# Patient Record
Sex: MALE | Race: WHITE | NOT HISPANIC OR LATINO | Employment: OTHER | URBAN - METROPOLITAN AREA
[De-identification: names, ages, dates, MRNs, and addresses within clinical notes are randomized per-mention and may not be internally consistent; named-entity substitution may affect disease eponyms.]

---

## 2017-01-20 ENCOUNTER — OFFICE VISIT (OUTPATIENT)
Dept: INTERNAL MEDICINE | Facility: CLINIC | Age: 52
End: 2017-01-20
Payer: COMMERCIAL

## 2017-01-20 VITALS
SYSTOLIC BLOOD PRESSURE: 134 MMHG | OXYGEN SATURATION: 95 % | HEIGHT: 64 IN | BODY MASS INDEX: 34.52 KG/M2 | HEART RATE: 93 BPM | WEIGHT: 202.19 LBS | DIASTOLIC BLOOD PRESSURE: 84 MMHG

## 2017-01-20 DIAGNOSIS — G25.81 RLS (RESTLESS LEGS SYNDROME): Primary | ICD-10-CM

## 2017-01-20 PROCEDURE — 99213 OFFICE O/P EST LOW 20 MIN: CPT | Mod: S$GLB,,, | Performed by: NURSE PRACTITIONER

## 2017-01-20 PROCEDURE — 99999 PR PBB SHADOW E&M-EST. PATIENT-LVL III: CPT | Mod: PBBFAC,,, | Performed by: NURSE PRACTITIONER

## 2017-01-20 PROCEDURE — 99213 OFFICE O/P EST LOW 20 MIN: CPT | Mod: PBBFAC,PN | Performed by: NURSE PRACTITIONER

## 2017-01-20 RX ORDER — ROPINIROLE 0.5 MG/1
0.5 TABLET, FILM COATED ORAL 3 TIMES DAILY
Qty: 90 TABLET | Refills: 2 | Status: SHIPPED | OUTPATIENT
Start: 2017-01-20 | End: 2017-06-20

## 2017-01-20 RX ORDER — ACETAMINOPHEN 325 MG/1
325 TABLET ORAL EVERY 6 HOURS PRN
COMMUNITY
End: 2018-06-13

## 2017-01-20 NOTE — MR AVS SNAPSHOT
[facility] [address] [address]  Phone: [phone]  Fax: [phone]                  Michael Guillaume   2017 9:00 AM   Office Visit    Description:  Male : 1965   Provider:  Latrice Zafar NP   Department:  [facility] Medicine           Reason for Visit     Insomnia     Leg Pain           Diagnoses this Visit        Comments    RLS (restless legs syndrome)    -  Primary            To Do List           Future Appointments        Provider Department Dept Phone    3/22/2017 8:00 AM [place], [facility] [phone]    3/28/2017 7:30 AM Latrice Zafar NP [facility] [phone]      Goals (5 Years of Data)     None      Follow-Up and Disposition     Return if symptoms worsen or fail to improve.       These Medications        Disp Refills Start End    ropinirole (REQUIP) 0.5 MG tablet 90 tablet 2 2017    Take 1 tablet (0.5 mg total) by mouth 3 (three) times daily. - Oral    Pharmacy: [facility] #: [phone]         [facility] On Call     [facility] On Call Nurse Care Line -  Assistance  Registered nurses in the [facility] provide clinical advisement, health education, appointment booking, and other advisory services.  Call for this free service at [phone].             Medications           Message regarding Medications     Verify the changes and/or additions to your medication regime listed below are the same as discussed with your clinician today.  If any of these changes or additions are incorrect, please notify your healthcare provider.        START taking these NEW medications        Refills    ropinirole (REQUIP) 0.5 MG tablet 2    Sig: Take 1 tablet (0.5 mg total) by mouth 3 (three) times daily.    Class: Normal    Route: Oral           Verify that the below list of medications is an accurate representation  of the medications you are currently taking.  If none reported, the list may be blank. If incorrect, please contact your healthcare provider. Carry this list with you in case of emergency.           Current Medications     acetaminophen (TYLENOL) 325 MG tablet Take 325 mg by mouth every 6 (six) hours as needed for Pain.    alprazolam (XANAX) 1 MG tablet TAKE ONE TABLET BY MOUTH THREE TIMES A DAY AS     NEEDED    aspirin 81 mg Tab Take 1 tablet by mouth once daily.     carvedilol (COREG) 25 MG tablet TAKE 2 TABLETS BY MOUTH TWICE A DAY    clopidogrel (PLAVIX) 75 mg tablet Take 1 tablet (75 mg total) by mouth once daily.    esomeprazole (NEXIUM) 40 MG capsule TAKE ONE CAPSULE BY MOUTH EVERY DAY    hydrochlorothiazide (HYDRODIURIL) 12.5 MG Tab Take 1 tablet (12.5 mg total) by mouth once daily.    MULTI-VITAMIN HI-PO ORAL Take by mouth once daily.     ramipril (ALTACE) 10 MG capsule Take 1 capsule (10 mg total) by mouth 2 (two) times daily.    rosuvastatin (CRESTOR) 20 MG tablet Take 1 tablet (20 mg total) by mouth once daily.    SYMBICORT 80-4.5 mcg/actuation HFAA INHALE 2 PUFFS TWICE A   DAY AS DIRECTED    albuterol (PROVENTIL) 2.5 mg /3 mL (0.083 %) nebulizer solution Take 3 mLs (2.5 mg total) by nebulization every 6 (six) hours as needed for Wheezing.    albuterol-ipratropium 2.5mg-0.5mg/3mL (DUO-NEB) 0.5 mg-3 mg(2.5 mg base)/3 mL nebulizer solution Take 3 mLs by nebulization every 6 (six) hours as needed for Wheezing.    carvedilol (COREG) 25 MG tablet Take 2 tablets (50 mg total) by mouth 2 (two) times daily.    ropinirole (REQUIP) 0.5 MG tablet Take 1 tablet (0.5 mg total) by mouth 3 (three) times daily.    vitamin E 400 UNIT capsule Take 400 Units by mouth once daily.             Clinical Reference Information           Vital Signs - Last Recorded  Most recent update: 1/20/2017  9:11 AM by Sanchez Méndez MA    BP Pulse Ht Wt SpO2 BMI    134/84 (BP Location: Right arm, Patient Position: Sitting, BP Method: Manual)  "93 5' 4" (1.626 m) 91.7 kg (202 lb 2.6 oz) 95% 34.7 kg/m2      Blood Pressure          Most Recent Value    BP  134/84      Allergies as of 1/20/2017     Lipitor  [Atorvastatin]      Immunizations Administered on Date of Encounter - 1/20/2017     None      Instructions      Understanding Restless Legs Syndrome  Are you ever annoyed by a creeping or itching feeling in your legs? Do you often feel an urge to move your legs while sitting or lying in bed? This can keep you from falling asleep at night. You may then feel tired during the day. If you have these problems, talk to your health care provider. He or she can suggest a treatment plan and help you find ways to sleep better.    Restless legs syndrome (RLS)  RLS is a creeping, crawly, or jumpy feeling in the legs with an urge to move them. Symptoms of RLS often occur during periods of inactivity, such as when you sit or lie down at night. This discomfort can keep you from falling asleep. RLS is more common in older people and tends to run in families. Overuse of caffeine or alcohol may make symptoms worse. Iron deficiency, diabetes, or kidney problems can contribute to RLS.  Periodic limb movement syndrome (PLMS)  PLMS is sudden, repetitive leg jerking during sleep. The person you sleep with is often the one who notices it. Your legs may jerk many times during the night. You and your partner may both have trouble sleeping and feel tired in the morning. PLMS shouldnt be confused with the normal leg or body twitching many people have when first falling asleep.  Treating these problems  If these problems are causing disrupted sleep and daytime symptoms, treatment may be needed. Possible treatments may include:  · Avoiding medications like antidepressants, antinausea medications, and antipsychotic medications.   · Prescribed medications.  · Lifestyle changes, such as controlling caffeine intake, alcohol, and smoking.  © 7444-1071 [employer] [address] [place] [address]. All rights reserved. This information is not intended as a substitute for professional medical care. Always follow your healthcare professional's instructions.        Restless Legs Syndrome: What You Can Do  Symptoms of restless leg syndrome (RLS) can be treated. Together, you and your health care provider can work on your treatment plan. If needed, medications may be prescribed. Also learn what you can do to ease your discomfort. Good sleep habits and a healthy lifestyle will help you rest better at night and have more energy during the day.    Working with your health care provider  RLS may occur on its own and may be passed on in families. It is sometimes linked to other medical problems. Low iron may cause some RLS symptoms. Your health care provider may order a lab test to check your iron level. Other medical problems associated with RLS are kidney disease, diabetes, and multiple sclerosis. Your doctor may prescribe medications to reduce your symptoms and help you sleep better.  Tips for temporary relief  To reduce your discomfort, try the following:  · Walking or stretching  · Rubbing your legs  · Having a massage  · Taking a hot or cold bath  · Doing activities that make muscles in your hands or legs work  · Relaxing with yoga or meditation  Good sleep habits  Even though you have RLS, you can still have restful sleep. Try these good sleeping habits:  · Keep a regular sleep schedule. Go to bed and get up at the same time each day.  · Avoid or limit naps.  · Make sure the bedroom is quiet, dark, and not too hot or too cold.  · Use your bed only for sleep and sex.  Healthy lifestyle  Your lifestyle affects your health and your sleep. Here are some healthy habits:  · Eat a balanced diet. To get enough vitamins and minerals, you may also need to take supplements.  · Manage stress and learn ways to relax. Deep breathing techniques and visualization can help to relax your  muscles and calm your mind.  · Exercise regularly. It can help reduce stress. Also, you will have more energy during the day and be more tired at bedtime. Afternoon exercise is best. Nighttime exercise may affect how well you sleep.  · Avoid alcohol, nicotine, and caffeine.  © 8357-0618 [employer]. [address] [place] [address], [place] [address]. All rights reserved. This information is not intended as a substitute for professional medical care. Always follow your healthcare professional's instructions.

## 2017-01-20 NOTE — PROGRESS NOTES
Subjective:       Patient ID: Michael Guillaume is a 51 y.o. male.    Chief Complaint: Insomnia and Leg Pain    Patient is known, to me and presents with   Chief Complaint   Patient presents with    Insomnia    Leg Pain   .  Denies chest pain and shortness of breath.  Patient presents with insomnia due to leg discomfort and has to move legs especially at night  HPI  Review of Systems   Constitutional: Negative.    Respiratory: Negative.    Cardiovascular: Negative.    Musculoskeletal: Positive for arthralgias.   Skin: Negative.    Neurological: Negative.    Psychiatric/Behavioral: Positive for sleep disturbance. Negative for suicidal ideas. The patient is not nervous/anxious.        Objective:      Physical Exam   Constitutional: He is oriented to person, place, and time. He appears well-developed and well-nourished. No distress.   Neck: Normal range of motion. Neck supple. No JVD present. No thyromegaly present.   Cardiovascular: Normal rate, regular rhythm and normal heart sounds.    No murmur heard.  Pulmonary/Chest: Effort normal and breath sounds normal. No stridor. He has no wheezes.   Musculoskeletal: Normal range of motion. He exhibits no edema, tenderness or deformity.   Lymphadenopathy:     He has no cervical adenopathy.   Neurological: He is alert and oriented to person, place, and time. He has normal reflexes. He displays normal reflexes. No cranial nerve deficit. He exhibits normal muscle tone. Coordination normal.   Skin: Skin is warm and dry. No rash noted. He is not diaphoretic. No erythema. No pallor.   Psychiatric: He has a normal mood and affect. His behavior is normal. Judgment and thought content normal.       Assessment:       1. RLS (restless legs syndrome)        Plan:   Michael BELL was seen today for insomnia and leg pain.    Diagnoses and all orders for this visit:    RLS (restless legs syndrome)  -     ropinirole (REQUIP) 0.5 MG tablet; Take 1 tablet (0.5 mg total) by mouth 3 (three) times  "daily.  "This note will not be shared with the patient."  Will try requip  Will let me know if helps  RTC as scheduled  "

## 2017-01-20 NOTE — PATIENT INSTRUCTIONS
Understanding Restless Legs Syndrome  Are you ever annoyed by a creeping or itching feeling in your legs? Do you often feel an urge to move your legs while sitting or lying in bed? This can keep you from falling asleep at night. You may then feel tired during the day. If you have these problems, talk to your health care provider. He or she can suggest a treatment plan and help you find ways to sleep better.    Restless legs syndrome (RLS)  RLS is a creeping, crawly, or jumpy feeling in the legs with an urge to move them. Symptoms of RLS often occur during periods of inactivity, such as when you sit or lie down at night. This discomfort can keep you from falling asleep. RLS is more common in older people and tends to run in families. Overuse of caffeine or alcohol may make symptoms worse. Iron deficiency, diabetes, or kidney problems can contribute to RLS.  Periodic limb movement syndrome (PLMS)  PLMS is sudden, repetitive leg jerking during sleep. The person you sleep with is often the one who notices it. Your legs may jerk many times during the night. You and your partner may both have trouble sleeping and feel tired in the morning. PLMS shouldnt be confused with the normal leg or body twitching many people have when first falling asleep.  Treating these problems  If these problems are causing disrupted sleep and daytime symptoms, treatment may be needed. Possible treatments may include:  · Avoiding medications like antidepressants, antinausea medications, and antipsychotic medications.   · Prescribed medications.  · Lifestyle changes, such as controlling caffeine intake, alcohol, and smoking.  © 0019-1323 [employer]. [address] [place] [address], [place] [address]. All rights reserved. This information is not intended as a substitute for professional medical care. Always follow your healthcare professional's instructions.        Restless Legs Syndrome: What You Can Do  Symptoms of restless leg syndrome  (RLS) can be treated. Together, you and your health care provider can work on your treatment plan. If needed, medications may be prescribed. Also learn what you can do to ease your discomfort. Good sleep habits and a healthy lifestyle will help you rest better at night and have more energy during the day.    Working with your health care provider  RLS may occur on its own and may be passed on in families. It is sometimes linked to other medical problems. Low iron may cause some RLS symptoms. Your health care provider may order a lab test to check your iron level. Other medical problems associated with RLS are kidney disease, diabetes, and multiple sclerosis. Your doctor may prescribe medications to reduce your symptoms and help you sleep better.  Tips for temporary relief  To reduce your discomfort, try the following:  · Walking or stretching  · Rubbing your legs  · Having a massage  · Taking a hot or cold bath  · Doing activities that make muscles in your hands or legs work  · Relaxing with yoga or meditation  Good sleep habits  Even though you have RLS, you can still have restful sleep. Try these good sleeping habits:  · Keep a regular sleep schedule. Go to bed and get up at the same time each day.  · Avoid or limit naps.  · Make sure the bedroom is quiet, dark, and not too hot or too cold.  · Use your bed only for sleep and sex.  Healthy lifestyle  Your lifestyle affects your health and your sleep. Here are some healthy habits:  · Eat a balanced diet. To get enough vitamins and minerals, you may also need to take supplements.  · Manage stress and learn ways to relax. Deep breathing techniques and visualization can help to relax your muscles and calm your mind.  · Exercise regularly. It can help reduce stress. Also, you will have more energy during the day and be more tired at bedtime. Afternoon exercise is best. Nighttime exercise may affect how well you sleep.  · Avoid alcohol, nicotine, and caffeine.  ©  5916-9704 [employer]. [address] [place] [address], [place] [address]. All rights reserved. This information is not intended as a substitute for professional medical care. Always follow your healthcare professional's instructions.

## 2017-02-09 ENCOUNTER — OFFICE VISIT (OUTPATIENT)
Dept: INTERNAL MEDICINE | Facility: CLINIC | Age: 52
End: 2017-02-09
Payer: COMMERCIAL

## 2017-02-09 VITALS
RESPIRATION RATE: 18 BRPM | BODY MASS INDEX: 34.15 KG/M2 | SYSTOLIC BLOOD PRESSURE: 144 MMHG | WEIGHT: 200 LBS | HEIGHT: 64 IN | HEART RATE: 80 BPM | DIASTOLIC BLOOD PRESSURE: 92 MMHG | OXYGEN SATURATION: 95 % | TEMPERATURE: 97 F

## 2017-02-09 DIAGNOSIS — S39.012A LUMBAR STRAIN, INITIAL ENCOUNTER: Primary | ICD-10-CM

## 2017-02-09 LAB
BILIRUB SERPL-MCNC: NORMAL MG/DL
BLOOD URINE, POC: NORMAL
COLOR, POC UA: CLEAR
GLUCOSE UR QL STRIP: NORMAL
KETONES UR QL STRIP: NORMAL
LEUKOCYTE ESTERASE URINE, POC: NORMAL
NITRITE, POC UA: NORMAL
PH, POC UA: 6
PROTEIN, POC: NORMAL
SPECIFIC GRAVITY, POC UA: 1.01
UROBILINOGEN, POC UA: NORMAL

## 2017-02-09 PROCEDURE — 96372 THER/PROPH/DIAG INJ SC/IM: CPT | Mod: PBBFAC,PN

## 2017-02-09 PROCEDURE — 81001 URINALYSIS AUTO W/SCOPE: CPT | Mod: PBBFAC,PN | Performed by: NURSE PRACTITIONER

## 2017-02-09 PROCEDURE — 99214 OFFICE O/P EST MOD 30 MIN: CPT | Mod: PBBFAC,PN | Performed by: NURSE PRACTITIONER

## 2017-02-09 PROCEDURE — 99213 OFFICE O/P EST LOW 20 MIN: CPT | Mod: S$GLB,,, | Performed by: NURSE PRACTITIONER

## 2017-02-09 PROCEDURE — 99999 PR PBB SHADOW E&M-EST. PATIENT-LVL IV: CPT | Mod: PBBFAC,,, | Performed by: NURSE PRACTITIONER

## 2017-02-09 RX ORDER — CYCLOBENZAPRINE HCL 10 MG
10 TABLET ORAL NIGHTLY PRN
Qty: 30 TABLET | Refills: 0 | Status: SHIPPED | OUTPATIENT
Start: 2017-02-09 | End: 2017-02-19

## 2017-02-09 RX ORDER — METHYLPREDNISOLONE ACETATE 80 MG/ML
80 INJECTION, SUSPENSION INTRA-ARTICULAR; INTRALESIONAL; INTRAMUSCULAR; SOFT TISSUE
Status: COMPLETED | OUTPATIENT
Start: 2017-02-09 | End: 2017-02-09

## 2017-02-09 RX ADMIN — METHYLPREDNISOLONE ACETATE 80 MG: 80 INJECTION, SUSPENSION INTRA-ARTICULAR; INTRALESIONAL; INTRAMUSCULAR; SOFT TISSUE at 09:02

## 2017-02-09 NOTE — MR AVS SNAPSHOT
[facility] [address] [address]  Phone: [phone]  Fax: [phone]                  Michael Guillaume   2017 9:30 AM   Office Visit    Description:  Male : 1965   Provider:  Latrice Zafar NP   Department:  [facility] Internal Medicine           Reason for Visit     Back Pain           Diagnoses this Visit        Comments    Lumbar strain, initial encounter    -  Primary            To Do List           Future Appointments        Provider Department Dept Phone    3/22/2017 8:00 AM [place], [facility] [phone]    3/28/2017 7:30 AM Latrice Zafar NP [facility] [phone]      Goals (5 Years of Data)     None      Follow-Up and Disposition     Return if symptoms worsen or fail to improve.       These Medications        Disp Refills Start End    cyclobenzaprine (FLEXERIL) 10 MG tablet 30 tablet 0 2017    Take 1 tablet (10 mg total) by mouth nightly as needed. - Oral    [facility] [address] Ph #: [phone]         [facility] On Call     [facility] On Call Nurse Care Line -  Assistance  Registered nurses in the [facility] provide clinical advisement, health education, appointment booking, and other advisory services.  Call for this free service at [phone].             Medications           Message regarding Medications     Verify the changes and/or additions to your medication regime listed below are the same as discussed with your clinician today.  If any of these changes or additions are incorrect, please notify your healthcare provider.        START taking these NEW medications        Refills    cyclobenzaprine (FLEXERIL) 10 MG tablet 0    Sig: Take 1 tablet (10 mg total) by mouth nightly as needed.    Class: Normal    Route: Oral      These medications were administered today        Dose Freq    methylPREDNISolone  acetate injection 80 mg 80 mg Clinic/HOD 1 time    Sig: Inject 1 mL (80 mg total) into the muscle one time.    Class: Normal    Route: Intramuscular           Verify that the below list of medications is an accurate representation of the medications you are currently taking.  If none reported, the list may be blank. If incorrect, please contact your healthcare provider. Carry this list with you in case of emergency.           Current Medications     acetaminophen (TYLENOL) 325 MG tablet Take 325 mg by mouth every 6 (six) hours as needed for Pain.    albuterol-ipratropium 2.5mg-0.5mg/3mL (DUO-NEB) 0.5 mg-3 mg(2.5 mg base)/3 mL nebulizer solution Take 3 mLs by nebulization every 6 (six) hours as needed for Wheezing.    alprazolam (XANAX) 1 MG tablet TAKE ONE TABLET BY MOUTH THREE TIMES A DAY AS     NEEDED    aspirin 81 mg Tab Take 1 tablet by mouth once daily.     carvedilol (COREG) 25 MG tablet Take 2 tablets (50 mg total) by mouth 2 (two) times daily.    clopidogrel (PLAVIX) 75 mg tablet Take 1 tablet (75 mg total) by mouth once daily.    esomeprazole (NEXIUM) 40 MG capsule TAKE ONE CAPSULE BY MOUTH EVERY DAY    hydrochlorothiazide (HYDRODIURIL) 12.5 MG Tab Take 1 tablet (12.5 mg total) by mouth once daily.    MULTI-VITAMIN HI-PO ORAL Take by mouth once daily.     ramipril (ALTACE) 10 MG capsule Take 1 capsule (10 mg total) by mouth 2 (two) times daily.    ropinirole (REQUIP) 0.5 MG tablet Take 1 tablet (0.5 mg total) by mouth 3 (three) times daily.    rosuvastatin (CRESTOR) 20 MG tablet Take 1 tablet (20 mg total) by mouth once daily.    SYMBICORT 80-4.5 mcg/actuation HFAA INHALE 2 PUFFS TWICE A   DAY AS DIRECTED    vitamin E 400 UNIT capsule Take 400 Units by mouth once daily.      albuterol (PROVENTIL) 2.5 mg /3 mL (0.083 %) nebulizer solution Take 3 mLs (2.5 mg total) by nebulization every 6 (six) hours as needed for Wheezing.    cyclobenzaprine (FLEXERIL) 10 MG tablet Take 1 tablet (10 mg total) by mouth  "nightly as needed.           Clinical Reference Information           Your Vitals Were     BP Pulse Temp Resp Height Weight    144/92 80 97.4 °F (36.3 °C) (Oral) 18 5' 4" (1.626 m) 90.7 kg (200 lb)    SpO2 BMI             95% 34.33 kg/m2         Blood Pressure          Most Recent Value    BP  (!)  144/92      Allergies as of 2/9/2017     Lipitor  [Atorvastatin]      Immunizations Administered on Date of Encounter - 2/9/2017     None      Orders Placed During Today's Visit      Normal Orders This Visit    POCT urinalysis, dipstick or tablet reag       Instructions      Understanding Lumbosacral Strain    Lumbosacral strain is a medical term for an injury that causes low back pain. The lumbosacral area (low back) is between the bottom of the ribcage and the top of the buttocks. A strain is tearing of muscles and tendons. These tears can be very small but still cause pain.  How a lumbosacral strain happens  Muscles and tendons connected to the spine can be strained in a number of ways:  · Sitting or standing in the same position for long periods of time. This can harm the low back over time. Poor posture can make low back pain more likely.  · Moving the muscles and tendons past their usual range of motion. This can cause a sudden injury. This can happen when you twist, bend over, or lift something heavy. Not using correct technique for sports or tasks like lifting can make back injury more likely.  · Accidents or falls  Lumbosacral strain can be caused by other problems, but these are less common.  Symptoms of lumbosacral strain  Symptoms may include:  · Pain in the back, often on one side  · Pain that gets worse with movement and gets better with rest  · Inability to move as freely as usual  · Swelling, slight redness, and skin warmth in the painful area  Treatment for lumbosacral strain  Low back pain often goes away by itself within several weeks. But it often comes back. Treatment focuses on reducing pain and " avoiding further injury. Bed rest is usually not recommended for low back pain. Treatments may include:  · Avoiding or changing the action that caused the problem. This helps prevent injuring the tissues again.  · Prescription or over-the-counter pain medicines. These help reduce inflammation, swelling, and pain.  · Cold or heat packs. These help reduce pain and swelling.  · Stretching and other exercises. These improve flexibility and strength.  · Physical therapy. This usually includes exercises and other treatments.  · Injections of medicine. This may relieve symptoms.  If these treatments do not relieve symptoms, your healthcare provider may order imaging tests to learn more about the problem. Sometimes you may need surgery.  Possible complications of lumbosacral strain  If the cause of the pain is not addressed, symptoms may return or get worse. Follow your healthcare providers instructions on lifestyle changes and treating your back.     When to call your healthcare provider  Call your healthcare provider right away if you have any of these:  · Fever of 100.4°F (38°C) or higher, or as directed  · Numbness, tingling, or weakness  · Problems with bowel or bladder control, or problems having sex  · Pain that does not go away, or gets worse  · New symptoms   Date Last Reviewed: 3/10/2016  © 2624-1962 [employer]. [address] [place] [address]. All rights reserved. This information is not intended as a substitute for professional medical care. Always follow your healthcare professional's instructions.             Language Assistance Services     ATTENTION: Language assistance services are available, free of charge. Please call [phone].      ATENCIÓN: Si habla don, tiene a iglesias disposición servicios gratuitos de asistencia lingüística. Llame al [phone].     [facility] Ý: N?u b?n nói Ti?ng Vi?t, có các d?ch v? h? tr? ngôn ng? mi?n phí dành cho b?n. G?i s? [phone].          [facility] complies with applicable Federal civil rights laws and does not discriminate on the basis of race, color, national origin, age, disability, or sex.

## 2017-02-09 NOTE — PATIENT INSTRUCTIONS
Understanding Lumbosacral Strain    Lumbosacral strain is a medical term for an injury that causes low back pain. The lumbosacral area (low back) is between the bottom of the ribcage and the top of the buttocks. A strain is tearing of muscles and tendons. These tears can be very small but still cause pain.  How a lumbosacral strain happens  Muscles and tendons connected to the spine can be strained in a number of ways:  · Sitting or standing in the same position for long periods of time. This can harm the low back over time. Poor posture can make low back pain more likely.  · Moving the muscles and tendons past their usual range of motion. This can cause a sudden injury. This can happen when you twist, bend over, or lift something heavy. Not using correct technique for sports or tasks like lifting can make back injury more likely.  · Accidents or falls  Lumbosacral strain can be caused by other problems, but these are less common.  Symptoms of lumbosacral strain  Symptoms may include:  · Pain in the back, often on one side  · Pain that gets worse with movement and gets better with rest  · Inability to move as freely as usual  · Swelling, slight redness, and skin warmth in the painful area  Treatment for lumbosacral strain  Low back pain often goes away by itself within several weeks. But it often comes back. Treatment focuses on reducing pain and avoiding further injury. Bed rest is usually not recommended for low back pain. Treatments may include:  · Avoiding or changing the action that caused the problem. This helps prevent injuring the tissues again.  · Prescription or over-the-counter pain medicines. These help reduce inflammation, swelling, and pain.  · Cold or heat packs. These help reduce pain and swelling.  · Stretching and other exercises. These improve flexibility and strength.  · Physical therapy. This usually includes exercises and other treatments.  · Injections of medicine. This may relieve  symptoms.  If these treatments do not relieve symptoms, your healthcare provider may order imaging tests to learn more about the problem. Sometimes you may need surgery.  Possible complications of lumbosacral strain  If the cause of the pain is not addressed, symptoms may return or get worse. Follow your healthcare providers instructions on lifestyle changes and treating your back.     When to call your healthcare provider  Call your healthcare provider right away if you have any of these:  · Fever of 100.4°F (38°C) or higher, or as directed  · Numbness, tingling, or weakness  · Problems with bowel or bladder control, or problems having sex  · Pain that does not go away, or gets worse  · New symptoms   Date Last Reviewed: 3/10/2016  © 1318-8017 [employer]. [address] [place] [address], [place] [address]. All rights reserved. This information is not intended as a substitute for professional medical care. Always follow your healthcare professional's instructions.

## 2017-02-09 NOTE — PROGRESS NOTES
Subjective:       Patient ID: Michael Guillaume is a 52 y.o. male.    Chief Complaint: Back Pain (lower back pain x monday PS:8)    Patient is known, to me and presents with   Chief Complaint   Patient presents with    Back Pain     lower back pain x monday PS:8   .  Denies chest pain and shortness of breath.  Patient presents with lower back pain and wants to have his urine tested. Did not lift on any thing but states that it feels like his lumbar region   HPI  Review of Systems   Respiratory: Negative.    Cardiovascular: Negative.    Genitourinary: Negative.    Musculoskeletal: Positive for arthralgias and back pain.   Skin: Negative.        Objective:      Physical Exam   Constitutional: He is oriented to person, place, and time. He appears well-developed and well-nourished. No distress.   Neck: Normal range of motion. Neck supple. No JVD present. No thyromegaly present.   Cardiovascular: Normal rate, regular rhythm and normal heart sounds.    No murmur heard.  Pulmonary/Chest: Effort normal and breath sounds normal. No stridor. He has no wheezes.   Genitourinary:   Genitourinary Comments: See dip   Musculoskeletal: He exhibits tenderness. He exhibits no edema or deformity.   Limited rom to lumbar spine and tenderness to lower back region   Lymphadenopathy:     He has no cervical adenopathy.   Neurological: He is alert and oriented to person, place, and time. He has normal reflexes. He displays normal reflexes. No cranial nerve deficit. He exhibits normal muscle tone. Coordination normal.   Skin: Skin is warm and dry. No rash noted. He is not diaphoretic. No erythema. No pallor.       Assessment:       1. Lumbar strain, initial encounter        Plan:   Michael BELL was seen today for back pain.    Diagnoses and all orders for this visit:    Lumbar strain, initial encounter  -     methylPREDNISolone acetate injection 80 mg; Inject 1 mL (80 mg total) into the muscle one time.  -     cyclobenzaprine (FLEXERIL) 10 MG  "tablet; Take 1 tablet (10 mg total) by mouth nightly as needed.  -     POCT urinalysis, dipstick or tablet reag  "This note will not be shared with the patient."  Urine clear  If no improvement will need to see me  RTC as scheduled  "

## 2017-02-13 DIAGNOSIS — K21.9 GASTROESOPHAGEAL REFLUX DISEASE, ESOPHAGITIS PRESENCE NOT SPECIFIED: ICD-10-CM

## 2017-02-13 RX ORDER — ESOMEPRAZOLE MAGNESIUM 40 MG/1
CAPSULE, DELAYED RELEASE ORAL
Qty: 30 CAPSULE | Refills: 3 | Status: SHIPPED | OUTPATIENT
Start: 2017-02-13 | End: 2017-03-23 | Stop reason: SDUPTHER

## 2017-03-06 DIAGNOSIS — F41.9 ANXIETY: ICD-10-CM

## 2017-03-06 RX ORDER — ALPRAZOLAM 1 MG/1
TABLET ORAL
Qty: 90 TABLET | Refills: 3 | Status: SHIPPED | OUTPATIENT
Start: 2017-03-06 | End: 2017-06-30 | Stop reason: SDUPTHER

## 2017-03-06 NOTE — TELEPHONE ENCOUNTER
Requested Prescriptions     Pending Prescriptions Disp Refills    alprazolam (XANAX) 1 MG tablet 90 tablet 3     Sig: TAKE ONE TABLET BY MOUTH THREE TIMES A DAY AS     NEEDED

## 2017-03-13 ENCOUNTER — OFFICE VISIT (OUTPATIENT)
Dept: INTERNAL MEDICINE | Facility: CLINIC | Age: 52
End: 2017-03-13
Payer: COMMERCIAL

## 2017-03-13 ENCOUNTER — TELEPHONE (OUTPATIENT)
Dept: INTERNAL MEDICINE | Facility: CLINIC | Age: 52
End: 2017-03-13

## 2017-03-13 VITALS
BODY MASS INDEX: 34.25 KG/M2 | SYSTOLIC BLOOD PRESSURE: 124 MMHG | HEIGHT: 64 IN | RESPIRATION RATE: 14 BRPM | HEART RATE: 102 BPM | WEIGHT: 200.63 LBS | DIASTOLIC BLOOD PRESSURE: 72 MMHG | TEMPERATURE: 99 F | OXYGEN SATURATION: 97 %

## 2017-03-13 DIAGNOSIS — J44.1 COPD EXACERBATION: Primary | ICD-10-CM

## 2017-03-13 DIAGNOSIS — J44.1 COPD EXACERBATION: ICD-10-CM

## 2017-03-13 PROCEDURE — 99213 OFFICE O/P EST LOW 20 MIN: CPT | Mod: S$GLB,,, | Performed by: NURSE PRACTITIONER

## 2017-03-13 PROCEDURE — 96372 THER/PROPH/DIAG INJ SC/IM: CPT | Mod: PBBFAC,PN

## 2017-03-13 PROCEDURE — 99999 PR PBB SHADOW E&M-EST. PATIENT-LVL IV: CPT | Mod: PBBFAC,,, | Performed by: NURSE PRACTITIONER

## 2017-03-13 PROCEDURE — 99214 OFFICE O/P EST MOD 30 MIN: CPT | Mod: PBBFAC,PN,25 | Performed by: NURSE PRACTITIONER

## 2017-03-13 RX ORDER — METHYLPREDNISOLONE ACETATE 80 MG/ML
80 INJECTION, SUSPENSION INTRA-ARTICULAR; INTRALESIONAL; INTRAMUSCULAR; SOFT TISSUE
Status: COMPLETED | OUTPATIENT
Start: 2017-03-13 | End: 2017-03-13

## 2017-03-13 RX ORDER — PROMETHAZINE HYDROCHLORIDE AND DEXTROMETHORPHAN HYDROBROMIDE 6.25; 15 MG/5ML; MG/5ML
5 SYRUP ORAL EVERY 6 HOURS PRN
Qty: 120 ML | Refills: 0 | Status: SHIPPED | OUTPATIENT
Start: 2017-03-13 | End: 2017-03-14 | Stop reason: SDUPTHER

## 2017-03-13 RX ORDER — DOXYCYCLINE HYCLATE 100 MG
100 TABLET ORAL EVERY 12 HOURS
Qty: 14 TABLET | Refills: 0 | Status: SHIPPED | OUTPATIENT
Start: 2017-03-13 | End: 2017-03-20

## 2017-03-13 RX ORDER — PROMETHAZINE HYDROCHLORIDE AND DEXTROMETHORPHAN HYDROBROMIDE 6.25; 15 MG/5ML; MG/5ML
5 SYRUP ORAL EVERY 6 HOURS PRN
Qty: 120 ML | Refills: 0 | Status: SHIPPED | OUTPATIENT
Start: 2017-03-13 | End: 2017-03-13 | Stop reason: SDUPTHER

## 2017-03-13 RX ADMIN — METHYLPREDNISOLONE ACETATE 80 MG: 80 INJECTION, SUSPENSION INTRA-ARTICULAR; INTRALESIONAL; INTRAMUSCULAR; SOFT TISSUE at 02:03

## 2017-03-13 NOTE — PROGRESS NOTES
Subjective:       Patient ID: Michael Guillaume is a 52 y.o. male.    Chief Complaint: Headache; Cough; Chest Congestion; and Shortness of Breath    Patient is known, to me and presents with   Chief Complaint   Patient presents with    Headache    Cough    Chest Congestion    Shortness of Breath   .  Denies chest pain and shortness of breath.    HPI  Review of Systems   Constitutional: Positive for chills, fatigue and fever.   HENT: Positive for congestion, postnasal drip and sore throat.    Eyes: Negative.    Respiratory: Positive for cough, shortness of breath and wheezing.    Cardiovascular: Negative.    Skin: Negative.    Neurological: Positive for headaches.   Hematological: Negative.        Objective:      Physical Exam   Constitutional: He appears well-developed and well-nourished. No distress.   HENT:   Head: Normocephalic and atraumatic.   Right Ear: Tympanic membrane mobility is abnormal.   Left Ear: Tympanic membrane mobility is abnormal.   Nose: Mucosal edema present.   Mouth/Throat: Posterior oropharyngeal erythema present. No oropharyngeal exudate.   Eyes: Conjunctivae are normal. Right eye exhibits no discharge. Left eye exhibits no discharge.   Neck: Normal range of motion. Neck supple. No JVD present. No thyromegaly present.   Cardiovascular: Normal rate, regular rhythm and normal heart sounds.    No murmur heard.  Pulmonary/Chest: Effort normal. No stridor. He has wheezes in the right lower field and the left lower field.   Lymphadenopathy:     He has no cervical adenopathy.   Skin: Skin is warm and dry. No rash noted. He is not diaphoretic. No erythema. No pallor.       Assessment:       1. COPD exacerbation        Plan:   Michael BELL was seen today for headache, cough, chest congestion and shortness of breath.    Diagnoses and all orders for this visit:    COPD exacerbation  -     methylPREDNISolone acetate injection 80 mg; Inject 1 mL (80 mg total) into the muscle one time.  -     doxycycline  "(VIBRA-TABS) 100 MG tablet; Take 1 tablet (100 mg total) by mouth every 12 (twelve) hours.  -     promethazine-dextromethorphan (PROMETHAZINE-DM) 6.25-15 mg/5 mL Syrp; Take 5 mLs by mouth every 6 (six) hours as needed.  -     POCT Influenza A/B  "This note will not be shared with the patient."  Keep hydrated  RTC as scheduled  "

## 2017-03-13 NOTE — TELEPHONE ENCOUNTER
----- Message from Crys Muñoz MA sent at 3/13/2017  2:23 PM CDT -----  Contact: Patient  Michael Guillaume  MRN: [id number]  : 1965  PCP: Latrice Zafar  Home Phone      [phone]  Work Phone      Not on file.  Mobile          [phone]      MESSAGE: [facility] is out of cough syrup. Please ask Latrice to resend cough syrup to [facility].

## 2017-03-13 NOTE — MR AVS SNAPSHOT
[facility]  [address] [facility] [address]  Phone: [phone]  Fax: [phone]                  Michael Guillaume   3/13/2017 1:30 PM   Office Visit    Description:  Male : 1965   Provider:  Latrice Zafar NP   Department:  [facility]           Reason for Visit     Headache     Cough     Chest Congestion     Shortness of Breath           Diagnoses this Visit        Comments    COPD exacerbation    -  Primary            To Do List           Future Appointments        Provider Department Dept Phone    3/22/2017 8:00 AM [place], [facility] [phone]    3/28/2017 7:30 AM Latrice Zafar NP [facility] [phone]      Goals (5 Years of Data)     None      Follow-Up and Disposition     Return if symptoms worsen or fail to improve.       These Medications        Disp Refills Start End    doxycycline (VIBRA-TABS) 100 MG tablet 14 tablet 0 3/13/2017 3/20/2017    Take 1 tablet (100 mg total) by mouth every 12 (twelve) hours. - Oral    Pharmacy: [address] Ph #: [phone]       promethazine-dextromethorphan (PROMETHAZINE-DM) 6.25-15 mg/5 mL Syrp 120 mL 0 3/13/2017 3/20/2017    Take 5 mLs by mouth every 6 (six) hours as needed. - Oral    Pharmacy: [address] Ph #: [phone]         [facility]     [facility] On Call Nurse Care Line -  Assistance  Registered nurses in the [facility] provide clinical advisement, health education, appointment booking, and other advisory services.  Call for this free service at [phone].             Medications           Message regarding Medications     Verify the changes and/or additions to your medication regime listed below are the same as discussed with your clinician today.  If any of these changes or additions are incorrect, please notify your  healthcare provider.        START taking these NEW medications        Refills    doxycycline (VIBRA-TABS) 100 MG tablet 0    Sig: Take 1 tablet (100 mg total) by mouth every 12 (twelve) hours.    Class: Normal    Route: Oral    promethazine-dextromethorphan (PROMETHAZINE-DM) 6.25-15 mg/5 mL Syrp 0    Sig: Take 5 mLs by mouth every 6 (six) hours as needed.    Class: Normal    Route: Oral      These medications were administered today        Dose Freq    methylPREDNISolone acetate injection 80 mg 80 mg Clinic/HOD 1 time    Sig: Inject 1 mL (80 mg total) into the muscle one time.    Class: Normal    Route: Intramuscular           Verify that the below list of medications is an accurate representation of the medications you are currently taking.  If none reported, the list may be blank. If incorrect, please contact your healthcare provider. Carry this list with you in case of emergency.           Current Medications     acetaminophen (TYLENOL) 325 MG tablet Take 325 mg by mouth every 6 (six) hours as needed for Pain.    albuterol-ipratropium 2.5mg-0.5mg/3mL (DUO-NEB) 0.5 mg-3 mg(2.5 mg base)/3 mL nebulizer solution Take 3 mLs by nebulization every 6 (six) hours as needed for Wheezing.    alprazolam (XANAX) 1 MG tablet TAKE ONE TABLET BY MOUTH THREE TIMES A DAY AS     NEEDED    aspirin 81 mg Tab Take 1 tablet by mouth once daily.     carvedilol (COREG) 25 MG tablet Take 2 tablets (50 mg total) by mouth 2 (two) times daily.    clopidogrel (PLAVIX) 75 mg tablet Take 1 tablet (75 mg total) by mouth once daily.    esomeprazole (NEXIUM) 40 MG capsule TAKE ONE CAPSULE BY MOUTH EVERY DAY    hydrochlorothiazide (HYDRODIURIL) 12.5 MG Tab Take 1 tablet (12.5 mg total) by mouth once daily.    MULTI-VITAMIN HI-PO ORAL Take by mouth once daily.     ramipril (ALTACE) 10 MG capsule Take 1 capsule (10 mg total) by mouth 2 (two) times daily.    ropinirole (REQUIP) 0.5 MG tablet Take 1 tablet (0.5 mg total) by mouth 3 (three) times daily.  "   rosuvastatin (CRESTOR) 20 MG tablet Take 1 tablet (20 mg total) by mouth once daily.    SYMBICORT 80-4.5 mcg/actuation HFAA INHALE 2 PUFFS TWICE A   DAY AS DIRECTED    vitamin E 400 UNIT capsule Take 400 Units by mouth once daily.      albuterol (PROVENTIL) 2.5 mg /3 mL (0.083 %) nebulizer solution Take 3 mLs (2.5 mg total) by nebulization every 6 (six) hours as needed for Wheezing.    doxycycline (VIBRA-TABS) 100 MG tablet Take 1 tablet (100 mg total) by mouth every 12 (twelve) hours.    promethazine-dextromethorphan (PROMETHAZINE-DM) 6.25-15 mg/5 mL Syrp Take 5 mLs by mouth every 6 (six) hours as needed.           Clinical Reference Information           Your Vitals Were     BP Pulse Temp Resp Height Weight    124/72 (BP Location: Right arm, Patient Position: Sitting, BP Method: Manual) 102 99.4 °F (37.4 °C) 14 5' 4" (1.626 m) 91 kg (200 lb 9.9 oz)    SpO2 BMI             97% 34.44 kg/m2         Blood Pressure          Most Recent Value    BP  124/72      Allergies as of 3/13/2017     Lipitor  [Atorvastatin]      Immunizations Administered on Date of Encounter - 3/13/2017     None      Orders Placed During Today's Visit      Normal Orders This Visit    POCT Influenza A/B       Language Assistance Services     ATTENTION: Language assistance services are available, free of charge. Please call [phone].      ATENCIÓN: Si habla español, tiene a iglesias disposición servicios gratuitos de asistencia lingüística. Llame al [phone].     [facility] Ý: N?u b?n nói Ti?ng Vi?t, có các d?ch v? h? tr? ngôn ng? mi?n phí dành cho b?n. G?i s? [phone].         [facility] complies with applicable Federal civil rights laws and does not discriminate on the basis of race, color, national origin, age, disability, or sex.        "

## 2017-03-14 ENCOUNTER — TELEPHONE (OUTPATIENT)
Dept: INTERNAL MEDICINE | Facility: CLINIC | Age: 52
End: 2017-03-14

## 2017-03-14 DIAGNOSIS — J44.1 COPD EXACERBATION: ICD-10-CM

## 2017-03-14 RX ORDER — PROMETHAZINE HYDROCHLORIDE AND DEXTROMETHORPHAN HYDROBROMIDE 6.25; 15 MG/5ML; MG/5ML
5 SYRUP ORAL EVERY 6 HOURS PRN
Qty: 120 ML | Refills: 0 | Status: SHIPPED | OUTPATIENT
Start: 2017-03-14 | End: 2017-03-21

## 2017-03-14 NOTE — TELEPHONE ENCOUNTER
----- Message from Crys Muñoz MA sent at 3/14/2017 10:47 AM CDT -----  Contact: PATIENT  Michael Guillaume  MRN: [id number]  : 1965  PCP: Latrice Zafar  Home Phone      [phone]  Work Phone      Not on file.  Mobile          [phone]      MESSAGE: [facility] out of [facility].  [facility] ([place]  Has it, Please ask Latrice to send to [facility]

## 2017-03-17 ENCOUNTER — TELEPHONE (OUTPATIENT)
Dept: INTERNAL MEDICINE | Facility: CLINIC | Age: 52
End: 2017-03-17

## 2017-03-17 DIAGNOSIS — J44.1 COPD EXACERBATION: Primary | ICD-10-CM

## 2017-03-17 RX ORDER — AZITHROMYCIN 250 MG/1
TABLET, FILM COATED ORAL
Qty: 6 TABLET | Refills: 0 | Status: SHIPPED | OUTPATIENT
Start: 2017-03-17 | End: 2017-03-23

## 2017-03-17 NOTE — TELEPHONE ENCOUNTER
----- Message from Crys Muñoz MA sent at 3/17/2017  1:27 PM CDT -----  Contact: Wife-Alaina Guillaume  MRN: [id number]  : 1965  PCP: Latrice Zafar  Home Phone      [phone]  Work Phone      Not on file.  Mobile          [phone]      MESSAGE: Patient has been on Vibratabs since his last visit, this causes diarrhea and vomiting.  Is there something else he can take.  His # [phone]    Send meds to [facility]

## 2017-03-20 DIAGNOSIS — J44.9 CHRONIC OBSTRUCTIVE PULMONARY DISEASE, UNSPECIFIED COPD TYPE: ICD-10-CM

## 2017-03-20 RX ORDER — BUDESONIDE AND FORMOTEROL FUMARATE DIHYDRATE 80; 4.5 UG/1; UG/1
AEROSOL RESPIRATORY (INHALATION)
Qty: 10.2 G | Refills: 4 | Status: SHIPPED | OUTPATIENT
Start: 2017-03-20 | End: 2017-09-22 | Stop reason: SDUPTHER

## 2017-03-21 ENCOUNTER — TELEPHONE (OUTPATIENT)
Dept: INTERNAL MEDICINE | Facility: CLINIC | Age: 52
End: 2017-03-21

## 2017-03-21 NOTE — TELEPHONE ENCOUNTER
----- Message from Leslie Zaldivar sent at 3/21/2017  8:44 AM CDT -----  Contact: self  Michael Guillaume  MRN: [id number]  : 1965  PCP: Latrice Zafar  Home Phone      [phone]  Work Phone      Not on file.  Mobile          [phone]      MESSAGE:   Pt said that he is starting to feel a little better today, but he said that he is still having a headache. Would like to speak with the nurse about this.    Phone: [phone]

## 2017-03-22 ENCOUNTER — CLINICAL SUPPORT (OUTPATIENT)
Dept: INTERNAL MEDICINE | Facility: CLINIC | Age: 52
End: 2017-03-22
Payer: COMMERCIAL

## 2017-03-22 DIAGNOSIS — I73.9 PAD (PERIPHERAL ARTERY DISEASE): ICD-10-CM

## 2017-03-22 DIAGNOSIS — R73.03 PREDIABETES: ICD-10-CM

## 2017-03-22 DIAGNOSIS — N52.01 ERECTILE DYSFUNCTION DUE TO ARTERIAL INSUFFICIENCY: ICD-10-CM

## 2017-03-22 DIAGNOSIS — E07.9 THYROID DISORDER: ICD-10-CM

## 2017-03-22 DIAGNOSIS — E78.5 HYPERLIPIDEMIA LDL GOAL <70: ICD-10-CM

## 2017-03-22 DIAGNOSIS — I42.9 CARDIOMYOPATHY: ICD-10-CM

## 2017-03-22 DIAGNOSIS — E66.9 OBESITY (BMI 30-39.9): ICD-10-CM

## 2017-03-22 DIAGNOSIS — I10 ESSENTIAL HYPERTENSION: ICD-10-CM

## 2017-03-22 DIAGNOSIS — J44.9 CHRONIC OBSTRUCTIVE PULMONARY DISEASE, UNSPECIFIED COPD TYPE: ICD-10-CM

## 2017-03-22 LAB
ALBUMIN SERPL BCP-MCNC: 3.9 G/DL
ALP SERPL-CCNC: 66 U/L
ALT SERPL W/O P-5'-P-CCNC: 21 U/L
ANION GAP SERPL CALC-SCNC: 11 MMOL/L
AST SERPL-CCNC: 27 U/L
BASOPHILS # BLD AUTO: 0.02 K/UL
BASOPHILS NFR BLD: 0.3 %
BILIRUB SERPL-MCNC: 0.5 MG/DL
BUN SERPL-MCNC: 16 MG/DL
CALCIUM SERPL-MCNC: 9.8 MG/DL
CHLORIDE SERPL-SCNC: 97 MMOL/L
CHOLEST/HDLC SERPL: 4.4 {RATIO}
CO2 SERPL-SCNC: 32 MMOL/L
CREAT SERPL-MCNC: 1.1 MG/DL
DIFFERENTIAL METHOD: ABNORMAL
EOSINOPHIL # BLD AUTO: 0 K/UL
EOSINOPHIL NFR BLD: 0.4 %
ERYTHROCYTE [DISTWIDTH] IN BLOOD BY AUTOMATED COUNT: 12.6 %
EST. GFR  (AFRICAN AMERICAN): >60 ML/MIN/1.73 M^2
EST. GFR  (NON AFRICAN AMERICAN): >60 ML/MIN/1.73 M^2
GLUCOSE SERPL-MCNC: 90 MG/DL
HCT VFR BLD AUTO: 45.9 %
HDL/CHOLESTEROL RATIO: 22.9 %
HDLC SERPL-MCNC: 170 MG/DL
HDLC SERPL-MCNC: 39 MG/DL
HGB BLD-MCNC: 15.4 G/DL
LDLC SERPL CALC-MCNC: 70.2 MG/DL
LYMPHOCYTES # BLD AUTO: 1.8 K/UL
LYMPHOCYTES NFR BLD: 25 %
MCH RBC QN AUTO: 30.9 PG
MCHC RBC AUTO-ENTMCNC: 33.6 %
MCV RBC AUTO: 92 FL
MONOCYTES # BLD AUTO: 1 K/UL
MONOCYTES NFR BLD: 13 %
NEUTROPHILS # BLD AUTO: 4.4 K/UL
NEUTROPHILS NFR BLD: 60.9 %
NONHDLC SERPL-MCNC: 131 MG/DL
PLATELET # BLD AUTO: 366 K/UL
PMV BLD AUTO: 10.2 FL
POTASSIUM SERPL-SCNC: 4 MMOL/L
PROT SERPL-MCNC: 7.3 G/DL
RBC # BLD AUTO: 4.98 M/UL
SODIUM SERPL-SCNC: 140 MMOL/L
T3 SERPL-MCNC: 91 NG/DL
T4 SERPL-MCNC: 6.8 UG/DL
TRIGL SERPL-MCNC: 304 MG/DL
TSH SERPL DL<=0.005 MIU/L-ACNC: 0.77 UIU/ML
WBC # BLD AUTO: 7.29 K/UL

## 2017-03-22 PROCEDURE — 36415 COLL VENOUS BLD VENIPUNCTURE: CPT | Mod: S$GLB,,, | Performed by: NURSE PRACTITIONER

## 2017-03-22 PROCEDURE — 36415 COLL VENOUS BLD VENIPUNCTURE: CPT

## 2017-03-22 PROCEDURE — 84480 ASSAY TRIIODOTHYRONINE (T3): CPT

## 2017-03-22 PROCEDURE — 80061 LIPID PANEL: CPT

## 2017-03-22 PROCEDURE — 84436 ASSAY OF TOTAL THYROXINE: CPT

## 2017-03-22 PROCEDURE — 85025 COMPLETE CBC W/AUTO DIFF WBC: CPT

## 2017-03-22 PROCEDURE — 84443 ASSAY THYROID STIM HORMONE: CPT

## 2017-03-22 PROCEDURE — 80053 COMPREHEN METABOLIC PANEL: CPT

## 2017-03-23 ENCOUNTER — OFFICE VISIT (OUTPATIENT)
Dept: INTERNAL MEDICINE | Facility: CLINIC | Age: 52
End: 2017-03-23
Payer: COMMERCIAL

## 2017-03-23 VITALS
WEIGHT: 191.81 LBS | RESPIRATION RATE: 14 BRPM | SYSTOLIC BLOOD PRESSURE: 106 MMHG | HEART RATE: 98 BPM | OXYGEN SATURATION: 98 % | DIASTOLIC BLOOD PRESSURE: 68 MMHG | HEIGHT: 64 IN | BODY MASS INDEX: 32.74 KG/M2

## 2017-03-23 DIAGNOSIS — I73.9 PAD (PERIPHERAL ARTERY DISEASE): ICD-10-CM

## 2017-03-23 DIAGNOSIS — E66.9 OBESITY (BMI 30-39.9): ICD-10-CM

## 2017-03-23 DIAGNOSIS — K21.9 GASTROESOPHAGEAL REFLUX DISEASE, ESOPHAGITIS PRESENCE NOT SPECIFIED: ICD-10-CM

## 2017-03-23 DIAGNOSIS — E78.5 HYPERLIPIDEMIA LDL GOAL <70: ICD-10-CM

## 2017-03-23 DIAGNOSIS — I10 BENIGN ESSENTIAL HTN: ICD-10-CM

## 2017-03-23 DIAGNOSIS — E78.5 HYPERLIPIDEMIA LDL GOAL <100: ICD-10-CM

## 2017-03-23 DIAGNOSIS — R51.9 NONINTRACTABLE HEADACHE, UNSPECIFIED CHRONICITY PATTERN, UNSPECIFIED HEADACHE TYPE: ICD-10-CM

## 2017-03-23 DIAGNOSIS — I42.9 CARDIOMYOPATHY, UNSPECIFIED TYPE: ICD-10-CM

## 2017-03-23 PROCEDURE — 3078F DIAST BP <80 MM HG: CPT | Mod: S$GLB,,, | Performed by: NURSE PRACTITIONER

## 2017-03-23 PROCEDURE — 3074F SYST BP LT 130 MM HG: CPT | Mod: S$GLB,,, | Performed by: NURSE PRACTITIONER

## 2017-03-23 PROCEDURE — 99999 PR PBB SHADOW E&M-EST. PATIENT-LVL III: CPT | Mod: PBBFAC,,, | Performed by: NURSE PRACTITIONER

## 2017-03-23 PROCEDURE — 1160F RVW MEDS BY RX/DR IN RCRD: CPT | Mod: S$GLB,,, | Performed by: NURSE PRACTITIONER

## 2017-03-23 PROCEDURE — 96372 THER/PROPH/DIAG INJ SC/IM: CPT | Mod: S$GLB,,, | Performed by: NURSE PRACTITIONER

## 2017-03-23 PROCEDURE — 99214 OFFICE O/P EST MOD 30 MIN: CPT | Mod: S$GLB,,, | Performed by: NURSE PRACTITIONER

## 2017-03-23 RX ORDER — RAMIPRIL 10 MG/1
10 CAPSULE ORAL 2 TIMES DAILY
Qty: 60 CAPSULE | Refills: 5 | Status: SHIPPED | OUTPATIENT
Start: 2017-03-23 | End: 2017-09-22 | Stop reason: SDUPTHER

## 2017-03-23 RX ORDER — HYDROCHLOROTHIAZIDE 12.5 MG/1
12.5 TABLET ORAL DAILY
Qty: 30 TABLET | Refills: 5 | Status: SHIPPED | OUTPATIENT
Start: 2017-03-23 | End: 2017-06-20 | Stop reason: ALTCHOICE

## 2017-03-23 RX ORDER — METHYLPREDNISOLONE ACETATE 80 MG/ML
80 INJECTION, SUSPENSION INTRA-ARTICULAR; INTRALESIONAL; INTRAMUSCULAR; SOFT TISSUE
Status: COMPLETED | OUTPATIENT
Start: 2017-03-23 | End: 2017-03-23

## 2017-03-23 RX ORDER — CARVEDILOL 25 MG/1
50 TABLET ORAL 2 TIMES DAILY
Qty: 60 TABLET | Refills: 5 | Status: SHIPPED | OUTPATIENT
Start: 2017-03-23 | End: 2017-06-20 | Stop reason: SDUPTHER

## 2017-03-23 RX ORDER — ROSUVASTATIN CALCIUM 20 MG/1
20 TABLET, COATED ORAL DAILY
Qty: 30 TABLET | Refills: 5 | Status: SHIPPED | OUTPATIENT
Start: 2017-03-23 | End: 2017-09-22 | Stop reason: SDUPTHER

## 2017-03-23 RX ORDER — ESOMEPRAZOLE MAGNESIUM 40 MG/1
40 CAPSULE, DELAYED RELEASE ORAL DAILY
Qty: 30 CAPSULE | Refills: 3 | Status: SHIPPED | OUTPATIENT
Start: 2017-03-23 | End: 2017-07-05 | Stop reason: SDUPTHER

## 2017-03-23 RX ORDER — CLOPIDOGREL BISULFATE 75 MG/1
75 TABLET ORAL DAILY
Qty: 30 TABLET | Refills: 5 | Status: ON HOLD | OUTPATIENT
Start: 2017-03-23 | End: 2017-08-11

## 2017-03-23 RX ADMIN — METHYLPREDNISOLONE ACETATE 80 MG: 80 INJECTION, SUSPENSION INTRA-ARTICULAR; INTRALESIONAL; INTRAMUSCULAR; SOFT TISSUE at 01:03

## 2017-03-23 NOTE — PROGRESS NOTES
Subjective:       Patient ID: Michael Guillaume is a 52 y.o. male.    Chief Complaint: Headache    Patient is known, to me and presents with   Chief Complaint   Patient presents with    Headache   .  Denies chest pain and shortness of breath.  Patient presents with headache for the past few days and is improving with tylenol but still with slight headache. Patient has been suffering with copd exercabation   HPI  Review of Systems   Constitutional: Negative.  Negative for activity change, appetite change, chills, diaphoresis, fatigue, fever and unexpected weight change.   HENT: Negative for congestion, ear discharge, ear pain, facial swelling, hearing loss, nosebleeds, postnasal drip, rhinorrhea, sinus pressure, sneezing, sore throat, tinnitus, trouble swallowing and voice change.    Eyes: Negative.  Negative for photophobia, pain, discharge, redness, itching and visual disturbance.   Respiratory: Negative.  Negative for apnea, cough, choking, chest tightness, shortness of breath, wheezing and stridor.    Cardiovascular: Negative.  Negative for chest pain, palpitations and leg swelling.   Gastrointestinal: Negative for abdominal distention, abdominal pain, anal bleeding, blood in stool, constipation, diarrhea, nausea and vomiting.   Genitourinary: Negative.  Negative for difficulty urinating, discharge, dysuria, enuresis, flank pain, frequency, hematuria, penile pain, penile swelling, scrotal swelling, testicular pain and urgency.   Musculoskeletal: Negative.  Negative for arthralgias, back pain, gait problem, joint swelling, myalgias, neck pain and neck stiffness.   Skin: Negative.  Negative for color change, pallor, rash and wound.   Neurological: Positive for headaches. Negative for dizziness, tremors, seizures, syncope, facial asymmetry, speech difficulty, weakness, light-headedness and numbness.   Hematological: Negative for adenopathy. Does not bruise/bleed easily.   Psychiatric/Behavioral: Negative.  Negative  for agitation, sleep disturbance and suicidal ideas. The patient is not nervous/anxious.        Objective:      Physical Exam   Constitutional: He is oriented to person, place, and time. He appears well-developed and well-nourished. No distress.   HENT:   Head: Normocephalic and atraumatic.   Right Ear: External ear normal.   Left Ear: External ear normal.   Nose: Nose normal.   Mouth/Throat: Oropharynx is clear and moist. No oropharyngeal exudate.   Eyes: Conjunctivae and EOM are normal. Pupils are equal, round, and reactive to light. Right eye exhibits no discharge. Left eye exhibits no discharge. No scleral icterus.   Neck: Normal range of motion. No JVD present. No tracheal deviation present. No thyromegaly present.   Cardiovascular: Normal rate, regular rhythm, normal heart sounds and intact distal pulses.  Exam reveals no gallop and no friction rub.    No murmur heard.  Pulmonary/Chest: Effort normal and breath sounds normal. No stridor. No respiratory distress. He has no wheezes. He has no rales. He exhibits no tenderness.   Abdominal: Soft. Bowel sounds are normal. He exhibits no distension and no mass. There is no tenderness. There is no rebound and no guarding.   Musculoskeletal: Normal range of motion. He exhibits no edema or tenderness.   Lymphadenopathy:     He has no cervical adenopathy.   Neurological: He is alert and oriented to person, place, and time. He has normal reflexes. He displays normal reflexes. No cranial nerve deficit. He exhibits normal muscle tone. Coordination normal.   Skin: Skin is warm and dry. No rash noted. He is not diaphoretic. No erythema. No pallor.   Psychiatric: He has a normal mood and affect. His behavior is normal. Judgment and thought content normal.   Nursing note and vitals reviewed.      Assessment:       1. Nonintractable headache, unspecified chronicity pattern, unspecified headache type    2. Gastroesophageal reflux disease, esophagitis presence not specified    3.  "Benign essential HTN    4. PAD    5. Hyperlipidemia    6. Cardiomyopathy, unspecified type    7. Hyperlipidemia LDL goal <70    8. Obesity (BMI 30-39.9)        Plan:   Michael BELL was seen today for headache.    Diagnoses and all orders for this visit:    Nonintractable headache, unspecified chronicity pattern, unspecified headache type  -     methylPREDNISolone acetate injection 80 mg; Inject 1 mL (80 mg total) into the muscle one time.    Gastroesophageal reflux disease, esophagitis presence not specified  -     esomeprazole (NEXIUM) 40 MG capsule; Take 1 capsule (40 mg total) by mouth once daily.    Benign essential HTN  -     hydrochlorothiazide (HYDRODIURIL) 12.5 MG Tab; Take 1 tablet (12.5 mg total) by mouth once daily.  -     carvedilol (COREG) 25 MG tablet; Take 2 tablets (50 mg total) by mouth 2 (two) times daily.  -     ramipril (ALTACE) 10 MG capsule; Take 1 capsule (10 mg total) by mouth 2 (two) times daily.    PAD  -     clopidogrel (PLAVIX) 75 mg tablet; Take 1 tablet (75 mg total) by mouth once daily.    Hyperlipidemia    Cardiomyopathy, unspecified type  -     hydrochlorothiazide (HYDRODIURIL) 12.5 MG Tab; Take 1 tablet (12.5 mg total) by mouth once daily.  -     carvedilol (COREG) 25 MG tablet; Take 2 tablets (50 mg total) by mouth 2 (two) times daily.  -     ramipril (ALTACE) 10 MG capsule; Take 1 capsule (10 mg total) by mouth 2 (two) times daily.    Hyperlipidemia LDL goal <70  -     rosuvastatin (CRESTOR) 20 MG tablet; Take 1 tablet (20 mg total) by mouth once daily.    Obesity (BMI 30-39.9)  "This note will not be shared with the patient."  Continue with plan of care  Refills on meds.  Medication compliance was discussed with the patient.   Medication side effects were discussed.  The patient was instructed on using exercise frequently to reduce blood pressure.  Thirty to forty-five minutes of brisk walking three to four times a week is often helpful to lower your blood pressure.  Monitor blood " pressures at home and to record the values in a log.  The patient was instructed to monitor weight closely and to try to keep it as close to ideal body weight as possible.  Reduce salt intake to less than 2 grams per day.  Do not add salt to food at the table.  Reduce or get rid of salt used in cooking.  Limit processed and fast foods.  Read package labels for amount of salt (soduim) in foods.  Losing weight, even just 10 pounds, of can decrease blood pressure.  rtc as scheduled

## 2017-03-23 NOTE — MR AVS SNAPSHOT
[facility]  [address]  [facility] [address]  Phone: [phone]  Fax: [phone]                  Michael Guillaume   3/23/2017 1:15 PM   Office Visit    Description:  Male : 1965   Provider:  Latrice Zafar NP   Department:  [facility]           Reason for Visit     Headache           Diagnoses this Visit        Comments    Nonintractable headache, unspecified chronicity pattern, unspecified headache type         Gastroesophageal reflux disease, esophagitis presence not specified         Benign essential HTN         PAD (peripheral artery disease)         Hyperlipidemia LDL goal <100         Cardiomyopathy, unspecified type         Hyperlipidemia LDL goal <70         Obesity (BMI 30-39.9)                To Do List           Goals (5 Years of Data)     None      Follow-Up and Disposition     Return in about 6 months (around 2017).       These Medications        Disp Refills Start End    esomeprazole (NEXIUM) 40 MG capsule 30 capsule 3 3/23/2017     Take 1 capsule (40 mg total) by mouth once daily. - Oral    Pharmacy: [facility]/pharmacy [address] [place] [address] [address] [facility] Ph #: [phone]       hydrochlorothiazide (HYDRODIURIL) 12.5 MG Tab 30 tablet 5 3/23/2017     Take 1 tablet (12.5 mg total) by mouth once daily. - Oral    Pharmacy: [facility]/pharmacy [address] [facility] Ph #: [phone]       carvedilol (COREG) 25 MG tablet 60 tablet 5 3/23/2017     Take 2 tablets (50 mg total) by mouth 2 (two) times daily. - Oral    Pharmacy: [facility]/pharmacy [address] [place] - [address] [facility] Ph #: [phone]       clopidogrel (PLAVIX) 75 mg tablet 30 tablet 5 3/23/2017     Take 1 tablet (75 mg total) by mouth once daily. - Oral    Pharmacy: [facility]/pharmacy [address] [place] - [address] [facility] Ph #: [phone]       ramipril (ALTACE) 10 MG capsule 60 capsule 5 3/23/2017     Take 1 capsule (10 mg total) by mouth 2 (two) times daily. - Oral    Pharmacy:  [facility]/pharmacy #[id number] [address] - [address] Ph #: [phone]       rosuvastatin (CRESTOR) 20 MG tablet 30 tablet 5 3/23/2017     Take 1 tablet (20 mg total) by mouth once daily. - Oral    Pharmacy: [facility]/pharmacy #[id number] [place] [address] - [address] Ph #: [phone]         [facility]     [facility] - 24/7 Assistance  Registered nurses in the [facility] provide clinical advisement, health education, appointment booking, and other advisory services.  Call for this free service at [phone].             Medications           Message regarding Medications     Verify the changes and/or additions to your medication regime listed below are the same as discussed with your clinician today.  If any of these changes or additions are incorrect, please notify your healthcare provider.        These medications were administered today        Dose Freq    methylPREDNISolone acetate injection 80 mg 80 mg Clinic/[facility] 1 time    Sig: Inject 1 mL (80 mg total) into the muscle one time.    Class: Normal    Route: Intramuscular      CHANGE how you are taking these medications     Start Taking Instead of    esomeprazole (NEXIUM) 40 MG capsule esomeprazole (NEXIUM) 40 MG capsule    Dosage:  Take 1 capsule (40 mg total) by mouth once daily. Dosage:  TAKE ONE CAPSULE BY MOUTH EVERY DAY    Reason for Change:  Reorder       STOP taking these medications     azithromycin (Z-SINDI) 250 MG tablet Take 2 tablets by mouth on day 1; Take 1 tablet by mouth on days 2-5    albuterol (PROVENTIL) 2.5 mg /3 mL (0.083 %) nebulizer solution Take 3 mLs (2.5 mg total) by nebulization every 6 (six) hours as needed for Wheezing.           Verify that the below list of medications is an accurate representation of the medications you are currently taking.  If none reported, the list may be blank. If incorrect, please contact your healthcare provider. Carry this list with you in case of emergency.           Current  "Medications     acetaminophen (TYLENOL) 325 MG tablet Take 325 mg by mouth every 6 (six) hours as needed for Pain.    albuterol-ipratropium 2.5mg-0.5mg/3mL (DUO-NEB) 0.5 mg-3 mg(2.5 mg base)/3 mL nebulizer solution Take 3 mLs by nebulization every 6 (six) hours as needed for Wheezing.    alprazolam (XANAX) 1 MG tablet TAKE ONE TABLET BY MOUTH THREE TIMES A DAY AS     NEEDED    aspirin 81 mg Tab Take 1 tablet by mouth once daily.     carvedilol (COREG) 25 MG tablet Take 2 tablets (50 mg total) by mouth 2 (two) times daily.    clopidogrel (PLAVIX) 75 mg tablet Take 1 tablet (75 mg total) by mouth once daily.    esomeprazole (NEXIUM) 40 MG capsule Take 1 capsule (40 mg total) by mouth once daily.    hydrochlorothiazide (HYDRODIURIL) 12.5 MG Tab Take 1 tablet (12.5 mg total) by mouth once daily.    MULTI-VITAMIN HI-PO ORAL Take by mouth once daily.     ramipril (ALTACE) 10 MG capsule Take 1 capsule (10 mg total) by mouth 2 (two) times daily.    ropinirole (REQUIP) 0.5 MG tablet Take 1 tablet (0.5 mg total) by mouth 3 (three) times daily.    rosuvastatin (CRESTOR) 20 MG tablet Take 1 tablet (20 mg total) by mouth once daily.    SYMBICORT 80-4.5 mcg/actuation HFAA INHALE 2 PUFFS TWICE A   DAY AS DIRECTED    vitamin E 400 UNIT capsule Take 400 Units by mouth once daily.             Clinical Reference Information           Your Vitals Were     BP Pulse Resp Height Weight SpO2    106/68 (BP Location: Right arm, Patient Position: Sitting, BP Method: Manual) 98 14 5' 4" (1.626 m) 87 kg (191 lb 12.8 oz) 98%    BMI                32.92 kg/m2          Blood Pressure          Most Recent Value    BP  106/68      Allergies as of 3/23/2017     Lipitor  [Atorvastatin]      Immunizations Administered on Date of Encounter - 3/23/2017     None      Instructions      4 Steps for Eating Healthier  Changing the way you eat can improve your health. It can lower your cholesterol and blood pressure, and help you stay at a healthy weight. Your " diet doesnt have to be bland and boring to be healthy. Just watch your calories and follow these steps:    1. Eat fewer unhealthy fats  · Choose more fish and lean meats instead of fatty cuts of meat.  · Skip butter and lard, and use less margarine.  · Pass on foods that have palm, coconut, or hydrogenated oils.  · Eat fewer high-fat dairy foods like cheese, ice cream, and whole milk.  · Get a heart-healthy cookbook and try some low-fat recipes.  2. Go light on salt  · Keep the saltshaker off the table.  · Limit high-salt ingredients, such as soy sauce, bouillon, and garlic salt.  · Instead of adding salt when cooking, season your food with herbs and flavorings. Try lemon, garlic, and onion.  · Limit convenience foods, such as boxed or canned foods and restaurant food.  · Read food labels and choose lower-sodium options.  3. Limit sugar  · Pause before you add sugars to pancakes, cereal, coffee, or tea. This includes white and brown table sugar, syrup, honey, and molasses. Cut your usual amount by half.  · Use non-sugar sweeteners. Stevia, aspartame, and sucralose can satisfy a sweet tooth without adding calories.  · Swap out sugar-filled soda and other drinks. Buy sugar-free or low-calorie beverages. Remember water is always the best choice.  · Read labels and choose foods with less added sugar. Keep in mind that dairy foods and foods with fruit will have some natural sugar.  · Cut the sugar in recipes by 1/3 to 1/2. Boost the flavor with extracts like almond, vanilla, or orange. Or add spices such as cinnamon or nutmeg.  4. Eat more fiber  · Eat fresh fruits and vegetables every day.  · Boost your diet with whole grains. Go for oats, whole-grain rice, and bran.  · Add beans and lentils to your meals.  · Drink more water to match your fiber increase. This is to help prevent constipation.  Date Last Reviewed: 5/11/2015  © 6618-5868 [employer]. [address] [place] [address], [place] [address]. All rights  reserved. This information is not intended as a substitute for professional medical care. Always follow your healthcare professional's instructions.        Medicines for Acid Reflux  Your healthcare provider has told you that you have acid reflux. This condition causes stomach acid to wash up into your throat. For most people, acid reflux is troubling but not dangerous. But left untreated, acid reflux sometimes damages the esophagus. Medicines can help control acid reflux and limit your risk of future problems.  Medicines for acid reflux  Your healthcare provider may prescribe medicine to help treat your acid reflux. Medicine will be based on your symptoms and any test results. Your provider will explain how to take your medicine. You will also be told about possible side effects.  Reducing stomach acid  Your provider may suggest antacids that you can buy over the counter. Antacids can give fast relief. Or you may be told to take a type of medicine called H2 blockers. These are available over the counter and by prescription (for higher doses).  Blocking stomach acid  In more severe cases, your healthcare provider may suggest stronger medicines such as proton pump inhibitors (PPIs). These keep the stomach from making acid. They are often prescribed for long-term use.  Other medicines  In some cases medicines to reduce or block stomach acid may not work. Then you may be switched to another type of medicine that helps your stomach empty better.     Date Last Reviewed: 10/1/2016  © 1456-1918 [employer]. [address] [place] [address], [place] [address]. All rights reserved. This information is not intended as a substitute for professional medical care. Always follow your healthcare professional's instructions.             Language Assistance Services     ATTENTION: Language assistance services are available, free of charge. Please call [phone].      ATENCIÓN: althea Goff disposición  servicios gratuitos de asistencia lingüística. Nisa patel [phone].     [facility] Ý: N?u b?n nói Ti?ng Vi?t, có các d?ch v? h? tr? ngôn ng? mi?n phí dành cho b?n. G?i s? [phone].         [facility] complies with applicable Federal civil rights laws and does not discriminate on the basis of race, color, national origin, age, disability, or sex.

## 2017-03-23 NOTE — PATIENT INSTRUCTIONS
4 Steps for Eating Healthier  Changing the way you eat can improve your health. It can lower your cholesterol and blood pressure, and help you stay at a healthy weight. Your diet doesnt have to be bland and boring to be healthy. Just watch your calories and follow these steps:    1. Eat fewer unhealthy fats  · Choose more fish and lean meats instead of fatty cuts of meat.  · Skip butter and lard, and use less margarine.  · Pass on foods that have palm, coconut, or hydrogenated oils.  · Eat fewer high-fat dairy foods like cheese, ice cream, and whole milk.  · Get a heart-healthy cookbook and try some low-fat recipes.  2. Go light on salt  · Keep the saltshaker off the table.  · Limit high-salt ingredients, such as soy sauce, bouillon, and garlic salt.  · Instead of adding salt when cooking, season your food with herbs and flavorings. Try lemon, garlic, and onion.  · Limit convenience foods, such as boxed or canned foods and restaurant food.  · Read food labels and choose lower-sodium options.  3. Limit sugar  · Pause before you add sugars to pancakes, cereal, coffee, or tea. This includes white and brown table sugar, syrup, honey, and molasses. Cut your usual amount by half.  · Use non-sugar sweeteners. Stevia, aspartame, and sucralose can satisfy a sweet tooth without adding calories.  · Swap out sugar-filled soda and other drinks. Buy sugar-free or low-calorie beverages. Remember water is always the best choice.  · Read labels and choose foods with less added sugar. Keep in mind that dairy foods and foods with fruit will have some natural sugar.  · Cut the sugar in recipes by 1/3 to 1/2. Boost the flavor with extracts like almond, vanilla, or orange. Or add spices such as cinnamon or nutmeg.  4. Eat more fiber  · Eat fresh fruits and vegetables every day.  · Boost your diet with whole grains. Go for oats, whole-grain rice, and bran.  · Add beans and lentils to your meals.  · Drink more water to match your fiber  increase. This is to help prevent constipation.  Date Last Reviewed: 5/11/2015  © 3253-8684 [employer]. [address] [place] [address]. All rights reserved. This information is not intended as a substitute for professional medical care. Always follow your healthcare professional's instructions.        Medicines for Acid Reflux  Your healthcare provider has told you that you have acid reflux. This condition causes stomach acid to wash up into your throat. For most people, acid reflux is troubling but not dangerous. But left untreated, acid reflux sometimes damages the esophagus. Medicines can help control acid reflux and limit your risk of future problems.  Medicines for acid reflux  Your healthcare provider may prescribe medicine to help treat your acid reflux. Medicine will be based on your symptoms and any test results. Your provider will explain how to take your medicine. You will also be told about possible side effects.  Reducing stomach acid  Your provider may suggest antacids that you can buy over the counter. Antacids can give fast relief. Or you may be told to take a type of medicine called H2 blockers. These are available over the counter and by prescription (for higher doses).  Blocking stomach acid  In more severe cases, your healthcare provider may suggest stronger medicines such as proton pump inhibitors (PPIs). These keep the stomach from making acid. They are often prescribed for long-term use.  Other medicines  In some cases medicines to reduce or block stomach acid may not work. Then you may be switched to another type of medicine that helps your stomach empty better.     Date Last Reviewed: 10/1/2016  © 5886-0414 [employer]. [address] [place] [address]. All rights reserved. This information is not intended as a substitute for professional medical care. Always follow your healthcare professional's instructions.

## 2017-05-19 ENCOUNTER — OFFICE VISIT (OUTPATIENT)
Dept: INTERNAL MEDICINE | Facility: CLINIC | Age: 52
End: 2017-05-19
Payer: COMMERCIAL

## 2017-05-19 VITALS
BODY MASS INDEX: 33.8 KG/M2 | OXYGEN SATURATION: 95 % | RESPIRATION RATE: 20 BRPM | HEART RATE: 80 BPM | DIASTOLIC BLOOD PRESSURE: 88 MMHG | HEIGHT: 64 IN | SYSTOLIC BLOOD PRESSURE: 126 MMHG | WEIGHT: 198 LBS

## 2017-05-19 DIAGNOSIS — K60.2 RECTAL FISSURE: Primary | ICD-10-CM

## 2017-05-19 PROCEDURE — 3079F DIAST BP 80-89 MM HG: CPT | Mod: S$GLB,,, | Performed by: NURSE PRACTITIONER

## 2017-05-19 PROCEDURE — 1160F RVW MEDS BY RX/DR IN RCRD: CPT | Mod: S$GLB,,, | Performed by: NURSE PRACTITIONER

## 2017-05-19 PROCEDURE — 99999 PR PBB SHADOW E&M-EST. PATIENT-LVL IV: CPT | Mod: PBBFAC,,, | Performed by: NURSE PRACTITIONER

## 2017-05-19 PROCEDURE — 3074F SYST BP LT 130 MM HG: CPT | Mod: S$GLB,,, | Performed by: NURSE PRACTITIONER

## 2017-05-19 PROCEDURE — 99213 OFFICE O/P EST LOW 20 MIN: CPT | Mod: S$GLB,,, | Performed by: NURSE PRACTITIONER

## 2017-05-19 RX ORDER — HYDROCORTISONE ACETATE 25 MG/1
25 SUPPOSITORY RECTAL 2 TIMES DAILY
Qty: 20 SUPPOSITORY | Refills: 3 | Status: SHIPPED | OUTPATIENT
Start: 2017-05-19 | End: 2017-05-29

## 2017-05-19 RX ORDER — HYDROCORTISONE 25 MG/G
CREAM TOPICAL 2 TIMES DAILY
Qty: 28 G | Refills: 3 | Status: SHIPPED | OUTPATIENT
Start: 2017-05-19 | End: 2017-07-13

## 2017-05-19 NOTE — PATIENT INSTRUCTIONS
Anal Fissure (Child)    The anal canal is the end portion of the intestinal tract. It includes the rectum and anus. Stool is passed through the anus. Sometimes a crack or tear develops in the lining of the anal canal. This condition is called an anal fissure.  Anal fissures are caused by trauma or stretching of the anal canal. This is most often due to constipation (having hard, [id number] stools). Severe diarrhea or insertion of an object into the anal canal may also cause a fissure.  Symptoms include pain and bleeding, especially during a bowel movement. Sometimes there is swelling, itching, and skin irritation. The area may spasm, causing more pain and skin separation. The most common complication is infection, which may lead to an abscess (pocket of pus). When this happens, there may also be discharge from the fissure.  An anal fissure usually heals on its own with no special treatment. Home care instructions to help prevent constipation and relieve symptoms may be given. Once the area has healed, follow-up tests may be needed.  In some cases, a fissure does not heal on its own. Surgery may then be needed to close the tear.  Home care  Medicines  Your child may be prescribed medicines, such as pain relievers, stool softeners, or laxatives. Make sure to follow all instructions when giving any of these medicines to your child. Note: Do not give your child over-the-counter medicines without talking to the provider first.  General care  · To help ease constipation, you may be told to:  ¨ Increase the fiber in your childs diet. This includes foods such as whole grains, fresh fruits, and vegetables. Fiber adds bulk to stool and absorbs water to soften stool. This helps stool pass through the colon more easily. If needed, a fiber supplement may also be prescribed.  ¨ Encourage your child to drink lots of water. This can also help soften stool.  · To help relieve pain and relax the muscles in the anus, have  your child soak in a bath with a few inches of warm water. This is a called a sitz bath. Your child should do this for 10 to 15 minutes at time, a few times a day, or as advised.  · Keep a careful record of when your child has a bowel movement and the type of stool that was passed. This may help the provider determine future care for your child. Older children should be encouraged to keep their own record.  · Check your childs anus for bleeding or signs of infection (see below). Older children may be asked to help monitor their own symptoms.  Follow-up care  Follow up with your childs healthcare provider as advised. If testing was done, youll be told the results and any new findings that may affect your childs care.  When to seek medical advice  Unless your childs healthcare provider advises otherwise, call the provider right away if:  · Your child is 3 months old or younger and has a fever of 100.4°F (38°C) or higher. (Seek treatment right away. Fever in a young baby can be a sign of a serious infection.)  · Your child is younger than 2 years of age and has a fever of 100.4°F (38°C) that lasts for more than 1 day.  · Your child is 2 years old or older and has a fever of 100.4°F (38°C) that lasts for more than 3 days.  · Your child has repeated fevers above 104°F (40°C).  Also call the provider right away if:  · Your child symptoms worsen, or they dont improve with home care measures.  · Your child has signs of infection such as increased redness, swelling, or foul-smelling drainage in the area around the fissure.  · Your child has ongoing constipation or explosive diarrhea.  Call 911  Call 911 right away if:  · Your child has trouble breathing.  · Your child has trouble swallowing.  · Your child faints.  · Your child has an unusually fast heart rate.  · Your child has large amounts of bleeding from the anus or blood in the stool.  Date Last Reviewed: 6/15/2015  © 6236-6843 [employer]. [address]  [place] [address]. All rights reserved. This information is not intended as a substitute for professional medical care. Always follow your healthcare professional's instructions.

## 2017-05-19 NOTE — MR AVS SNAPSHOT
[facility]  [facility] [facility] [address]  Phone: [phone]  Fax: [phone]                  Michael Guillaume   2017 9:15 AM   Office Visit    Description:  Male : 1965   Provider:  Latrice Zafar NP   Department:  [facility] Internal Medicine           Reason for Visit     Rectal Pain           Diagnoses this Visit        Comments    Rectal fissure    -  Primary            To Do List           Future Appointments        Provider Department Dept Phone    2017 1:15 PM Latrice Zafar NP [facility] [phone]      Goals (5 Years of Data)     None      Follow-Up and Disposition     Return if symptoms worsen or fail to improve.       These Medications        Disp Refills Start End    hydrocortisone 2.5 % cream 28 g 3 2017    Apply topically 2 (two) times daily. - Topical (Top)    Pharmacy: [address] Ph #: [phone]       hydrocortisone (ANUSOL-HC) 25 mg suppository 20 suppository 3 2017    Place 1 suppository (25 mg total) rectally 2 (two) times daily. - Rectal    Pharmacy: [address] Ph #: [phone]         [facility]     [facility] Nurse Care Line - 24/7 Assistance  Unless otherwise directed by your provider, please contact [facility], our nurse care line that is available for 24/7 assistance.     Registered nurses in the [facility] provide: appointment scheduling, clinical advisement, health education, and other advisory services.  Call: [phone] (toll free)               Medications           Message regarding Medications     Verify the changes and/or additions to your medication regime listed below are the same as discussed with your clinician today.  If any of these changes or additions are incorrect, please notify your healthcare provider.        START taking  these NEW medications        Refills    hydrocortisone 2.5 % cream 3    Sig: Apply topically 2 (two) times daily.    Class: Normal    Route: Topical (Top)    hydrocortisone (ANUSOL-HC) 25 mg suppository 3    Sig: Place 1 suppository (25 mg total) rectally 2 (two) times daily.    Class: Normal    Route: Rectal           Verify that the below list of medications is an accurate representation of the medications you are currently taking.  If none reported, the list may be blank. If incorrect, please contact your healthcare provider. Carry this list with you in case of emergency.           Current Medications     acetaminophen (TYLENOL) 325 MG tablet Take 325 mg by mouth every 6 (six) hours as needed for Pain.    albuterol-ipratropium 2.5mg-0.5mg/3mL (DUO-NEB) 0.5 mg-3 mg(2.5 mg base)/3 mL nebulizer solution Take 3 mLs by nebulization every 6 (six) hours as needed for Wheezing.    alprazolam (XANAX) 1 MG tablet TAKE ONE TABLET BY MOUTH THREE TIMES A DAY AS     NEEDED    aspirin 81 mg Tab Take 1 tablet by mouth once daily.     carvedilol (COREG) 25 MG tablet Take 2 tablets (50 mg total) by mouth 2 (two) times daily.    clopidogrel (PLAVIX) 75 mg tablet Take 1 tablet (75 mg total) by mouth once daily.    esomeprazole (NEXIUM) 40 MG capsule Take 1 capsule (40 mg total) by mouth once daily.    hydrochlorothiazide (HYDRODIURIL) 12.5 MG Tab Take 1 tablet (12.5 mg total) by mouth once daily.    MULTI-VITAMIN HI-PO ORAL Take by mouth once daily.     ramipril (ALTACE) 10 MG capsule Take 1 capsule (10 mg total) by mouth 2 (two) times daily.    ropinirole (REQUIP) 0.5 MG tablet Take 1 tablet (0.5 mg total) by mouth 3 (three) times daily.    rosuvastatin (CRESTOR) 20 MG tablet Take 1 tablet (20 mg total) by mouth once daily.    SYMBICORT 80-4.5 mcg/actuation HFAA INHALE 2 PUFFS TWICE A   DAY AS DIRECTED    vitamin E 400 UNIT capsule Take 400 Units by mouth once daily.      hydrocortisone (ANUSOL-HC) 25 mg suppository Place 1  "suppository (25 mg total) rectally 2 (two) times daily.    hydrocortisone 2.5 % cream Apply topically 2 (two) times daily.           Clinical Reference Information           Your Vitals Were     BP Pulse Resp Height Weight SpO2    126/88 80 20 5' 4" (1.626 m) 89.8 kg (198 lb) 95%    BMI                33.99 kg/m2          Blood Pressure          Most Recent Value    BP  126/88      Allergies as of 5/19/2017     Lipitor  [Atorvastatin]      Immunizations Administered on Date of Encounter - 5/19/2017     None      Instructions      Anal Fissure (Child)    The anal canal is the end portion of the intestinal tract. It includes the rectum and anus. Stool is passed through the anus. Sometimes a crack or tear develops in the lining of the anal canal. This condition is called an anal fissure.  Anal fissures are caused by trauma or stretching of the anal canal. This is most often due to constipation (having hard, [id number] stools). Severe diarrhea or insertion of an object into the anal canal may also cause a fissure.  Symptoms include pain and bleeding, especially during a bowel movement. Sometimes there is swelling, itching, and skin irritation. The area may spasm, causing more pain and skin separation. The most common complication is infection, which may lead to an abscess (pocket of pus). When this happens, there may also be discharge from the fissure.  An anal fissure usually heals on its own with no special treatment. Home care instructions to help prevent constipation and relieve symptoms may be given. Once the area has healed, follow-up tests may be needed.  In some cases, a fissure does not heal on its own. Surgery may then be needed to close the tear.  Home care  Medicines  Your child may be prescribed medicines, such as pain relievers, stool softeners, or laxatives. Make sure to follow all instructions when giving any of these medicines to your child. Note: Do not give your child over-the-counter medicines " without talking to the provider first.  General care  · To help ease constipation, you may be told to:  ¨ Increase the fiber in your childs diet. This includes foods such as whole grains, fresh fruits, and vegetables. Fiber adds bulk to stool and absorbs water to soften stool. This helps stool pass through the colon more easily. If needed, a fiber supplement may also be prescribed.  ¨ Encourage your child to drink lots of water. This can also help soften stool.  · To help relieve pain and relax the muscles in the anus, have your child soak in a bath with a few inches of warm water. This is a called a sitz bath. Your child should do this for 10 to 15 minutes at time, a few times a day, or as advised.  · Keep a careful record of when your child has a bowel movement and the type of stool that was passed. This may help the provider determine future care for your child. Older children should be encouraged to keep their own record.  · Check your childs anus for bleeding or signs of infection (see below). Older children may be asked to help monitor their own symptoms.  Follow-up care  Follow up with your childs healthcare provider as advised. If testing was done, youll be told the results and any new findings that may affect your childs care.  When to seek medical advice  Unless your childs healthcare provider advises otherwise, call the provider right away if:  · Your child is 3 months old or younger and has a fever of 100.4°F (38°C) or higher. (Seek treatment right away. Fever in a young baby can be a sign of a serious infection.)  · Your child is younger than 2 years of age and has a fever of 100.4°F (38°C) that lasts for more than 1 day.  · Your child is 2 years old or older and has a fever of 100.4°F (38°C) that lasts for more than 3 days.  · Your child has repeated fevers above 104°F (40°C).  Also call the provider right away if:  · Your child symptoms worsen, or they dont improve with home care  measures.  · Your child has signs of infection such as increased redness, swelling, or foul-smelling drainage in the area around the fissure.  · Your child has ongoing constipation or explosive diarrhea.  Call 911  Call 911 right away if:  · Your child has trouble breathing.  · Your child has trouble swallowing.  · Your child faints.  · Your child has an unusually fast heart rate.  · Your child has large amounts of bleeding from the anus or blood in the stool.  Date Last Reviewed: 6/15/2015  © 8509-5225 [employer]. [address] [place] [address]. All rights reserved. This information is not intended as a substitute for professional medical care. Always follow your healthcare professional's instructions.             Language Assistance Services     ATTENTION: Language assistance services are available, free of charge. Please call [phone].      ATENCIÓN: Si habla don, tiene a iglesias disposición servicios gratuitos de asistencia lingüística. Llame al [phone].     TRISTA Ý: N?u b?n nói Ti?ng Vi?t, có các d?ch v? h? tr? ngôn ng? mi?n phí dành cho b?n. G?i s? [phone].         [facility] complies with applicable Federal civil rights laws and does not discriminate on the basis of race, color, national origin, age, disability, or sex.

## 2017-05-19 NOTE — PROGRESS NOTES
"Subjective:       Patient ID: Michael Guillaume is a 52 y.o. male.    Chief Complaint: Rectal Pain (x 4 days- pain with BM and burning )    Patient is known, to me and presents with   Chief Complaint   Patient presents with    Rectal Pain     x 4 days- pain with BM and burning    .  Denies chest pain and shortness of breath. Patient presents with chronic issues with rectal fissure. States that he needs to have refills on medications and had gotten better but now it is back. Did have some bright red blood but no longer having any blood. Had colonoscopy one year ago.      HPI  Review of Systems   Constitutional: Negative.    Respiratory: Negative.    Cardiovascular: Negative.    Gastrointestinal: Positive for rectal pain. Negative for abdominal distention, abdominal pain, anal bleeding, blood in stool, constipation, diarrhea, nausea and vomiting.   Skin: Negative.        Objective:      Physical Exam   Constitutional: He appears well-developed and well-nourished. No distress.   Neck: Normal range of motion. Neck supple. No JVD present. No thyromegaly present.   Cardiovascular: Normal rate, regular rhythm and normal heart sounds.    No murmur heard.  Pulmonary/Chest: Effort normal and breath sounds normal. No stridor. He has no wheezes.   Abdominal: Soft. Bowel sounds are normal. He exhibits no distension and no mass. There is no tenderness. There is no rebound and no guarding. No hernia.   Lymphadenopathy:     He has no cervical adenopathy.   Skin: Skin is warm and dry. No rash noted. He is not diaphoretic. No erythema. No pallor.       Assessment:       1. Rectal fissure        Plan:   Michael BELL was seen today for rectal pain.    Diagnoses and all orders for this visit:    Rectal fissure  -     hydrocortisone 2.5 % cream; Apply topically 2 (two) times daily.  -     hydrocortisone (ANUSOL-HC) 25 mg suppository; Place 1 suppository (25 mg total) rectally 2 (two) times daily.  "This note will not be shared with the " "patient."  Need to do stool softner  If no improvement let me know  RTC as scheduled  "

## 2017-06-20 ENCOUNTER — OFFICE VISIT (OUTPATIENT)
Dept: CARDIOLOGY | Facility: CLINIC | Age: 52
End: 2017-06-20
Payer: COMMERCIAL

## 2017-06-20 ENCOUNTER — HOSPITAL ENCOUNTER (OUTPATIENT)
Dept: CARDIOLOGY | Facility: CLINIC | Age: 52
Discharge: HOME | End: 2017-06-20
Payer: COMMERCIAL

## 2017-06-20 VITALS
HEART RATE: 80 BPM | WEIGHT: 203.5 LBS | DIASTOLIC BLOOD PRESSURE: 53 MMHG | OXYGEN SATURATION: 98 % | BODY MASS INDEX: 34.74 KG/M2 | HEIGHT: 64 IN | SYSTOLIC BLOOD PRESSURE: 111 MMHG

## 2017-06-20 DIAGNOSIS — E78.5 HYPERLIPIDEMIA LDL GOAL <100: Chronic | ICD-10-CM

## 2017-06-20 DIAGNOSIS — R06.09 DYSPNEA ON EXERTION: Chronic | ICD-10-CM

## 2017-06-20 DIAGNOSIS — I10 ESSENTIAL HYPERTENSION: Chronic | ICD-10-CM

## 2017-06-20 DIAGNOSIS — R60.0 BILATERAL EDEMA OF LOWER EXTREMITY: Primary | ICD-10-CM

## 2017-06-20 DIAGNOSIS — Z00.00 PHYSICAL EXAM: ICD-10-CM

## 2017-06-20 DIAGNOSIS — I73.9 PAD (PERIPHERAL ARTERY DISEASE): Chronic | ICD-10-CM

## 2017-06-20 DIAGNOSIS — I42.9 CARDIOMYOPATHY, UNSPECIFIED TYPE: Chronic | ICD-10-CM

## 2017-06-20 DIAGNOSIS — E66.9 OBESITY (BMI 30-39.9): Chronic | ICD-10-CM

## 2017-06-20 PROCEDURE — 99999 PR PBB SHADOW E&M-EST. PATIENT-LVL III: CPT | Mod: PBBFAC,,, | Performed by: NURSE PRACTITIONER

## 2017-06-20 PROCEDURE — 93000 ELECTROCARDIOGRAM COMPLETE: CPT | Mod: S$GLB,,, | Performed by: INTERNAL MEDICINE

## 2017-06-20 PROCEDURE — 99214 OFFICE O/P EST MOD 30 MIN: CPT | Mod: S$GLB,,, | Performed by: NURSE PRACTITIONER

## 2017-06-20 RX ORDER — CARVEDILOL 25 MG/1
25 TABLET ORAL 2 TIMES DAILY
Qty: 60 TABLET | Refills: 5 | Status: SHIPPED | OUTPATIENT
Start: 2017-06-20 | End: 2017-06-20 | Stop reason: DRUGHIGH

## 2017-06-20 RX ORDER — HYDROCHLOROTHIAZIDE 25 MG/1
25 TABLET ORAL DAILY
Qty: 30 TABLET | Refills: 11 | Status: SHIPPED | OUTPATIENT
Start: 2017-06-20 | End: 2017-09-22 | Stop reason: SDUPTHER

## 2017-06-20 RX ORDER — CARVEDILOL 12.5 MG/1
12.5 TABLET ORAL 2 TIMES DAILY WITH MEALS
Qty: 60 TABLET | Refills: 11 | Status: SHIPPED | OUTPATIENT
Start: 2017-06-20 | End: 2017-09-22 | Stop reason: SDUPTHER

## 2017-06-20 RX ORDER — HYDROCHLOROTHIAZIDE 25 MG/1
25 TABLET ORAL DAILY
Qty: 30 TABLET | Refills: 11 | Status: SHIPPED | OUTPATIENT
Start: 2017-06-20 | End: 2017-06-20 | Stop reason: SDUPTHER

## 2017-06-20 NOTE — PROGRESS NOTES
"Mr. Guillaume is a patient of Dr. Lopez and was last seen in [facility] Cardiology Visit 3/7/16.    Subjective:   Patient ID:  Michael Guillaume is a 52 y.o. male who presents for follow-up of Edema (x 2 weeks) and Fatigue    HPI  Mr. Guillaume is an 52y.o.  male with non-obstructive coronary artery disease, COPD, hyperlipidemia, hypertension, and hypothyroidism.  States that 2-3 weeks ago he noticed that he was holding onto more fluid in his face, abdomen, and legs.  He doubled his HCTZ to 25mg and has noticed a difference but is running out of his prescription as a result.  He reports having abdominal distention.  Previous h/o EF of 40% in 2014 but recovered his EF to 50-55%.  He has had multiple catheterizations of his legs.  He reports SCHWARTZ and PND.  Patient denies chest pain with exertion or at rest, palpitations, SOB, dizziness, syncope, orthopnea, PND, or claudication.  Denies change in weight.  Reports not taking his spiriva for the last couple of months. States "I don't want to get hooked on it."  He says that he only takes it if he really needs it.  Quit smoking in 2011 but continues to smoke about 10 joints of marajuana for about 40 years.      Review of Systems   Constitution: Negative for decreased appetite, diaphoresis, weakness, malaise/fatigue, weight gain and weight loss.   HENT: Negative for headaches.    Eyes: Negative for visual disturbance.   Cardiovascular: Positive for dyspnea on exertion and leg swelling. Negative for chest pain, claudication, irregular heartbeat, near-syncope, orthopnea, palpitations, paroxysmal nocturnal dyspnea and syncope.        Denies chest pressure   Respiratory: Positive for shortness of breath. Negative for cough, hemoptysis, sleep disturbances due to breathing and snoring.    Endocrine: Negative for cold intolerance and heat intolerance.   Hematologic/Lymphatic: Negative for bleeding problem. Does not bruise/bleed easily.   Musculoskeletal: Negative for myalgias. "   Gastrointestinal: Negative for bloating, abdominal pain, anorexia, change in bowel habit, constipation, diarrhea, nausea and vomiting.   Neurological: Negative for difficulty with concentration, disturbances in coordination, excessive daytime sleepiness, dizziness, light-headedness, loss of balance and numbness.   Psychiatric/Behavioral: The patient does not have insomnia.      Allergies and current medications updated and reviewed:  Review of patient's allergies indicates:   Allergen Reactions    Lipitor  [atorvastatin]      Other reaction(s): Severe Myalgias     Current Outpatient Prescriptions   Medication Sig    acetaminophen (TYLENOL) 325 MG tablet Take 325 mg by mouth every 6 (six) hours as needed for Pain.    albuterol-ipratropium 2.5mg-0.5mg/3mL (DUO-NEB) 0.5 mg-3 mg(2.5 mg base)/3 mL nebulizer solution Take 3 mLs by nebulization every 6 (six) hours as needed for Wheezing.    alprazolam (XANAX) 1 MG tablet TAKE ONE TABLET BY MOUTH THREE TIMES A DAY AS     NEEDED    aspirin 81 mg Tab Take 1 tablet by mouth once daily.     clopidogrel (PLAVIX) 75 mg tablet Take 1 tablet (75 mg total) by mouth once daily.    esomeprazole (NEXIUM) 40 MG capsule Take 1 capsule (40 mg total) by mouth once daily.    hydrocortisone 2.5 % cream Apply topically 2 (two) times daily.    MULTI-VITAMIN HI-PO ORAL Take by mouth once daily.     ramipril (ALTACE) 10 MG capsule Take 1 capsule (10 mg total) by mouth 2 (two) times daily.    rosuvastatin (CRESTOR) 20 MG tablet Take 1 tablet (20 mg total) by mouth once daily.    SYMBICORT 80-4.5 mcg/actuation HFAA INHALE 2 PUFFS TWICE A   DAY AS DIRECTED    vitamin E 400 UNIT capsule Take 400 Units by mouth once daily.      carvedilol (COREG) 12.5 MG tablet Take 1 tablet (12.5 mg total) by mouth 2 (two) times daily with meals.    hydrochlorothiazide (HYDRODIURIL) 25 MG tablet Take 1 tablet (25 mg total) by mouth once daily.     No current facility-administered medications for  "this visit.        Objective:     Right Arm BP - Sittin/55 (17 1431)  Left Arm BP - Sittin/53 (17 1431)    BP (!) 111/53 (BP Location: Left arm, Patient Position: Sitting, BP Method: Automatic)   Pulse 80   Ht 5' 4" (1.626 m)   Wt 92.3 kg (203 lb 7.8 oz)   SpO2 98%   BMI 34.93 kg/m²     Physical Exam   Constitutional: He is oriented to person, place, and time. Vital signs are normal. He appears well-developed and well-nourished. He is active. No distress.   HENT:   Head: Normocephalic and atraumatic.   Eyes: Conjunctivae and lids are normal. No scleral icterus.   Neck: Neck supple. Normal carotid pulses, no hepatojugular reflux and no JVD present. Carotid bruit is not present.   Cardiovascular: Normal rate, regular rhythm, S1 normal and S2 normal.  PMI is not displaced.  Exam reveals decreased pulses. Exam reveals no gallop and no friction rub.    No murmur heard.  Pulses:       Carotid pulses are 2+ on the right side, and 2+ on the left side.       Radial pulses are 1+ on the right side, and 1+ on the left side.        Popliteal pulses are 1+ on the right side, and 1+ on the left side.        Dorsalis pedis pulses are 0 on the right side, and 0 on the left side.        Posterior tibial pulses are 0 on the right side, and 0 on the left side.   Severe PAD with poor circulation and multiple stents in legs is likely the reason for pulses not being palpable.    Pulmonary/Chest: Effort normal. No respiratory distress. He has no decreased breath sounds. He has wheezes in the right upper field, the right middle field, the right lower field, the left upper field, the left middle field and the left lower field. He has no rhonchi. He has no rales. He exhibits no tenderness.   Abdominal: Soft. Normal appearance and bowel sounds are normal. He exhibits no distension, no fluid wave, no abdominal bruit, no ascites and no pulsatile midline mass. There is no hepatosplenomegaly. There is no tenderness. "   Musculoskeletal: He exhibits edema (trace BLE).   Neurological: He is alert and oriented to person, place, and time. Gait normal.   Skin: Skin is warm, dry and intact. No rash noted. He is not diaphoretic. Nails show no clubbing.   Psychiatric: He has a normal mood and affect. His speech is normal and behavior is normal. Judgment and thought content normal. Cognition and memory are normal.   Nursing note and vitals reviewed.      Chemistry        Component Value Date/Time     03/22/2017 0923    K 4.0 03/22/2017 0923    CL 97 03/22/2017 0923    CO2 32 (H) 03/22/2017 0923    BUN 16 03/22/2017 0923    CREATININE 1.1 03/22/2017 0923    GLU 90 03/22/2017 0923        Component Value Date/Time    CALCIUM 9.8 03/22/2017 0923    ALKPHOS 66 03/22/2017 0923    AST 27 03/22/2017 0923    ALT 21 03/22/2017 0923    BILITOT 0.5 03/22/2017 0923          Lab Results   Component Value Date    HGBA1C 5.8 03/22/2007       Recent Labs  Lab 03/22/17  0923   WHITE BLOOD CELL COUNT 7.29   HEMOGLOBIN 15.4   HEMATOCRIT 45.9   MCV 92   PLATELETS 366 H   TSH 0.771   CHOLESTEROL 170   HDL 39 L   LDL CHOLESTEROL 70.2   TRIGLYCERIDES 304 H   HDL/CHOLESTEROL RATIO 22.9       Recent Labs  Lab 08/17/16  1126 08/30/16  0611   INR 1.0 0.9        Test(s) Reviewed  I have reviewed the following in detail:  [x] Stress test   [x] Angiography   [x] Echocardiogram   [x] Labs   [] Other:       Assessment:     1. Bilateral edema of lower extremity  Trace edema.    2. Cardiomyopathy, unspecified type  No exam findings suggestive of acute decompensated heart failure.    3. Essential hypertension  Well controlled   4. Hyperlipidemia  LDL at goal, 70. Elevated TG. Pt drinking 6 alcoholic    5. PAD     6. Dyspnea on exertion  Wheezing and not taking COPD medication, spiriva. Likely SCHWARTZ r/t COPD not being controlled d/t poor compliance. Can not exclude change in systolic function as a contributor.    7. Obesity (BMI 30-39.9)  BMI 34.9      Plan:      Bilateral edema of lower extremity    Cardiomyopathy, unspecified type  Comments:  Repeat echo to evaluate systolic function given SCHWARTZ and lower extremity edema. Daily weights. Call for increase of 3lbs/day or 5lbs/wk.  Orders:  -     Discontinue: carvedilol (COREG) 25 MG tablet; Take 1 tablet (25 mg total) by mouth 2 (two) times daily.  Dispense: 60 tablet; Refill: 5  -     2D Echo w/ Color Flow Doppler; Future    Essential hypertension  Comments:  Decrease carvedilol to 12.5mg BID, increase HCTZ to 25mg (the dose he's been taking for 3 weeks), and discussed low salt diet.   Orders:  -     Basic metabolic panel; Future; Expected date: 06/20/2017  -     Discontinue: hydrochlorothiazide (HYDRODIURIL) 25 MG tablet; Take 1 tablet (25 mg total) by mouth once daily.  Dispense: 30 tablet; Refill: 11  -     carvedilol (COREG) 12.5 MG tablet; Take 1 tablet (12.5 mg total) by mouth 2 (two) times daily with meals.  Dispense: 60 tablet; Refill: 11  -     hydrochlorothiazide (HYDRODIURIL) 25 MG tablet; Take 1 tablet (25 mg total) by mouth once daily.  Dispense: 30 tablet; Refill: 11    Hyperlipidemia  Comments:  Continue rosuvastatin    PAD  Comments:  Instructed to continue walking program.     Dyspnea on exertion  Comments:  Discussed the importance of maintanence medications for COPD inflammation and instructed to take spiriva as prescribed.   Orders:  -     2D Echo w/ Color Flow Doppler; Future    Obesity (BMI 30-39.9)  Comments:  Encouraged to lose 5-10% of his body weight.       Return in about 6 months (around 12/20/2017).

## 2017-06-21 DIAGNOSIS — Z00.00 PHYSICAL EXAM: Primary | ICD-10-CM

## 2017-06-27 ENCOUNTER — HOSPITAL ENCOUNTER (OUTPATIENT)
Dept: CARDIOLOGY | Facility: CLINIC | Age: 52
Discharge: HOME | End: 2017-06-27
Payer: COMMERCIAL

## 2017-06-27 DIAGNOSIS — I42.9 CARDIOMYOPATHY, UNSPECIFIED TYPE: Chronic | ICD-10-CM

## 2017-06-27 DIAGNOSIS — R06.09 DYSPNEA ON EXERTION: Chronic | ICD-10-CM

## 2017-06-27 LAB
DIASTOLIC DYSFUNCTION: NO
ESTIMATED PA SYSTOLIC PRESSURE: 14.29
RETIRED EF AND QEF - SEE NOTES: 60 (ref 55–65)

## 2017-06-27 PROCEDURE — 93306 TTE W/DOPPLER COMPLETE: CPT | Mod: S$GLB,,, | Performed by: INTERNAL MEDICINE

## 2017-06-30 ENCOUNTER — TELEPHONE (OUTPATIENT)
Dept: INTERNAL MEDICINE | Facility: CLINIC | Age: 52
End: 2017-06-30

## 2017-06-30 DIAGNOSIS — K60.2 FISSURE, ANORECTAL: Primary | ICD-10-CM

## 2017-06-30 DIAGNOSIS — F41.9 ANXIETY: ICD-10-CM

## 2017-06-30 RX ORDER — ALPRAZOLAM 1 MG/1
TABLET ORAL
Qty: 90 TABLET | Refills: 2 | Status: SHIPPED | OUTPATIENT
Start: 2017-06-30 | End: 2017-09-22 | Stop reason: SDUPTHER

## 2017-06-30 NOTE — TELEPHONE ENCOUNTER
----- Message from Crys Muñoz MA sent at 2017  3:03 PM CDT -----  Contact: Patient  Michael Guillaume  MRN: [id number]  : 1965  PCP: Latrice Zafar  Home Phone      [phone]  Work Phone      Not on file.  Mobile          [phone]      MESSAGE: Patient c/o fissure not healting, requesting a referral to specialist.   Please [phone]

## 2017-07-05 DIAGNOSIS — K21.9 GASTROESOPHAGEAL REFLUX DISEASE, ESOPHAGITIS PRESENCE NOT SPECIFIED: ICD-10-CM

## 2017-07-05 NOTE — TELEPHONE ENCOUNTER
Requested Prescriptions     Pending Prescriptions Disp Refills    esomeprazole (NEXIUM) 40 MG capsule 30 capsule 3     Sig: Take 1 capsule (40 mg total) by mouth once daily.     [facility]

## 2017-07-06 ENCOUNTER — TELEPHONE (OUTPATIENT)
Dept: CARDIOLOGY | Facility: CLINIC | Age: 52
End: 2017-07-06

## 2017-07-06 RX ORDER — ESOMEPRAZOLE MAGNESIUM 40 MG/1
40 CAPSULE, DELAYED RELEASE ORAL DAILY
Qty: 30 CAPSULE | Refills: 3 | Status: SHIPPED | OUTPATIENT
Start: 2017-07-06 | End: 2019-07-23 | Stop reason: DRUGHIGH

## 2017-07-06 NOTE — TELEPHONE ENCOUNTER
Results released to patient through patient portal.  Echo results are normal without significant change from previous.

## 2017-07-13 ENCOUNTER — OFFICE VISIT (OUTPATIENT)
Dept: SURGERY | Facility: CLINIC | Age: 52
End: 2017-07-13
Payer: COMMERCIAL

## 2017-07-13 VITALS
HEART RATE: 80 BPM | SYSTOLIC BLOOD PRESSURE: 92 MMHG | DIASTOLIC BLOOD PRESSURE: 68 MMHG | BODY MASS INDEX: 34.28 KG/M2 | WEIGHT: 200.81 LBS | RESPIRATION RATE: 17 BRPM | HEIGHT: 64 IN

## 2017-07-13 DIAGNOSIS — K60.2 ANAL FISSURE: Primary | ICD-10-CM

## 2017-07-13 PROCEDURE — 99203 OFFICE O/P NEW LOW 30 MIN: CPT | Mod: S$GLB,,, | Performed by: COLON & RECTAL SURGERY

## 2017-07-13 PROCEDURE — 99999 PR PBB SHADOW E&M-EST. PATIENT-LVL III: CPT | Mod: PBBFAC,,, | Performed by: COLON & RECTAL SURGERY

## 2017-07-13 RX ORDER — HYDROCORTISONE ACETATE 25 MG/1
SUPPOSITORY RECTAL 2 TIMES DAILY PRN
Status: ON HOLD | COMMUNITY
Start: 2017-06-27 | End: 2017-08-11

## 2017-07-13 NOTE — PROGRESS NOTES
Subjective:       Patient ID: Michael Guillaume is a 52 y.o. male.    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   +Bleeding and tearing pain with BM's when it is acting up. No significant constipation.  +Worse pain when he eats spicy foods.  Poor eating habits - really only eats dinner and fiber intake is poor.  Using Anusol suppositories and hydrocortisone cream as prescribed by PCP.    Significant PMH - CAD, MI, CHF, COPD, PVD - has multiple cardiac and PV stents - on Plavix.  Recent Echo shows EF 60% with no diastolic dysfunction.    No family hx of CRC or IBD.      Review of patient's allergies indicates:   Allergen Reactions    Lipitor  [atorvastatin]      Other reaction(s): Severe Myalgias       Past Medical History:   Diagnosis Date    Anticoagulant long-term use     Arthritis     Blood transfusion     CHF (congestive heart failure)     COPD (chronic obstructive pulmonary disease)     COPD (chronic obstructive pulmonary disease)     Coronary artery disease     Encounter for blood transfusion     Hyperlipidemia 7/3/2004    Hypertension     Myocardial infarction     PAD (peripheral artery disease)     Sleep apnea     Thoracic or lumbosacral neuritis or radiculitis, unspecified 5/13/2013    Thyroid disorder 9/8/2015       Past Surgical History:   Procedure Laterality Date    ABDOMINAL SURGERY      ANGIOPLASTY      aortogram  2013    CARDIAC CATHETERIZATION      COLONOSCOPY N/A 10/29/2015    Procedure: COLONOSCOPY;  Surgeon: Mino Wells MD;  Location: [facility];  Service: Endoscopy;  Laterality: N/A;  ALLERGY - LIPITOR    FRACTURE SURGERY      TONSILLECTOMY         Current Outpatient Prescriptions   Medication Sig Dispense Refill    acetaminophen (TYLENOL) 325 MG tablet Take 325 mg by mouth every 6 (six) hours as needed  for Pain.      albuterol-ipratropium 2.5mg-0.5mg/3mL (DUO-NEB) 0.5 mg-3 mg(2.5 mg base)/3 mL nebulizer solution Take 3 mLs by nebulization every 6 (six) hours as needed for Wheezing. 90 mL 2    alprazolam (XANAX) 1 MG tablet TAKE ONE TABLET BY MOUTH THREE TIMES A DAY AS     NEEDED 90 tablet 2    aspirin 81 mg Tab Take 1 tablet by mouth once daily.       carvedilol (COREG) 12.5 MG tablet Take 1 tablet (12.5 mg total) by mouth 2 (two) times daily with meals. 60 tablet 11    clopidogrel (PLAVIX) 75 mg tablet Take 1 tablet (75 mg total) by mouth once daily. 30 tablet 5    esomeprazole (NEXIUM) 40 MG capsule Take 1 capsule (40 mg total) by mouth once daily. 30 capsule 3    hydrochlorothiazide (HYDRODIURIL) 25 MG tablet Take 1 tablet (25 mg total) by mouth once daily. 30 tablet 11    MULTI-VITAMIN HI-PO ORAL Take by mouth once daily.       ramipril (ALTACE) 10 MG capsule Take 1 capsule (10 mg total) by mouth 2 (two) times daily. 60 capsule 5    rosuvastatin (CRESTOR) 20 MG tablet Take 1 tablet (20 mg total) by mouth once daily. 30 tablet 5    SYMBICORT 80-4.5 mcg/actuation HFAA INHALE 2 PUFFS TWICE A   DAY AS DIRECTED 10.2 g 4    vitamin E 400 UNIT capsule Take 400 Units by mouth once daily.        ANUCORT-HC 25 mg suppository        No current facility-administered medications for this visit.        Family History   Problem Relation Age of Onset    Clotting disorder Mother     Heart failure Mother     Hypertension Mother     Heart disease Mother     Clotting disorder Father     Heart attack Father     Hypertension Father     Heart disease Father     Heart disease Brother        Social History     Social History    Marital status:      Spouse name: N/A    Number of children: N/A    Years of education: N/A     Social History Main Topics    Smoking status: Former Smoker     Quit date: 11/1/2011    Smokeless tobacco: Never Used      Comment: 5-7 cigarettes in 3 months    Alcohol use 3.6  oz/week     2 - 3 Cans of beer, 6 Shots of liquor per week      Comment: social occasions     Drug use: No    Sexual activity: Not Asked     Other Topics Concern    None     Social History Narrative    None       Review of Systems   Constitutional: Negative for chills and fever.   HENT: Negative for congestion and sinus pressure.    Eyes: Negative for visual disturbance.   Respiratory: Negative for cough and shortness of breath.    Cardiovascular: Negative for chest pain and palpitations.        +Claudication-type pain   Gastrointestinal: Positive for anal bleeding and rectal pain. Negative for abdominal distention, abdominal pain, blood in stool, constipation, diarrhea, nausea and vomiting.   Endocrine: Negative for cold intolerance and heat intolerance.   Genitourinary: Negative for dysuria and frequency.   Musculoskeletal: Negative for arthralgias and back pain.   Skin: Negative for rash.   Allergic/Immunologic: Negative for immunocompromised state.   Neurological: Negative for dizziness, light-headedness and headaches.   Psychiatric/Behavioral: Negative for confusion. The patient is not nervous/anxious.        Objective:      Physical Exam   Constitutional: He is oriented to person, place, and time. He appears well-developed and well-nourished.   HENT:   Head: Normocephalic and atraumatic.   Eyes: Conjunctivae are normal.   Pulmonary/Chest: Effort normal. No respiratory distress.   Abdominal: Soft. Bowel sounds are normal. He exhibits no distension and no mass. There is no tenderness. There is no rebound and no guarding.   Genitourinary:   Genitourinary Comments: Perineum - normal perianal skin, no mass, +Chronic-appearing anterior midline fissure with exposed IAS fibers, no external hemorrhoids     Neurological: He is alert and oriented to person, place, and time.   Skin: Skin is warm and dry. No erythema.           Assessment:       1. Anal fissure        Plan:   Given chronicity and failure to improve  with conservative measures, I feel that he would most likely benefit from LIAS in terms of achieving durable relief.    He will need cardiac clearance and need to hold Plavix for 1 week pre-op    I have discussed the procedure at length with Michael Guillaume.  We discussed the rationale, risks, benefits, and alternatives in depth.  We discussed the expected outcomes and potential complications including but not limited to bleeding, infection, recurrence, prolonged pain, need for further procedures and altered continence.  He verbalized his understanding of the procedure and wishes to proceed.  Written consent was obtained.    Surgery tentatively scheduled for 8/11/17 pending cardiac clearance.    Mino Wells MD, FACS, FASCRS

## 2017-07-13 NOTE — LETTER
July 13, 2017      Latrice Zafar NP  [address] [facility] [place] [address]           [facility]  [address] [place] [id number]  Phone: [phone]  Fax: [phone]          Patient: Michael Guillaume   MR Number: [id number]   YOB: 1965   Date of Visit: 7/13/2017       Dear Latrice Zafar:    Thank you for referring Michael Guillaume to me for evaluation. Attached you will find relevant portions of my assessment and plan of care.    If you have questions, please do not hesitate to call me. I look forward to following Michael Guillaume along with you.    Sincerely,    Mino Wells MD    Enclosure  CC:  MD Jessica Bueno NP    If you would like to receive this communication electronically, please contact [email or web address] or [phone] to request more information on [employer] access.    For providers and/or their staff who would like to refer a patient to [facility], please contact us through our one-stop-shop provider referral line, [facility], at [phone].    If you feel you have received this communication in error or would no longer like to receive these types of communications, please e-mail [email or web address]

## 2017-07-19 PROBLEM — K60.2 ANAL FISSURE: Status: ACTIVE | Noted: 2017-07-19

## 2017-07-26 ENCOUNTER — TELEPHONE (OUTPATIENT)
Dept: SURGERY | Facility: CLINIC | Age: 52
End: 2017-07-26

## 2017-07-26 NOTE — TELEPHONE ENCOUNTER
Asked pt's wife if he had an appt to see cardiology for surgical clearance. She said he will do that when he gets back in town. Asked that he call and let me know when he does it.

## 2017-07-28 ENCOUNTER — ANESTHESIA EVENT (OUTPATIENT)
Dept: SURGERY | Facility: HOSPITAL | Age: 52
End: 2017-07-28
Payer: COMMERCIAL

## 2017-07-28 ENCOUNTER — TELEPHONE (OUTPATIENT)
Dept: CARDIOLOGY | Facility: CLINIC | Age: 52
End: 2017-07-28

## 2017-07-28 DIAGNOSIS — Z01.818 PREOP TESTING: Primary | ICD-10-CM

## 2017-07-28 NOTE — PRE ADMISSION SCREENING
"Anesthesia Assessment: Preoperative EQUATION    Planned Procedure: Procedure(s) (LRB):  SPHINCTEROTOMY-LATERAL INTERNAL ANAL (N/A)  Requested Anesthesia Type:Local MAC  Surgeon: Mino Wells MD  Service: Colon and Rectal  Known or anticipated Date of Surgery:8/11/2017    Surgeon notes: reviewed and Anal fissure    Electronic QUestionnaire Assessment completed via nurse interview with patient.        Michael Guillaume RAY [[id number]] - 52 y.o. Male     Providers Outside of [facility]     No data to display   Surgical Risk Level     Surgical Risk Level:  2       caRDScore (Clinical Anesthesia Rapid Decision Score)     Very High   Total Score: 29    29 Sum of Clinical Scores   caRDScores (Grouped)     caRDScore - Ane:  8            caRDScore - CVD:  5            caRDScore - Pul:  7            caRDScore - Met:  3            caRDScore - Phy:  6       caRDScore Items     Flowsheet Row Pre-admit from 8/11/2017 in [facility]   Anesthesia   Oral or IM for chronic condition (steroid dependent- Why in comment) Yes [uri]   Has large neck size >40cm (15.7in., large male shirt size, large male collar size >16) Yes [16.5]   S/P facial/plastic reconstruction/chin implant Yes [s/p cut on forehead sx]   Has GERD, hiatal hernia, or chronic heartburn/dyspepsia requiring Rx some or all times Yes   Has/has had bowel disease of small or large intestine Yes [Anal fissure]   Has chronic anxiety Yes   CVD   Activity similar to best ability for maximal activity or exercise METS 4   Diagnosed with high blood pressure Yes   <150/90">Typical BP runs <150/90 Yes   PVD in abdomen or legs Yes [s/p-x5-7 stents placed]   Evaluated / Known claudication, stable Yes   Symptoms of claudication increasing No   Pulmonary   Total smoking adds up to 10 - 20 years Yes   During most of those years, smoked 1 pack per day Yes   Has COPD (emphysema, chronic bronchitis, or bronchiectasis) Yes   Has to take steroids for COPD related bronchospasm within " the last year Yes [inhaler]   Some SOB with moderate activity or exertion Yes   Metabolic   Is on Rx for depression or bipolar disease Yes   Has / Has had Dx of schizophrenia, OCD, Other disease Rxed by psychiatrist Yes   Has hyperlipoproteinemia Yes   Physiologic   Anti-platelet agents: Clopidogrel (Plavix) Yes   Aspirin (taken because I have stents) Yes   Flags     Red Flag Score:  2            Yellow Flag Score:  8       Red Flags     Flowsheet Row Pre-admit from 8/11/2017 in [facility]   Anti-platelet agents: Clopidogrel (Plavix) Yes   Aspirin (taken because I have stents) Yes   Yellow Flags     Flowsheet Row Pre-admit from 8/11/2017 in [facility]   Has had steroids in the last year Yes   Takes herbal medications or vitamin supplements Yes [will stop x7 days prior to sx]   Is or has been a Smoker Yes   Aspirin Yes   S/P facial/plastic reconstruction/chin implant Yes [s/p cut on forehead sx]   Has COPD (emphysema, chronic bronchitis, or bronchiectasis) Yes   Some SOB with moderate activity or exertion Yes   Has / Has had Dx of schizophrenia, OCD, Other disease Rxed by psychiatrist Yes   PONV Risk Score (assumes periop narcotic use = +1, Max=4)     PONV Risk Score:  2       PONV Risk Factors   Total Score: 1        Sleep Apnea   Total Score: 0    JUSTIN STOP-Bang Risk Factors (Max=8)   Total Score: 6    1 Has loud snoring   1 Observed to have interrupted breathing during sleep   1 Takes medication for high blood pressure   1 Age >50   1 Has large neck size >40cm   1 Male   JUSTIN Risk Level - 1 (Low), 2 (Moderate), 3 (High)     JUSTIN Risk Level:  3       RCRI (Revised Cardiac Risk Indices of ACC/AHA guidelines, Max=6)   Total Score: 1    1 Patient is having an intra-abdominal thoracic or major vascular case   CAD Risk Factors   Total Score: 7                                CHADS Score if applicable (history of atrial fib/flutter, Max=6)   Total Score: 1        Maximal Exercise  Capacity     Flowsheet Row Pre-admit from 8/11/2017 in [facility]   Maximal Exercise Capacity METS 4   Summary of Dependence   Total Score: 1    1 Is totally independent of others for activities of daily living   Phone Fraility Score (Max = 17)   Total Score: 2    1 Uses 5 or more meds on reg basis   1 Describes health as Fair   Pain Factors     No data to display   Risk Triggers (Evidence-Based Risk Triggers)     Pulmonary Risk Triggers   Total Score: 4                    Renal Risk Triggers   Total Score: 3                Delirium Risk Triggers   Total Score: 0    Urologic Risk Triggers   Total Score: 0    Logistics     Pre-op Clinic Logistics   Total Score: 12                                DOSC Logistics   Total Score: 5                    Discharge Logistics   Total Score: 1        Discharge Planning     Flowsheet Row Pre-admit from 8/11/2017 in [facility]   Discharge Planning   Will assist patient 24/7, if needed -- [[phone]]   Anes & Int Med <For office use only>     Total Score: 27      Surgical Risk Level Assessment             Triage considerations:     Cardiac hx:non-obstructive coronary artery disease, COPD, hyperlipidemia, hypertension,Bilateral edema of lower extremity ,Cardiomyopathy, unspecified type,PAD,  Dyspnea on exertion - Wheezing and not taking COPD medication, spiriva. Likely SCHWARTZ r/t COPD not being controlled d/t poor compliance. Can not exclude change in systolic function as a contributor.   Obesity (BMI 30-39.9) BMI 34.9-    Previous anesthesia records:10/29/15-Colonoscopy-MAC Airway/Jaw/Neck:  Airway Findings: Mouth Opening: Normal Tongue: Normal  General Airway Assessment: Adult  Mallampati: II  TM Distance: Normal, at least 6 cm   -No PONV-no apparent anesthetic complications, tolerated procedure well and no evidence of recall    Anesthesia Hx:  No problems with previous Anesthesia      Last PCP note: within 3 months , within  [facility] , focused visit , Latrice Zafar NP-5/19/17-Rectal fissure  Subspecialty notes: Cardiology: General, Endocrinology    Other important co-morbidities:   Past Medical History:   Diagnosis Date    Anticoagulant long-term use      Arthritis      Blood transfusion      CHF (congestive heart failure)      COPD (chronic obstructive pulmonary disease)      COPD (chronic obstructive pulmonary disease)      Coronary artery disease      Encounter for blood transfusion      Hyperlipidemia 7/3/2004    Hypertension      Myocardial infarction      PAD (peripheral artery disease)      Sleep apnea      Thoracic or lumbosacral neuritis or radiculitis, unspecified 5/13/2013    Thyroid disorder 9/8/2015          Tests already available:  Results have been reviewed.Lab 6/27/17-BMP/ EKG-6/20/17/ 2DEcho-6/27/17            Instructions given. (See in Nurse's note)    Optimization:  Anesthesia Preop Clinic Assessment  Indicated    Medical Opinion Indicated           Plan:    Testing:  CBC and BMP-ordered by surgeons' office(LOIS Feliz with Dr. Wells)   Pre-anesthesia  visit       Visit focus: airway concerns, acute or chronic anxiety concerns, SCHWARTZ     Consultation:IM Perioperative Hospitalist     Patient  has previously scheduled Medical Appointment:None prior to surgery date.    Navigation: Tests Scheduled. Labs-CBC & CMP ordered per surgeons' office(Trini NIETO RN)-appt. On 8/1/17 @ 10:30a             Consults scheduled.POC & Perioperative IM Consult on 8/1/17 @ 9a & 11a             Results will be tracked by Preop Clinic.                 Terri Reyes RN  7/28/17

## 2017-07-28 NOTE — TELEPHONE ENCOUNTER
Dr. Lopez, Please see message from Emely in the Pre-Op Center - pt having surgery on 8-11-17. Thanks, Kiara

## 2017-07-28 NOTE — ANESTHESIA PREPROCEDURE EVALUATION
Pre-operative evaluation for Procedure(s) (LRB):  SPHINCTEROTOMY-LATERAL INTERNAL ANAL (N/A)    Michael Guillaume is a 52 y.o. male PMH HTN, CAD, JUSTIN, COPD, GERD, PAD, and CHF who presents for the above procedure.    LDA: none    Prev airway:  None on file    Drips: none    Patient Active Problem List   Diagnosis    PAD    Claudication of calf muscles    HTN (hypertension)    Spondylosis without myelopathy    Degeneration of lumbar or lumbosacral intervertebral disc    Spinal stenosis, lumbar region, without neurogenic claudication    Thoracic or lumbosacral neuritis or radiculitis, unspecified    Lumbago    Cardiomyopathy    Erectile dysfunction    BPH with urinary obstruction    Dyspnea on exertion    Screening for colon cancer    Hyperlipidemia LDL goal <70    Anxiety    Obesity (BMI 30-39.9)    Subclinical hyperthyroidism    Benign essential HTN    Gastroesophageal reflux disease    Leg edema    Anal fissure    Marijuana use    COPD (chronic obstructive pulmonary disease)    Tattoos    History of sleep apnea       Review of patient's allergies indicates:   Allergen Reactions    Lipitor [atorvastatin] Other (See Comments)     Other reaction(s): Severe Myalgias        No current facility-administered medications on file prior to encounter.      Current Outpatient Prescriptions on File Prior to Encounter   Medication Sig Dispense Refill    acetaminophen (TYLENOL) 325 MG tablet Take 325 mg by mouth every 6 (six) hours as needed for Pain.      albuterol-ipratropium 2.5mg-0.5mg/3mL (DUO-NEB) 0.5 mg-3 mg(2.5 mg base)/3 mL nebulizer solution Take 3 mLs by nebulization every 6 (six) hours as needed for Wheezing. 90 mL 2    alprazolam (XANAX) 1 MG tablet TAKE ONE TABLET BY MOUTH THREE TIMES A DAY AS     NEEDED 90 tablet 2    ANUCORT-HC 25 mg suppository 2 (two) times daily as needed.       aspirin 81 mg Tab Take 1 tablet by mouth once daily.       carvedilol (COREG) 12.5 MG tablet Take 1  tablet (12.5 mg total) by mouth 2 (two) times daily with meals. 60 tablet 11    clopidogrel (PLAVIX) 75 mg tablet Take 1 tablet (75 mg total) by mouth once daily. 30 tablet 5    esomeprazole (NEXIUM) 40 MG capsule Take 1 capsule (40 mg total) by mouth once daily. (Patient taking differently: Take 20 mg by mouth once daily. ) 30 capsule 3    hydrochlorothiazide (HYDRODIURIL) 25 MG tablet Take 1 tablet (25 mg total) by mouth once daily. (Patient taking differently: Take 25 mg by mouth every evening. ) 30 tablet 11    MULTI-VITAMIN HI-PO ORAL Take by mouth once daily.       ramipril (ALTACE) 10 MG capsule Take 1 capsule (10 mg total) by mouth 2 (two) times daily. (Patient taking differently: Take 10 mg by mouth once daily. ) 60 capsule 5    rosuvastatin (CRESTOR) 20 MG tablet Take 1 tablet (20 mg total) by mouth once daily. (Patient taking differently: Take 20 mg by mouth every evening. ) 30 tablet 5    SYMBICORT 80-4.5 mcg/actuation HFAA INHALE 2 PUFFS TWICE A   DAY AS DIRECTED 10.2 g 4    vitamin E 400 UNIT capsule Take 400 Units by mouth once daily.           Past Surgical History:   Procedure Laterality Date    ABDOMINAL SURGERY      ANGIOPLASTY      aortogram  2013    CARDIAC CATHETERIZATION      COLONOSCOPY N/A 10/29/2015    Procedure: COLONOSCOPY;  Surgeon: Mino eWlls MD;  Location: [facility];  Service: Endoscopy;  Laterality: N/A;  ALLERGY - LIPITOR    FRACTURE SURGERY      TONSILLECTOMY         Social History     Social History    Marital status:      Spouse name: N/A    Number of children: N/A    Years of education: N/A     Occupational History    Not on file.     Social History Main Topics    Smoking status: Former Smoker     Packs/day: 1.00     Years: 20.00     Types: Cigarettes     Quit date: 11/1/2011    Smokeless tobacco: Never Used      Comment: 5-7 cigarettes in 3 months    Alcohol use 3.6 oz/week     2 - 3 Cans of beer, 6 Shots of liquor per week      Comment: wine-  occasions    Drug use: No    Sexual activity: Not on file     Other Topics Concern    Not on file     Social History Narrative    No narrative on file         Vital Signs Range (Last 24H):         CBC: No results for input(s): WBC, RBC, HGB, HCT, PLT, MCV, MCH, MCHC in the last 72 hours.    CMP: No results for input(s): NA, K, CL, CO2, BUN, CREATININE, GLU, MG, PHOS, CALCIUM, ALBUMIN, PROT, ALKPHOS, ALT, AST, BILITOT in the last 72 hours.    INR  No results for input(s): INR, PROTIME, APTT in the last 72 hours.    Invalid input(s): PT        Diagnostic Studies: none    EK17 NSR    2D Echo: 17  CONCLUSIONS     1 - Normal left ventricular systolic function (EF 60-65%).     2 - Normal left ventricular diastolic function.     3 - Normal right ventricular systolic function .     4 - The estimated PA systolic pressure is 14 mmHg    NM Pharm Stress Test 8/7/15  Impression: NORMAL MYOCARDIAL PERFUSION  1. The perfusion scan is free of evidence for myocardial ischemia or injury. The inferior wall is obscured by bowel.  2. Resting wall motion is physiologic.   3. Resting LV function is normal.  (normal is >= 51%)  4. The ventricular volumes are normal at rest and stress.   5. The extracardiac distribution of radioactivity is normal.   6. When compared to the previous study from 2011, the inferior wall artifact persists and is likely due to bowel.    Anesthesia Assessment: Preoperative EQUATION    Planned Procedure: Procedure(s) (LRB):  SPHINCTEROTOMY-LATERAL INTERNAL ANAL (N/A)  Requested Anesthesia Type:Local MAC  Surgeon: Mino Wells MD  Service: Colon and Rectal  Known or anticipated Date of Surgery:2017    Surgeon notes: reviewed and Anal fissure    Electronic QUestionnaire Assessment completed via nurse interview with patient.        Michael Guillaume [[id number]] - 52 y.o. Male     Providers Outside of [facility]     No data to display   Surgical Risk Level     Surgical Risk Level:  2      "  caRDScore (Clinical Anesthesia Rapid Decision Score)     Very High   Total Score: 29    29 Sum of Clinical Scores   caRDScores (Grouped)     caRDScore - Ane:  8            caRDScore - CVD:  5            caRDScore - Pul:  7            caRDScore - Met:  3            caRDScore - Phy:  6       caRDScore Items     Flowsheet Row Pre-admit from 8/11/2017 in [facility]   Anesthesia   Oral or IM for chronic condition (steroid dependent- Why in comment) Yes [uri]   Has large neck size >40cm (15.7in., large male shirt size, large male collar size >16) Yes [16.5]   S/P facial/plastic reconstruction/chin implant Yes [s/p cut on forehead sx]   Has GERD, hiatal hernia, or chronic heartburn/dyspepsia requiring Rx some or all times Yes   Has/has had bowel disease of small or large intestine Yes [Anal fissure]   Has chronic anxiety Yes   CVD   Activity similar to best ability for maximal activity or exercise METS 4   Diagnosed with high blood pressure Yes   <150/90">Typical BP runs <150/90 Yes   PVD in abdomen or legs Yes [s/p-x5-7 stents placed]   Evaluated / Known claudication, stable Yes   Symptoms of claudication increasing No   Pulmonary   Total smoking adds up to 10 - 20 years Yes   During most of those years, smoked 1 pack per day Yes   Has COPD (emphysema, chronic bronchitis, or bronchiectasis) Yes   Has to take steroids for COPD related bronchospasm within the last year Yes [inhaler]   Some SOB with moderate activity or exertion Yes   Metabolic   Is on Rx for depression or bipolar disease Yes   Has / Has had Dx of schizophrenia, OCD, Other disease Rxed by psychiatrist Yes   Has hyperlipoproteinemia Yes   Physiologic   Anti-platelet agents: Clopidogrel (Plavix) Yes   Aspirin (taken because I have stents) Yes   Flags     Red Flag Score:  2            Yellow Flag Score:  8       Red Flags     Flowsheet Row Pre-admit from 8/11/2017 in [facility]   Anti-platelet agents: Clopidogrel " (Plavix) Yes   Aspirin (taken because I have stents) Yes   Yellow Flags     Flowsheet Row Pre-admit from 8/11/2017 in [facility]   Has had steroids in the last year Yes   Takes herbal medications or vitamin supplements Yes [will stop x7 days prior to sx]   Is or has been a Smoker Yes   Aspirin Yes   S/P facial/plastic reconstruction/chin implant Yes [s/p cut on forehead sx]   Has COPD (emphysema, chronic bronchitis, or bronchiectasis) Yes   Some SOB with moderate activity or exertion Yes   Has / Has had Dx of schizophrenia, OCD, Other disease Rxed by psychiatrist Yes   PONV Risk Score (assumes periop narcotic use = +1, Max=4)     PONV Risk Score:  2       PONV Risk Factors   Total Score: 1        Sleep Apnea   Total Score: 0    JUSTIN STOP-Bang Risk Factors (Max=8)   Total Score: 6    1 Has loud snoring   1 Observed to have interrupted breathing during sleep   1 Takes medication for high blood pressure   1 Age >50   1 Has large neck size >40cm   1 Male   JUSTIN Risk Level - 1 (Low), 2 (Moderate), 3 (High)     JUSTIN Risk Level:  3       RCRI (Revised Cardiac Risk Indices of ACC/AHA guidelines, Max=6)   Total Score: 1    1 Patient is having an intra-abdominal thoracic or major vascular case   CAD Risk Factors   Total Score: 7                                CHADS Score if applicable (history of atrial fib/flutter, Max=6)   Total Score: 1        Maximal Exercise Capacity     Flowsheet Row Pre-admit from 8/11/2017 in [facility]   Maximal Exercise Capacity METS 4   Summary of Dependence   Total Score: 1    1 Is totally independent of others for activities of daily living   Phone Fraility Score (Max = 17)   Total Score: 2    1 Uses 5 or more meds on reg basis   1 Describes health as Fair   Pain Factors     No data to display   Risk Triggers (Evidence-Based Risk Triggers)     Pulmonary Risk Triggers   Total Score: 4                    Renal Risk Triggers   Total Score: 3                 Delirium Risk Triggers   Total Score: 0    Urologic Risk Triggers   Total Score: 0    Logistics     Pre-op Clinic Logistics   Total Score: 12                                DOSC Logistics   Total Score: 5                    Discharge Logistics   Total Score: 1        Discharge Planning     Flowsheet Row Pre-admit from 8/11/2017 in [facility]   Discharge Planning   Will assist patient 24/7, if needed -- [[phone]]   Anes & Int Med <For office use only>     Total Score: 27      Surgical Risk Level Assessment             Triage considerations:     Cardiac hx:non-obstructive coronary artery disease, COPD, hyperlipidemia, hypertension,Bilateral edema of lower extremity ,Cardiomyopathy, unspecified type,PAD,  Dyspnea on exertion - Wheezing and not taking COPD medication, spiriva. Likely SCHWARTZ r/t COPD not being controlled d/t poor compliance. Can not exclude change in systolic function as a contributor.   Obesity (BMI 30-39.9) BMI 34.9-    Previous anesthesia records:10/29/15-Colonoscopy-MAC Airway/Jaw/Neck:  Airway Findings: Mouth Opening: Normal Tongue: Normal  General Airway Assessment: Adult  Mallampati: II  TM Distance: Normal, at least 6 cm   -No PONV-no apparent anesthetic complications, tolerated procedure well and no evidence of recall    Anesthesia Hx:  No problems with previous Anesthesia      Last PCP note: within 3 months , within [facility] , focused visit , Latrice Zafar NP-5/19/17-Rectal fissure  Subspecialty notes: Cardiology: General, Endocrinology    Other important co-morbidities:   Past Medical History:   Diagnosis Date    Anticoagulant long-term use      Arthritis      Blood transfusion      CHF (congestive heart failure)      COPD (chronic obstructive pulmonary disease)      COPD (chronic obstructive pulmonary disease)      Coronary artery disease      Encounter for blood transfusion      Hyperlipidemia 7/3/2004    Hypertension      Myocardial  infarction      PAD (peripheral artery disease)      Sleep apnea      Thoracic or lumbosacral neuritis or radiculitis, unspecified 5/13/2013    Thyroid disorder 9/8/2015          Tests already available:  Results have been reviewed.Lab 6/27/17-BMP/ EKG-6/20/17/ 2DEcho-6/27/17            Instructions given. (See in Nurse's note)    Optimization:  Anesthesia Preop Clinic Assessment  Indicated    Medical Opinion Indicated           Plan:    Testing:  CBC and BMP-ordered by surgeons' office(LOIS Feliz with Dr. Wells)   Pre-anesthesia  visit       Visit focus: airway concerns, acute or chronic anxiety concerns, SCHWARTZ     Consultation:IM Perioperative Hospitalist     Patient  has previously scheduled Medical Appointment:None prior to surgery date.    Navigation: Tests Scheduled. Labs-CBC & CMP ordered per surgeons' office(Trini NIETO RN)-appt. On 8/1/17 @ 10:30a             Consults scheduled.POC & Perioperative IM Consult on 8/1/17 @ 9a & 11a             Results will be tracked by Preop Clinic.                 Terri Reyes RN  7/28/17 07/28/2017  Michael Guillaume is a 52 y.o., male.    Anesthesia Evaluation    I have reviewed the Patient Summary Reports.    I have reviewed the Nursing Notes.   I have reviewed the Medications.     Review of Systems  Anesthesia Hx:  No problems with previous Anesthesia  History of prior surgery of interest to airway management or planning: Previous anesthesia: General, MAC  10/29/17 Colonoscopy with MAC.  Denies Family Hx of Anesthesia complications.   Denies Personal Hx of Anesthesia complications.   Social:  Non-Smoker, No Alcohol Use    Hematology/Oncology:  Hematology Normal   Oncology Normal     Cardiovascular:   Exercise tolerance: good Hypertension, well controlled Past MI (age of 30's) CAD   CHF (cardiomyopathy, EF 45%) PVD (s/p stents in left and right leg-multiple times)  hyperlipidemia Walks 1 mile every morning but stops about 10-15 times.  Denies chest pain   Pulmonary:   COPD, mild Shortness of breath (SCHWARTZ- patient reports stable and not worsening) Sleep Apnea (s/p UPPP and T&A- between 9619-5801)    Renal/:   BPH    Hepatic/GI:   GERD (can lie flat) Denies Liver Disease.  Bowel Conditions: (anal fissure)    Musculoskeletal:  Lumbar Spine Disorders, Lumbar Disc Disease   Neurological:   Denies CVA. Denies Seizures.  Neuro Symptoms of numbness, tingling Toes and legs when walking  Endocrine:   Hyperthyroidism    Psych:   anxiety depression          Physical Exam  General:  Obesity    Airway/Jaw/Neck:  Airway Findings: Mouth Opening: Normal Tongue: Normal  General Airway Assessment: Adult  Mallampati: III  Improves to II with phonation.  Jaw/Neck Findings:  Neck ROM: Normal ROM  Neck Findings:  Girth Increased, Short Neck      Dental:  Dental Findings: In tact, molar caps   Chest/Lungs:  Chest/Lungs Clear    Heart/Vascular:  Heart Findings: Normal Heart murmur: negative    Abdomen:  Abdomen Findings: Normal    Musculoskeletal:  Musculoskeletal Findings: Normal   Skin:  Skin Findings: Normal    Mental Status:  Mental Status Findings:  Cooperative, Alert and Oriented         Labs reviewed    Last dose of plavix-5 days before surgery 8/5    Please refer to , Internal Medicine, perioperative risk assessment and recommendations.     Asiya Tariq RN        Anesthesia Plan  Type of Anesthesia, risks & benefits discussed:  Anesthesia Type:  general, MAC  Patient's Preference:   Intra-op Monitoring Plan: standard ASA monitors  Intra-op Monitoring Plan Comments:   Post Op Pain Control Plan: per primary service following discharge from PACU, IV/PO Opioids PRN and multimodal analgesia  Post Op Pain Control Plan Comments:   Induction:    Beta Blocker:  Patient is on a Beta-Blocker and has received one dose within the past 24 hours (No further documentation required).        Informed Consent: Patient understands risks and agrees with Anesthesia plan.  Questions answered. Anesthesia consent signed with patient.  ASA Score: 3     Day of Surgery Review of History & Physical:  There are no significant changes.          Ready For Surgery From Anesthesia Perspective.

## 2017-07-28 NOTE — TELEPHONE ENCOUNTER
----- Message from Vania Yeh MA sent at 7/28/2017  4:24 PM CDT -----  Contact: [facility] x [phone]  She is calling about instructions for Plavix and Asa. How many days to be off of meds? Having surgery  with Kash Wells for anal fissure. Please call [facility].  Dr. Lopez pt. Last visit 3-7-16

## 2017-07-31 ENCOUNTER — TELEPHONE (OUTPATIENT)
Dept: CARDIOLOGY | Facility: CLINIC | Age: 52
End: 2017-07-31

## 2017-08-01 ENCOUNTER — INITIAL CONSULT (OUTPATIENT)
Dept: INTERNAL MEDICINE | Facility: CLINIC | Age: 52
End: 2017-08-01
Payer: COMMERCIAL

## 2017-08-01 ENCOUNTER — HOSPITAL ENCOUNTER (OUTPATIENT)
Dept: PREADMISSION TESTING | Facility: HOSPITAL | Age: 52
Discharge: HOME | End: 2017-08-01
Attending: ANESTHESIOLOGY
Payer: COMMERCIAL

## 2017-08-01 VITALS
HEART RATE: 77 BPM | BODY MASS INDEX: 34.06 KG/M2 | HEIGHT: 64 IN | SYSTOLIC BLOOD PRESSURE: 116 MMHG | OXYGEN SATURATION: 98 % | DIASTOLIC BLOOD PRESSURE: 76 MMHG | TEMPERATURE: 97 F | WEIGHT: 199.5 LBS

## 2017-08-01 DIAGNOSIS — E78.5 HYPERLIPIDEMIA LDL GOAL <70: ICD-10-CM

## 2017-08-01 DIAGNOSIS — N13.8 BPH WITH URINARY OBSTRUCTION: ICD-10-CM

## 2017-08-01 DIAGNOSIS — Z01.818 PREOP EXAMINATION: Primary | ICD-10-CM

## 2017-08-01 DIAGNOSIS — R06.09 DYSPNEA ON EXERTION: ICD-10-CM

## 2017-08-01 DIAGNOSIS — F12.90 MARIJUANA USE: ICD-10-CM

## 2017-08-01 DIAGNOSIS — K21.9 GASTROESOPHAGEAL REFLUX DISEASE, ESOPHAGITIS PRESENCE NOT SPECIFIED: ICD-10-CM

## 2017-08-01 DIAGNOSIS — I73.9 PAD (PERIPHERAL ARTERY DISEASE): ICD-10-CM

## 2017-08-01 DIAGNOSIS — E66.9 OBESITY (BMI 30-39.9): ICD-10-CM

## 2017-08-01 DIAGNOSIS — L81.8 TATTOOS: ICD-10-CM

## 2017-08-01 DIAGNOSIS — R60.0 LEG EDEMA: ICD-10-CM

## 2017-08-01 DIAGNOSIS — I10 ESSENTIAL HYPERTENSION: ICD-10-CM

## 2017-08-01 DIAGNOSIS — N40.1 BPH WITH URINARY OBSTRUCTION: ICD-10-CM

## 2017-08-01 DIAGNOSIS — Z86.69 HISTORY OF SLEEP APNEA: ICD-10-CM

## 2017-08-01 DIAGNOSIS — J44.9 CHRONIC OBSTRUCTIVE PULMONARY DISEASE, UNSPECIFIED COPD TYPE: ICD-10-CM

## 2017-08-01 PROCEDURE — 99214 OFFICE O/P EST MOD 30 MIN: CPT | Mod: S$GLB,,, | Performed by: HOSPITALIST

## 2017-08-01 PROCEDURE — 99999 PR PBB SHADOW E&M-EST. PATIENT-LVL III: CPT | Mod: PBBFAC,,, | Performed by: HOSPITALIST

## 2017-08-01 NOTE — ASSESSMENT & PLAN NOTE
Hypertension-  Blood pressure is low.Suggested BP monitoring at the house and to call towards the end of the week  I suggest continuation of Coreg- during the entire perioperative period. I suggest holding Ramipril on the morning of the surgery and can continue that  post operatively under blood pressure, electrolyte and renal function monitoring as long as they are acceptable.I suggest addressing pain control as uncontrolled pain can increased blood pressure

## 2017-08-01 NOTE — ASSESSMENT & PLAN NOTE
GERD-  I suggest continuation of the Proton pump inhibitor in the perioperative period . I suggest aspiration precautions

## 2017-08-01 NOTE — DISCHARGE INSTRUCTIONS
Your surgery has been scheduled for:__________________________________________    You should report to:  ____[facility], located on the [place] side of the first floor of the           [facility] ([phone])  ____[facility], located on the [facility] side of the            Second floor of the [facility] ([phone])  ____3rd Floor [facility] located on the [facility] side of the [facility] [phone]  Please Note   - Tell your doctor if you take Aspirin, products containing Aspirin, herbal medications  or blood thinners, such as Coumadin, Ticlid, or Plavix.  (Consult your provider regarding holding or stopping before surgery).  - Arrange for someone to drive you home following surgery.  You will not be allowed to leave the surgical facility alone or drive yourself home following sedation and anesthesia.  Before Surgery  - Stop taking all herbal medications 14days prior to surgery  - No Motrin/Advil (Ibuprofen) 7 days before surgery  - No Aleve (Naproxen) 7 days before surgery  - Stop Taking Asprin, products containing Asprin _____days before surgery  - Stop taking blood thinners_______days before surgery  - Refrain from drinking alcoholic beverages for 24hours before and after surgery  - Stop or limit smoking _________days before surgery  Night before Surgery  - DO NOT EAT OR DRINK ANYTHING AFTER MIDNIGHT, INCLUDING GUM, HARD CANDY, MINTS, OR CHEWING TOBACCO.  - Take a shower or bath (shower is recommended).  Bathe with Hibiclens soap or an antibacterial soap from the neck down.  If not supplied by your surgeon, hibiclens soap will need to be purchased over the counter in pharmacy.  Rinse soap off thoroughly.  - Shampoo your hair with your regular shampoo  The Day of Surgery  - Take another bath or shower with hibiclens or any antibacterial soap, to reduce the chance of infection.  - Take heart and blood  pressure medications with a small sip of water, as advised by the perioperative team.  - Do not take fluid pills  - You may brush your teeth and rinse your mouth, but do not swall any additional water.   - Do not apply perfumes, powder, body lotions or deodorant on the day of surgery.  - Nail polish should be removed.  - Do not wear makeup or moisturizer  - Wear comfortable clothes, such as a button front shirt and loose fitting pants.  - Leave all jewelry, including body piercings, and valuables at home.    - Bring any devices you will neeed after surgery such as crutches or canes.  - If you have sleep apnea, please bring your CPAP machine  In the event that your physical condition changes including the onset of a cold or respiratory illness, or if you have to delay or cancel your surgery, please notify your surgeon.  Anesthesia: General Anesthesia  Youre due to have surgery. During surgery, youll be given medication called anesthesia. (It is also called anesthetic.) This will keep you comfortable and pain-free. Your anesthesia provider will use general anesthesia. This sheet tells you more about it.  What is general anesthesia?     You are watched continuously during your procedure by the anesthesia provider   General anesthesia puts you into a state like deep sleep. It goes into the bloodstream (IV anesthetics), into the lungs (gas anesthetics), or both. You feel nothing during the procedure. You will not remember it. During the procedure, the anesthesia provider monitors you continuously. He or she checks your heart rate and rhythm, blood pressure, breathing, and blood oxygen.  · IV Anesthetics. IV anesthetics are given through an IV line in your arm. Theyre often given first. This is so you are asleep before a gas anesthetic is started. Some kinds of IV anesthetics relieve pain. Others relax you. Your doctor will decide which kind is best in your case.  · Gas Anesthetics. Gas anesthetics are breathed into  the lungs. They are often used to keep you asleep. They can be given through a facemask or a tube placed in your larynx or trachea (breathing tube).  ? If you have a facemask, your anesthesia provider will most likely place it over your nose and mouth while youre still awake. Youll breathe oxygen through the mask as your IV anesthetic is started. Gas anesthetic may be added through the mask.  ? If you have a tube in the larynx or trachea, it will be inserted into your throat after youre asleep.  Anesthesia tools and medications  You will likely have:  · IV anesthetics. These are put into an IV line into your bloodstream.  · Gas anesthetics. You breathe these anesthetics into your lungs, where they pass into your bloodstream.  · Pulse oximeter. This is a small clip that is attached to the end of your finger. This measures your blood oxygen level.  · Electrocardiography leads (electrodes). These are small sticky pads that are placed on your chest. They record your heart rate and rhythm.  · Blood pressure cuff. This reads your blood pressure.  Risks and possible complications  General anesthesia has some risks. These include:  · Breathing problems  · Nausea and vomiting  · Sore throat or hoarseness (usually temporary)  · Allergic reaction to the anesthetic  · Irregular heartbeat (rare)  · Cardiac arrest (rare)   Anesthesia safety  · Follow all instructions you are given for how long not to eat or drink before your procedure.  · Be sure your doctor knows what medications and drugs you take. This includes over-the-counter medications, herbs, supplements, alcohol or other drugs. You will be asked when those were last taken.  · Have an adult family member or friend drive you home after the procedure.  · For the first 24 hours after your surgery:  ? Do not drive or use heavy equipment.  ? Have a trusted family member or spouse make important decisions or sign documents.  ? Avoid alcohol.  ? Have a responsible adult stay  with you. He or she can watch for problems and help keep you safe.  Date Last Reviewed: 10/16/2014  © 9860-6925 [employer], [employer]. [address] [place] [address], [place] [address]. All rights reserved. This information is not intended as a substitute for professional medical care. Always follow your healthcare professional's instructions.

## 2017-08-01 NOTE — HPI
History of present illness- I had the pleasure of meeting this pleasant 52 y.o. gentleman in the pre op clinic prior to his elective anal procedure . The patient is new to me .     I have obtained the history by speaking to the patient and by reviewing the electronic health records.    Events leading up to surgery / History of presenting illness -    Anal fissure    He has been troubled with severe  anal area pain for about 2 years . Pain increases with passing hard stool and decreases with soft stools.  Has been avoiding foods that constipates him and had been not having anal pain for about a week   Because he does not have pain any more , he is having second thoughts ,to proceed with surgery or not     Relevant health conditions of significance for the perioperative period/ History of presenting illness -    Hypertension ,On medication  For about a month , having lower BP-Coreg dose was reduced to half by cardiologist   Home  machine readings -  95/65-45   Noticed more tired since BP was low  No dizziness, light headedness     Obesity- suggested weight loss  Exercise limitation because of PAD - lower extremities     GERD- controlled on Nexium   Used to have chest discomfort central,no radiation. No relation to specific food - relieved with Nexium  No chest pain on exertion     History of Cardiomyopathy , LV dysfunction resolved with med therapy   Most recent echo - June 2017-   Normal left ventricular systolic function (EF 60-65%).     Has a family history of vascular disease   Brother had transplant   As per patient had Mi in 1998 - retrospective diagnosis    no History of CABG.History of cardiac stenting  Chest pain , SOB on exertion, like climbing an incline ,last episode 1 month ago - last one 1 month   Not troubled as much as he cannot do much  No unstable features  As pert cardiology evaluation- can hold the plavix for 5 days prior to his surgery. Prefer he continue his ASA  A SPECT done 8/15 was negative  for ischemia.     PAD - multiple stents placed   Claudication pain both lower extremities  Reduced Claudication distance - personally does not feel like he need any intervention   Walks one mile - and stops 10 times during that time due to claudication ,No chest pain     BPH   Slow stream of Urine

## 2017-08-01 NOTE — LETTER
August 1, 2017      Jeanne Kc MD  [address] [facility] [address]           [facility] - Pre Op Consult  [address] [facility] [address]  Phone: [phone]          Patient: Michael Guillaume   MR Number: [id number]   YOB: 1965   Date of Visit: 8/1/2017       Dear Dr. Jeanne Kc:    Thank you for referring Michael Guillaume to me for evaluation. Attached you will find relevant portions of my assessment and plan of care.    If you have questions, please do not hesitate to call me. I look forward to following Michael Guillaume along with you.    Sincerely,    Vicky Bergman MD    Enclosure  CC:  YOGESH Craven MD    If you would like to receive this communication electronically, please contact [email or web address] or [phone] to request more information on [facility] access.    For providers and/or their staff who would like to refer a patient to [facility], please contact us through our one-stop-shop provider referral line, [facility], at [phone].    If you feel you have received this communication in error or would no longer like to receive these types of communications, please e-mail [email or web address]

## 2017-08-01 NOTE — ASSESSMENT & PLAN NOTE
I suggest consideration of inhaled bronchodilator use if the patient has perioperative bronchospasm   Suggest Rinsing mouth after Symbicort use

## 2017-08-01 NOTE — ASSESSMENT & PLAN NOTE
Edema- I suggested avoidance of added salt,avoidance of NSAID's ( Except ASA 81 mg )  and suggested Limb elevation

## 2017-08-01 NOTE — ASSESSMENT & PLAN NOTE
Multiple Stents  ASA - with history of  Stenting.  I have discussed the  risk of stent thrombosis with interruption of Aspirin regimen .Risk ( bleeding ) ,  benefits about perioperative use of  ASA  discussed

## 2017-08-01 NOTE — OUTPATIENT SUBJECTIVE & OBJECTIVE
Outpatient Subjective & Objective     Chief complaint-Preoperative evaluation, Perioperative Medical management, complication reduction plan     Relevant health conditions of significance for the perioperative period/ History of presenting illness -    Active cardiac conditions- none    Revised cardiac risk index predictors- coronary artery disease and history of heart failure    Functional capacity -Examples of physical activity  can take 1 flight of stairs----- He can undertake all the above activities without  chest pain,chest tightness,,dizziness,lightheadedness making his exercise tolerance more than 4 Mets.   Winded on exertion, not new ,stable over time     Review of Systems   Constitutional: Negative for chills and fever.        No unusual weight changes   HENT:        STOPBANG  Napping during the day   Elevated BP  Age over 50 years  Neck size over 40 CM  Male gender    Was operated on the upper air way  for sleep apnea   Eyes:        Blurred vision- had glasses - recently - F/U suggested   Respiratory:        Cough ,clear Phlegm   No change in color consistency, amount of phlegm   No hemoptysis   Cardiovascular:        As noted   Gastrointestinal:        Bowels- Regular   Rectal bleeding with wiping with fissure   No overt GI/ blood losses   Endocrine:        Recent steroid use- none   Genitourinary: Negative for dysuria.   Musculoskeletal:        As above      Skin: Negative for rash.   Neurological: Negative for syncope.        No unilateral weakness   Hematological:        ASA,PLAVIX   Psychiatric/Behavioral:        Depression,Anxiety- Controlled   Under Psych care       Past Medical History:   Diagnosis Date    Anticoagulant long-term use     Arthritis     Blood transfusion     CHF (congestive heart failure)     COPD (chronic obstructive pulmonary disease)     COPD (chronic obstructive pulmonary disease)     Coronary artery disease     Encounter for blood transfusion     Hyperlipidemia  7/3/2004    Hypertension     Myocardial infarction     PAD (peripheral artery disease)     Sleep apnea     Thoracic or lumbosacral neuritis or radiculitis, unspecified 5/13/2013    Thyroid disorder 9/8/2015     Family History   Problem Relation Age of Onset    Clotting disorder Mother     Heart failure Mother     Hypertension Mother     Heart disease Mother     Clotting disorder Father     Heart attack Father     Hypertension Father     Heart disease Father     Heart disease Brother     Anesthesia problems Neg Hx      Past Surgical History:   Procedure Laterality Date    ABDOMINAL SURGERY      ANGIOPLASTY      aortogram  2013    CARDIAC CATHETERIZATION      COLONOSCOPY N/A 10/29/2015    Procedure: COLONOSCOPY;  Surgeon: Mino Wells MD;  Location: [facility];  Service: Endoscopy;  Laterality: N/A;  ALLERGY - LIPITOR    FRACTURE SURGERY      TONSILLECTOMY     No anesthesia, bleeding , cardiac , PONV  Medications and Allergies reviewed in epic.     Lives with wife, Son- help available     Physical Exam      Investigations  Lab and Imaging have been reviewed in [facility].    Review of Medicine tests    EKG- I had independently reviewed the EKG from--6/20/2017   It was reported to be showing     Normal sinus rhythm  Normal ECG  When compared with ECG of 30-AUG-2016 06:14,  No significant change was found    June 2017     1 - Normal left ventricular systolic function (EF 60-65%).     2 - Normal left ventricular diastolic function.     3 - Normal right ventricular systolic function .     4 - The estimated PA systolic pressure is 14 mmHg.     Aug 2015     1. The perfusion scan is free of evidence for myocardial ischemia or injury. The inferior wall is obscured by bowel.  2. Resting wall motion is physiologic.   3. Resting LV function is normal.  (normal is >= 51%)  4. The ventricular volumes are normal at rest and stress.   5. The extracardiac distribution of radioactivity is normal.   6. When compared  to the previous study from 07/06/2011, the inferior wall artifact persists and is likely due to bowel.    Review of clinical lab tests-Date-6/27/2017 -- Creatinine-1.1  Date--3/22/2017Hemoglobin-- 15.4 Platelet count--266    Review of old records- Was done and information gathered regards to events leading to surgery and health conditions of significance in the perioperative period      Review of old records- Was done and information gathered regards to events leading to surgery and health conditions of significance in the perioperative period.    Outpatient Subjective & Objective

## 2017-08-01 NOTE — ASSESSMENT & PLAN NOTE
Possible sleep apnea- I suggest a sleep study and suggest caution with usage of medication that can cause respiratory suppression in the perioperative period  potential ramifications of untreated sleep apnea, which could include daytime sleepiness, hypertension, heart disease and stroke were discussed

## 2017-08-01 NOTE — PROGRESS NOTES
[facility] - Pre Op Consult  Progress Note    Patient Name: Michael Guillaume  MRN: [id number]  Date of Evaluation- 08/01/2017  PCP- Latrice Zafar NP    Future cases for Michael Guillaume [[id number]]     Case ID Status Date Time Timothy Procedure Provider Location    [id number] [facility] 8/11/2017  1:45 PM 70 SPHINCTEROTOMY-LATERAL INTERNAL ANAL Mino Wells MD [[id number]] [facility] OR 2ND FLR          HPI:  History of present illness- I had the pleasure of meeting this pleasant 52 y.o. gentleman in the pre op clinic prior to his elective anal procedure . The patient is new to me .     I have obtained the history by speaking to the patient and by reviewing the electronic health records.    Events leading up to surgery / History of presenting illness -    Anal fissure    He has been troubled with severe  anal area pain for about 2 years . Pain increases with passing hard stool and decreases with soft stools.  Has been avoiding foods that constipates him and had been not having anal pain for about a week   Because he does not have pain any more , he is having second thoughts ,to proceed with surgery or not     Relevant health conditions of significance for the perioperative period/ History of presenting illness -    Hypertension ,On medication  For about a month , having lower BP-Coreg dose was reduced to half by cardiologist   Home  machine readings -  95/65-45   Noticed more tired since BP was low  No dizziness, light headedness     Obesity- suggested weight loss  Exercise limitation because of PAD - lower extremities     GERD- controlled on Nexium   Used to have chest discomfort central,no radiation. No relation to specific food - relieved with Nexium  No chest pain on exertion     History of Cardiomyopathy , LV dysfunction resolved with med therapy   Most recent echo - June 2017-   Normal left ventricular systolic function (EF 60-65%).     Has a family history of vascular disease   Brother had transplant   As per patient had Mi in 1998 -  retrospective diagnosis    no History of CABG.History of cardiac stenting  Chest pain , SOB on exertion, like climbing an incline ,last episode 1 month ago - last one 1 month   Not troubled as much as he cannot do much  No unstable features  As pert cardiology evaluation- can hold the plavix for 5 days prior to his surgery. Prefer he continue his ASA  A SPECT done 8/15 was negative for ischemia.     PAD - multiple stents placed   Claudication pain both lower extremities  Reduced Claudication distance - personally does not feel like he need any intervention   Walks one mile - and stops 10 times during that time due to claudication ,No chest pain     BPH   Slow stream of Urine      Subjective/ Objective:          Chief complaint-Preoperative evaluation, Perioperative Medical management, complication reduction plan     Relevant health conditions of significance for the perioperative period/ History of presenting illness -      Review of Systems    Past Medical History:   Diagnosis Date    Anticoagulant long-term use     Arthritis     Blood transfusion     mid 40's age    CHF (congestive heart failure)     COPD (chronic obstructive pulmonary disease)     COPD (chronic obstructive pulmonary disease)     Coronary artery disease     Encounter for blood transfusion     GERD (gastroesophageal reflux disease)     Hyperlipidemia 7/3/2004    Hypertension     Myocardial infarction     PAD (peripheral artery disease)     Sleep apnea     Thoracic or lumbosacral neuritis or radiculitis, unspecified 5/13/2013    Thyroid disorder 9/8/2015     Family History   Problem Relation Age of Onset    Clotting disorder Mother      ?    Heart failure Mother     Hypertension Mother     Heart disease Mother     Clotting disorder Father      ?    Heart attack Father     Hypertension Father     Heart disease Father     Heart disease Brother     Anesthesia problems Neg Hx      Past Surgical History:   Procedure Laterality  Date    ABDOMINAL SURGERY      ANGIOPLASTY      aortogram  2013    CARDIAC CATHETERIZATION      COLONOSCOPY N/A 10/29/2015    Procedure: COLONOSCOPY;  Surgeon: Mino Wells MD;  Location: [facility];  Service: Endoscopy;  Laterality: N/A;  ALLERGY - LIPITOR    FRACTURE SURGERY      TONSILLECTOMY       Medications and Allergies reviewed in epic.     Physical Exam   Constitutional: He appears well-developed.   HENT:   Head: Normocephalic.   Eyes: Pupils are equal, round, and reactive to light.   Neck: No JVD present.   Cardiovascular: Normal rate and regular rhythm.    Pulmonary/Chest: Effort normal and breath sounds normal.   Abdominal: Soft.   Musculoskeletal: He exhibits edema.   Neurological: He is alert.   Skin: Skin is warm.   Psychiatric: His behavior is normal.       Physical Exam   Constitutional: He appears well-developed.   HENT:   Head: Normocephalic.   Eyes: Pupils are equal, round, and reactive to light.   Neck: No JVD present.   Cardiovascular: Normal rate and regular rhythm.    Pulmonary/Chest: Effort normal and breath sounds normal.   Abdominal: Soft.   Musculoskeletal: He exhibits edema.   Neurological: He is alert.   Skin: Skin is warm.   Psychiatric: His behavior is normal.     Constitutional- Vitals - Body mass index is 34.24 kg/m².,   Vitals:    08/01/17 0900   Pulse: 87     General appearance-Conscious,Coherent  Eyes- No conjunctival icterus,pupils  round , reactive to light  and  Bilateral cataracts  ENT-Oral cavity- moist  , Hearing grossly normal   Neck- No thyromegaly ,Trachea -central, No jugular venous distension,   No Carotid Bruit   Cardiovascular -Heart Sounds- Normal  and  no murmur   , No gallop rhythm   Respiratory - Normal Respiratory Effort, Normal breath sounds,  no wheeze  and  no forced expiratory wheeze    Peripheral pitting pedal edema-- mild, no calf pain   Gastrointestinal -Soft abdomen, No palpable masses, Non Tender,Liver,Spleen not palpable. No-- free fluid and  shifting dullness  Musculoskeletal- No finger Clubbing. Strength grossly normal   Lymphatic-No Palpable cervical, axillary,Inguinal lymphadenopathy   Psychiatric - normal effect,Orientation  Rt Dorsalis pedis pulses-palpable    Lt Dorsalis pedis pulses- palpable   Rt Posterior tibial pulses -palpable   Left posterior tibial pulses -palpable   Miscellaneous - tattoos,  no asterixis,  no dupuytren's contracture,  no renal bruit and  bowel sounds positive     Investigations  Lab and Imaging have been reviewed in epic.      Review of old records- Was done and information gathered regards to events leading to surgery and health conditions of significance in the perioperative period.        Assessment/Plan:     Dyspnea on exertion  No suggestion of cardiac decompensation   Possible COPD, Weight contribution    PAD  Multiple Stents  ASA - with history of  Stenting.  I have discussed the  risk of stent thrombosis with interruption of Aspirin regimen .Risk ( bleeding ) ,  benefits about perioperative use of  ASA  discussed      HTN (hypertension)  Hypertension-  Blood pressure is low.Suggested BP monitoring at the house and to call towards the end of the week  I suggest continuation of Coreg- during the entire perioperative period. I suggest holding Ramipril on the morning of the surgery and can continue that  post operatively under blood pressure, electrolyte and renal function monitoring as long as they are acceptable.I suggest addressing pain control as uncontrolled pain can increased blood pressure     BPH with urinary obstruction  Risk of post op urinary retention    Obesity (BMI 30-39.9)  Suggested weight loss    Hyperlipidemia LDL goal <70  HLD-I  suggest continuation of statin during the entire perioperative period.    Gastroesophageal reflux disease  GERD-  I suggest continuation of the Proton pump inhibitor in the perioperative period . I suggest aspiration precautions    Leg edema  Edema- I suggested avoidance of  added salt,avoidance of NSAID's ( Except ASA 81 mg )  and suggested Limb elevation     Marijuana use  Suggested quitting     COPD (chronic obstructive pulmonary disease)   I suggest consideration of inhaled bronchodilator use if the patient has perioperative bronchospasm   Suggest Rinsing mouth after Symbicort use      Tattoos  No suggestion of hepatic decompensation    History of sleep apnea    Possible sleep apnea- I suggest a sleep study and suggest caution with usage of medication that can cause respiratory suppression in the perioperative period  potential ramifications of untreated sleep apnea, which could include daytime sleepiness, hypertension, heart disease and stroke were discussed           Preoperative cardiac risk assessment-  The patient does not have any active cardiac conditions . Revised cardiac risk index predictors- -2--.Functional capacity is more than 4 Mets. He will be undergoing a anal  procedure that carries a intermediate risk     The estimated risk of the rate of adverse cardiac outcomes  6.6%    No further cardiac work up is indicated prior to proceeding with the surgery          Preventive perioperative care    Thromboembolic prophylaxis:  His risk factors for thrombosis include obesity, surgical procedure and age.I suggest  thromboembolic prophylaxis ( mechanical/pharmacological, weighing the risk benefits of pharmacological agent use considering myra procedural bleeding )  during the perioperative period.I suggested being active in the post operative period.      Postoperative pulmonary complication prophylaxis-Risk factors for post operative pulmonary complications include ASA class >2 and COPD- I suggest incentive spirometry use, early ambulation and end tidal carbon dioxide monitoring , oral care , head end of bed elevation      Renal complication prophylaxis-Risk factors for renal complications include hypertension and vascular disease . I suggest keeping him well hydrated .I  suggested drinking 2 litre's of water a day   Suggested avoidance pf soft drinks     Surgical site Infection Prophylaxis-I  suggest appropriate antibiotic for Prophylaxis against Surgical site infections     Delirium prophylaxis-Risk factors - benzodiazepine use -, nightly  I suggest avoidance / minimizing the use of  Benzodiazepines ( unless the patient has been taking it on a regular basis ),Anticholinergic medication,Antihistamines ( like  Benadryl).I suggest minimizing the use of opioid medication and use of IV tylenol,if it is appropriate. I suggest using the lowest possible dose of opioids for the shortest duration possible in the perioperative period. I suggest to Keep shades/blinds open during the day, lights off and shades closed at night to encourage normal sleep/wake cycle.I encourage the presence of the family member with the patient at all times, if at all possible as mental status changes can be picked up early by the family members and they help with reorientation. I encouraged the presence of family to help with orientation in the perioperative period. Benadryl avoidance suggested      In view of BPH the patient  is at risk of postoperative urinary retention.  I suggest avoidance / minimizing the of  Benzodiazepines,Anticholinergic medication,antihistamines ( Benadryl) , if possible in the perioperative period. I suggest using the minimum possible use of opioids for the minimum period of time in the perioperative period. Benadryl avoidance suggested      This visit was focused on Preoperative evaluation, Perioperative Medical management, complication reduction plans. I suggest that the patient follows up with primary care or relevant sub specialists for ongoing health care.    I appreciate the opportunity to be involved in this patients care. Please feel free to contact me if there were any questions about this consultation.    Patient is pending optimization- BP optimization    Vicky BELL  "MD Pacheco  Perioperative Medicine  [facility]   Pager [phone]    The days that he skips BP medication Ramipril, or other BP medication, feels less tired  ------------------------------------    8/2- 12 49     March 2014- US- There is 20 - 39% right Internal Carotid stenosis.  There is 20 - 39% left Internal Carotid stenosis.  Labs - 8/1- Normal Hb,HCT,Plt , Creat Stable  Vitals - /76 Comment: left and 128/89 right  Pulse 77   Temp 97.2 °F (36.2 °C) (Oral)   Ht 5' 4" (1.626 m)   Wt 90.5 kg (199 lb 8 oz)   SpO2 98%   BMI 34.24 kg/m²        [employer] Society of Anesthesiologists Physical status classification ( ASA ) class- 3-     Postoperative pulmonary complication risk assessment    ARISCAT ( [place]et) risk index- risk class -  Low,   if duration of surgery is under  3 hours     Perfecto Respiratory failure index- percentage risk of respiratory failure- 0.5 %    Called for follow up   Spoke to wife   Suggested to stay on BP medication  Call with BP readings   ASA ( Continuation ) , Plavix instruction discussed  ------------------------------    8/9- 12 38     Called and spoke to wife   BP at pharmacy - no problem to her understanding   No lightheadedness   On BP medication   To continue ASA myra op  Plans on going ahead with surgery   No medication changes  ----------------------------    8/10- 17 43     Called to follow up   Spoke to wife   Doing good   Per patient feels real good   No medication changes  ---     8/10- 18 48     Called and spoke to wife   No fever . for many years has a deep  Cough, no change in amount ,color , consistency of phlegm, no dysuria   Suggest to monitor temp myra op   Suggest avoidance Marijuana   "

## 2017-08-01 NOTE — OUTPATIENT SUBJECTIVE & OBJECTIVE
Outpatient Subjective & Objective     Chief complaint-Preoperative evaluation, Perioperative Medical management, complication reduction plan     Relevant health conditions of significance for the perioperative period/ History of presenting illness -      Review of Systems    Past Medical History:   Diagnosis Date    Anticoagulant long-term use     Arthritis     Blood transfusion     mid 40's age    CHF (congestive heart failure)     COPD (chronic obstructive pulmonary disease)     COPD (chronic obstructive pulmonary disease)     Coronary artery disease     Encounter for blood transfusion     GERD (gastroesophageal reflux disease)     Hyperlipidemia 7/3/2004    Hypertension     Myocardial infarction     PAD (peripheral artery disease)     Sleep apnea     Thoracic or lumbosacral neuritis or radiculitis, unspecified 5/13/2013    Thyroid disorder 9/8/2015     Family History   Problem Relation Age of Onset    Clotting disorder Mother      ?    Heart failure Mother     Hypertension Mother     Heart disease Mother     Clotting disorder Father      ?    Heart attack Father     Hypertension Father     Heart disease Father     Heart disease Brother     Anesthesia problems Neg Hx      Past Surgical History:   Procedure Laterality Date    ABDOMINAL SURGERY      ANGIOPLASTY      aortogram  2013    CARDIAC CATHETERIZATION      COLONOSCOPY N/A 10/29/2015    Procedure: COLONOSCOPY;  Surgeon: Mino Wells MD;  Location: [facility];  Service: Endoscopy;  Laterality: N/A;  ALLERGY - LIPITOR    FRACTURE SURGERY      TONSILLECTOMY       Medications and Allergies reviewed in epic.     Physical Exam   Constitutional: He appears well-developed.   HENT:   Head: Normocephalic.   Eyes: Pupils are equal, round, and reactive to light.   Neck: No JVD present.   Cardiovascular: Normal rate and regular rhythm.    Pulmonary/Chest: Effort normal and breath sounds normal.   Abdominal: Soft.   Musculoskeletal: He  exhibits edema.   Neurological: He is alert.   Skin: Skin is warm.   Psychiatric: His behavior is normal.       Physical Exam   Constitutional: He appears well-developed.   HENT:   Head: Normocephalic.   Eyes: Pupils are equal, round, and reactive to light.   Neck: No JVD present.   Cardiovascular: Normal rate and regular rhythm.    Pulmonary/Chest: Effort normal and breath sounds normal.   Abdominal: Soft.   Musculoskeletal: He exhibits edema.   Neurological: He is alert.   Skin: Skin is warm.   Psychiatric: His behavior is normal.     Constitutional- Vitals - Body mass index is 34.24 kg/m².,   Vitals:    08/01/17 0900   Pulse: 87     General appearance-Conscious,Coherent  Eyes- No conjunctival icterus,pupils  round , reactive to light  and  Bilateral cataracts  ENT-Oral cavity- moist  , Hearing grossly normal   Neck- No thyromegaly ,Trachea -central, No jugular venous distension,   No Carotid Bruit   Cardiovascular -Heart Sounds- Normal  and  no murmur   , No gallop rhythm   Respiratory - Normal Respiratory Effort, Normal breath sounds,  no wheeze  and  no forced expiratory wheeze    Peripheral pitting pedal edema-- mild, no calf pain   Gastrointestinal -Soft abdomen, No palpable masses, Non Tender,Liver,Spleen not palpable. No-- free fluid and shifting dullness  Musculoskeletal- No finger Clubbing. Strength grossly normal   Lymphatic-No Palpable cervical, axillary,Inguinal lymphadenopathy   Psychiatric - normal effect,Orientation  Rt Dorsalis pedis pulses-palpable    Lt Dorsalis pedis pulses- palpable   Rt Posterior tibial pulses -palpable   Left posterior tibial pulses -palpable   Miscellaneous - tattoos,  no asterixis,  no dupuytren's contracture,  no renal bruit and  bowel sounds positive     Investigations  Lab and Imaging have been reviewed in epic.      Review of old records- Was done and information gathered regards to events leading to surgery and health conditions of significance in the perioperative  period.    Outpatient Subjective & Objective

## 2017-08-10 ENCOUNTER — TELEPHONE (OUTPATIENT)
Dept: SURGERY | Facility: CLINIC | Age: 52
End: 2017-08-10

## 2017-08-10 NOTE — TELEPHONE ENCOUNTER
----- Message from Emilia Ambrocio sent at 8/10/2017 12:10 PM CDT -----  Contact: Wife- Alaina- [phone]  Priscilla- breezy wife called with questions about his upcoming procedure tomorrow- please call Alaina back at [phone]

## 2017-08-11 ENCOUNTER — HOSPITAL ENCOUNTER (OUTPATIENT)
Facility: HOSPITAL | Age: 52
Discharge: HOME | End: 2017-08-11
Attending: COLON & RECTAL SURGERY | Admitting: COLON & RECTAL SURGERY
Payer: COMMERCIAL

## 2017-08-11 ENCOUNTER — ANESTHESIA (OUTPATIENT)
Dept: SURGERY | Facility: HOSPITAL | Age: 52
End: 2017-08-11
Payer: COMMERCIAL

## 2017-08-11 VITALS
OXYGEN SATURATION: 100 % | RESPIRATION RATE: 18 BRPM | HEART RATE: 84 BPM | DIASTOLIC BLOOD PRESSURE: 89 MMHG | WEIGHT: 198 LBS | SYSTOLIC BLOOD PRESSURE: 125 MMHG | BODY MASS INDEX: 33.8 KG/M2 | HEIGHT: 64 IN | TEMPERATURE: 99 F

## 2017-08-11 DIAGNOSIS — I73.9 PAD (PERIPHERAL ARTERY DISEASE): ICD-10-CM

## 2017-08-11 DIAGNOSIS — K60.2 ANAL FISSURE: Primary | ICD-10-CM

## 2017-08-11 PROCEDURE — 71000044 HC DOSC ROUTINE RECOVERY FIRST HOUR: Performed by: COLON & RECTAL SURGERY

## 2017-08-11 PROCEDURE — 36000706: Performed by: COLON & RECTAL SURGERY

## 2017-08-11 PROCEDURE — 63600175 PHARM REV CODE 636 W HCPCS: Performed by: STUDENT IN AN ORGANIZED HEALTH CARE EDUCATION/TRAINING PROGRAM

## 2017-08-11 PROCEDURE — 71000015 HC POSTOP RECOV 1ST HR: Performed by: COLON & RECTAL SURGERY

## 2017-08-11 PROCEDURE — 46080 SPHNCTROTMY ANAL DIV SPHNCTR: CPT | Mod: ,,, | Performed by: COLON & RECTAL SURGERY

## 2017-08-11 PROCEDURE — 36000707: Performed by: COLON & RECTAL SURGERY

## 2017-08-11 PROCEDURE — D9220A PRA ANESTHESIA: Mod: ,,, | Performed by: ANESTHESIOLOGY

## 2017-08-11 PROCEDURE — 63600175 PHARM REV CODE 636 W HCPCS: Performed by: COLON & RECTAL SURGERY

## 2017-08-11 PROCEDURE — 37000009 HC ANESTHESIA EA ADD 15 MINS: Performed by: COLON & RECTAL SURGERY

## 2017-08-11 PROCEDURE — 25000003 PHARM REV CODE 250: Performed by: COLON & RECTAL SURGERY

## 2017-08-11 PROCEDURE — C9290 INJ, BUPIVACAINE LIPOSOME: HCPCS | Performed by: COLON & RECTAL SURGERY

## 2017-08-11 PROCEDURE — 37000008 HC ANESTHESIA 1ST 15 MINUTES: Performed by: COLON & RECTAL SURGERY

## 2017-08-11 RX ORDER — PROPOFOL 10 MG/ML
VIAL (ML) INTRAVENOUS CONTINUOUS PRN
Status: DISCONTINUED | OUTPATIENT
Start: 2017-08-11 | End: 2017-08-11

## 2017-08-11 RX ORDER — LIDOCAINE HYDROCHLORIDE 10 MG/ML
1 INJECTION, SOLUTION EPIDURAL; INFILTRATION; INTRACAUDAL; PERINEURAL ONCE
Status: COMPLETED | OUTPATIENT
Start: 2017-08-11 | End: 2017-08-11

## 2017-08-11 RX ORDER — HYDROCODONE BITARTRATE AND ACETAMINOPHEN 5; 325 MG/1; MG/1
1 TABLET ORAL EVERY 4 HOURS PRN
Status: DISCONTINUED | OUTPATIENT
Start: 2017-08-11 | End: 2017-08-11 | Stop reason: HOSPADM

## 2017-08-11 RX ORDER — SODIUM CHLORIDE 9 MG/ML
INJECTION, SOLUTION INTRAVENOUS CONTINUOUS
Status: DISCONTINUED | OUTPATIENT
Start: 2017-08-11 | End: 2017-08-11 | Stop reason: HOSPADM

## 2017-08-11 RX ORDER — ONDANSETRON 2 MG/ML
4 INJECTION INTRAMUSCULAR; INTRAVENOUS DAILY PRN
Status: DISCONTINUED | OUTPATIENT
Start: 2017-08-11 | End: 2017-08-11 | Stop reason: HOSPADM

## 2017-08-11 RX ORDER — OXYCODONE AND ACETAMINOPHEN 5; 325 MG/1; MG/1
1 TABLET ORAL EVERY 4 HOURS PRN
Qty: 10 TABLET | Refills: 0 | Status: SHIPPED | OUTPATIENT
Start: 2017-08-11 | End: 2017-08-11

## 2017-08-11 RX ORDER — PROPOFOL 10 MG/ML
VIAL (ML) INTRAVENOUS
Status: DISCONTINUED | OUTPATIENT
Start: 2017-08-11 | End: 2017-08-11

## 2017-08-11 RX ORDER — OXYCODONE AND ACETAMINOPHEN 5; 325 MG/1; MG/1
1 TABLET ORAL EVERY 4 HOURS PRN
Qty: 10 TABLET | Refills: 0 | Status: SHIPPED | OUTPATIENT
Start: 2017-08-11 | End: 2018-03-29

## 2017-08-11 RX ORDER — LIDOCAINE HCL/PF 100 MG/5ML
SYRINGE (ML) INTRAVENOUS
Status: DISCONTINUED | OUTPATIENT
Start: 2017-08-11 | End: 2017-08-11

## 2017-08-11 RX ORDER — SODIUM CHLORIDE 0.9 % (FLUSH) 0.9 %
3 SYRINGE (ML) INJECTION
Status: DISCONTINUED | OUTPATIENT
Start: 2017-08-11 | End: 2017-08-11 | Stop reason: HOSPADM

## 2017-08-11 RX ORDER — CLOPIDOGREL BISULFATE 75 MG/1
75 TABLET ORAL DAILY
Qty: 30 TABLET | Refills: 5 | Status: SHIPPED | OUTPATIENT
Start: 2017-08-16 | End: 2017-09-22 | Stop reason: SDUPTHER

## 2017-08-11 RX ORDER — FENTANYL CITRATE 50 UG/ML
25 INJECTION, SOLUTION INTRAMUSCULAR; INTRAVENOUS EVERY 5 MIN PRN
Status: DISCONTINUED | OUTPATIENT
Start: 2017-08-11 | End: 2017-08-11 | Stop reason: HOSPADM

## 2017-08-11 RX ORDER — MIDAZOLAM HYDROCHLORIDE 1 MG/ML
INJECTION, SOLUTION INTRAMUSCULAR; INTRAVENOUS
Status: DISCONTINUED | OUTPATIENT
Start: 2017-08-11 | End: 2017-08-11

## 2017-08-11 RX ORDER — BUPIVACAINE HYDROCHLORIDE 2.5 MG/ML
INJECTION, SOLUTION EPIDURAL; INFILTRATION; INTRACAUDAL
Status: DISCONTINUED | OUTPATIENT
Start: 2017-08-11 | End: 2017-08-11 | Stop reason: HOSPADM

## 2017-08-11 RX ADMIN — SODIUM CHLORIDE: 0.9 INJECTION, SOLUTION INTRAVENOUS at 12:08

## 2017-08-11 RX ADMIN — PROPOFOL 30 MG: 10 INJECTION, EMULSION INTRAVENOUS at 02:08

## 2017-08-11 RX ADMIN — MIDAZOLAM HYDROCHLORIDE 2 MG: 1 INJECTION, SOLUTION INTRAMUSCULAR; INTRAVENOUS at 02:08

## 2017-08-11 RX ADMIN — LIDOCAINE HYDROCHLORIDE 10 MG: 10 INJECTION, SOLUTION EPIDURAL; INFILTRATION; INTRACAUDAL; PERINEURAL at 12:08

## 2017-08-11 RX ADMIN — PROPOFOL 20 MG: 10 INJECTION, EMULSION INTRAVENOUS at 02:08

## 2017-08-11 RX ADMIN — PROPOFOL 150 MCG/KG/MIN: 10 INJECTION, EMULSION INTRAVENOUS at 02:08

## 2017-08-11 RX ADMIN — LIDOCAINE HYDROCHLORIDE 100 MG: 20 INJECTION, SOLUTION INTRAVENOUS at 02:08

## 2017-08-11 NOTE — TRANSFER OF CARE
"Anesthesia Transfer of Care Note    Patient: Michael Guillaume    Procedure(s) Performed: Procedure(s) (LRB):  SPHINCTEROTOMY-LATERAL INTERNAL ANAL (N/A)    Patient location: [facility]    Anesthesia Type: general    Transport from OR: Transported from OR on room air with adequate spontaneous ventilation    Post pain: adequate analgesia    Post assessment: no apparent anesthetic complications    Post vital signs: stable    Level of consciousness: awake    Nausea/Vomiting: no nausea/vomiting    Complications: none    Transfer of care protocol was followed      Last vitals:   Visit Vitals  /69 (BP Location: Right arm, Patient Position: Lying)   Pulse 76   Temp 37.1 °C (98.7 °F) (Oral)   Resp 18   Ht 5' 4" (1.626 m)   Wt 89.8 kg (198 lb)   SpO2 100%   BMI 33.99 kg/m²     "

## 2017-08-11 NOTE — PLAN OF CARE
Plan of care reviewed with patient and wife. Verbalization of understanding. Call light within reach. Pt waiting to go to surgery. Will cont to monitor.

## 2017-08-11 NOTE — DISCHARGE INSTRUCTIONS
Wound care routine:    Some bleeding expected  Take fiber supplements such as Metamucil, Citrucel, Konsyl  The goal is 1-2 nonstraining nonloose bowel movements per day.  Sitz Baths or tub soaks three times a day in warm water  We recommend 25-30 grams of fiber daily or reaching the above bowel movement goal.    Take mineral oil by mouth (2 teaspoons a day to keep bowel movements soft)      Procedural Sedation (Adult)  You have been given medicine by vein to make you sleep during your surgery. This may have included both a pain medicine and sleeping medicine. Most of the effects have worn off. But you may still have some drowsiness for the next 6 to 8 hours.  Home care  Follow these guidelines when you get home:  · For the next 8 hours, you should be watched by a responsible adult. This person should make sure your condition is not getting worse.  · Don't take any medicine by mouth for pain or for sleep during the next 4 hours. These might react with the medicines you were given in the hospital. This could cause a much stronger response than usual.  · Don't drink any alcohol for the next 24 hours.  · Don't drive, operate dangerous machinery, or make important business or personal decisions during the next 24 hours.  Follow-up care  Follow up with your healthcare provider if you are not alert and back to your usual level of activity within 12 hours.  When to seek medical advice  Call your healthcare provider right away if any of these occur:  · Drowsiness gets worse  · Weakness or dizziness gets worse  · Repeated vomiting  · You cannot be awakened   Date Last Reviewed: 10/18/2016  © 0327-7098 [employer]. [address] [place] [address], [place] [address]. All rights reserved. This information is not intended as a substitute for professional medical care. Always follow your healthcare professional's instructions.

## 2017-08-11 NOTE — H&P (VIEW-ONLY)
[facility] - Pre Op Consult  Progress Note    Patient Name: Michael Guillaume  MRN: [id number]  Date of Evaluation- 08/01/2017  PCP- Latrice Zafar NP    Future cases for Michael Guillaume [[id number]]     Case ID Status Date Time Timothy Procedure Provider Location    [id number] [facility] 8/11/2017  1:45 PM 70 SPHINCTEROTOMY-LATERAL INTERNAL ANAL Mino Wells MD [[id number]] [facility] OR 2ND FLR          HPI:  History of present illness- I had the pleasure of meeting this pleasant 52 y.o. gentleman in the pre op clinic prior to his elective anal procedure . The patient is new to me .     I have obtained the history by speaking to the patient and by reviewing the electronic health records.    Events leading up to surgery / History of presenting illness -    Anal fissure    He has been troubled with severe  anal area pain for about 2 years . Pain increases with passing hard stool and decreases with soft stools.  Has been avoiding foods that constipates him and had been not having anal pain for about a week   Because he does not have pain any more , he is having second thoughts ,to proceed with surgery or not     Relevant health conditions of significance for the perioperative period/ History of presenting illness -    Hypertension ,On medication  For about a month , having lower BP-Coreg dose was reduced to half by cardiologist   Home  machine readings -  95/65-45   Noticed more tired since BP was low  No dizziness, light headedness     Obesity- suggested weight loss  Exercise limitation because of PAD - lower extremities     GERD- controlled on Nexium   Used to have chest discomfort central,no radiation. No relation to specific food - relieved with Nexium  No chest pain on exertion     History of Cardiomyopathy , LV dysfunction resolved with med therapy   Most recent echo - June 2017-   Normal left ventricular systolic function (EF 60-65%).     Has a family history of vascular disease   Brother had transplant   As per patient had Mi in 1998 -  retrospective diagnosis    no History of CABG.History of cardiac stenting  Chest pain , SOB on exertion, like climbing an incline ,last episode 1 month ago - last one 1 month   Not troubled as much as he cannot do much  No unstable features  As pert cardiology evaluation- can hold the plavix for 5 days prior to his surgery. Prefer he continue his ASA  A SPECT done 8/15 was negative for ischemia.     PAD - multiple stents placed   Claudication pain both lower extremities  Reduced Claudication distance - personally does not feel like he need any intervention   Walks one mile - and stops 10 times during that time due to claudication ,No chest pain     BPH   Slow stream of Urine      Subjective/ Objective:          Chief complaint-Preoperative evaluation, Perioperative Medical management, complication reduction plan     Relevant health conditions of significance for the perioperative period/ History of presenting illness -      Review of Systems    Past Medical History:   Diagnosis Date    Anticoagulant long-term use     Arthritis     Blood transfusion     mid 40's age    CHF (congestive heart failure)     COPD (chronic obstructive pulmonary disease)     COPD (chronic obstructive pulmonary disease)     Coronary artery disease     Encounter for blood transfusion     GERD (gastroesophageal reflux disease)     Hyperlipidemia 7/3/2004    Hypertension     Myocardial infarction     PAD (peripheral artery disease)     Sleep apnea     Thoracic or lumbosacral neuritis or radiculitis, unspecified 5/13/2013    Thyroid disorder 9/8/2015     Family History   Problem Relation Age of Onset    Clotting disorder Mother      ?    Heart failure Mother     Hypertension Mother     Heart disease Mother     Clotting disorder Father      ?    Heart attack Father     Hypertension Father     Heart disease Father     Heart disease Brother     Anesthesia problems Neg Hx      Past Surgical History:   Procedure Laterality  Date    ABDOMINAL SURGERY      ANGIOPLASTY      aortogram  2013    CARDIAC CATHETERIZATION      COLONOSCOPY N/A 10/29/2015    Procedure: COLONOSCOPY;  Surgeon: Mino Wells MD;  Location: [facility];  Service: Endoscopy;  Laterality: N/A;  ALLERGY - LIPITOR    FRACTURE SURGERY      TONSILLECTOMY       Medications and Allergies reviewed in epic.     Physical Exam   Constitutional: He appears well-developed.   HENT:   Head: Normocephalic.   Eyes: Pupils are equal, round, and reactive to light.   Neck: No JVD present.   Cardiovascular: Normal rate and regular rhythm.    Pulmonary/Chest: Effort normal and breath sounds normal.   Abdominal: Soft.   Musculoskeletal: He exhibits edema.   Neurological: He is alert.   Skin: Skin is warm.   Psychiatric: His behavior is normal.       Physical Exam   Constitutional: He appears well-developed.   HENT:   Head: Normocephalic.   Eyes: Pupils are equal, round, and reactive to light.   Neck: No JVD present.   Cardiovascular: Normal rate and regular rhythm.    Pulmonary/Chest: Effort normal and breath sounds normal.   Abdominal: Soft.   Musculoskeletal: He exhibits edema.   Neurological: He is alert.   Skin: Skin is warm.   Psychiatric: His behavior is normal.     Constitutional- Vitals - Body mass index is 34.24 kg/m².,   Vitals:    08/01/17 0900   Pulse: 87     General appearance-Conscious,Coherent  Eyes- No conjunctival icterus,pupils  round , reactive to light  and  Bilateral cataracts  ENT-Oral cavity- moist  , Hearing grossly normal   Neck- No thyromegaly ,Trachea -central, No jugular venous distension,   No Carotid Bruit   Cardiovascular -Heart Sounds- Normal  and  no murmur   , No gallop rhythm   Respiratory - Normal Respiratory Effort, Normal breath sounds,  no wheeze  and  no forced expiratory wheeze    Peripheral pitting pedal edema-- mild, no calf pain   Gastrointestinal -Soft abdomen, No palpable masses, Non Tender,Liver,Spleen not palpable. No-- free fluid and  shifting dullness  Musculoskeletal- No finger Clubbing. Strength grossly normal   Lymphatic-No Palpable cervical, axillary,Inguinal lymphadenopathy   Psychiatric - normal effect,Orientation  Rt Dorsalis pedis pulses-palpable    Lt Dorsalis pedis pulses- palpable   Rt Posterior tibial pulses -palpable   Left posterior tibial pulses -palpable   Miscellaneous - tattoos,  no asterixis,  no dupuytren's contracture,  no renal bruit and  bowel sounds positive     Investigations  Lab and Imaging have been reviewed in epic.      Review of old records- Was done and information gathered regards to events leading to surgery and health conditions of significance in the perioperative period.        Assessment/Plan:     Dyspnea on exertion  No suggestion of cardiac decompensation   Possible COPD, Weight contribution    PAD  Multiple Stents  ASA - with history of  Stenting.  I have discussed the  risk of stent thrombosis with interruption of Aspirin regimen .Risk ( bleeding ) ,  benefits about perioperative use of  ASA  discussed      HTN (hypertension)  Hypertension-  Blood pressure is low.Suggested BP monitoring at the house and to call towards the end of the week  I suggest continuation of Coreg- during the entire perioperative period. I suggest holding Ramipril on the morning of the surgery and can continue that  post operatively under blood pressure, electrolyte and renal function monitoring as long as they are acceptable.I suggest addressing pain control as uncontrolled pain can increased blood pressure     BPH with urinary obstruction  Risk of post op urinary retention    Obesity (BMI 30-39.9)  Suggested weight loss    Hyperlipidemia LDL goal <70  HLD-I  suggest continuation of statin during the entire perioperative period.    Gastroesophageal reflux disease  GERD-  I suggest continuation of the Proton pump inhibitor in the perioperative period . I suggest aspiration precautions    Leg edema  Edema- I suggested avoidance of  added salt,avoidance of NSAID's ( Except ASA 81 mg )  and suggested Limb elevation     Marijuana use  Suggested quitting     COPD (chronic obstructive pulmonary disease)   I suggest consideration of inhaled bronchodilator use if the patient has perioperative bronchospasm   Suggest Rinsing mouth after Symbicort use      Tattoos  No suggestion of hepatic decompensation    History of sleep apnea    Possible sleep apnea- I suggest a sleep study and suggest caution with usage of medication that can cause respiratory suppression in the perioperative period  potential ramifications of untreated sleep apnea, which could include daytime sleepiness, hypertension, heart disease and stroke were discussed           Preoperative cardiac risk assessment-  The patient does not have any active cardiac conditions . Revised cardiac risk index predictors- -2--.Functional capacity is more than 4 Mets. He will be undergoing a anal  procedure that carries a intermediate risk     The estimated risk of the rate of adverse cardiac outcomes  6.6%    No further cardiac work up is indicated prior to proceeding with the surgery          Preventive perioperative care    Thromboembolic prophylaxis:  His risk factors for thrombosis include obesity, surgical procedure and age.I suggest  thromboembolic prophylaxis ( mechanical/pharmacological, weighing the risk benefits of pharmacological agent use considering myra procedural bleeding )  during the perioperative period.I suggested being active in the post operative period.      Postoperative pulmonary complication prophylaxis-Risk factors for post operative pulmonary complications include ASA class >2 and COPD- I suggest incentive spirometry use, early ambulation and end tidal carbon dioxide monitoring , oral care , head end of bed elevation      Renal complication prophylaxis-Risk factors for renal complications include hypertension and vascular disease . I suggest keeping him well hydrated .I  suggested drinking 2 litre's of water a day   Suggested avoidance pf soft drinks     Surgical site Infection Prophylaxis-I  suggest appropriate antibiotic for Prophylaxis against Surgical site infections     Delirium prophylaxis-Risk factors - benzodiazepine use -, nightly  I suggest avoidance / minimizing the use of  Benzodiazepines ( unless the patient has been taking it on a regular basis ),Anticholinergic medication,Antihistamines ( like  Benadryl).I suggest minimizing the use of opioid medication and use of IV tylenol,if it is appropriate. I suggest using the lowest possible dose of opioids for the shortest duration possible in the perioperative period. I suggest to Keep shades/blinds open during the day, lights off and shades closed at night to encourage normal sleep/wake cycle.I encourage the presence of the family member with the patient at all times, if at all possible as mental status changes can be picked up early by the family members and they help with reorientation. I encouraged the presence of family to help with orientation in the perioperative period. Benadryl avoidance suggested      In view of BPH the patient  is at risk of postoperative urinary retention.  I suggest avoidance / minimizing the of  Benzodiazepines,Anticholinergic medication,antihistamines ( Benadryl) , if possible in the perioperative period. I suggest using the minimum possible use of opioids for the minimum period of time in the perioperative period. Benadryl avoidance suggested      This visit was focused on Preoperative evaluation, Perioperative Medical management, complication reduction plans. I suggest that the patient follows up with primary care or relevant sub specialists for ongoing health care.    I appreciate the opportunity to be involved in this patients care. Please feel free to contact me if there were any questions about this consultation.    Patient is pending optimization- BP optimization    Vicky BELL  "MD Pacheco  Perioperative Medicine  [facility]   Pager [phone]    The days that he skips BP medication Ramipril, or other BP medication, feels less tired  ------------------------------------    8/2- 12 49     March 2014- US- There is 20 - 39% right Internal Carotid stenosis.  There is 20 - 39% left Internal Carotid stenosis.  Labs - 8/1- Normal Hb,HCT,Plt , Creat Stable  Vitals - /76 Comment: left and 128/89 right  Pulse 77   Temp 97.2 °F (36.2 °C) (Oral)   Ht 5' 4" (1.626 m)   Wt 90.5 kg (199 lb 8 oz)   SpO2 98%   BMI 34.24 kg/m²        American Society of Anesthesiologists Physical status classification ( ASA ) class- 3-     Postoperative pulmonary complication risk assessment    ARISCAT ( [place]) risk index- risk class -  Low,   if duration of surgery is under  3 hours     Perfecto Respiratory failure index- percentage risk of respiratory failure- 0.5 %    Called for follow up   Spoke to wife   Suggested to stay on BP medication  Call with BP readings   ASA ( Continuation ) , Plavix instruction discussed  ------------------------------    8/9- 12 38     Called and spoke to wife   BP at pharmacy - no problem to her understanding   No lightheadedness   On BP medication   To continue ASA myra op  Plans on going ahead with surgery   No medication changes  ----------------------------    8/10- 17 43     Called to follow up   Spoke to wife   Doing good   Per patient feels real good   No medication changes  ---     8/10- 18 48     Called and spoke to wife   No fever . for many years has a deep  Cough, no change in amount ,color , consistency of phlegm, no dysuria   Suggest to monitor temp myra op   Suggest avoidance Marijuana   "

## 2017-08-11 NOTE — PLAN OF CARE
Patient and spouse state they are ready to be discharged. Instructions and narcotic prescription given to patient and family. Both verbalize understanding. Patient tolerating po liquids with no difficulty. Patient denies pain. Anesthesia consent and surgical consent in chart upon patient's discharge from [facility].

## 2017-08-11 NOTE — H&P (VIEW-ONLY)
Subjective:       Patient ID: Michael Guillaume is a 52 y.o. male.    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   +Bleeding and tearing pain with BM's when it is acting up. No significant constipation.  +Worse pain when he eats spicy foods.  Poor eating habits - really only eats dinner and fiber intake is poor.  Using Anusol suppositories and hydrocortisone cream as prescribed by PCP.    Significant PMH - CAD, MI, CHF, COPD, PVD - has multiple cardiac and PV stents - on Plavix.  Recent Echo shows EF 60% with no diastolic dysfunction.    No family hx of CRC or IBD.      Review of patient's allergies indicates:   Allergen Reactions    Lipitor  [atorvastatin]      Other reaction(s): Severe Myalgias       Past Medical History:   Diagnosis Date    Anticoagulant long-term use     Arthritis     Blood transfusion     CHF (congestive heart failure)     COPD (chronic obstructive pulmonary disease)     COPD (chronic obstructive pulmonary disease)     Coronary artery disease     Encounter for blood transfusion     Hyperlipidemia 7/3/2004    Hypertension     Myocardial infarction     PAD (peripheral artery disease)     Sleep apnea     Thoracic or lumbosacral neuritis or radiculitis, unspecified 5/13/2013    Thyroid disorder 9/8/2015       Past Surgical History:   Procedure Laterality Date    ABDOMINAL SURGERY      ANGIOPLASTY      aortogram  2013    CARDIAC CATHETERIZATION      COLONOSCOPY N/A 10/29/2015    Procedure: COLONOSCOPY;  Surgeon: Mino Wells MD;  Location: [facility];  Service: Endoscopy;  Laterality: N/A;  ALLERGY - LIPITOR    FRACTURE SURGERY      TONSILLECTOMY         Current Outpatient Prescriptions   Medication Sig Dispense Refill    acetaminophen (TYLENOL) 325 MG tablet Take 325 mg by mouth every 6 (six) hours as needed  for Pain.      albuterol-ipratropium 2.5mg-0.5mg/3mL (DUO-NEB) 0.5 mg-3 mg(2.5 mg base)/3 mL nebulizer solution Take 3 mLs by nebulization every 6 (six) hours as needed for Wheezing. 90 mL 2    alprazolam (XANAX) 1 MG tablet TAKE ONE TABLET BY MOUTH THREE TIMES A DAY AS     NEEDED 90 tablet 2    aspirin 81 mg Tab Take 1 tablet by mouth once daily.       carvedilol (COREG) 12.5 MG tablet Take 1 tablet (12.5 mg total) by mouth 2 (two) times daily with meals. 60 tablet 11    clopidogrel (PLAVIX) 75 mg tablet Take 1 tablet (75 mg total) by mouth once daily. 30 tablet 5    esomeprazole (NEXIUM) 40 MG capsule Take 1 capsule (40 mg total) by mouth once daily. 30 capsule 3    hydrochlorothiazide (HYDRODIURIL) 25 MG tablet Take 1 tablet (25 mg total) by mouth once daily. 30 tablet 11    MULTI-VITAMIN HI-PO ORAL Take by mouth once daily.       ramipril (ALTACE) 10 MG capsule Take 1 capsule (10 mg total) by mouth 2 (two) times daily. 60 capsule 5    rosuvastatin (CRESTOR) 20 MG tablet Take 1 tablet (20 mg total) by mouth once daily. 30 tablet 5    SYMBICORT 80-4.5 mcg/actuation HFAA INHALE 2 PUFFS TWICE A   DAY AS DIRECTED 10.2 g 4    vitamin E 400 UNIT capsule Take 400 Units by mouth once daily.        ANUCORT-HC 25 mg suppository        No current facility-administered medications for this visit.        Family History   Problem Relation Age of Onset    Clotting disorder Mother     Heart failure Mother     Hypertension Mother     Heart disease Mother     Clotting disorder Father     Heart attack Father     Hypertension Father     Heart disease Father     Heart disease Brother        Social History     Social History    Marital status:      Spouse name: N/A    Number of children: N/A    Years of education: N/A     Social History Main Topics    Smoking status: Former Smoker     Quit date: 11/1/2011    Smokeless tobacco: Never Used      Comment: 5-7 cigarettes in 3 months    Alcohol use 3.6  oz/week     2 - 3 Cans of beer, 6 Shots of liquor per week      Comment: social occasions     Drug use: No    Sexual activity: Not Asked     Other Topics Concern    None     Social History Narrative    None       Review of Systems   Constitutional: Negative for chills and fever.   HENT: Negative for congestion and sinus pressure.    Eyes: Negative for visual disturbance.   Respiratory: Negative for cough and shortness of breath.    Cardiovascular: Negative for chest pain and palpitations.        +Claudication-type pain   Gastrointestinal: Positive for anal bleeding and rectal pain. Negative for abdominal distention, abdominal pain, blood in stool, constipation, diarrhea, nausea and vomiting.   Endocrine: Negative for cold intolerance and heat intolerance.   Genitourinary: Negative for dysuria and frequency.   Musculoskeletal: Negative for arthralgias and back pain.   Skin: Negative for rash.   Allergic/Immunologic: Negative for immunocompromised state.   Neurological: Negative for dizziness, light-headedness and headaches.   Psychiatric/Behavioral: Negative for confusion. The patient is not nervous/anxious.        Objective:      Physical Exam   Constitutional: He is oriented to person, place, and time. He appears well-developed and well-nourished.   HENT:   Head: Normocephalic and atraumatic.   Eyes: Conjunctivae are normal.   Pulmonary/Chest: Effort normal. No respiratory distress.   Abdominal: Soft. Bowel sounds are normal. He exhibits no distension and no mass. There is no tenderness. There is no rebound and no guarding.   Genitourinary:   Genitourinary Comments: Perineum - normal perianal skin, no mass, +Chronic-appearing anterior midline fissure with exposed IAS fibers, no external hemorrhoids     Neurological: He is alert and oriented to person, place, and time.   Skin: Skin is warm and dry. No erythema.           Assessment:       1. Anal fissure        Plan:   Given chronicity and failure to improve  with conservative measures, I feel that he would most likely benefit from LIAS in terms of achieving durable relief.    He will need cardiac clearance and need to hold Plavix for 1 week pre-op    I have discussed the procedure at length with Michael Guillaume.  We discussed the rationale, risks, benefits, and alternatives in depth.  We discussed the expected outcomes and potential complications including but not limited to bleeding, infection, recurrence, prolonged pain, need for further procedures and altered continence.  He verbalized his understanding of the procedure and wishes to proceed.  Written consent was obtained.    Surgery tentatively scheduled for 8/11/17 pending cardiac clearance.    Mino Wells MD, FACS, FASCRS

## 2017-08-11 NOTE — BRIEF OP NOTE
[facility]  Brief Operative Note     SUMMARY     Surgery Date: 8/11/2017     Surgeon(s) and Role:     * Mino Wells MD - Primary    Assisting Surgeon: None    Pre-op Diagnosis:  Anal fissure [K60.2]    Post-op Diagnosis:  Post-Op Diagnosis Codes:     * Anal fissure [K60.2]    Procedure(s) (LRB):  SPHINCTEROTOMY-LATERAL INTERNAL ANAL (N/A)    Anesthesia: Local MAC    Description of the findings of the procedure: lateral internal sphincterotomy (closed)    Findings/Key Components: chronic anal fissure    Estimated Blood Loss: 10mL         Specimens:   Specimen (12h ago through future)    None          Discharge Note    SUMMARY     Admit Date: 8/11/2017    Discharge Date and Time:  08/11/2017 2:49 PM    Hospital Course (synopsis of major diagnoses, care, treatment, and services provided during the course of the hospital stay): Underwent elective anal sphincterotomy and tolerated well without complication      Final Diagnosis: Post-Op Diagnosis Codes:     * Anal fissure [K60.2]    Disposition: Home or Self Care    Follow Up/Patient Instructions:     Medications:  Reconciled Home Medications:   Current Discharge Medication List      START taking these medications    Details   oxycodone-acetaminophen (PERCOCET) 5-325 mg per tablet Take 1 tablet by mouth every 4 (four) hours as needed for Pain.  Qty: 10 tablet, Refills: 0         CONTINUE these medications which have CHANGED    Details   clopidogrel (PLAVIX) 75 mg tablet Take 1 tablet (75 mg total) by mouth once daily.  Qty: 30 tablet, Refills: 5    Associated Diagnoses: PAD (peripheral artery disease)         CONTINUE these medications which have NOT CHANGED    Details   alprazolam (XANAX) 1 MG tablet TAKE ONE TABLET BY MOUTH THREE TIMES A DAY AS     NEEDED  Qty: 90 tablet, Refills: 2    Associated Diagnoses: Anxiety      aspirin 81 mg Tab Take 1 tablet by mouth once daily.       carvedilol (COREG) 12.5 MG tablet Take 1 tablet (12.5 mg total) by  mouth 2 (two) times daily with meals.  Qty: 60 tablet, Refills: 11    Associated Diagnoses: Essential hypertension      esomeprazole (NEXIUM) 40 MG capsule Take 1 capsule (40 mg total) by mouth once daily.  Qty: 30 capsule, Refills: 3    Associated Diagnoses: Gastroesophageal reflux disease, esophagitis presence not specified      hydrochlorothiazide (HYDRODIURIL) 25 MG tablet Take 1 tablet (25 mg total) by mouth once daily.  Qty: 30 tablet, Refills: 11    Associated Diagnoses: Essential hypertension      ramipril (ALTACE) 10 MG capsule Take 1 capsule (10 mg total) by mouth 2 (two) times daily.  Qty: 60 capsule, Refills: 5    Associated Diagnoses: Benign essential HTN; Cardiomyopathy, unspecified type      rosuvastatin (CRESTOR) 20 MG tablet Take 1 tablet (20 mg total) by mouth once daily.  Qty: 30 tablet, Refills: 5    Associated Diagnoses: Hyperlipidemia LDL goal <70      acetaminophen (TYLENOL) 325 MG tablet Take 325 mg by mouth every 6 (six) hours as needed for Pain.      albuterol-ipratropium 2.5mg-0.5mg/3mL (DUO-NEB) 0.5 mg-3 mg(2.5 mg base)/3 mL nebulizer solution Take 3 mLs by nebulization every 6 (six) hours as needed for Wheezing.  Qty: 90 mL, Refills: 2    Associated Diagnoses: COPD exacerbation      MULTI-VITAMIN HI-PO ORAL Take by mouth once daily.       SYMBICORT 80-4.5 mcg/actuation HFAA INHALE 2 PUFFS TWICE A   DAY AS DIRECTED  Qty: 10.2 g, Refills: 4    Associated Diagnoses: Chronic obstructive pulmonary disease, unspecified COPD type      vitamin E 400 UNIT capsule Take 400 Units by mouth once daily.      Associated Diagnoses: PAD (peripheral artery disease); HTN (hypertension); HLD (hyperlipidemia); Erectile dysfunction             Discharge Procedure Orders  Diet general     Call MD for:  temperature >100.4     Call MD for:  persistent nausea and vomiting     Call MD for:  severe uncontrolled pain     Call MD for:  redness, tenderness, or signs of infection (pain, swelling, redness, odor or  green/yellow discharge around incision site)     Wound care routine (specify)   Order Comments: You have packing in your anus that will be expelled with your next bowel movement. Take sitz baths three times a day. Avoid constipation - take stool softeners and miralax to avoid constipation       Follow-up Information     Mino Wells MD. Schedule an appointment as soon as possible for a visit in 2 weeks.    Specialty:  Colon and Rectal Surgery  Contact information:  [address]  [facility] [id number] [phone]

## 2017-08-11 NOTE — ANESTHESIA POSTPROCEDURE EVALUATION
"Anesthesia Post Evaluation    Patient: Michael Guillaume    Procedure(s) Performed: Procedure(s) (LRB):  SPHINCTEROTOMY-LATERAL INTERNAL ANAL (N/A)    Final Anesthesia Type: general  Patient location during evaluation: PACU  Patient participation: Yes- Able to Participate  Level of consciousness: awake and alert  Post-procedure vital signs: reviewed and stable  Pain management: adequate  Airway patency: patent  PONV status at discharge: No PONV  Anesthetic complications: no      Cardiovascular status: blood pressure returned to baseline  Respiratory status: unassisted  Hydration status: euvolemic  Follow-up not needed.        Visit Vitals  /82   Pulse 82   Temp 36.7 °C (98 °F) (Skin)   Resp 18   Ht 5' 4" (1.626 m)   Wt 89.8 kg (198 lb)   SpO2 100%   BMI 33.99 kg/m²       Pain/Lina Score: Pain Assessment Performed: Yes (8/11/2017  2:45 PM)  Presence of Pain: denies (8/11/2017  2:45 PM)  Lina Score: 10 (8/11/2017  2:45 PM)      "

## 2017-08-12 NOTE — OP NOTE
DATE OF PROCEDURE:  08/11/2017.    PREOPERATIVE DIAGNOSIS:  Chronic anal fissure.    POSTOPERATIVE DIAGNOSIS:  Chronic anal fissure.    PROCEDURE:  Examination under anesthesia with lateral internal anal   sphincterotomy.    SURGEON:  Mino Wells M.D.    ASSISTANT:  Renay Marie M.D.    ANESTHESIA:  Local MAC.    INTRAVENOUS FLUIDS:  One liter of crystalloid.    ESTIMATED BLOOD LOSS:  Less than 5 mL.    DRAINS:  None.    SPECIMENS:  None.    COMPLICATIONS:  None.    OPERATIVE FINDINGS:  Chronic-appearing anterior midline anal fissure,   hypertrophic internal anal sphincter.    INDICATIONS:  The patient is a 52-year-old male who has been suffering from a   chronic anterior midline anal fissure, which has not responded to conservative   measures.  He has a significant cardiac history including multiple stents and is   on dual-antiplatelet therapy for these, resulting in bleeding from the fissure.    He presents today for an elective lateral internal anal sphincterotomy.  He   has received clearance from his Cardiologist to hold his Plavix perioperatively.    DESCRIPTION OF PROCEDURE:  The patient was identified, brought to the Operating   Room, and placed on the table in a prone position ensuring adequate padding of   all pressure points and after obtaining informed consent.  After initiation of   monitored anesthesia care, the table was jackknifed and the buttocks were taped   apart to efface the anal verge.  The perianal region was prepped and draped in   the usual sterile fashion.  An anal block was performed using a combination of   0.25% Marcaine mixed with Exparel.  On initial inspection, there was a deep   chronic-appearing anterior midline anal fissure present with exposed internal   anal sphincter muscle fibers.  Evaluation of the anal canal using a bivalve anal   speculum revealed mild internal hemorrhoidal disease that did not appear to be   problematic at this point.  The anal sphincter was then placed  on stretch using   the bivalve anal speculum.  The internal anal sphincter was palpated in the   right lateral position and was noted to be hypertrophic.  Using a #11 blade, I   then performed a closed lateral internal anal sphincterotomy in the right   lateral position, dividing the distal aspect of the internal anal sphincter   muscle.  Upon doing this, there was adequate release of the intersphincteric   band.  The incision was closed with a single 3-0 chromic figure-of-eight suture.    The anal canal was irrigated and noted to be hemostatic.  Gelfoam gauze was   placed within the anal canal and sterile dressings were applied.    The patient tolerated the procedure well with no complications.  He was awakened   from sedation in the Operating Room and taken to Recovery in satisfactory   condition.  All needle, instrument, and sponge counts were correct at the end of   the case.  I was present throughout the entire procedure.      [facility]KE  dd: 08/12/2017 09:41:02 (CDT)  td: 08/12/2017 10:21:57 (CDT)  Doc ID   #[id number]  Job ID #[id number]    CC:

## 2017-08-14 ENCOUNTER — TELEPHONE (OUTPATIENT)
Dept: SURGERY | Facility: CLINIC | Age: 52
End: 2017-08-14

## 2017-08-14 NOTE — TELEPHONE ENCOUNTER
----- Message from Lory Sr sent at 8/14/2017  8:11 AM CDT -----  Contact: wife - lashon chaves - [phone]  alexandr - how long does he wait to go back to normal activity and how long does he wait before he has f/u appt - please call wife Alina chaves - [phone]

## 2017-08-22 ENCOUNTER — OFFICE VISIT (OUTPATIENT)
Dept: SURGERY | Facility: CLINIC | Age: 52
End: 2017-08-22
Payer: COMMERCIAL

## 2017-08-22 VITALS
WEIGHT: 198.88 LBS | BODY MASS INDEX: 33.95 KG/M2 | DIASTOLIC BLOOD PRESSURE: 71 MMHG | HEIGHT: 64 IN | SYSTOLIC BLOOD PRESSURE: 107 MMHG | HEART RATE: 93 BPM

## 2017-08-22 DIAGNOSIS — K60.2 ANAL FISSURE: Primary | ICD-10-CM

## 2017-08-22 PROCEDURE — 99024 POSTOP FOLLOW-UP VISIT: CPT | Mod: S$GLB,,, | Performed by: COLON & RECTAL SURGERY

## 2017-08-22 PROCEDURE — 99999 PR PBB SHADOW E&M-EST. PATIENT-LVL III: CPT | Mod: PBBFAC,,, | Performed by: COLON & RECTAL SURGERY

## 2017-08-22 NOTE — LETTER
August 22, 2017      Latrice Zafar, NP  [address]  [place] [address]           [facility]  [address]  [facility] [address]  Phone: [phone]          Patient: Michael Guillaume   MR Number: [id number]   YOB: 1965   Date of Visit: 8/22/2017       Dear Latrice Zafar:    Thank you for referring Michael Guillaume to me for evaluation. Attached you will find relevant portions of my assessment and plan of care.    If you have questions, please do not hesitate to call me. I look forward to following Michael Guillaume along with you.    Sincerely,    Mino Wells MD    Enclosure  CC:  No Recipients    If you would like to receive this communication electronically, please contact [email or web address] or [phone] to request more information on [employer] access.    For providers and/or their staff who would like to refer a patient to [facility], please contact us through our one-stop-shop provider referral line, [facility], at [phone].    If you feel you have received this communication in error or would no longer like to receive these types of communications, please e-mail [email or web address]

## 2017-08-22 NOTE — PROGRESS NOTES
CRS Post-operative visit    Visit Info:     Procedure: LIAS    Date of Procedure: August 11, 2017    Indication: 53 yo M with chronic anal fissure since screening colonoscopy (normal scope) 10/2015 - did not improve with conservative measures and he opted to proceed with LIAS.    Current Status: Doing well postoperatively. No pain or bleeding with BM's.  No problems with continence, no F/C     Pathology:   N/A    Physical Exam:  General: White male in NAD   Neuro: aaox4   Respiratory: resps even unlabored  Extremities: Warm dry and intact  Anorectal: Healing anterior midline fissure    Assessment:  Chronic anal fissure, s/p LIAS - symptoms resolved    Plan:  Continue increased fiber/fluid intake  Colace 100 mg bid  Miralax prn  Normal colonoscopy 2015 - repeat 10 years    Mino Wells MD, FACS, FASCRS

## 2017-09-22 ENCOUNTER — OFFICE VISIT (OUTPATIENT)
Dept: INTERNAL MEDICINE | Facility: CLINIC | Age: 52
End: 2017-09-22
Payer: COMMERCIAL

## 2017-09-22 VITALS
WEIGHT: 198.44 LBS | BODY MASS INDEX: 33.88 KG/M2 | SYSTOLIC BLOOD PRESSURE: 102 MMHG | HEIGHT: 64 IN | RESPIRATION RATE: 12 BRPM | OXYGEN SATURATION: 96 % | TEMPERATURE: 97 F | DIASTOLIC BLOOD PRESSURE: 70 MMHG | HEART RATE: 86 BPM

## 2017-09-22 DIAGNOSIS — Z12.5 SCREENING FOR PROSTATE CANCER: ICD-10-CM

## 2017-09-22 DIAGNOSIS — F41.9 ANXIETY: ICD-10-CM

## 2017-09-22 DIAGNOSIS — E66.9 OBESITY (BMI 30-39.9): ICD-10-CM

## 2017-09-22 DIAGNOSIS — I73.9 PAD (PERIPHERAL ARTERY DISEASE): ICD-10-CM

## 2017-09-22 DIAGNOSIS — J44.1 COPD EXACERBATION: ICD-10-CM

## 2017-09-22 DIAGNOSIS — I10 ESSENTIAL HYPERTENSION: Chronic | ICD-10-CM

## 2017-09-22 DIAGNOSIS — I10 BENIGN ESSENTIAL HTN: ICD-10-CM

## 2017-09-22 DIAGNOSIS — E78.5 HYPERLIPIDEMIA LDL GOAL <70: ICD-10-CM

## 2017-09-22 DIAGNOSIS — I42.9 CARDIOMYOPATHY, UNSPECIFIED TYPE: ICD-10-CM

## 2017-09-22 PROCEDURE — 3008F BODY MASS INDEX DOCD: CPT | Mod: S$GLB,,, | Performed by: NURSE PRACTITIONER

## 2017-09-22 PROCEDURE — 3074F SYST BP LT 130 MM HG: CPT | Mod: S$GLB,,, | Performed by: NURSE PRACTITIONER

## 2017-09-22 PROCEDURE — 99214 OFFICE O/P EST MOD 30 MIN: CPT | Mod: 25,S$GLB,, | Performed by: NURSE PRACTITIONER

## 2017-09-22 PROCEDURE — 99999 PR PBB SHADOW E&M-EST. PATIENT-LVL IV: CPT | Mod: PBBFAC,,, | Performed by: NURSE PRACTITIONER

## 2017-09-22 PROCEDURE — 96372 THER/PROPH/DIAG INJ SC/IM: CPT | Mod: S$GLB,,, | Performed by: NURSE PRACTITIONER

## 2017-09-22 PROCEDURE — 3078F DIAST BP <80 MM HG: CPT | Mod: S$GLB,,, | Performed by: NURSE PRACTITIONER

## 2017-09-22 RX ORDER — DOXYCYCLINE HYCLATE 100 MG
100 TABLET ORAL EVERY 12 HOURS
Qty: 14 TABLET | Refills: 0 | Status: SHIPPED | OUTPATIENT
Start: 2017-09-22 | End: 2017-09-29

## 2017-09-22 RX ORDER — CARVEDILOL 12.5 MG/1
12.5 TABLET ORAL 2 TIMES DAILY WITH MEALS
Qty: 60 TABLET | Refills: 11 | Status: SHIPPED | OUTPATIENT
Start: 2017-09-22 | End: 2018-09-22

## 2017-09-22 RX ORDER — RAMIPRIL 10 MG/1
10 CAPSULE ORAL 2 TIMES DAILY
Qty: 60 CAPSULE | Refills: 5 | Status: SHIPPED | OUTPATIENT
Start: 2017-09-22 | End: 2018-11-26 | Stop reason: SDUPTHER

## 2017-09-22 RX ORDER — BUDESONIDE AND FORMOTEROL FUMARATE DIHYDRATE 80; 4.5 UG/1; UG/1
AEROSOL RESPIRATORY (INHALATION)
Qty: 10.2 G | Refills: 5 | Status: SHIPPED | OUTPATIENT
Start: 2017-09-22 | End: 2018-10-10 | Stop reason: SDUPTHER

## 2017-09-22 RX ORDER — CLOPIDOGREL BISULFATE 75 MG/1
75 TABLET ORAL DAILY
Qty: 30 TABLET | Refills: 5 | Status: SHIPPED | OUTPATIENT
Start: 2017-09-22 | End: 2018-06-12

## 2017-09-22 RX ORDER — HYDROCHLOROTHIAZIDE 25 MG/1
25 TABLET ORAL DAILY
Qty: 30 TABLET | Refills: 5 | Status: SHIPPED | OUTPATIENT
Start: 2017-09-22 | End: 2018-06-12 | Stop reason: SDUPTHER

## 2017-09-22 RX ORDER — IPRATROPIUM BROMIDE AND ALBUTEROL SULFATE 2.5; .5 MG/3ML; MG/3ML
3 SOLUTION RESPIRATORY (INHALATION) EVERY 6 HOURS PRN
Qty: 90 ML | Refills: 2 | Status: SHIPPED | OUTPATIENT
Start: 2017-09-22 | End: 2018-09-22

## 2017-09-22 RX ORDER — ROSUVASTATIN CALCIUM 20 MG/1
20 TABLET, COATED ORAL DAILY
Qty: 30 TABLET | Refills: 5 | Status: SHIPPED | OUTPATIENT
Start: 2017-09-22 | End: 2018-10-29 | Stop reason: SDUPTHER

## 2017-09-22 RX ORDER — PROMETHAZINE HYDROCHLORIDE AND DEXTROMETHORPHAN HYDROBROMIDE 6.25; 15 MG/5ML; MG/5ML
5 SYRUP ORAL EVERY 6 HOURS PRN
Qty: 120 ML | Refills: 0 | Status: SHIPPED | OUTPATIENT
Start: 2017-09-22 | End: 2017-09-29

## 2017-09-22 RX ORDER — METHYLPREDNISOLONE ACETATE 80 MG/ML
80 INJECTION, SUSPENSION INTRA-ARTICULAR; INTRALESIONAL; INTRAMUSCULAR; SOFT TISSUE
Status: COMPLETED | OUTPATIENT
Start: 2017-09-22 | End: 2017-09-22

## 2017-09-22 RX ORDER — ALPRAZOLAM 1 MG/1
1 TABLET ORAL 3 TIMES DAILY PRN
Qty: 90 TABLET | Refills: 5 | Status: SHIPPED | OUTPATIENT
Start: 2017-09-22 | End: 2018-03-26 | Stop reason: SDUPTHER

## 2017-09-22 RX ADMIN — METHYLPREDNISOLONE ACETATE 80 MG: 80 INJECTION, SUSPENSION INTRA-ARTICULAR; INTRALESIONAL; INTRAMUSCULAR; SOFT TISSUE at 01:09

## 2017-09-22 NOTE — PATIENT INSTRUCTIONS
4 Steps for Eating Healthier  Changing the way you eat can improve your health. It can lower your cholesterol and blood pressure, and help you stay at a healthy weight. Your diet doesnt have to be bland and boring to be healthy. Just watch your calories and follow these steps:    1. Eat fewer unhealthy fats  · Choose more fish and lean meats instead of fatty cuts of meat.  · Skip butter and lard, and use less margarine.  · Pass on foods that have palm, coconut, or hydrogenated oils.  · Eat fewer high-fat dairy foods like cheese, ice cream, and whole milk.  · Get a heart-healthy cookbook and try some low-fat recipes.  2. Go light on salt  · Keep the saltshaker off the table.  · Limit high-salt ingredients, such as soy sauce, bouillon, and garlic salt.  · Instead of adding salt when cooking, season your food with herbs and flavorings. Try lemon, garlic, and onion.  · Limit convenience foods, such as boxed or canned foods and restaurant food.  · Read food labels and choose lower-sodium options.  3. Limit sugar  · Pause before you add sugars to pancakes, cereal, coffee, or tea. This includes white and brown table sugar, syrup, honey, and molasses. Cut your usual amount by half.  · Use non-sugar sweeteners. Stevia, aspartame, and sucralose can satisfy a sweet tooth without adding calories.  · Swap out sugar-filled soda and other drinks. Buy sugar-free or low-calorie beverages. Remember water is always the best choice.  · Read labels and choose foods with less added sugar. Keep in mind that dairy foods and foods with fruit will have some natural sugar.  · Cut the sugar in recipes by 1/3 to 1/2. Boost the flavor with extracts like almond, vanilla, or orange. Or add spices such as cinnamon or nutmeg.  4. Eat more fiber  · Eat fresh fruits and vegetables every day.  · Boost your diet with whole grains. Go for oats, whole-grain rice, and bran.  · Add beans and lentils to your meals.  · Drink more water to match your fiber  increase. This is to help prevent constipation.  Date Last Reviewed: 5/11/2015  © 8388-8288 [employer]. [address] [place] [address], [place] [address]. All rights reserved. This information is not intended as a substitute for professional medical care. Always follow your healthcare professional's instructions.

## 2017-09-22 NOTE — PROGRESS NOTES
Subjective:       Patient ID: Michael Guillaume is a 52 y.o. male.    Chief Complaint: Medication Refill; Nasal Congestion; and Cough    Patient is known, to me and presents with   Chief Complaint   Patient presents with    Medication Refill    Nasal Congestion    Cough   .  Denies chest pain and shortness of breath.  Patient presents with COPD exacerbation for the past few days. And also needs refills and labs  HPI  Review of Systems   Constitutional: Negative.  Negative for activity change, appetite change, chills, diaphoresis, fatigue, fever and unexpected weight change.   HENT: Positive for congestion, postnasal drip and sore throat. Negative for ear discharge, ear pain, facial swelling, hearing loss, nosebleeds, rhinorrhea, sinus pressure, sneezing, tinnitus, trouble swallowing and voice change.    Eyes: Negative.  Negative for photophobia, pain, discharge, redness, itching and visual disturbance.   Respiratory: Positive for cough and wheezing. Negative for apnea, choking, chest tightness, shortness of breath and stridor.    Cardiovascular: Negative.  Negative for chest pain, palpitations and leg swelling.   Gastrointestinal: Negative for abdominal distention, abdominal pain, anal bleeding, blood in stool, constipation, diarrhea, nausea and vomiting.   Genitourinary: Negative.  Negative for difficulty urinating, discharge, dysuria, enuresis, flank pain, frequency, hematuria, penile pain, penile swelling, scrotal swelling, testicular pain and urgency.   Musculoskeletal: Negative.  Negative for arthralgias, back pain, gait problem, joint swelling, myalgias, neck pain and neck stiffness.   Skin: Negative.  Negative for color change, pallor, rash and wound.   Neurological: Negative for dizziness, tremors, seizures, syncope, facial asymmetry, speech difficulty, weakness, light-headedness, numbness and headaches.   Hematological: Negative.  Negative for adenopathy. Does not bruise/bleed easily.    Psychiatric/Behavioral: Negative.  Negative for agitation, sleep disturbance and suicidal ideas. The patient is not nervous/anxious.        Objective:      Physical Exam   Constitutional: He is oriented to person, place, and time. He appears well-developed and well-nourished. No distress.   HENT:   Head: Normocephalic and atraumatic.   Right Ear: External ear normal. Tympanic membrane is retracted. Tympanic membrane mobility is abnormal.   Left Ear: External ear normal. Tympanic membrane is retracted. Tympanic membrane mobility is abnormal.   Nose: Mucosal edema present.   Mouth/Throat: Posterior oropharyngeal erythema present. No oropharyngeal exudate.   Eyes: Conjunctivae and EOM are normal. Pupils are equal, round, and reactive to light. Right eye exhibits no discharge. Left eye exhibits no discharge. No scleral icterus.   Neck: Normal range of motion. Neck supple. No JVD present. No tracheal deviation present. No thyromegaly present.   Cardiovascular: Normal rate, regular rhythm, normal heart sounds and intact distal pulses.  Exam reveals no gallop and no friction rub.    No murmur heard.  Pulmonary/Chest: Effort normal. No stridor. No respiratory distress. He has wheezes in the right middle field, the right lower field, the left middle field and the left lower field. He has rhonchi in the right middle field, the right lower field, the left middle field and the left lower field. He has no rales. He exhibits no tenderness.   Abdominal: Soft. Bowel sounds are normal. He exhibits no distension and no mass. There is no tenderness. There is no rebound and no guarding.   Musculoskeletal: Normal range of motion. He exhibits no edema or tenderness.   Lymphadenopathy:     He has no cervical adenopathy.   Neurological: He is alert and oriented to person, place, and time. He has normal reflexes. He displays normal reflexes. No cranial nerve deficit. He exhibits normal muscle tone. Coordination normal.   Skin: Skin is  warm and dry. Capillary refill takes less than 2 seconds. No rash noted. He is not diaphoretic. No erythema. No pallor.   Psychiatric: He has a normal mood and affect. His behavior is normal. Judgment and thought content normal.   Nursing note and vitals reviewed.      Assessment:       1. COPD exacerbation    2. Hyperlipidemia LDL goal <70    3. Benign essential HTN    4. Cardiomyopathy, unspecified type    5. Essential hypertension    6. PAD    7. Anxiety    8. Obesity (BMI 30-39.9)    9. Screening for prostate cancer        Plan:   Michael BELL was seen today for medication refill, nasal congestion and cough.    Diagnoses and all orders for this visit:    COPD exacerbation  -     albuterol-ipratropium 2.5mg-0.5mg/3mL (DUO-NEB) 0.5 mg-3 mg(2.5 mg base)/3 mL nebulizer solution; Take 3 mLs by nebulization every 6 (six) hours as needed for Wheezing.  -     CBC auto differential; Future  -     Comprehensive metabolic panel; Future  -     methylPREDNISolone acetate injection 80 mg; Inject 1 mL (80 mg total) into the muscle one time.  -     doxycycline (VIBRA-TABS) 100 MG tablet; Take 1 tablet (100 mg total) by mouth every 12 (twelve) hours.  -     promethazine-dextromethorphan (PROMETHAZINE-DM) 6.25-15 mg/5 mL Syrp; Take 5 mLs by mouth every 6 (six) hours as needed.    Hyperlipidemia LDL goal <70  -     rosuvastatin (CRESTOR) 20 MG tablet; Take 1 tablet (20 mg total) by mouth once daily.  -     CBC auto differential; Future  -     Comprehensive metabolic panel; Future  -     TSH; Future  -     Lipid panel; Future    Benign essential HTN  -     ramipril (ALTACE) 10 MG capsule; Take 1 capsule (10 mg total) by mouth 2 (two) times daily.  -     CBC auto differential; Future  -     Comprehensive metabolic panel; Future  -     Microalbumin/creatinine urine ratio; Future    Cardiomyopathy, unspecified type  -     ramipril (ALTACE) 10 MG capsule; Take 1 capsule (10 mg total) by mouth 2 (two) times daily.  -     CBC auto  "differential; Future  -     Comprehensive metabolic panel; Future    Essential hypertension  Comments:  Decrease carvedilol to 12.5mg BID, increase HCTZ to 25mg (the dose he's been taking for 3 weeks), and discussed low salt diet.   Orders:  -     hydrochlorothiazide (HYDRODIURIL) 25 MG tablet; Take 1 tablet (25 mg total) by mouth once daily.  -     carvedilol (COREG) 12.5 MG tablet; Take 1 tablet (12.5 mg total) by mouth 2 (two) times daily with meals.  -     CBC auto differential; Future  -     Comprehensive metabolic panel; Future    PAD  -     clopidogrel (PLAVIX) 75 mg tablet; Take 1 tablet (75 mg total) by mouth once daily.  -     CBC auto differential; Future  -     Comprehensive metabolic panel; Future    Anxiety  -     alprazolam (XANAX) 1 MG tablet; Take 1 tablet (1 mg total) by mouth 3 (three) times daily as needed.  -     CBC auto differential; Future  -     Comprehensive metabolic panel; Future    Obesity (BMI 30-39.9)  -     CBC auto differential; Future  -     Comprehensive metabolic panel; Future    Screening for prostate cancer  -     PSA, Screening; Future    Other orders  -     SYMBICORT 80-4.5 mcg/actuation HFAA; INHALE 2 PUFFS TWICE A   DAY AS DIRECTED    "This note will not be shared with the patient."  Refills on meds.  Medication compliance was discussed with the patient.   Medication side effects were discussed.  The patient was instructed on using exercise frequently to reduce blood pressure.  Thirty to forty-five minutes of brisk walking three to four times a week is often helpful to lower your blood pressure.  Monitor blood pressures at home and to record the values in a log.  The patient was instructed to monitor weight closely and to try to keep it as close to ideal body weight as possible.  Reduce salt intake to less than 2 grams per day.  Do not add salt to food at the table.  Reduce or get rid of salt used in cooking.  Limit processed and fast foods.  Read package labels for amount " of salt (soduim) in foods.  Losing weight, even just 10 pounds, of can decrease blood pressure.  Keep hydrated  RTC as scheduled

## 2017-09-25 ENCOUNTER — CLINICAL SUPPORT (OUTPATIENT)
Dept: INTERNAL MEDICINE | Facility: CLINIC | Age: 52
End: 2017-09-25
Payer: COMMERCIAL

## 2017-09-25 DIAGNOSIS — I73.9 PAD (PERIPHERAL ARTERY DISEASE): ICD-10-CM

## 2017-09-25 DIAGNOSIS — E66.9 OBESITY (BMI 30-39.9): ICD-10-CM

## 2017-09-25 DIAGNOSIS — J44.1 COPD EXACERBATION: ICD-10-CM

## 2017-09-25 DIAGNOSIS — F41.9 ANXIETY: ICD-10-CM

## 2017-09-25 DIAGNOSIS — I10 ESSENTIAL HYPERTENSION: Chronic | ICD-10-CM

## 2017-09-25 DIAGNOSIS — E78.5 HYPERLIPIDEMIA LDL GOAL <70: ICD-10-CM

## 2017-09-25 DIAGNOSIS — I42.9 CARDIOMYOPATHY, UNSPECIFIED TYPE: ICD-10-CM

## 2017-09-25 DIAGNOSIS — Z12.5 SCREENING FOR PROSTATE CANCER: ICD-10-CM

## 2017-09-25 DIAGNOSIS — I10 BENIGN ESSENTIAL HTN: ICD-10-CM

## 2017-09-25 LAB
ALBUMIN SERPL BCP-MCNC: 3.8 G/DL
ALP SERPL-CCNC: 97 U/L
ALT SERPL W/O P-5'-P-CCNC: 18 U/L
ANION GAP SERPL CALC-SCNC: 8 MMOL/L
AST SERPL-CCNC: 21 U/L
BASOPHILS # BLD AUTO: 0.03 K/UL
BASOPHILS NFR BLD: 0.4 %
BILIRUB SERPL-MCNC: 0.4 MG/DL
BUN SERPL-MCNC: 17 MG/DL
CALCIUM SERPL-MCNC: 9.5 MG/DL
CHLORIDE SERPL-SCNC: 103 MMOL/L
CHOLEST SERPL-MCNC: 186 MG/DL
CHOLEST/HDLC SERPL: 5.8 {RATIO}
CO2 SERPL-SCNC: 30 MMOL/L
COMPLEXED PSA SERPL-MCNC: 0.56 NG/ML
CREAT SERPL-MCNC: 1 MG/DL
CREAT UR-MCNC: 143 MG/DL
DIFFERENTIAL METHOD: NORMAL
EOSINOPHIL # BLD AUTO: 0.1 K/UL
EOSINOPHIL NFR BLD: 1.1 %
ERYTHROCYTE [DISTWIDTH] IN BLOOD BY AUTOMATED COUNT: 13.3 %
EST. GFR  (AFRICAN AMERICAN): >60 ML/MIN/1.73 M^2
EST. GFR  (NON AFRICAN AMERICAN): >60 ML/MIN/1.73 M^2
GLUCOSE SERPL-MCNC: 92 MG/DL
HCT VFR BLD AUTO: 48.2 %
HDLC SERPL-MCNC: 32 MG/DL
HDLC SERPL: 17.2 %
HGB BLD-MCNC: 15.9 G/DL
LDLC SERPL CALC-MCNC: 96.6 MG/DL
LYMPHOCYTES # BLD AUTO: 1.8 K/UL
LYMPHOCYTES NFR BLD: 22.8 %
MCH RBC QN AUTO: 30.2 PG
MCHC RBC AUTO-ENTMCNC: 33 G/DL
MCV RBC AUTO: 92 FL
MICROALBUMIN UR DL<=1MG/L-MCNC: 5 UG/ML
MICROALBUMIN/CREATININE RATIO: 3.5 UG/MG
MONOCYTES # BLD AUTO: 0.6 K/UL
MONOCYTES NFR BLD: 7.7 %
NEUTROPHILS # BLD AUTO: 5.4 K/UL
NEUTROPHILS NFR BLD: 67.7 %
NONHDLC SERPL-MCNC: 154 MG/DL
PLATELET # BLD AUTO: 294 K/UL
PMV BLD AUTO: 10.3 FL
POTASSIUM SERPL-SCNC: 4 MMOL/L
PROT SERPL-MCNC: 7.1 G/DL
RBC # BLD AUTO: 5.26 M/UL
SODIUM SERPL-SCNC: 141 MMOL/L
TRIGL SERPL-MCNC: 287 MG/DL
TSH SERPL DL<=0.005 MIU/L-ACNC: 0.81 UIU/ML
WBC # BLD AUTO: 7.94 K/UL

## 2017-09-25 PROCEDURE — 80061 LIPID PANEL: CPT

## 2017-09-25 PROCEDURE — 84443 ASSAY THYROID STIM HORMONE: CPT

## 2017-09-25 PROCEDURE — 36415 COLL VENOUS BLD VENIPUNCTURE: CPT | Mod: S$GLB,,, | Performed by: NURSE PRACTITIONER

## 2017-09-25 PROCEDURE — 84153 ASSAY OF PSA TOTAL: CPT

## 2017-09-25 PROCEDURE — 36415 COLL VENOUS BLD VENIPUNCTURE: CPT

## 2017-09-25 PROCEDURE — 80053 COMPREHEN METABOLIC PANEL: CPT

## 2017-09-25 PROCEDURE — 85025 COMPLETE CBC W/AUTO DIFF WBC: CPT

## 2017-09-25 PROCEDURE — 82570 ASSAY OF URINE CREATININE: CPT

## 2017-10-12 DIAGNOSIS — I73.9 PAD (PERIPHERAL ARTERY DISEASE): ICD-10-CM

## 2017-10-13 RX ORDER — CLOPIDOGREL BISULFATE 75 MG/1
TABLET ORAL
Qty: 30 TABLET | Refills: 5 | Status: SHIPPED | OUTPATIENT
Start: 2017-10-13 | End: 2017-11-30 | Stop reason: SDUPTHER

## 2017-11-30 ENCOUNTER — OFFICE VISIT (OUTPATIENT)
Dept: INTERNAL MEDICINE | Facility: CLINIC | Age: 52
End: 2017-11-30
Payer: COMMERCIAL

## 2017-11-30 VITALS
SYSTOLIC BLOOD PRESSURE: 110 MMHG | BODY MASS INDEX: 33.49 KG/M2 | DIASTOLIC BLOOD PRESSURE: 78 MMHG | HEART RATE: 85 BPM | TEMPERATURE: 100 F | OXYGEN SATURATION: 95 % | RESPIRATION RATE: 18 BRPM | HEIGHT: 64 IN | WEIGHT: 196.19 LBS

## 2017-11-30 DIAGNOSIS — J44.1 COPD EXACERBATION: Primary | ICD-10-CM

## 2017-11-30 PROCEDURE — 96372 THER/PROPH/DIAG INJ SC/IM: CPT | Mod: S$GLB,,, | Performed by: INTERNAL MEDICINE

## 2017-11-30 PROCEDURE — 99999 PR PBB SHADOW E&M-EST. PATIENT-LVL IV: CPT | Mod: PBBFAC,,, | Performed by: NURSE PRACTITIONER

## 2017-11-30 PROCEDURE — 99213 OFFICE O/P EST LOW 20 MIN: CPT | Mod: S$GLB,,, | Performed by: NURSE PRACTITIONER

## 2017-11-30 RX ORDER — DOXYCYCLINE HYCLATE 100 MG
100 TABLET ORAL EVERY 12 HOURS
Qty: 14 TABLET | Refills: 0 | Status: SHIPPED | OUTPATIENT
Start: 2017-11-30 | End: 2017-12-07

## 2017-11-30 RX ORDER — PROMETHAZINE HYDROCHLORIDE AND DEXTROMETHORPHAN HYDROBROMIDE 6.25; 15 MG/5ML; MG/5ML
5 SYRUP ORAL EVERY 6 HOURS PRN
Qty: 120 ML | Refills: 0 | Status: SHIPPED | OUTPATIENT
Start: 2017-11-30 | End: 2017-12-07

## 2017-11-30 RX ORDER — METHYLPREDNISOLONE ACETATE 80 MG/ML
80 INJECTION, SUSPENSION INTRA-ARTICULAR; INTRALESIONAL; INTRAMUSCULAR; SOFT TISSUE
Status: COMPLETED | OUTPATIENT
Start: 2017-11-30 | End: 2017-11-30

## 2017-11-30 RX ADMIN — METHYLPREDNISOLONE ACETATE 80 MG: 80 INJECTION, SUSPENSION INTRA-ARTICULAR; INTRALESIONAL; INTRAMUSCULAR; SOFT TISSUE at 02:11

## 2017-11-30 NOTE — PROGRESS NOTES
Subjective:       Patient ID: Michael Guillaume is a 52 y.o. male.    Chief Complaint: Cough (with wheezing x 3 days)    Patient is known, to me and presents with   Chief Complaint   Patient presents with    Cough     with wheezing x 3 days   .  Denies chest pain and shortness of breath.  Presents with possible COPD exacerbation   HPI  Review of Systems   Constitutional: Negative.    HENT: Positive for congestion, postnasal drip and sore throat.    Eyes: Negative.    Respiratory: Positive for cough and wheezing.    Cardiovascular: Negative.    Skin: Negative.    Hematological: Negative.        Objective:      Physical Exam   Constitutional: He appears well-developed and well-nourished. No distress.   HENT:   Head: Normocephalic and atraumatic.   Right Ear: Tympanic membrane mobility is abnormal.   Left Ear: Tympanic membrane mobility is abnormal.   Nose: Mucosal edema present.   Mouth/Throat: Posterior oropharyngeal erythema present. No oropharyngeal exudate.   Eyes: Conjunctivae are normal. Right eye exhibits no discharge. Left eye exhibits no discharge. No scleral icterus.   Neck: Normal range of motion. Neck supple. No JVD present. No tracheal deviation present. No thyromegaly present.   Cardiovascular: Normal rate, regular rhythm and normal heart sounds.    No murmur heard.  Pulmonary/Chest: Effort normal. No stridor. He has wheezes in the right upper field, the right middle field, the right lower field, the left upper field, the left middle field and the left lower field.   Lymphadenopathy:     He has no cervical adenopathy.   Skin: Skin is warm and dry. Capillary refill takes less than 2 seconds. No rash noted. He is not diaphoretic. No erythema. No pallor.       Assessment:       1. COPD exacerbation        Plan:   Michael BELL was seen today for cough.    Diagnoses and all orders for this visit:    COPD exacerbation  -     doxycycline (VIBRA-TABS) 100 MG tablet; Take 1 tablet (100 mg total) by mouth every 12  "(twelve) hours.  -     methylPREDNISolone acetate injection 80 mg; Inject 1 mL (80 mg total) into the muscle one time.  -     promethazine-dextromethorphan (PROMETHAZINE-DM) 6.25-15 mg/5 mL Syrp; Take 5 mLs by mouth every 6 (six) hours as needed.    "This note will not be shared with the patient."  Continue with breathing tx qid  If no improvement will need to return to clinic  rtc as scheduled   "

## 2017-12-02 DIAGNOSIS — I10 BENIGN ESSENTIAL HTN: ICD-10-CM

## 2017-12-02 DIAGNOSIS — I42.9 CARDIOMYOPATHY, UNSPECIFIED TYPE: ICD-10-CM

## 2017-12-04 RX ORDER — RAMIPRIL 10 MG/1
10 CAPSULE ORAL 2 TIMES DAILY
Qty: 60 CAPSULE | Refills: 5 | Status: SHIPPED | OUTPATIENT
Start: 2017-12-04 | End: 2018-03-29 | Stop reason: SDUPTHER

## 2017-12-15 DIAGNOSIS — E78.5 HYPERLIPIDEMIA LDL GOAL <70: ICD-10-CM

## 2017-12-15 RX ORDER — ROSUVASTATIN CALCIUM 20 MG/1
20 TABLET, COATED ORAL DAILY
Qty: 30 TABLET | Refills: 5 | Status: SHIPPED | OUTPATIENT
Start: 2017-12-15 | End: 2018-03-29 | Stop reason: SDUPTHER

## 2018-03-26 ENCOUNTER — TELEPHONE (OUTPATIENT)
Dept: INTERNAL MEDICINE | Facility: CLINIC | Age: 53
End: 2018-03-26

## 2018-03-26 ENCOUNTER — CLINICAL SUPPORT (OUTPATIENT)
Dept: INTERNAL MEDICINE | Facility: CLINIC | Age: 53
End: 2018-03-26
Payer: COMMERCIAL

## 2018-03-26 DIAGNOSIS — F41.9 ANXIETY: ICD-10-CM

## 2018-03-26 DIAGNOSIS — E07.9 THYROID DISORDER: ICD-10-CM

## 2018-03-26 DIAGNOSIS — I10 BENIGN ESSENTIAL HTN: ICD-10-CM

## 2018-03-26 DIAGNOSIS — E78.5 HYPERLIPIDEMIA LDL GOAL <70: Primary | ICD-10-CM

## 2018-03-26 LAB
ALBUMIN SERPL BCP-MCNC: 3.9 G/DL
ALP SERPL-CCNC: 92 U/L
ALT SERPL W/O P-5'-P-CCNC: 15 U/L
ANION GAP SERPL CALC-SCNC: 11 MMOL/L
AST SERPL-CCNC: 30 U/L
BASOPHILS # BLD AUTO: 0.06 K/UL
BASOPHILS NFR BLD: 0.6 %
BILIRUB SERPL-MCNC: 0.5 MG/DL
BUN SERPL-MCNC: 22 MG/DL
CALCIUM SERPL-MCNC: 10 MG/DL
CHLORIDE SERPL-SCNC: 98 MMOL/L
CHOLEST SERPL-MCNC: 157 MG/DL
CHOLEST/HDLC SERPL: 5.1 {RATIO}
CO2 SERPL-SCNC: 31 MMOL/L
CREAT SERPL-MCNC: 1.2 MG/DL
DIFFERENTIAL METHOD: ABNORMAL
EOSINOPHIL # BLD AUTO: 0.1 K/UL
EOSINOPHIL NFR BLD: 0.9 %
ERYTHROCYTE [DISTWIDTH] IN BLOOD BY AUTOMATED COUNT: 12.8 %
EST. GFR  (AFRICAN AMERICAN): >60 ML/MIN/1.73 M^2
EST. GFR  (NON AFRICAN AMERICAN): >60 ML/MIN/1.73 M^2
GLUCOSE SERPL-MCNC: 96 MG/DL
HCT VFR BLD AUTO: 48.3 %
HDLC SERPL-MCNC: 31 MG/DL
HDLC SERPL: 19.7 %
HGB BLD-MCNC: 15.6 G/DL
IMM GRANULOCYTES # BLD AUTO: 0.03 K/UL
IMM GRANULOCYTES NFR BLD AUTO: 0.3 %
LDLC SERPL CALC-MCNC: 79.8 MG/DL
LYMPHOCYTES # BLD AUTO: 1.6 K/UL
LYMPHOCYTES NFR BLD: 16.3 %
MCH RBC QN AUTO: 29.9 PG
MCHC RBC AUTO-ENTMCNC: 32.3 G/DL
MCV RBC AUTO: 93 FL
MONOCYTES # BLD AUTO: 1 K/UL
MONOCYTES NFR BLD: 10.2 %
NEUTROPHILS # BLD AUTO: 7.1 K/UL
NEUTROPHILS NFR BLD: 71.7 %
NONHDLC SERPL-MCNC: 126 MG/DL
NRBC BLD-RTO: 0 /100 WBC
PLATELET # BLD AUTO: 345 K/UL
PMV BLD AUTO: 10.2 FL
POTASSIUM SERPL-SCNC: 4.4 MMOL/L
PROT SERPL-MCNC: 7.2 G/DL
RBC # BLD AUTO: 5.21 M/UL
SODIUM SERPL-SCNC: 140 MMOL/L
TRIGL SERPL-MCNC: 231 MG/DL
TSH SERPL DL<=0.005 MIU/L-ACNC: 0.67 UIU/ML
WBC # BLD AUTO: 9.85 K/UL

## 2018-03-26 PROCEDURE — 80053 COMPREHEN METABOLIC PANEL: CPT

## 2018-03-26 PROCEDURE — 85025 COMPLETE CBC W/AUTO DIFF WBC: CPT

## 2018-03-26 PROCEDURE — 80061 LIPID PANEL: CPT

## 2018-03-26 PROCEDURE — 84443 ASSAY THYROID STIM HORMONE: CPT

## 2018-03-26 PROCEDURE — 36415 COLL VENOUS BLD VENIPUNCTURE: CPT

## 2018-03-26 NOTE — TELEPHONE ENCOUNTER
----- Message from Stan Powell sent at 3/26/2018  7:58 AM CDT -----  Contact: SELF  Michael Guillaume  MRN: [id number]  : 1965  PCP: Latrice Zafar  Home Phone      [phone]  Work Phone      Not on file.  [employer]          [phone]      MESSAGE: NEEDS REFILL ON [facility]    PHONE: [phone]    [place]

## 2018-03-27 RX ORDER — ALPRAZOLAM 1 MG/1
1 TABLET ORAL 3 TIMES DAILY PRN
Qty: 90 TABLET | Refills: 5 | Status: SHIPPED | OUTPATIENT
Start: 2018-03-27 | End: 2018-09-24 | Stop reason: SDUPTHER

## 2018-03-29 ENCOUNTER — OFFICE VISIT (OUTPATIENT)
Dept: INTERNAL MEDICINE | Facility: CLINIC | Age: 53
End: 2018-03-29
Payer: COMMERCIAL

## 2018-03-29 VITALS
OXYGEN SATURATION: 95 % | TEMPERATURE: 98 F | WEIGHT: 194 LBS | DIASTOLIC BLOOD PRESSURE: 82 MMHG | SYSTOLIC BLOOD PRESSURE: 116 MMHG | HEART RATE: 95 BPM | BODY MASS INDEX: 33.12 KG/M2 | HEIGHT: 64 IN | RESPIRATION RATE: 20 BRPM

## 2018-03-29 DIAGNOSIS — F41.9 ANXIETY: ICD-10-CM

## 2018-03-29 DIAGNOSIS — J44.1 COPD WITH ACUTE EXACERBATION: ICD-10-CM

## 2018-03-29 DIAGNOSIS — Z12.5 SCREENING FOR PROSTATE CANCER: ICD-10-CM

## 2018-03-29 DIAGNOSIS — I10 BENIGN ESSENTIAL HTN: Primary | ICD-10-CM

## 2018-03-29 DIAGNOSIS — I42.9 CARDIOMYOPATHY, UNSPECIFIED TYPE: ICD-10-CM

## 2018-03-29 DIAGNOSIS — E78.5 HYPERLIPIDEMIA LDL GOAL <70: ICD-10-CM

## 2018-03-29 DIAGNOSIS — E66.9 OBESITY (BMI 30-39.9): ICD-10-CM

## 2018-03-29 DIAGNOSIS — I73.9 PAD (PERIPHERAL ARTERY DISEASE): ICD-10-CM

## 2018-03-29 DIAGNOSIS — I73.9 CLAUDICATION OF CALF MUSCLES: ICD-10-CM

## 2018-03-29 PROCEDURE — 99214 OFFICE O/P EST MOD 30 MIN: CPT | Mod: 25,S$GLB,, | Performed by: NURSE PRACTITIONER

## 2018-03-29 PROCEDURE — 99999 PR PBB SHADOW E&M-EST. PATIENT-LVL IV: CPT | Mod: PBBFAC,,, | Performed by: NURSE PRACTITIONER

## 2018-03-29 PROCEDURE — 3079F DIAST BP 80-89 MM HG: CPT | Mod: CPTII,S$GLB,, | Performed by: NURSE PRACTITIONER

## 2018-03-29 PROCEDURE — 3074F SYST BP LT 130 MM HG: CPT | Mod: CPTII,S$GLB,, | Performed by: NURSE PRACTITIONER

## 2018-03-29 PROCEDURE — 96372 THER/PROPH/DIAG INJ SC/IM: CPT | Mod: S$GLB,,, | Performed by: INTERNAL MEDICINE

## 2018-03-29 RX ORDER — PROMETHAZINE HYDROCHLORIDE AND DEXTROMETHORPHAN HYDROBROMIDE 6.25; 15 MG/5ML; MG/5ML
5 SYRUP ORAL EVERY 6 HOURS PRN
Qty: 120 ML | Refills: 0 | Status: SHIPPED | OUTPATIENT
Start: 2018-03-29 | End: 2018-04-05

## 2018-03-29 RX ORDER — DOXYCYCLINE HYCLATE 100 MG
100 TABLET ORAL EVERY 12 HOURS
Qty: 14 TABLET | Refills: 0 | Status: SHIPPED | OUTPATIENT
Start: 2018-03-29 | End: 2018-04-05

## 2018-03-29 RX ORDER — METHYLPREDNISOLONE ACETATE 80 MG/ML
80 INJECTION, SUSPENSION INTRA-ARTICULAR; INTRALESIONAL; INTRAMUSCULAR; SOFT TISSUE
Status: COMPLETED | OUTPATIENT
Start: 2018-03-29 | End: 2018-03-29

## 2018-03-29 RX ADMIN — METHYLPREDNISOLONE ACETATE 80 MG: 80 INJECTION, SUSPENSION INTRA-ARTICULAR; INTRALESIONAL; INTRAMUSCULAR; SOFT TISSUE at 09:03

## 2018-03-29 NOTE — PATIENT INSTRUCTIONS
4 Steps for Eating Healthier  Changing the way you eat can improve your health. It can lower your cholesterol and blood pressure, and help you stay at a healthy weight. Your diet doesnt have to be bland and boring to be healthy. Just watch your calories and follow these steps:    1. Eat fewer unhealthy fats  · Choose more fish and lean meats instead of fatty cuts of meat.  · Skip butter and lard, and use less margarine.  · Pass on foods that have palm, coconut, or hydrogenated oils.  · Eat fewer high-fat dairy foods like cheese, ice cream, and whole milk.  · Get a heart-healthy cookbook and try some low-fat recipes.  2. Go light on salt  · Keep the saltshaker off the table.  · Limit high-salt ingredients, such as soy sauce, bouillon, and garlic salt.  · Instead of adding salt when cooking, season your food with herbs and flavorings. Try lemon, garlic, and onion.  · Limit convenience foods, such as boxed or canned foods and restaurant food.  · Read food labels and choose lower-sodium options.  3. Limit sugar  · Pause before you add sugars to pancakes, cereal, coffee, or tea. This includes white and brown table sugar, syrup, honey, and molasses. Cut your usual amount by half.  · Use non-sugar sweeteners. Stevia, aspartame, and sucralose can satisfy a sweet tooth without adding calories.  · Swap out sugar-filled soda and other drinks. Buy sugar-free or low-calorie beverages. Remember water is always the best choice.  · Read labels and choose foods with less added sugar. Keep in mind that dairy foods and foods with fruit will have some natural sugar.  · Cut the sugar in recipes by 1/3 to 1/2. Boost the flavor with extracts like almond, vanilla, or orange. Or add spices such as cinnamon or nutmeg.  4. Eat more fiber  · Eat fresh fruits and vegetables every day.  · Boost your diet with whole grains. Go for oats, whole-grain rice, and bran.  · Add beans and lentils to your meals.  · Drink more water to match your fiber  increase. This is to help prevent constipation.  Date Last Reviewed: 5/11/2015  © 6234-4001 [employer]. [address] [place] [address], [place] [address]. All rights reserved. This information is not intended as a substitute for professional medical care. Always follow your healthcare professional's instructions.

## 2018-03-29 NOTE — PROGRESS NOTES
Subjective:       Patient ID: Michael Guillaume is a 53 y.o. male.    Chief Complaint: Follow-up (check up with results) and URI (cough and wheezing )    Patient is known, to me and presents with   Chief Complaint   Patient presents with    Follow-up     check up with results    URI     cough and wheezing    .  Denies chest pain and shortness of breath.  Patient presents with check up and labs. He is having a copd exacerbation. Up to date with screenings  HPI  Review of Systems   Constitutional: Negative.  Negative for activity change, appetite change, chills, diaphoresis, fatigue, fever and unexpected weight change.   HENT: Positive for congestion, postnasal drip and sore throat. Negative for ear discharge, ear pain, facial swelling, hearing loss, nosebleeds, rhinorrhea, sinus pressure, sneezing, tinnitus, trouble swallowing and voice change.    Eyes: Negative.  Negative for photophobia, pain, discharge, redness, itching and visual disturbance.   Respiratory: Positive for cough and wheezing. Negative for apnea, choking, chest tightness, shortness of breath and stridor.    Cardiovascular: Negative.  Negative for chest pain, palpitations and leg swelling.   Gastrointestinal: Negative for abdominal distention, abdominal pain, anal bleeding, blood in stool, constipation, diarrhea, nausea and vomiting.   Genitourinary: Negative.  Negative for difficulty urinating, discharge, dysuria, enuresis, flank pain, frequency, hematuria, penile pain, penile swelling, scrotal swelling, testicular pain and urgency.   Musculoskeletal: Negative.  Negative for arthralgias, back pain, gait problem, joint swelling, myalgias, neck pain and neck stiffness.   Skin: Negative.  Negative for color change, pallor, rash and wound.   Neurological: Negative for dizziness, tremors, seizures, syncope, facial asymmetry, speech difficulty, weakness, light-headedness, numbness and headaches.   Hematological: Negative for adenopathy. Does not  bruise/bleed easily.   Psychiatric/Behavioral: Negative.  Negative for agitation.       Objective:      Physical Exam   Constitutional: He is oriented to person, place, and time. He appears well-developed and well-nourished. No distress.   HENT:   Head: Normocephalic and atraumatic.   Right Ear: Tympanic membrane is not bulging. Tympanic membrane mobility is abnormal.   Left Ear: Tympanic membrane mobility is abnormal.   Nose: Mucosal edema present.   Mouth/Throat: No oropharyngeal exudate or posterior oropharyngeal erythema.   Eyes: Conjunctivae and EOM are normal. Pupils are equal, round, and reactive to light. Right eye exhibits no discharge. Left eye exhibits no discharge. No scleral icterus.   Neck: Normal range of motion. No JVD present. No tracheal deviation present. No thyromegaly present.   Cardiovascular: Normal rate, regular rhythm, normal heart sounds and intact distal pulses.  Exam reveals no gallop and no friction rub.    No murmur heard.  Pulmonary/Chest: Effort normal. No stridor. No respiratory distress. He has wheezes in the right upper field, the right middle field, the right lower field, the left upper field, the left middle field and the left lower field. He has no rales. He exhibits no tenderness.   Abdominal: Soft. Bowel sounds are normal. He exhibits no distension and no mass. There is no tenderness. There is no rebound and no guarding.   Musculoskeletal: Normal range of motion. He exhibits no edema or tenderness.   Lymphadenopathy:     He has no cervical adenopathy.   Neurological: He is alert and oriented to person, place, and time. He has normal reflexes. He displays normal reflexes. No cranial nerve deficit. He exhibits normal muscle tone. Coordination normal.   Skin: Skin is warm and dry. No rash noted. He is not diaphoretic. No erythema. No pallor.   Psychiatric: He has a normal mood and affect. His behavior is normal. Judgment and thought content normal.   Nursing note and vitals  "reviewed.      Assessment:       1. Benign essential HTN    2. COPD with acute exacerbation    3. Hyperlipidemia LDL goal <70    4. PAD    5. Claudication of calf muscles    6. Cardiomyopathy, unspecified type    7. Anxiety    8. Obesity (BMI 30-39.9)    9. Screening for prostate cancer        Plan:   Michael BELL was seen today for follow-up and uri.    Diagnoses and all orders for this visit:    Benign essential HTN  -     CBC auto differential; Future  -     Comprehensive metabolic panel; Future  -     Microalbumin/creatinine urine ratio; Future    COPD with acute exacerbation  -     methylPREDNISolone acetate injection 80 mg; Inject 1 mL (80 mg total) into the muscle one time.  -     doxycycline (VIBRA-TABS) 100 MG tablet; Take 1 tablet (100 mg total) by mouth every 12 (twelve) hours.  -     promethazine-dextromethorphan (PROMETHAZINE-DM) 6.25-15 mg/5 mL Syrp; Take 5 mLs by mouth every 6 (six) hours as needed.    Hyperlipidemia LDL goal <70  -     CBC auto differential; Future  -     Comprehensive metabolic panel; Future  -     TSH; Future  -     Lipid panel; Future    PAD  -     CBC auto differential; Future  -     Comprehensive metabolic panel; Future    Claudication of calf muscles  -     CBC auto differential; Future  -     Comprehensive metabolic panel; Future    Cardiomyopathy, unspecified type  -     CBC auto differential; Future  -     Comprehensive metabolic panel; Future    Anxiety  -     CBC auto differential; Future  -     Comprehensive metabolic panel; Future    Obesity (BMI 30-39.9)  -     CBC auto differential; Future  -     Comprehensive metabolic panel; Future    Screening for prostate cancer  -     PSA, Screening; Future    "This note will not be shared with the patient."  Refills on meds.  Medication compliance was discussed with the patient.   Medication side effects were discussed.  The patient was instructed on using exercise frequently to reduce blood pressure.  Thirty to forty-five minutes of " brisk walking three to four times a week is often helpful to lower your blood pressure.  Monitor blood pressures at home and to record the values in a log.  The patient was instructed to monitor weight closely and to try to keep it as close to ideal body weight as possible.  Reduce salt intake to less than 2 grams per day.  Do not add salt to food at the table.  Reduce or get rid of salt used in cooking.  Limit processed and fast foods.  Read package labels for amount of salt (soduim) in foods.  Losing weight, even just 10 pounds, of can decrease blood pressure.  Continue with plan of care  rtc as scheduled

## 2018-04-29 DIAGNOSIS — I73.9 PAD (PERIPHERAL ARTERY DISEASE): ICD-10-CM

## 2018-04-30 RX ORDER — CLOPIDOGREL BISULFATE 75 MG/1
TABLET ORAL
Qty: 30 TABLET | Refills: 5 | Status: SHIPPED | OUTPATIENT
Start: 2018-04-30 | End: 2018-10-30 | Stop reason: SDUPTHER

## 2018-06-12 ENCOUNTER — OFFICE VISIT (OUTPATIENT)
Dept: CARDIOLOGY | Facility: CLINIC | Age: 53
End: 2018-06-12
Payer: COMMERCIAL

## 2018-06-12 ENCOUNTER — LAB VISIT (OUTPATIENT)
Dept: LAB | Facility: HOSPITAL | Age: 53
End: 2018-06-12
Attending: NURSE PRACTITIONER
Payer: COMMERCIAL

## 2018-06-12 VITALS
SYSTOLIC BLOOD PRESSURE: 100 MMHG | HEIGHT: 64 IN | OXYGEN SATURATION: 96 % | HEART RATE: 88 BPM | BODY MASS INDEX: 33.54 KG/M2 | WEIGHT: 196.44 LBS | DIASTOLIC BLOOD PRESSURE: 59 MMHG

## 2018-06-12 DIAGNOSIS — I73.9 PAD (PERIPHERAL ARTERY DISEASE): Primary | ICD-10-CM

## 2018-06-12 DIAGNOSIS — M79.675 GREAT TOE PAIN, LEFT: ICD-10-CM

## 2018-06-12 DIAGNOSIS — E66.9 OBESITY (BMI 30-39.9): ICD-10-CM

## 2018-06-12 DIAGNOSIS — M79.10 MYALGIA: ICD-10-CM

## 2018-06-12 DIAGNOSIS — I10 BENIGN ESSENTIAL HTN: ICD-10-CM

## 2018-06-12 DIAGNOSIS — E78.5 HYPERLIPIDEMIA LDL GOAL <70: ICD-10-CM

## 2018-06-12 DIAGNOSIS — I73.9 CLAUDICATION OF CALF MUSCLES: ICD-10-CM

## 2018-06-12 DIAGNOSIS — I42.9 CARDIOMYOPATHY, UNSPECIFIED TYPE: ICD-10-CM

## 2018-06-12 LAB
25(OH)D3+25(OH)D2 SERPL-MCNC: 33 NG/ML
MAGNESIUM SERPL-MCNC: 2.3 MG/DL
URATE SERPL-MCNC: 8.5 MG/DL

## 2018-06-12 PROCEDURE — 82306 VITAMIN D 25 HYDROXY: CPT

## 2018-06-12 PROCEDURE — 3074F SYST BP LT 130 MM HG: CPT | Mod: CPTII,S$GLB,, | Performed by: NURSE PRACTITIONER

## 2018-06-12 PROCEDURE — 83735 ASSAY OF MAGNESIUM: CPT

## 2018-06-12 PROCEDURE — 99214 OFFICE O/P EST MOD 30 MIN: CPT | Mod: S$GLB,,, | Performed by: NURSE PRACTITIONER

## 2018-06-12 PROCEDURE — 99999 PR PBB SHADOW E&M-EST. PATIENT-LVL III: CPT | Mod: PBBFAC,,, | Performed by: NURSE PRACTITIONER

## 2018-06-12 PROCEDURE — 36415 COLL VENOUS BLD VENIPUNCTURE: CPT

## 2018-06-12 PROCEDURE — 3078F DIAST BP <80 MM HG: CPT | Mod: CPTII,S$GLB,, | Performed by: NURSE PRACTITIONER

## 2018-06-12 PROCEDURE — 84550 ASSAY OF BLOOD/URIC ACID: CPT

## 2018-06-12 PROCEDURE — 3008F BODY MASS INDEX DOCD: CPT | Mod: CPTII,S$GLB,, | Performed by: NURSE PRACTITIONER

## 2018-06-12 RX ORDER — HYDROCHLOROTHIAZIDE 12.5 MG/1
12.5 TABLET ORAL DAILY PRN
Qty: 90 TABLET | Refills: 3 | Status: SHIPPED | OUTPATIENT
Start: 2018-06-12 | End: 2018-06-13

## 2018-06-12 NOTE — Clinical Note
Rubens Norwood, I was drawing labs on him anyway so I added an uric acid level so you would have the results for tomorrow.   Thank you, Jessica

## 2018-06-12 NOTE — PROGRESS NOTES
Mr. Guillaume is a patient of Dr. Lopez and was last seen in [facility] Cardiology Visit 7/6/17.    Subjective:   Patient ID:  Michael Guillaume is a 53 y.o. male who presents for follow-up of Bilateral edema of lower extremity (6 months ); Leg Problem; and Hypotension    Problems:  non-obstructive coronary artery disease   Cardiomyopathy EF 40-45% recovered to 60-65%  COPD   hyperlipidemia   hypertension   hypothyroidism.   PAD s/p stent to mid right SFA and right EIA in 2014  20 pack year h/o smoking, quit in 2011    HPI  Mr. Guillaume is in clinic today for routine follow up.  Reports worsening claudication and now is having rest pain.  He is walking but has had to stop more frequently because of pain.  He is treated with low dose ASA and high intensity statin.  Patient is taking rosuvastatin 20mg and LDL is 80.  Hypertension is treated with ramipril 10mg, carvedilol 12.5mg BID, and HCTZ 25mg.  Reports following a low salt diet. Home blood pressure readings are 90-100s systolic.    Review of Systems   Constitution: Negative for decreased appetite, diaphoresis, weakness, malaise/fatigue, weight gain and weight loss.   Eyes: Negative for visual disturbance.   Cardiovascular: Positive for claudication. Negative for chest pain, dyspnea on exertion, irregular heartbeat, leg swelling, near-syncope, orthopnea, palpitations, paroxysmal nocturnal dyspnea and syncope.        Denies chest pressure   Respiratory: Negative for cough, hemoptysis, shortness of breath, sleep disturbances due to breathing and snoring.    Endocrine: Negative for cold intolerance and heat intolerance.   Hematologic/Lymphatic: Negative for bleeding problem. Does not bruise/bleed easily.   Musculoskeletal: Positive for gout (left great toe pain). Negative for myalgias.   Gastrointestinal: Negative for bloating, abdominal pain, anorexia, change in bowel habit, constipation, diarrhea, nausea and vomiting.   Neurological: Negative for difficulty with concentration,  disturbances in coordination, excessive daytime sleepiness, dizziness, headaches, light-headedness, loss of balance and numbness.   Psychiatric/Behavioral: The patient does not have insomnia.      Allergies and current medications updated and reviewed:  Review of patient's allergies indicates:   Allergen Reactions    Lipitor [atorvastatin] Other (See Comments)     Other reaction(s): Severe Myalgias     Current Outpatient Prescriptions   Medication Sig    acetaminophen (TYLENOL) 325 MG tablet Take 325 mg by mouth every 6 (six) hours as needed for Pain.    albuterol-ipratropium 2.5mg-0.5mg/3mL (DUO-NEB) 0.5 mg-3 mg(2.5 mg base)/3 mL nebulizer solution Take 3 mLs by nebulization every 6 (six) hours as needed for Wheezing.    ALPRAZolam (XANAX) 1 MG tablet Take 1 tablet (1 mg total) by mouth 3 (three) times daily as needed.    aspirin 81 mg Tab Take 1 tablet by mouth once daily.     carvedilol (COREG) 12.5 MG tablet Take 1 tablet (12.5 mg total) by mouth 2 (two) times daily with meals.    clopidogrel (PLAVIX) 75 mg tablet TAKE 1 TABLET BY MOUTH EVERY DAY    esomeprazole (NEXIUM) 40 MG capsule Take 1 capsule (40 mg total) by mouth once daily. (Patient taking differently: Take 20 mg by mouth once daily. )    hydrochlorothiazide (HYDRODIURIL) 25 MG tablet Take 1 tablet (25 mg total) by mouth once daily.    MULTI-VITAMIN HI-PO ORAL Take by mouth once daily.     ramipril (ALTACE) 10 MG capsule Take 1 capsule (10 mg total) by mouth 2 (two) times daily.    rosuvastatin (CRESTOR) 20 MG tablet Take 1 tablet (20 mg total) by mouth once daily.    SYMBICORT 80-4.5 mcg/actuation HFAA INHALE 2 PUFFS TWICE A   DAY AS DIRECTED    vitamin E 400 UNIT capsule Take 400 Units by mouth once daily.       No current facility-administered medications for this visit.      Objective:     Right Arm BP - Sittin/67 (18 1553)  Left Arm BP - Sittin/59 (18 1553)    BP (!) 100/59 (BP Location: Left arm, Patient  "Position: Sitting, BP Method: X-Large (Automatic))   Pulse 88   Ht 5' 4" (1.626 m)   Wt 89.1 kg (196 lb 6.9 oz)   SpO2 96%   BMI 33.72 kg/m²     Physical Exam   Constitutional: He is oriented to person, place, and time. Vital signs are normal. He appears well-developed and well-nourished. He is active. No distress.   HENT:   Head: Normocephalic and atraumatic.   Eyes: Conjunctivae and lids are normal. No scleral icterus.   Neck: Neck supple. Normal carotid pulses, no hepatojugular reflux and no JVD present. Carotid bruit is not present.   Cardiovascular: Normal rate, regular rhythm, S1 normal, S2 normal and intact distal pulses.  PMI is not displaced.  Exam reveals no gallop and no friction rub.    No murmur heard.  Pulses:       Carotid pulses are 2+ on the right side, and 2+ on the left side.       Radial pulses are 2+ on the right side, and 2+ on the left side.        Dorsalis pedis pulses are 2+ on the right side, and 2+ on the left side.        Posterior tibial pulses are 1+ on the right side, and 1+ on the left side.   Pulmonary/Chest: Effort normal and breath sounds normal. No respiratory distress. He has no decreased breath sounds. He has no wheezes. He has no rhonchi. He has no rales. He exhibits no tenderness.   Abdominal: Soft. Normal appearance and bowel sounds are normal. He exhibits no distension, no fluid wave, no abdominal bruit, no ascites and no pulsatile midline mass. There is no hepatosplenomegaly. There is no tenderness.   Musculoskeletal: He exhibits no edema.   Neurological: He is alert and oriented to person, place, and time. Gait normal.   Skin: Skin is warm, dry and intact. No rash noted. He is not diaphoretic. Nails show no clubbing.   Psychiatric: He has a normal mood and affect. His speech is normal and behavior is normal. Judgment and thought content normal. Cognition and memory are normal.   Nursing note and vitals reviewed.      Chemistry        Component Value Date/Time    NA " 140 03/26/2018 0928    K 4.4 03/26/2018 0928    CL 98 03/26/2018 0928    CO2 31 (H) 03/26/2018 0928    BUN 22 (H) 03/26/2018 0928    CREATININE 1.2 03/26/2018 0928    GLU 96 03/26/2018 0928        Component Value Date/Time    CALCIUM 10.0 03/26/2018 0928    ALKPHOS 92 03/26/2018 0928    AST 30 03/26/2018 0928    ALT 15 03/26/2018 0928    BILITOT 0.5 03/26/2018 0928    ESTGFRAFRICA >60.0 03/26/2018 0928    EGFRNONAA >60.0 03/26/2018 0928        Lab Results   Component Value Date    HGBA1C 5.8 03/22/2007         Recent Labs  Lab 03/26/18 0928   WHITE BLOOD CELL COUNT 9.85   HEMOGLOBIN 15.6   HEMATOCRIT 48.3   MCV 93   PLATELETS 345   TSH 0.669   CHOLESTEROL 157   HDL 31 L   LDL CHOLESTEROL 79.8   TRIGLYCERIDES 231 H   HDL/CHOLESTEROL RATIO 19.7 L         Recent Labs  Lab 08/17/16  1126 08/30/16  0611   INR 1.0 0.9        Test(s) Reviewed  I have reviewed the following in detail:  [] Stress test   [] Angiography   [] Echocardiogram   [] Labs   [] Other:         Assessment/Plan:     PAD  Comments:  Symptomatic with rest pain. Encouraged walking. Obtain ultrasound. Follows with Dr. Neal  Orders:  -     Cancel: CAR Ankle Brachial Indices W Stress; Future  -     CAR Ultrasound doppler arterial legs bilat; Future    Claudication of calf muscles    Hyperlipidemia LDL goal <70  Comments:  LDL not at goal <70. Reports myalgias with rosuvastatin 20mg. Check magnesium and vitamin D levels.    Benign essential HTN  Comments:  Hypotensive with current regimen. Stop HCTZ daily. Decrease to 12.5mg Daily PRN swelling. Instructed to send log in 2wks.  Orders:  -     hydroCHLOROthiazide (HYDRODIURIL) 12.5 MG Tab; Take 1 tablet (12.5 mg total) by mouth daily as needed (swelling).  Dispense: 90 tablet; Refill: 3    Obesity (BMI 30-39.9)  Comments:  BMI 33.7 Encouraged increased CV exercise to 30 minutes a day for 4-5 days a week.     Great toe pain, left  Comments:  Suspect gout. Has IM clinic appointment tomorrow. Draw uric acid  level since he's getting labs today.   Orders:  -     Uric acid; Future; Expected date: 06/12/2018    Myalgia  -     VITAMIN D; Future; Expected date: 06/12/2018  -     Magnesium; Future; Expected date: 06/12/2018    Cardiomyopathy, unspecified type  Comments:  Recovered on ACEI and beta blocker therapy.  No changes.      Follow-up in about 6 months (around 12/12/2018).

## 2018-06-13 ENCOUNTER — OFFICE VISIT (OUTPATIENT)
Dept: INTERNAL MEDICINE | Facility: CLINIC | Age: 53
End: 2018-06-13
Payer: COMMERCIAL

## 2018-06-13 VITALS
HEIGHT: 64 IN | OXYGEN SATURATION: 98 % | DIASTOLIC BLOOD PRESSURE: 64 MMHG | HEART RATE: 96 BPM | WEIGHT: 191.13 LBS | TEMPERATURE: 97 F | BODY MASS INDEX: 32.63 KG/M2 | RESPIRATION RATE: 18 BRPM | SYSTOLIC BLOOD PRESSURE: 110 MMHG

## 2018-06-13 DIAGNOSIS — M10.072 ACUTE IDIOPATHIC GOUT INVOLVING TOE OF LEFT FOOT: Primary | ICD-10-CM

## 2018-06-13 DIAGNOSIS — E79.0 ELEVATED URIC ACID IN BLOOD: ICD-10-CM

## 2018-06-13 DIAGNOSIS — I10 BENIGN ESSENTIAL HTN: ICD-10-CM

## 2018-06-13 DIAGNOSIS — I70.213 ATHEROSCLEROSIS OF NATIVE ARTERY OF BOTH LOWER EXTREMITIES WITH INTERMITTENT CLAUDICATION: ICD-10-CM

## 2018-06-13 PROBLEM — J44.1 COPD EXACERBATION: Status: RESOLVED | Noted: 2017-09-22 | Resolved: 2018-06-13

## 2018-06-13 PROCEDURE — 99214 OFFICE O/P EST MOD 30 MIN: CPT | Mod: S$GLB,,, | Performed by: NURSE PRACTITIONER

## 2018-06-13 PROCEDURE — 99999 PR PBB SHADOW E&M-EST. PATIENT-LVL IV: CPT | Mod: PBBFAC,,, | Performed by: NURSE PRACTITIONER

## 2018-06-13 PROCEDURE — 3008F BODY MASS INDEX DOCD: CPT | Mod: CPTII,S$GLB,, | Performed by: NURSE PRACTITIONER

## 2018-06-13 PROCEDURE — 3078F DIAST BP <80 MM HG: CPT | Mod: CPTII,S$GLB,, | Performed by: NURSE PRACTITIONER

## 2018-06-13 PROCEDURE — 3074F SYST BP LT 130 MM HG: CPT | Mod: CPTII,S$GLB,, | Performed by: NURSE PRACTITIONER

## 2018-06-13 NOTE — PROGRESS NOTES
Subjective:       Patient ID: Michael Guillaume is a 53 y.o. male.    Chief Complaint: possible gout    New to me but seen previously in clinic by a fellow provider. Pt presents with left great and second toe pain that began ~5days ago.  States the week prior he was on a 7day cruise where he drunk large amount of alcohol daily which he normally doesn't do. Then the day before pain began he consumed a large amount of chocolate, went frogging and cut grass. Denies trauma, injury or fall. Pain was initially severe with toes being red, hot and swollen before gradually improving. He went to his cardiologist yesterday for routine visit. He has hx of PAD with 12 stents in place and worsening claudication which is being managed by cards. Uric acid was found to be elevated, 8.5 confirming gout. Today reports redness and swelling resolved and pain greatly improved. Of note, also reports HCTZ d/c yesterday by cards due to low BP and claudication.       Toe Pain    The incident occurred 5 to 7 days ago. There was no injury mechanism. The pain is present in the left toes. The quality of the pain is described as aching. The pain is at a severity of 10/10. The pain is severe. The pain has been improving since onset. Associated symptoms include an inability to bear weight, a loss of motion and a loss of sensation. Pertinent negatives include no muscle weakness, numbness or tingling. He reports no foreign bodies present. The symptoms are aggravated by palpation and weight bearing. He has tried elevation, immobilization, non-weight bearing and rest for the symptoms. The treatment provided significant relief.     Review of Systems   Constitutional: Negative for activity change, appetite change, fever and unexpected weight change.   HENT: Negative for congestion, ear pain, postnasal drip, rhinorrhea, sneezing, sore throat and voice change.    Eyes: Negative for pain and visual disturbance.   Respiratory: Negative for cough, chest  tightness and shortness of breath.    Cardiovascular: Negative for chest pain, palpitations and leg swelling.   Gastrointestinal: Negative for abdominal pain, constipation, diarrhea, nausea and vomiting.   Endocrine: Negative.    Genitourinary: Negative for difficulty urinating and dysuria.   Musculoskeletal: Positive for arthralgias, gait problem and joint swelling. Negative for back pain, myalgias, neck pain and neck stiffness.   Skin: Negative for rash.   Allergic/Immunologic: Negative.    Neurological: Negative for tingling, speech difficulty, numbness and headaches.   Hematological: Negative.    Psychiatric/Behavioral: Negative.    All other systems reviewed and are negative.      Objective:      Physical Exam   Constitutional: He is oriented to person, place, and time. Vital signs are normal. He appears well-developed and well-nourished. He is cooperative. No distress.   HENT:   Head: Normocephalic and atraumatic.   Right Ear: External ear normal.   Left Ear: External ear normal.   Nose: Nose normal.   Mouth/Throat: Oropharynx is clear and moist and mucous membranes are normal.   Eyes: Conjunctivae, EOM and lids are normal. Pupils are equal, round, and reactive to light.   Neck: Trachea normal, normal range of motion and phonation normal. Neck supple.   Cardiovascular: Normal rate, regular rhythm, normal heart sounds and intact distal pulses.    Pulses:       Dorsalis pedis pulses are 2+ on the right side, and 2+ on the left side.        Posterior tibial pulses are 1+ on the right side, and 1+ on the left side.   Pulmonary/Chest: Effort normal and breath sounds normal. No respiratory distress. He has no wheezes. He has no rales.   Abdominal: Soft. Normal appearance and bowel sounds are normal. There is no tenderness.   Musculoskeletal:        Right ankle: Normal.        Left ankle: Normal.        Right foot: Normal.        Left foot: Normal.        Feet:    Neurological: He is alert and oriented to person,  place, and time. No cranial nerve deficit.   Skin: Skin is warm, dry and intact. No rash noted. No erythema.   Psychiatric: He has a normal mood and affect. His speech is normal and behavior is normal. Judgment and thought content normal. Cognition and memory are normal.   Nursing note and vitals reviewed.      Assessment:       1. Acute idiopathic gout involving toe of left foot    2. Elevated uric acid in blood    3. Benign essential HTN    4. Atherosclerosis of native artery of both lower extremities with intermittent claudication        Plan:       1. Acute idiopathic gout involving toe of left foot  Education about gout causes and treatment discussed.  symptoms resolving, no indication for futher tx at this time    2. Elevated uric acid in blood  Same as above  Written education and precautions provided    3. Benign essential HTN  Asymptomatic, hemodynamically stable and neurovascularly intact   Controlled on current regimen  /64 in clinic today  Continue current plan per cardiology  F/u with cards for further evaluation    4. Atherosclerosis of native artery of both lower extremities with intermittent claudication  On statin  Managed by cardiology  Continue with current treatment plan    Follow-up if symptoms worsen or fail to improve.  MARGARET Milner, FNP-C  Family/Internal Medicine  [facility]

## 2018-06-13 NOTE — PATIENT INSTRUCTIONS
Eating to Prevent Gout  Gout is a painful form of arthritis caused by an excess of uric acid. This is a waste product made by the body. It builds up in the body and forms crystals that collect in the joints, bringing on a gout attack. Alcohol and certain foods can trigger a gout attack. Below are some guidelines for changing your diet to help you manage gout. Your healthcare provider can work with you to determine the best eating plan for you. Know that diet is only one part of managing gout. Take your medicines as prescribed and follow the other guidelines your healthcare provider has given you.  Foods to limit  Eating too many foods containing purines may increase the levels of uric acid in your body and increase your risk for a gout attack. It may be best to limit these high-purine foods:  · Alcohol (beer, red wine). You may be told to avoid alcohol completely.  · Certain fish (anchovies, sardines, fish roes, herring, tuna, mussels, codfish, scallops, trout, and elaine)  · Certain meats (red meat, processed meat, palmer, turkey, wild game, and goose)  · Sauces and gravies made with meat  · Organ meats (such as liver, kidneys, sweetbreads, and tripe)  · Legumes (such as dried beans, peas)  · Mushrooms, spinach, asparagus, and cauliflower  · Yeast and yeast extract supplements  Foods to try  Some foods may be helpful for people with gout. You may want to try adding some of the following foods to your diet:  · Dark berries: These include blueberries, blackberries, and cherries. These berries contain chemicals that may lower uric acid.  · Tofu: Tofu, which is made from soy, is a good source of protein. Studies have shown that it may be a better choice than meat for people with gout.  · Omega fatty acids: These acids are found in fatty fish (such as salmon), certain oils (such as flax, olive, or nut oils), or nuts. They may help prevent inflammation due to gout.  The following guidelines are recommended by the  American Medical Association for people with gout. Your diet should be:  · High in fiber, whole grains, fruits, and vegetables.  · Low in protein (15% of calories should come from protein. Choose lean sources such as soy, lean meats, and poultry).  · Low in fat (no more than 30% of calories should come from fat, with only 10% coming from animal fat).   Date Last Reviewed: 6/17/2015  © 0825-4058 [facility]. [address] [place] [address]. All rights reserved. This information is not intended as a substitute for professional medical care. Always follow your healthcare professional's instructions.        Uric Acid (Blood)  Does this test have other names?  Serum uric acid  What is this test?  This test measures the amount of uric acid in your blood.  Uric acid is a normal bodily waste product. It forms when chemicals called purines break down. Purines are a natural substance found in the body and are also found in many foods such as liver, shellfish, and alcohol. They can also be formed in the body when DNA is broken down.   When purines are broken down to uric acid in the blood, the body gets rid of it when you urinate or have a bowel movement. But if your body makes too much uric acid, or if your kidneys aren't working properly, uric acid can build up in the blood. Uric acid levels can also increase when you eat too many high-purine foods or take certain medicines like diuretics, aspirin, and niacin. Then crystals of uric acid can form and collect in the joints, causing painful inflammation. This condition is called gout.  Why do I need this test?  You might need this test if your healthcare provider wants to see whether you have high levels of uric acid in your blood. Your provider may recommend this test if you have symptoms of gout, although most people with hyperuricemia don't develop gout. Symptoms of gout include:  · Joint pain or tenderness  · Swelling in a joint or reddened skin  around a joint  · Swelling and pain in the big toe, ankle, or knee  · Joints that are hot to the touch  · Swelling and pain that affects only one joint in the body  · Skin that looks shiny and is red or purple  You may also need this test if you have symptoms of kidney stones. Symptoms include:  · Severe pain along your lower back. This may repeatedly get worse and then ease up. The pain may also travel to your genitals.  · Nausea  · Vomiting  · Urgent need to urinate  · Blood in your urine  What other tests might I have along with this test?  Your healthcare provider may also order other tests to diagnose gout, include looking at a sample of joint fluid drawn out with a needle.  Your provider may also order a urinalysis if he or she suspects that you have a kidney stone. The urinalysis looks for blood, white blood cells, and crystals.  Your provider may also order tests of your blood and urine to find out what's causing the high levels uric acid.  What do my test results mean?  Many things may affect your lab test results. These include the method each lab uses to do the test. Even if your test results are different from the normal value, you may not have a problem. To learn what the results mean for you, talk with your healthcare provider.  Results are given in milligrams per deciliter (mg/dL). Here are results that may mean you have hyperuricemia:  · For females: higher than 6 mg/dL  · For males: higher than 7 mg/dL  Many health conditions can cause high levels of uric acid. These include cancer, kidney disease, hypothyroidism, hyperparathyroidism, and sarcoidosis.  Your uric acid levels may be high if you eat foods high in purines, such as organ meats, dried beans and peas, and certain fish - anchovies, herring, sardines, and mackerel. High levels can also be caused by a low-salt diet.  How is this test done?  The test requires a blood sample, which is drawn through a needle from a vein in your arm.  Does this  test pose any risks?  Taking a blood sample with a needle carries risks that include bleeding, infection, bruising, or feeling dizzy. When the needle pricks your arm, you may feel a slight stinging sensation or pain. Afterward, the site may be slightly sore.  What might affect my test results?  Certain medicines may affect your test results. These include:  · Aspirin and other medicines that contain salicylate  · Cyclosporine, a medicine sometimes used for autoimmune diseases  · Levodopa, a medicine used to treat Parkinson disease  · Certain diuretic medicines such as hydrochlorothiazide  · Vitamin B-3 (niacin)  Other things that may affect your test results include:  · Vigorous exercise  · Chemotherapy or radiation therapy to treat cancer  · Foods high in purines, including organ meats, mushrooms, some types of fish and seafood, and dried peas and beans   How do I get ready for this test?  Ask your healthcare provider if you should avoid any foods, beverages, or medications before the test.  Be sure your provider knows about all medicines, herbs, vitamins, and supplements you are taking. This includes medicines that don't need a prescription and any illicit drugs you may use.      © 9118-4226 [employer]. [address] [place] [address]. All rights reserved. This information is not intended as a substitute for professional medical care. Always follow your healthcare professional's instructions.        Gout Diet  Gout is a painful condition caused by an excess of uric acid, a waste product made by the body. Uric acid forms crystals that collect in the joints. The immune response to these crystals brings on symptoms of joint pain and swelling. This is called a gout attack. Often, medications and diet changes are combined to manage gout. Below are some guidelines for changing your diet to help you manage gout and prevent attacks. Your health care provider will help you determine the best eating  plan for you.     Eating to manage gout  Weight loss for those who are overweight may help reduce gout attacks.  Eat less of these foods  Eating too many foods containing purines may raise the levels of uric acid in your body. This raises your risk for a gout attack. Try to limit these foods and drinks:  · Alcohol, such as beer and red wine. You may be told to avoid alcohol completely.  · Soft drinks that contain sugar or high fructose corn syrup  · Certain fish, including anchovies, sardines, fish eggs, and herring  · Shellfish  · Certain meats, such as red meat, hot dogs, luncheon meats, and turkey  · Organ meats, such as liver, kidneys, and sweetbreads  · Legumes, such as dried beans and peas  · Other high fat foods such as gravy, whole milk, and high fat cheeses  · Vegetables such as asparagus, cauliflower, spinach, and mushrooms used to be thought to contribute to an increased risk for a gout attack, but recent studies show that high purine vegetables don't increase the risk for a gout attack.  Eat more of these foods  Other foods may be helpful for people with gout. Add some of these foods to your diet:  · Cherries contain chemicals that may lower uric acid.  · Omega fatty acids. These are found in some fatty fish such as salmon, certain oils (flax, olive, or nut), and nuts themselves. Omega fatty acids may help prevent inflammation due to gout.  · Dairy products that are low-fat or fat-free, such as cheese and yogurt  · Complex carbohydrate foods, including whole grains, brown rice, oats, and beans  · Coffee, in moderation  · Water, approximately 64 ounces per day  Follow-up care  Follow up with your healthcare provider as advised.  When to seek medical advice  Call your healthcare provider right away if any of these occur:  · Return of gout symptoms, usually at night:  · Severe pain, swelling, and heat in a joint, especially the base of the big toe  · Affected joint is hard to move  · Skin of the affected  joint is purple or red  · Fever of 100.4°F (38°C) or higher  · Pain that doesn't get better even with prescribed medicine   Date Last Reviewed: 1/12/2016  © 4763-6705 [employer]. [address] [place] [address], [place] [address]. All rights reserved. This information is not intended as a substitute for professional medical care. Always follow your healthcare professional's instructions.        Gout    Gout is an inflammation of a joint due to a build-up of gout crystals in the joint fluid. This occurs when there is an excess of uric acid (a normal waste product) in the body. Uric acid builds up in the body when the kidneys are unable to filter enough of it from the blood. This may occur with age. It is also associated with kidney disease. Gout occurs more often in people with obesity, diabetes, high blood pressure, or high levels of fats in the blood. It may run in families. Gout tends to come and go. A flare up of gout is called an attack. Drinking alcohol or eating certain foods (such as shellfish or foods with additives such as high-fructose corn syrup) may increase uric acid levels in the blood and cause a gout attack.  During a gout attack, the affected joint may become a hot, red, swollen and painful. If you have had one attack of gout, you are likely to have another. An attack of gout can be treated with medicine. If these attacks become frequent, a daily medicine may be prescribed to help the kidneys remove uric acid from the body.  Home care  During a gout attack:  · Rest painful joints. If gout affects the joints of your foot or leg, you may want to use crutches for the first few days to keep from bearing weight on the affected joint.  · When sitting or lying down, raise the painful joint to a level higher than your heart.  · Apply an ice pack (ice cubes in a plastic bag wrapped in a thin towel) over the injured area for 20 minutes every 1 to 2 hours the first day for pain relief. Continue this 3 to  4 times a day for swelling and pain.  · Avoid alcohol and foods listed below (see Preventing attacks) during a gout attack. Drink extra fluid to help flush the uric acid through your kidneys.  · If you were prescribed a medicine to treat gout, take it as your healthcare provider has instructed. Don't skip doses.  · Take anti-inflammatory medicine as directed.   · If pain medicines have been prescribed, take them exactly as directed.    Preventing attacks  · Minimize or avoid alcohol use. Excess alcohol intake can cause a gout attack.  · Limit these foods and beverages:  ¨ Organ meats, such as kidneys and liver  ¨ Certain seafoods (anchovies, sardines, shrimp, scallops, herring, mackerel)  ¨ Wild game, meat extracts and meat gravies  ¨ Foods and beverages sweetened with high-fructose corn syrup, such as sodas  · Eat a healthy diet including low-fat and nonfat dairy, whole grains, and vegetables.  · If you are overweight, talk to your healthcare provider about a weight reduction plan. Avoid fasting or extreme low calorie diets (less than 900 calories per day). This will increase uric acid levels in the body.  · If you have diabetes or high blood pressure, work with your doctor to manage these conditions.  · Protect the joint from injury. Trauma can trigger a gout attack.  Follow-up care  Follow up with your healthcare provider, or as advised.   When to seek medical advice  Call your healthcare provider if you have any of the following:  · Fever over 100.4°F (38.ºC) with worsening joint pain  · Increasing redness around the joint  · Pain developing in another joint  · Repeated vomiting, abdominal pain, or blood in the vomit or stool (black or red color)  Date Last Reviewed: 3/1/2017  © 4747-4480 [email or web address]. [address] [place] [address], [place] [address]. All rights reserved. This information is not intended as a substitute for professional medical care. Always follow your healthcare professional's  instructions.

## 2018-06-15 ENCOUNTER — CLINICAL SUPPORT (OUTPATIENT)
Dept: CARDIOLOGY | Facility: CLINIC | Age: 53
End: 2018-06-15
Attending: NURSE PRACTITIONER
Payer: COMMERCIAL

## 2018-06-15 DIAGNOSIS — I73.9 PAD (PERIPHERAL ARTERY DISEASE): ICD-10-CM

## 2018-06-15 PROCEDURE — 93925 LOWER EXTREMITY STUDY: CPT | Mod: S$GLB,,, | Performed by: INTERNAL MEDICINE

## 2018-06-19 ENCOUNTER — OFFICE VISIT (OUTPATIENT)
Dept: INTERNAL MEDICINE | Facility: CLINIC | Age: 53
End: 2018-06-19
Payer: COMMERCIAL

## 2018-06-19 VITALS
DIASTOLIC BLOOD PRESSURE: 80 MMHG | SYSTOLIC BLOOD PRESSURE: 112 MMHG | WEIGHT: 198 LBS | OXYGEN SATURATION: 95 % | TEMPERATURE: 98 F | RESPIRATION RATE: 18 BRPM | HEART RATE: 91 BPM | HEIGHT: 64 IN | BODY MASS INDEX: 33.8 KG/M2

## 2018-06-19 DIAGNOSIS — M10.9 ACUTE GOUT INVOLVING TOE OF LEFT FOOT, UNSPECIFIED CAUSE: Primary | ICD-10-CM

## 2018-06-19 PROCEDURE — 3079F DIAST BP 80-89 MM HG: CPT | Mod: CPTII,S$GLB,, | Performed by: NURSE PRACTITIONER

## 2018-06-19 PROCEDURE — 3074F SYST BP LT 130 MM HG: CPT | Mod: CPTII,S$GLB,, | Performed by: NURSE PRACTITIONER

## 2018-06-19 PROCEDURE — 99213 OFFICE O/P EST LOW 20 MIN: CPT | Mod: 25,S$GLB,, | Performed by: NURSE PRACTITIONER

## 2018-06-19 PROCEDURE — 99999 PR PBB SHADOW E&M-EST. PATIENT-LVL IV: CPT | Mod: PBBFAC,,, | Performed by: NURSE PRACTITIONER

## 2018-06-19 PROCEDURE — 3008F BODY MASS INDEX DOCD: CPT | Mod: CPTII,S$GLB,, | Performed by: NURSE PRACTITIONER

## 2018-06-19 PROCEDURE — 96372 THER/PROPH/DIAG INJ SC/IM: CPT | Mod: S$GLB,,, | Performed by: NURSE PRACTITIONER

## 2018-06-19 RX ORDER — KETOROLAC TROMETHAMINE 30 MG/ML
60 INJECTION, SOLUTION INTRAMUSCULAR; INTRAVENOUS
Status: COMPLETED | OUTPATIENT
Start: 2018-06-19 | End: 2018-06-19

## 2018-06-19 RX ORDER — METHYLPREDNISOLONE ACETATE 80 MG/ML
80 INJECTION, SUSPENSION INTRA-ARTICULAR; INTRALESIONAL; INTRAMUSCULAR; SOFT TISSUE
Status: COMPLETED | OUTPATIENT
Start: 2018-06-19 | End: 2018-06-19

## 2018-06-19 RX ORDER — METHYLPREDNISOLONE 4 MG/1
TABLET ORAL
Qty: 1 PACKAGE | Refills: 0 | Status: SHIPPED | OUTPATIENT
Start: 2018-06-19 | End: 2018-06-26

## 2018-06-19 RX ORDER — COLCHICINE 0.6 MG/1
TABLET ORAL
Qty: 10 TABLET | Refills: 0 | Status: SHIPPED | OUTPATIENT
Start: 2018-06-19 | End: 2018-07-06

## 2018-06-19 RX ORDER — HYDROCHLOROTHIAZIDE 12.5 MG/1
CAPSULE ORAL
COMMUNITY
Start: 2018-06-12 | End: 2018-06-19

## 2018-06-19 RX ADMIN — METHYLPREDNISOLONE ACETATE 80 MG: 80 INJECTION, SUSPENSION INTRA-ARTICULAR; INTRALESIONAL; INTRAMUSCULAR; SOFT TISSUE at 01:06

## 2018-06-19 RX ADMIN — KETOROLAC TROMETHAMINE 60 MG: 30 INJECTION, SOLUTION INTRAMUSCULAR; INTRAVENOUS at 01:06

## 2018-06-19 NOTE — PROGRESS NOTES
Subjective:       Patient ID: Michael Guillaume is a 53 y.o. male.    Chief Complaint: Toe Pain (2nd toe on L. foot  - x 1 week - PS:10)    Patient is known, to me and presents with   Chief Complaint   Patient presents with    Toe Pain     2nd toe on L. foot  - x 1 week - PS:10   .  Denies chest pain and shortness of breath.  Patient presents with acute gout left second toe and has pain up to a 10. Did have uric acid level and is high. States that he declined having steroid shot last time.   HPI  Review of Systems   Constitutional: Negative.    Respiratory: Negative.    Cardiovascular: Negative.    Musculoskeletal: Positive for arthralgias.   Skin: Negative.    Neurological: Negative.        Objective:      Physical Exam   Constitutional: He appears well-developed and well-nourished. No distress.   Neck: Normal range of motion. Neck supple. No JVD present. No tracheal deviation present. No thyromegaly present.   Cardiovascular: Normal rate, regular rhythm and normal heart sounds.    No murmur heard.  Pulmonary/Chest: Effort normal and breath sounds normal. No stridor. He has no wheezes.   Musculoskeletal: He exhibits tenderness. He exhibits no deformity.        Left ankle: Tenderness.        Feet:    Tenderness to left second toe    Lymphadenopathy:     He has no cervical adenopathy.   Skin: Skin is warm and dry. Capillary refill takes less than 2 seconds. No rash noted. He is not diaphoretic. No erythema. No pallor.       Assessment:       1. Acute gout involving toe of left foot, unspecified cause        Plan:   Michael BELL was seen today for toe pain.    Diagnoses and all orders for this visit:    Acute gout involving toe of left foot, unspecified cause  -     colchicine 0.6 mg tablet; Take 1.2 mg po x 1, then 0.6 mg po one hour later x1  -     methylPREDNISolone acetate injection 80 mg; Inject 1 mL (80 mg total) into the muscle one time.  -     ketorolac injection 60 mg; Inject 2 mLs (60 mg total) into the muscle one  "time.  -     methylPREDNISolone (MEDROL DOSEPACK) 4 mg tablet; use as directed    "This note will not be shared with the patient."  Start medrol dose pack tomorrow  Will start taking allopurinol after attack  rtc in one week   "

## 2018-06-19 NOTE — PATIENT INSTRUCTIONS
Gout    Gout is an inflammation of a joint due to a build-up of gout crystals in the joint fluid. This occurs when there is an excess of uric acid (a normal waste product) in the body. Uric acid builds up in the body when the kidneys are unable to filter enough of it from the blood. This may occur with age. It is also associated with kidney disease. Gout occurs more often in people with obesity, diabetes, high blood pressure, or high levels of fats in the blood. It may run in families. Gout tends to come and go. A flare up of gout is called an attack. Drinking alcohol or eating certain foods (such as shellfish or foods with additives such as high-fructose corn syrup) may increase uric acid levels in the blood and cause a gout attack.  During a gout attack, the affected joint may become a hot, red, swollen and painful. If you have had one attack of gout, you are likely to have another. An attack of gout can be treated with medicine. If these attacks become frequent, a daily medicine may be prescribed to help the kidneys remove uric acid from the body.  Home care  During a gout attack:  · Rest painful joints. If gout affects the joints of your foot or leg, you may want to use crutches for the first few days to keep from bearing weight on the affected joint.  · When sitting or lying down, raise the painful joint to a level higher than your heart.  · Apply an ice pack (ice cubes in a plastic bag wrapped in a thin towel) over the injured area for 20 minutes every 1 to 2 hours the first day for pain relief. Continue this 3 to 4 times a day for swelling and pain.  · Avoid alcohol and foods listed below (see Preventing attacks) during a gout attack. Drink extra fluid to help flush the uric acid through your kidneys.  · If you were prescribed a medicine to treat gout, take it as your healthcare provider has instructed. Don't skip doses.  · Take anti-inflammatory medicine as directed.   · If pain medicines have been  prescribed, take them exactly as directed.    Preventing attacks  · Minimize or avoid alcohol use. Excess alcohol intake can cause a gout attack.  · Limit these foods and beverages:  ¨ Organ meats, such as kidneys and liver  ¨ Certain seafoods (anchovies, sardines, shrimp, scallops, herring, mackerel)  ¨ Wild game, meat extracts and meat gravies  ¨ Foods and beverages sweetened with high-fructose corn syrup, such as sodas  · Eat a healthy diet including low-fat and nonfat dairy, whole grains, and vegetables.  · If you are overweight, talk to your healthcare provider about a weight reduction plan. Avoid fasting or extreme low calorie diets (less than 900 calories per day). This will increase uric acid levels in the body.  · If you have diabetes or high blood pressure, work with your doctor to manage these conditions.  · Protect the joint from injury. Trauma can trigger a gout attack.  Follow-up care  Follow up with your healthcare provider, or as advised.   When to seek medical advice  Call your healthcare provider if you have any of the following:  · Fever over 100.4°F (38.ºC) with worsening joint pain  · Increasing redness around the joint  · Pain developing in another joint  · Repeated vomiting, abdominal pain, or blood in the vomit or stool (black or red color)  Date Last Reviewed: 3/1/2017  © 4800-9172 [employer]. [address] [place] [address], [place] [address]. All rights reserved. This information is not intended as a substitute for professional medical care. Always follow your healthcare professional's instructions.        Treating Gout Attacks     Raising the joint above the level of your heart can help reduce gout symptoms.     Gout is a disease that affects the joints. It is caused by excess uric acid in your blood stream that may lead to crystals forming in your joints. Left untreated, it can lead to painful foot and joint deformities and even kidney problems. But, by treating gout early, you can  relieve pain and help prevent future problems. Gout can usually be treated with medication and proper diet. In severe cases, surgery may be needed.  Gout attacks are painful and often happen more than once. Taking medications may reduce pain and prevent attacks in the future. There are also some things you can do at home to relieve symptoms.  Medications for gout  Your healthcare provider may prescribe a daily medication to reduce levels of uric acid. Reducing your uric acid levels may help prevent gout attacks. Allopurinol is one commonly used medication taken daily to reduce uric acid levels. Other medications can help relieve pain and swelling during an acute attack. Medicines such as NSAIDs (nonsteroidal anti-inflammatory medicines), steroids, and colchicine may be prescribed for intermittent use to relieve an acute gout attack. Be sure to take your medication as directed.  What you can do  Below are some things you can do at home to relieve gout symptoms. Your healthcare provider may have other tips.  · Rest the painful joint as much as you can.  · Raise the painful joint so it is at a level higher than your heart.  · Use ice for 10 minutes every 1-2 hours as possible.  How can I prevent gout?  With a little effort, you may be able to prevent gout attacks in the future. Here are some things you can do:  · Avoid foods high in purines  ¨ Certain meats (red meat, processed meat, turkey)  ¨ Organ meats (kidney, liver, sweetbread)  ¨ Shellfish (lobster, crab, shrimp, scallop, mussel)  ¨ Certain fish (anchovy, sardine, herring, mackerel)  · Take any medications prescribed by your healthcare provider.  · Lose weight if you need to.  · Reduce high fructose corn syrup in meals and drinks.  · Reduce or eliminate consumption of alcohol, particularly beer, but also red wine and spirits.  · Control blood pressure, diabetes, and cholesterol.  · Drink plenty of water to help flush uric acid from your body.  Date Last  Reviewed: 2/1/2016  © 8839-3007 [employer]. [address] [place] [address], [place] [address]. All rights reserved. This information is not intended as a substitute for professional medical care. Always follow your healthcare professional's instructions.

## 2018-06-26 ENCOUNTER — OFFICE VISIT (OUTPATIENT)
Dept: INTERNAL MEDICINE | Facility: CLINIC | Age: 53
End: 2018-06-26
Payer: COMMERCIAL

## 2018-06-26 VITALS
HEART RATE: 81 BPM | RESPIRATION RATE: 20 BRPM | OXYGEN SATURATION: 95 % | HEIGHT: 64 IN | DIASTOLIC BLOOD PRESSURE: 70 MMHG | SYSTOLIC BLOOD PRESSURE: 112 MMHG | BODY MASS INDEX: 32.74 KG/M2 | WEIGHT: 191.81 LBS

## 2018-06-26 DIAGNOSIS — M10.9 ACUTE GOUT INVOLVING TOE OF LEFT FOOT, UNSPECIFIED CAUSE: Primary | ICD-10-CM

## 2018-06-26 PROCEDURE — 99213 OFFICE O/P EST LOW 20 MIN: CPT | Mod: S$GLB,,, | Performed by: NURSE PRACTITIONER

## 2018-06-26 PROCEDURE — 3074F SYST BP LT 130 MM HG: CPT | Mod: CPTII,S$GLB,, | Performed by: NURSE PRACTITIONER

## 2018-06-26 PROCEDURE — 3078F DIAST BP <80 MM HG: CPT | Mod: CPTII,S$GLB,, | Performed by: NURSE PRACTITIONER

## 2018-06-26 PROCEDURE — 3008F BODY MASS INDEX DOCD: CPT | Mod: CPTII,S$GLB,, | Performed by: NURSE PRACTITIONER

## 2018-06-26 PROCEDURE — 99999 PR PBB SHADOW E&M-EST. PATIENT-LVL IV: CPT | Mod: PBBFAC,,, | Performed by: NURSE PRACTITIONER

## 2018-06-26 NOTE — PROGRESS NOTES
Subjective:       Patient ID: Michael Guillaume is a 53 y.o. male.    Chief Complaint: Follow-up (gout L. foot - PS:6 comes and goes )    Patient is known, to me and presents with   Chief Complaint   Patient presents with    Follow-up     gout L. foot - PS:6 comes and goes    .  Denies chest pain and shortness of breath.  Patient presents with follow up gout to left foot and is much better. Declines for me to place him on uric acid lowering medication. I explained to him the importance but he wants to hold off and see if it comes down with his diet. Also states that he knows what caused it due to over consumption of jaylyn.    HPI  Review of Systems   Constitutional: Negative.    Respiratory: Negative.    Cardiovascular: Negative.    Musculoskeletal: Positive for arthralgias.   Skin: Negative.    Neurological: Negative.        Objective:      Physical Exam   Constitutional: He is oriented to person, place, and time. He appears well-developed and well-nourished. No distress.   Neck: Normal range of motion. Neck supple. No JVD present. No tracheal deviation present. No thyromegaly present.   Cardiovascular: Normal rate, regular rhythm and normal heart sounds.    No murmur heard.  Pulmonary/Chest: Effort normal and breath sounds normal. No stridor. He has no wheezes.   Musculoskeletal: Normal range of motion. He exhibits no edema, tenderness or deformity.   Lymphadenopathy:     He has no cervical adenopathy.   Neurological: He is alert and oriented to person, place, and time. He displays normal reflexes. No cranial nerve deficit or sensory deficit. He exhibits normal muscle tone. Coordination normal.   Skin: Skin is warm and dry. Capillary refill takes less than 2 seconds. No rash noted. He is not diaphoretic. No erythema. No pallor.       Assessment:       1. Acute gout involving toe of left foot, unspecified cause        Plan:   Michael BELL was seen today for follow-up.    Diagnoses and all orders for this visit:    Acute  "gout involving toe of left foot, unspecified cause  -     Uric acid; Future    "This note will not be shared with the patient."  Refusing allopurinol and colcrys   Willing to do blood work in four weeks and if still high than will get on meds  rtc as scheduled   "

## 2018-06-26 NOTE — PATIENT INSTRUCTIONS
Gout    Gout is an inflammation of a joint due to a build-up of gout crystals in the joint fluid. This occurs when there is an excess of uric acid (a normal waste product) in the body. Uric acid builds up in the body when the kidneys are unable to filter enough of it from the blood. This may occur with age. It is also associated with kidney disease. Gout occurs more often in people with obesity, diabetes, high blood pressure, or high levels of fats in the blood. It may run in families. Gout tends to come and go. A flare up of gout is called an attack. Drinking alcohol or eating certain foods (such as shellfish or foods with additives such as high-fructose corn syrup) may increase uric acid levels in the blood and cause a gout attack.  During a gout attack, the affected joint may become a hot, red, swollen and painful. If you have had one attack of gout, you are likely to have another. An attack of gout can be treated with medicine. If these attacks become frequent, a daily medicine may be prescribed to help the kidneys remove uric acid from the body.  Home care  During a gout attack:  · Rest painful joints. If gout affects the joints of your foot or leg, you may want to use crutches for the first few days to keep from bearing weight on the affected joint.  · When sitting or lying down, raise the painful joint to a level higher than your heart.  · Apply an ice pack (ice cubes in a plastic bag wrapped in a thin towel) over the injured area for 20 minutes every 1 to 2 hours the first day for pain relief. Continue this 3 to 4 times a day for swelling and pain.  · Avoid alcohol and foods listed below (see Preventing attacks) during a gout attack. Drink extra fluid to help flush the uric acid through your kidneys.  · If you were prescribed a medicine to treat gout, take it as your healthcare provider has instructed. Don't skip doses.  · Take anti-inflammatory medicine as directed.   · If pain medicines have been  prescribed, take them exactly as directed.    Preventing attacks  · Minimize or avoid alcohol use. Excess alcohol intake can cause a gout attack.  · Limit these foods and beverages:  ¨ Organ meats, such as kidneys and liver  ¨ Certain seafoods (anchovies, sardines, shrimp, scallops, herring, mackerel)  ¨ Wild game, meat extracts and meat gravies  ¨ Foods and beverages sweetened with high-fructose corn syrup, such as sodas  · Eat a healthy diet including low-fat and nonfat dairy, whole grains, and vegetables.  · If you are overweight, talk to your healthcare provider about a weight reduction plan. Avoid fasting or extreme low calorie diets (less than 900 calories per day). This will increase uric acid levels in the body.  · If you have diabetes or high blood pressure, work with your doctor to manage these conditions.  · Protect the joint from injury. Trauma can trigger a gout attack.  Follow-up care  Follow up with your healthcare provider, or as advised.   When to seek medical advice  Call your healthcare provider if you have any of the following:  · Fever over 100.4°F (38.ºC) with worsening joint pain  · Increasing redness around the joint  · Pain developing in another joint  · Repeated vomiting, abdominal pain, or blood in the vomit or stool (black or red color)  Date Last Reviewed: 3/1/2017  © 6584-6406 [employer]. [address] [place] [address], [place] [address]. All rights reserved. This information is not intended as a substitute for professional medical care. Always follow your healthcare professional's instructions.        Treating Gout Attacks     Raising the joint above the level of your heart can help reduce gout symptoms.     Gout is a disease that affects the joints. It is caused by excess uric acid in your blood stream that may lead to crystals forming in your joints. Left untreated, it can lead to painful foot and joint deformities and even kidney problems. But, by treating gout early, you can  relieve pain and help prevent future problems. Gout can usually be treated with medication and proper diet. In severe cases, surgery may be needed.  Gout attacks are painful and often happen more than once. Taking medications may reduce pain and prevent attacks in the future. There are also some things you can do at home to relieve symptoms.  Medications for gout  Your healthcare provider may prescribe a daily medication to reduce levels of uric acid. Reducing your uric acid levels may help prevent gout attacks. Allopurinol is one commonly used medication taken daily to reduce uric acid levels. Other medications can help relieve pain and swelling during an acute attack. Medicines such as NSAIDs (nonsteroidal anti-inflammatory medicines), steroids, and colchicine may be prescribed for intermittent use to relieve an acute gout attack. Be sure to take your medication as directed.  What you can do  Below are some things you can do at home to relieve gout symptoms. Your healthcare provider may have other tips.  · Rest the painful joint as much as you can.  · Raise the painful joint so it is at a level higher than your heart.  · Use ice for 10 minutes every 1-2 hours as possible.  How can I prevent gout?  With a little effort, you may be able to prevent gout attacks in the future. Here are some things you can do:  · Avoid foods high in purines  ¨ Certain meats (red meat, processed meat, turkey)  ¨ Organ meats (kidney, liver, sweetbread)  ¨ Shellfish (lobster, crab, shrimp, scallop, mussel)  ¨ Certain fish (anchovy, sardine, herring, mackerel)  · Take any medications prescribed by your healthcare provider.  · Lose weight if you need to.  · Reduce high fructose corn syrup in meals and drinks.  · Reduce or eliminate consumption of alcohol, particularly beer, but also red wine and spirits.  · Control blood pressure, diabetes, and cholesterol.  · Drink plenty of water to help flush uric acid from your body.  Date Last  Reviewed: 2/1/2016  © 6840-6176 [employer]. [address] [place] [address], [place] [address]. All rights reserved. This information is not intended as a substitute for professional medical care. Always follow your healthcare professional's instructions.

## 2018-07-01 DIAGNOSIS — I10 ESSENTIAL HYPERTENSION: Chronic | ICD-10-CM

## 2018-07-01 RX ORDER — HYDROCHLOROTHIAZIDE 25 MG/1
25 TABLET ORAL DAILY
Qty: 30 TABLET | Refills: 10 | OUTPATIENT
Start: 2018-07-01

## 2018-07-01 RX ORDER — CARVEDILOL 12.5 MG/1
12.5 TABLET ORAL 2 TIMES DAILY WITH MEALS
Qty: 180 TABLET | Refills: 3 | Status: SHIPPED | OUTPATIENT
Start: 2018-07-01 | End: 2019-06-19 | Stop reason: SDUPTHER

## 2018-07-06 ENCOUNTER — TELEPHONE (OUTPATIENT)
Dept: INTERNAL MEDICINE | Facility: CLINIC | Age: 53
End: 2018-07-06

## 2018-07-06 RX ORDER — COLCHICINE 0.6 MG/1
0.6 TABLET ORAL DAILY
Qty: 30 TABLET | Refills: 11 | Status: SHIPPED | OUTPATIENT
Start: 2018-07-06 | End: 2019-07-23

## 2018-07-06 NOTE — TELEPHONE ENCOUNTER
----- Message from Crys Muñoz MA sent at 7/6/2018  7:59 AM CDT -----  Contact: Patient  Patient needs another script for Colchicine.   Send to Napoleon Fischer      His phone # [phone]

## 2018-07-07 DIAGNOSIS — E78.5 HYPERLIPIDEMIA LDL GOAL <70: ICD-10-CM

## 2018-07-09 RX ORDER — ROSUVASTATIN CALCIUM 20 MG/1
20 TABLET, COATED ORAL DAILY
Qty: 30 TABLET | Refills: 5 | Status: SHIPPED | OUTPATIENT
Start: 2018-07-09 | End: 2018-09-24 | Stop reason: SDUPTHER

## 2018-09-18 ENCOUNTER — CLINICAL SUPPORT (OUTPATIENT)
Dept: INTERNAL MEDICINE | Facility: CLINIC | Age: 53
End: 2018-09-18
Payer: COMMERCIAL

## 2018-09-18 DIAGNOSIS — E78.5 HYPERLIPIDEMIA LDL GOAL <70: ICD-10-CM

## 2018-09-18 DIAGNOSIS — I73.9 CLAUDICATION OF CALF MUSCLES: ICD-10-CM

## 2018-09-18 DIAGNOSIS — M10.9 ACUTE GOUT INVOLVING TOE OF LEFT FOOT, UNSPECIFIED CAUSE: ICD-10-CM

## 2018-09-18 DIAGNOSIS — I42.9 CARDIOMYOPATHY, UNSPECIFIED TYPE: ICD-10-CM

## 2018-09-18 DIAGNOSIS — E66.9 OBESITY (BMI 30-39.9): ICD-10-CM

## 2018-09-18 DIAGNOSIS — I10 BENIGN ESSENTIAL HTN: ICD-10-CM

## 2018-09-18 DIAGNOSIS — I73.9 PAD (PERIPHERAL ARTERY DISEASE): ICD-10-CM

## 2018-09-18 DIAGNOSIS — F41.9 ANXIETY: ICD-10-CM

## 2018-09-18 LAB
ALBUMIN SERPL BCP-MCNC: 4 G/DL
ALBUMIN/CREAT UR: 4.4 UG/MG
ALP SERPL-CCNC: 89 U/L
ALT SERPL W/O P-5'-P-CCNC: 19 U/L
ANION GAP SERPL CALC-SCNC: 10 MMOL/L
AST SERPL-CCNC: 32 U/L
BASOPHILS # BLD AUTO: 0.06 K/UL
BASOPHILS NFR BLD: 0.8 %
BILIRUB SERPL-MCNC: 0.4 MG/DL
BUN SERPL-MCNC: 12 MG/DL
CALCIUM SERPL-MCNC: 9.7 MG/DL
CHLORIDE SERPL-SCNC: 106 MMOL/L
CHOLEST SERPL-MCNC: 169 MG/DL
CHOLEST/HDLC SERPL: 5 {RATIO}
CO2 SERPL-SCNC: 28 MMOL/L
CREAT SERPL-MCNC: 1 MG/DL
CREAT UR-MCNC: 159 MG/DL
DIFFERENTIAL METHOD: ABNORMAL
EOSINOPHIL # BLD AUTO: 0.1 K/UL
EOSINOPHIL NFR BLD: 1 %
ERYTHROCYTE [DISTWIDTH] IN BLOOD BY AUTOMATED COUNT: 13.2 %
EST. GFR  (AFRICAN AMERICAN): >60 ML/MIN/1.73 M^2
EST. GFR  (NON AFRICAN AMERICAN): >60 ML/MIN/1.73 M^2
GLUCOSE SERPL-MCNC: 96 MG/DL
HCT VFR BLD AUTO: 51.2 %
HDLC SERPL-MCNC: 34 MG/DL
HDLC SERPL: 20.1 %
HGB BLD-MCNC: 16.2 G/DL
IMM GRANULOCYTES # BLD AUTO: 0.03 K/UL
IMM GRANULOCYTES NFR BLD AUTO: 0.4 %
LDLC SERPL CALC-MCNC: 95.8 MG/DL
LYMPHOCYTES # BLD AUTO: 1.5 K/UL
LYMPHOCYTES NFR BLD: 20.6 %
MCH RBC QN AUTO: 30.1 PG
MCHC RBC AUTO-ENTMCNC: 31.6 G/DL
MCV RBC AUTO: 95 FL
MICROALBUMIN UR DL<=1MG/L-MCNC: 7 UG/ML
MONOCYTES # BLD AUTO: 0.7 K/UL
MONOCYTES NFR BLD: 9.3 %
NEUTROPHILS # BLD AUTO: 5 K/UL
NEUTROPHILS NFR BLD: 67.9 %
NONHDLC SERPL-MCNC: 135 MG/DL
NRBC BLD-RTO: 0 /100 WBC
PLATELET # BLD AUTO: 299 K/UL
PMV BLD AUTO: 10.4 FL
POTASSIUM SERPL-SCNC: 4.2 MMOL/L
PROT SERPL-MCNC: 6.8 G/DL
RBC # BLD AUTO: 5.39 M/UL
SODIUM SERPL-SCNC: 144 MMOL/L
TRIGL SERPL-MCNC: 196 MG/DL
TSH SERPL DL<=0.005 MIU/L-ACNC: 0.61 UIU/ML
URATE SERPL-MCNC: 8.4 MG/DL
WBC # BLD AUTO: 7.34 K/UL

## 2018-09-18 PROCEDURE — 80053 COMPREHEN METABOLIC PANEL: CPT

## 2018-09-18 PROCEDURE — 85025 COMPLETE CBC W/AUTO DIFF WBC: CPT

## 2018-09-18 PROCEDURE — 84443 ASSAY THYROID STIM HORMONE: CPT

## 2018-09-18 PROCEDURE — 82043 UR ALBUMIN QUANTITATIVE: CPT

## 2018-09-18 PROCEDURE — 36415 COLL VENOUS BLD VENIPUNCTURE: CPT

## 2018-09-18 PROCEDURE — 84550 ASSAY OF BLOOD/URIC ACID: CPT

## 2018-09-18 PROCEDURE — 80061 LIPID PANEL: CPT

## 2018-09-24 ENCOUNTER — OFFICE VISIT (OUTPATIENT)
Dept: INTERNAL MEDICINE | Facility: CLINIC | Age: 53
End: 2018-09-24
Payer: COMMERCIAL

## 2018-09-24 VITALS
WEIGHT: 191 LBS | SYSTOLIC BLOOD PRESSURE: 132 MMHG | HEIGHT: 64 IN | HEART RATE: 73 BPM | DIASTOLIC BLOOD PRESSURE: 86 MMHG | BODY MASS INDEX: 32.61 KG/M2 | RESPIRATION RATE: 18 BRPM | OXYGEN SATURATION: 96 %

## 2018-09-24 DIAGNOSIS — J44.9 CHRONIC OBSTRUCTIVE PULMONARY DISEASE, UNSPECIFIED COPD TYPE: ICD-10-CM

## 2018-09-24 DIAGNOSIS — E78.5 HYPERLIPIDEMIA LDL GOAL <70: ICD-10-CM

## 2018-09-24 DIAGNOSIS — M1A.9XX0 CHRONIC GOUT WITHOUT TOPHUS, UNSPECIFIED CAUSE, UNSPECIFIED SITE: ICD-10-CM

## 2018-09-24 DIAGNOSIS — F41.9 ANXIETY: ICD-10-CM

## 2018-09-24 DIAGNOSIS — E66.9 OBESITY (BMI 30-39.9): ICD-10-CM

## 2018-09-24 DIAGNOSIS — I42.9 CARDIOMYOPATHY, UNSPECIFIED TYPE: ICD-10-CM

## 2018-09-24 DIAGNOSIS — I73.9 CLAUDICATION OF CALF MUSCLES: ICD-10-CM

## 2018-09-24 DIAGNOSIS — I10 BENIGN ESSENTIAL HTN: Primary | ICD-10-CM

## 2018-09-24 DIAGNOSIS — E05.90 SUBCLINICAL HYPERTHYROIDISM: ICD-10-CM

## 2018-09-24 DIAGNOSIS — F12.90 MARIJUANA USE: ICD-10-CM

## 2018-09-24 DIAGNOSIS — I73.9 PAD (PERIPHERAL ARTERY DISEASE): ICD-10-CM

## 2018-09-24 PROCEDURE — 99214 OFFICE O/P EST MOD 30 MIN: CPT | Mod: 25,S$GLB,, | Performed by: NURSE PRACTITIONER

## 2018-09-24 PROCEDURE — 3079F DIAST BP 80-89 MM HG: CPT | Mod: CPTII,S$GLB,, | Performed by: NURSE PRACTITIONER

## 2018-09-24 PROCEDURE — 96372 THER/PROPH/DIAG INJ SC/IM: CPT | Mod: S$GLB,,, | Performed by: NURSE PRACTITIONER

## 2018-09-24 PROCEDURE — 3008F BODY MASS INDEX DOCD: CPT | Mod: CPTII,S$GLB,, | Performed by: NURSE PRACTITIONER

## 2018-09-24 PROCEDURE — 99999 PR PBB SHADOW E&M-EST. PATIENT-LVL IV: CPT | Mod: PBBFAC,,, | Performed by: NURSE PRACTITIONER

## 2018-09-24 PROCEDURE — 3075F SYST BP GE 130 - 139MM HG: CPT | Mod: CPTII,S$GLB,, | Performed by: NURSE PRACTITIONER

## 2018-09-24 RX ORDER — ALPRAZOLAM 1 MG/1
1 TABLET ORAL 3 TIMES DAILY PRN
Qty: 90 TABLET | Refills: 5 | Status: SHIPPED | OUTPATIENT
Start: 2018-09-24 | End: 2019-03-29 | Stop reason: SDUPTHER

## 2018-09-24 RX ORDER — METHYLPREDNISOLONE ACETATE 80 MG/ML
80 INJECTION, SUSPENSION INTRA-ARTICULAR; INTRALESIONAL; INTRAMUSCULAR; SOFT TISSUE ONCE
Status: COMPLETED | OUTPATIENT
Start: 2018-09-24 | End: 2018-09-24

## 2018-09-24 RX ORDER — ALLOPURINOL 100 MG/1
100 TABLET ORAL DAILY
Qty: 30 TABLET | Refills: 5 | Status: SHIPPED | OUTPATIENT
Start: 2018-09-24 | End: 2019-07-23

## 2018-09-24 RX ADMIN — METHYLPREDNISOLONE ACETATE 80 MG: 80 INJECTION, SUSPENSION INTRA-ARTICULAR; INTRALESIONAL; INTRAMUSCULAR; SOFT TISSUE at 10:09

## 2018-09-24 NOTE — PROGRESS NOTES
Subjective:       Patient ID: Michael Guillaume is a 53 y.o. male.    Chief Complaint: Follow-up (x 6 months- results)    Patient is known, to me and presents with   Chief Complaint   Patient presents with    Follow-up     x 6 months- results   .  Denies chest pain and shortness of breath.  Patient presents with check up and labs. Up to date with screenings. Does see   cardiologists  HPI  Review of Systems   Constitutional: Negative.  Negative for activity change, appetite change, chills, diaphoresis, fatigue, fever and unexpected weight change.   HENT: Negative.  Negative for congestion, ear discharge, ear pain, facial swelling, hearing loss, nosebleeds, postnasal drip, rhinorrhea, sinus pressure, sneezing, sore throat, tinnitus, trouble swallowing and voice change.    Eyes: Negative.  Negative for photophobia, pain, discharge, redness, itching and visual disturbance.   Respiratory: Positive for cough and wheezing. Negative for apnea, choking, chest tightness, shortness of breath and stridor.    Cardiovascular: Negative.  Negative for chest pain, palpitations and leg swelling.   Gastrointestinal: Negative for abdominal distention, abdominal pain, anal bleeding, blood in stool, constipation, diarrhea, nausea and vomiting.   Genitourinary: Negative.  Negative for difficulty urinating, discharge, dysuria, enuresis, flank pain, frequency, hematuria, penile pain, penile swelling, scrotal swelling, testicular pain and urgency.   Musculoskeletal: Negative.  Negative for arthralgias, back pain, gait problem, joint swelling, myalgias, neck pain and neck stiffness.   Skin: Negative.  Negative for color change, pallor, rash and wound.   Neurological: Negative for dizziness, tremors, seizures, syncope, facial asymmetry, speech difficulty, weakness, light-headedness, numbness and headaches.   Hematological: Negative for adenopathy. Does not bruise/bleed easily.   Psychiatric/Behavioral: Negative.  Negative for agitation,  dysphoric mood, self-injury, sleep disturbance and suicidal ideas. The patient is not nervous/anxious.        Objective:      Physical Exam   Constitutional: He is oriented to person, place, and time. He appears well-developed and well-nourished. No distress.   HENT:   Head: Normocephalic and atraumatic.   Right Ear: External ear normal.   Left Ear: External ear normal.   Nose: Nose normal.   Mouth/Throat: Oropharynx is clear and moist. No oropharyngeal exudate.   Eyes: Conjunctivae and EOM are normal. Pupils are equal, round, and reactive to light. Right eye exhibits no discharge. Left eye exhibits no discharge.   Neck: Normal range of motion. Neck supple. No JVD present. No tracheal deviation present. No thyromegaly present.   Cardiovascular: Normal rate, regular rhythm, normal heart sounds and intact distal pulses. Exam reveals no gallop and no friction rub.   No murmur heard.  Pulmonary/Chest: Effort normal. No stridor. No respiratory distress. He has wheezes. He has no rales. He exhibits no tenderness.   Abdominal: Soft. Bowel sounds are normal. He exhibits no distension. There is no tenderness. There is no rebound and no guarding.   Musculoskeletal: Normal range of motion. He exhibits no edema or tenderness.   Lymphadenopathy:     He has no cervical adenopathy.   Neurological: He is alert and oriented to person, place, and time. He has normal reflexes. He displays normal reflexes. No cranial nerve deficit. He exhibits normal muscle tone. Coordination normal.   Skin: Skin is warm and dry. Capillary refill takes less than 2 seconds. No rash noted. He is not diaphoretic. No erythema. No pallor.   Psychiatric: He has a normal mood and affect. His behavior is normal. Judgment and thought content normal.   Nursing note and vitals reviewed.      Assessment:       1. Benign essential HTN    2. Hyperlipidemia LDL goal <70    3. Cardiomyopathy, unspecified type    4. Claudication of calf muscles    5. PAD    6. Chronic  "obstructive pulmonary disease, unspecified COPD type    7. Marijuana use    8. Subclinical hyperthyroidism    9. Chronic gout without tophus, unspecified cause, unspecified site    10. Obesity (BMI 30-39.9)    11. Anxiety        Plan:   Michael BELL was seen today for follow-up.    Diagnoses and all orders for this visit:    Benign essential HTN  -     Comprehensive metabolic panel; Future  -     CBC auto differential; Future  -     Microalbumin/creatinine urine ratio; Future    Hyperlipidemia LDL goal <70  -     Comprehensive metabolic panel; Future  -     CBC auto differential; Future  -     Lipid panel; Future    Cardiomyopathy, unspecified type  -     Comprehensive metabolic panel; Future  -     CBC auto differential; Future    Claudication of calf muscles  -     Comprehensive metabolic panel; Future  -     CBC auto differential; Future    PAD  -     Comprehensive metabolic panel; Future  -     CBC auto differential; Future    Chronic obstructive pulmonary disease, unspecified COPD type  -     Comprehensive metabolic panel; Future  -     CBC auto differential; Future  -     methylPREDNISolone acetate injection 80 mg; Inject 1 mL (80 mg total) into the muscle once.    Marijuana use  -     Comprehensive metabolic panel; Future  -     CBC auto differential; Future    Subclinical hyperthyroidism  -     Comprehensive metabolic panel; Future  -     CBC auto differential; Future  -     TSH; Future  -     T3; Future  -     T4; Future    Chronic gout without tophus, unspecified cause, unspecified site  -     Uric acid; Future  -     allopurinol (ZYLOPRIM) 100 MG tablet; Take 1 tablet (100 mg total) by mouth once daily.    Obesity (BMI 30-39.9)  -     Comprehensive metabolic panel; Future  -     CBC auto differential; Future    Anxiety  -     ALPRAZolam (XANAX) 1 MG tablet; Take 1 tablet (1 mg total) by mouth 3 (three) times daily as needed.    "This note will not be shared with the patient."  Lab drawn today CBC, CMP, TSH, " FLP  Limit the cholesterol in your diet to less than 300 mg per day.  Fats should contribute no more than 20 to 35% of your daily calories.  Less than 7 to 10% of your calories should come from saturated fat.  Avoid saturated fat products e.g., butter, some oils, meat, and poultry fat contain a lot of saturated fat.  Check food labels for fat and cholesterol content. Choose the foods with less fat per serving.  Limit the amount of butter and margarine you eat.  Use salad dressings and margarine made with polyunsaturated and monunsaturated fats.  Use egg whites or egg substitutes rather than whole eggs.  Replace whole-milk dairy products with nonfat or low-fat mild, cheese, spreads, and yogurt.  Eat skinless chicken, turkey, fish, and meatless entrees more often than red meat.  Choose lean cuts of meat and trim off all visible fat. Keep portion sizes moderate.  Avoid fatty desserts such as ice cream, cream-filled cakes, and cheesecakes. Choose fresh fruits, nonfat frozen yogurt, Popsicles, etc.  Reduce the amount of fried foods, vending machine food, and fast food you eat.  Eat fruits and vegetables (especially fresh fruits and leafy vegetables), beans, and whole grains daily. The fiber in these foods helps lower cholesterol.  Look for low-fat or nonfat varieties of the foods you like to eat or look for substitutes.  You may need to exercise 60 minutes a day to prevent weight gain and 90 minutes a day to lose weight.  RTC in six months.

## 2018-09-24 NOTE — PATIENT INSTRUCTIONS
4 Steps for Eating Healthier  Changing the way you eat can improve your health. It can lower your cholesterol and blood pressure, and help you stay at a healthy weight. Your diet doesnt have to be bland and boring to be healthy. Just watch your calories and follow these steps:    1. Eat fewer unhealthy fats  · Choose more fish and lean meats instead of fatty cuts of meat.  · Skip butter and lard, and use less margarine.  · Pass on foods that have palm, coconut, or hydrogenated oils.  · Eat fewer high-fat dairy foods like cheese, ice cream, and whole milk.  · Get a heart-healthy cookbook and try some low-fat recipes.  2. Go light on salt  · Keep the saltshaker off the table.  · Limit high-salt ingredients, such as soy sauce, bouillon, and garlic salt.  · Instead of adding salt when cooking, season your food with herbs and flavorings. Try lemon, garlic, and onion.  · Limit convenience foods, such as boxed or canned foods and restaurant food.  · Read food labels and choose lower-sodium options.  3. Limit sugar  · Pause before you add sugars to pancakes, cereal, coffee, or tea. This includes white and brown table sugar, syrup, honey, and molasses. Cut your usual amount by half.  · Use non-sugar sweeteners. Stevia, aspartame, and sucralose can satisfy a sweet tooth without adding calories.  · Swap out sugar-filled soda and other drinks. Buy sugar-free or low-calorie beverages. Remember water is always the best choice.  · Read labels and choose foods with less added sugar. Keep in mind that dairy foods and foods with fruit will have some natural sugar.  · Cut the sugar in recipes by 1/3 to 1/2. Boost the flavor with extracts like almond, vanilla, or orange. Or add spices such as cinnamon or nutmeg.  4. Eat more fiber  · Eat fresh fruits and vegetables every day.  · Boost your diet with whole grains. Go for oats, whole-grain rice, and bran.  · Add beans and lentils to your meals.  · Drink more water to match your fiber  increase. This is to help prevent constipation.  Date Last Reviewed: 5/11/2015  © 3196-1890 [employer], [employer]. [address] [place] [address], [place] [address]. All rights reserved. This information is not intended as a substitute for professional medical care. Always follow your healthcare professional's instructions.

## 2018-10-11 RX ORDER — BUDESONIDE AND FORMOTEROL FUMARATE DIHYDRATE 80; 4.5 UG/1; UG/1
AEROSOL RESPIRATORY (INHALATION)
Qty: 10.2 G | Refills: 5 | Status: SHIPPED | OUTPATIENT
Start: 2018-10-11 | End: 2023-08-14 | Stop reason: SDUPTHER

## 2018-10-29 ENCOUNTER — TELEPHONE (OUTPATIENT)
Dept: INTERNAL MEDICINE | Facility: CLINIC | Age: 53
End: 2018-10-29

## 2018-10-29 DIAGNOSIS — E78.5 HYPERLIPIDEMIA LDL GOAL <70: ICD-10-CM

## 2018-10-29 RX ORDER — ROSUVASTATIN CALCIUM 20 MG/1
20 TABLET, COATED ORAL DAILY
Qty: 30 TABLET | Refills: 5 | Status: SHIPPED | OUTPATIENT
Start: 2018-10-29 | End: 2019-04-15 | Stop reason: SDUPTHER

## 2018-10-29 NOTE — TELEPHONE ENCOUNTER
Refill request sent over from [employer] for pt chol med (rosuvastatin)  Please advise  Thanks!

## 2018-10-30 DIAGNOSIS — I73.9 PAD (PERIPHERAL ARTERY DISEASE): ICD-10-CM

## 2018-10-30 RX ORDER — CLOPIDOGREL BISULFATE 75 MG/1
75 TABLET ORAL DAILY
Qty: 90 TABLET | Refills: 1 | Status: SHIPPED | OUTPATIENT
Start: 2018-10-30 | End: 2019-05-15 | Stop reason: SDUPTHER

## 2018-11-07 ENCOUNTER — CLINICAL SUPPORT (OUTPATIENT)
Dept: INTERNAL MEDICINE | Facility: CLINIC | Age: 53
End: 2018-11-07
Payer: COMMERCIAL

## 2018-11-07 DIAGNOSIS — Z12.5 SCREENING FOR PROSTATE CANCER: ICD-10-CM

## 2018-11-07 LAB — COMPLEXED PSA SERPL-MCNC: 1.4 NG/ML

## 2018-11-07 PROCEDURE — 84153 ASSAY OF PSA TOTAL: CPT

## 2018-11-26 DIAGNOSIS — I42.9 CARDIOMYOPATHY, UNSPECIFIED TYPE: ICD-10-CM

## 2018-11-26 DIAGNOSIS — I10 BENIGN ESSENTIAL HTN: ICD-10-CM

## 2018-11-26 RX ORDER — RAMIPRIL 10 MG/1
CAPSULE ORAL
Qty: 60 CAPSULE | Refills: 5 | Status: SHIPPED | OUTPATIENT
Start: 2018-11-26 | End: 2019-07-23

## 2018-12-13 ENCOUNTER — OFFICE VISIT (OUTPATIENT)
Dept: INTERNAL MEDICINE | Facility: CLINIC | Age: 53
End: 2018-12-13
Payer: COMMERCIAL

## 2018-12-13 VITALS
WEIGHT: 183 LBS | RESPIRATION RATE: 18 BRPM | OXYGEN SATURATION: 95 % | DIASTOLIC BLOOD PRESSURE: 84 MMHG | SYSTOLIC BLOOD PRESSURE: 124 MMHG | TEMPERATURE: 98 F | BODY MASS INDEX: 31.24 KG/M2 | HEART RATE: 82 BPM | HEIGHT: 64 IN

## 2018-12-13 DIAGNOSIS — J44.1 COPD WITH ACUTE EXACERBATION: Primary | ICD-10-CM

## 2018-12-13 PROCEDURE — 3074F SYST BP LT 130 MM HG: CPT | Mod: CPTII,S$GLB,, | Performed by: NURSE PRACTITIONER

## 2018-12-13 PROCEDURE — 3079F DIAST BP 80-89 MM HG: CPT | Mod: CPTII,S$GLB,, | Performed by: NURSE PRACTITIONER

## 2018-12-13 PROCEDURE — 99213 OFFICE O/P EST LOW 20 MIN: CPT | Mod: 25,S$GLB,, | Performed by: NURSE PRACTITIONER

## 2018-12-13 PROCEDURE — 94640 AIRWAY INHALATION TREATMENT: CPT | Mod: S$GLB,,, | Performed by: NURSE PRACTITIONER

## 2018-12-13 PROCEDURE — 99999 PR PBB SHADOW E&M-EST. PATIENT-LVL V: CPT | Mod: PBBFAC,,, | Performed by: NURSE PRACTITIONER

## 2018-12-13 PROCEDURE — 3008F BODY MASS INDEX DOCD: CPT | Mod: CPTII,S$GLB,, | Performed by: NURSE PRACTITIONER

## 2018-12-13 PROCEDURE — 96372 THER/PROPH/DIAG INJ SC/IM: CPT | Mod: 59,S$GLB,, | Performed by: NURSE PRACTITIONER

## 2018-12-13 RX ORDER — PROMETHAZINE HYDROCHLORIDE AND DEXTROMETHORPHAN HYDROBROMIDE 6.25; 15 MG/5ML; MG/5ML
5 SYRUP ORAL EVERY 6 HOURS PRN
Qty: 120 ML | Refills: 0 | Status: SHIPPED | OUTPATIENT
Start: 2018-12-13 | End: 2018-12-20

## 2018-12-13 RX ORDER — IPRATROPIUM BROMIDE AND ALBUTEROL SULFATE 2.5; .5 MG/3ML; MG/3ML
3 SOLUTION RESPIRATORY (INHALATION)
Qty: 1 BOX | Refills: 2 | Status: SHIPPED | OUTPATIENT
Start: 2018-12-13 | End: 2022-09-30

## 2018-12-13 RX ORDER — AZITHROMYCIN 250 MG/1
TABLET, FILM COATED ORAL
Qty: 6 TABLET | Refills: 0 | Status: SHIPPED | OUTPATIENT
Start: 2018-12-13 | End: 2018-12-18

## 2018-12-13 RX ORDER — IPRATROPIUM BROMIDE AND ALBUTEROL SULFATE 2.5; .5 MG/3ML; MG/3ML
3 SOLUTION RESPIRATORY (INHALATION)
Status: COMPLETED | OUTPATIENT
Start: 2018-12-13 | End: 2018-12-13

## 2018-12-13 RX ORDER — METHYLPREDNISOLONE ACETATE 80 MG/ML
80 INJECTION, SUSPENSION INTRA-ARTICULAR; INTRALESIONAL; INTRAMUSCULAR; SOFT TISSUE ONCE
Status: COMPLETED | OUTPATIENT
Start: 2018-12-13 | End: 2018-12-13

## 2018-12-13 RX ORDER — METHYLPREDNISOLONE 4 MG/1
TABLET ORAL
Qty: 1 PACKAGE | Refills: 0 | Status: SHIPPED | OUTPATIENT
Start: 2018-12-13 | End: 2019-01-03

## 2018-12-13 RX ADMIN — METHYLPREDNISOLONE ACETATE 80 MG: 80 INJECTION, SUSPENSION INTRA-ARTICULAR; INTRALESIONAL; INTRAMUSCULAR; SOFT TISSUE at 09:12

## 2018-12-13 RX ADMIN — IPRATROPIUM BROMIDE AND ALBUTEROL SULFATE 3 ML: 2.5; .5 SOLUTION RESPIRATORY (INHALATION) at 09:12

## 2018-12-13 NOTE — PATIENT INSTRUCTIONS
Chronic Lung Disease: Managing Sleep Problems  Chronic lung diseases include chronic obstructive pulmonary disease (COPD), most often chronic bronchitis and emphysema. They also include pulmonary fibrosis or sarcoidosis. If you have a chronic lung disease, you may have trouble sleeping. You may wake up often at night. Or you may not feel rested in the morning. There are many reasons you may not be getting a good nights sleep. Lung disease can make it harder to breathe at night. Age, certain medicines, and not getting enough activity during the day can also affect sleep.    Sleeping better  If youre having trouble sleeping, try the following:  · Use a breathing technique. Taking slow, deep breaths can help you relax and fall asleep.  ¨ Ask your healthcare provider to show you how to do pursed-lip and diaphragmatic breathing in bed. Both of these breathing methods are good for people with lung disease.  · Dont have drinks with caffeine in the afternoon or evening.   · Try to go to sleep and wake up at around the same time every day. This helps your body get into a pattern.  · Avoid long naps during the day. This can make it harder to sleep at night. A very brief nap should be OK.  · Make sure your bed and bedroom are comfortable for you. This includes temperature, light, and noise level.  · It may be best not to watch television or use your computer or phone in bed.  · Talk to your healthcare provider about  any medicines you take at bedtime, they may be keeping you awake. You may be able to take the medicine at another time of day.  CPAP and BiPAP  To help with breathing at night, your healthcare provider may prescribe CPAP or BiPAP. You may be given a CPAP (continuous positive airway pressure) device. Or you may be given a BiPAP (bilevel positive airway pressure) device. These devices send a gentle flow of air through a nasal mask while you sleep. This air goes through your nose and into your lungs, keeping  your airways open. Below are tips for using these devices:  · Give yourself time to adjust to the device. It may take a while. You can ask your provider or someone from the medical supply company for suggestions to make it more comfortable.  · If your mask doesnt fit or feel right, talk to your provider or the medical supply company representative about adjusting it. Or you may try a different mask. Custom-made masks are also available.  · These devices work best if your nose is clear. If you have allergies or other problems that block your nose, talk to your provider.  Date Last Reviewed: 5/1/2016  © 9913-3992 [employer], [employer]. [address] [place] [address], [place] [address]. All rights reserved. This information is not intended as a substitute for professional medical care. Always follow your healthcare professional's instructions.

## 2018-12-13 NOTE — PROGRESS NOTES
Subjective:       Patient ID: Michael Guillaume is a 53 y.o. male.    Chief Complaint: Cough (with SOB- x 1 week)    Patient is known, to me and presents with   Chief Complaint   Patient presents with    Cough     with SOB- x 1 week   .  Denies chest pain and shortness of breath. Patient presents with copd exacerbation for the past few days and is worsening    HPI  Review of Systems   Constitutional: Negative.    HENT: Positive for congestion and postnasal drip.    Eyes: Negative.    Respiratory: Positive for cough, shortness of breath and wheezing.    Cardiovascular: Negative.    Skin: Negative.    Hematological: Negative.        Objective:      Physical Exam   Constitutional: He appears well-developed and well-nourished. No distress.   HENT:   Head: Normocephalic and atraumatic.   Right Ear: Tympanic membrane mobility is abnormal.   Left Ear: Tympanic membrane mobility is abnormal.   Nose: Mucosal edema present.   Mouth/Throat: No oropharyngeal exudate.   Eyes: Conjunctivae are normal. Right eye exhibits no discharge. Left eye exhibits no discharge.   Neck: Normal range of motion. Neck supple. No JVD present. No tracheal deviation present. No thyromegaly present.   Cardiovascular: Normal rate, regular rhythm and normal heart sounds.   No murmur heard.  Pulmonary/Chest: Effort normal. He has wheezes in the right upper field, the right middle field, the right lower field, the left upper field, the left middle field and the left lower field.   Lymphadenopathy:     He has no cervical adenopathy.   Skin: Skin is warm and dry. No rash noted. He is not diaphoretic. No erythema. No pallor.       Assessment:       1. COPD with acute exacerbation        Plan:   Michael BELL was seen today for cough.    Diagnoses and all orders for this visit:    COPD with acute exacerbation  -     methylPREDNISolone acetate injection 80 mg  -     methylPREDNISolone (MEDROL DOSEPACK) 4 mg tablet; use as directed  -     albuterol-ipratropium 2.5  "mg-0.5 mg/3 mL nebulizer solution 3 mL  -     azithromycin (Z-SINDI) 250 MG tablet; Take 2 tablets by mouth on day 1; Take 1 tablet by mouth on days 2-5  -     promethazine-dextromethorphan (PROMETHAZINE-DM) 6.25-15 mg/5 mL Syrp; Take 5 mLs by mouth every 6 (six) hours as needed.  -     albuterol-ipratropium (DUO-NEB) 2.5 mg-0.5 mg/3 mL nebulizer solution; Take 3 mLs by nebulization every 6 (six) hours while awake. Rescue    "This note will not be shared with the patient."  Do breathing tx qid  If any worsening call me  Did clear up after treatment   rtc as scheduled  "

## 2019-03-25 ENCOUNTER — CLINICAL SUPPORT (OUTPATIENT)
Dept: INTERNAL MEDICINE | Facility: CLINIC | Age: 54
End: 2019-03-25
Payer: COMMERCIAL

## 2019-03-25 DIAGNOSIS — M1A.9XX0 CHRONIC GOUT WITHOUT TOPHUS, UNSPECIFIED CAUSE, UNSPECIFIED SITE: ICD-10-CM

## 2019-03-25 DIAGNOSIS — F12.90 MARIJUANA USE: ICD-10-CM

## 2019-03-25 DIAGNOSIS — E66.9 OBESITY (BMI 30-39.9): ICD-10-CM

## 2019-03-25 DIAGNOSIS — I73.9 PAD (PERIPHERAL ARTERY DISEASE): ICD-10-CM

## 2019-03-25 DIAGNOSIS — J44.9 CHRONIC OBSTRUCTIVE PULMONARY DISEASE, UNSPECIFIED COPD TYPE: ICD-10-CM

## 2019-03-25 DIAGNOSIS — E78.5 HYPERLIPIDEMIA LDL GOAL <70: ICD-10-CM

## 2019-03-25 DIAGNOSIS — E05.90 SUBCLINICAL HYPERTHYROIDISM: ICD-10-CM

## 2019-03-25 DIAGNOSIS — I42.9 CARDIOMYOPATHY, UNSPECIFIED TYPE: ICD-10-CM

## 2019-03-25 DIAGNOSIS — I10 BENIGN ESSENTIAL HTN: ICD-10-CM

## 2019-03-25 DIAGNOSIS — I73.9 CLAUDICATION OF CALF MUSCLES: ICD-10-CM

## 2019-03-25 LAB
ALBUMIN SERPL BCP-MCNC: 3.9 G/DL (ref 3.5–5.2)
ALBUMIN/CREAT UR: 3.3 UG/MG (ref 0–30)
ALP SERPL-CCNC: 89 U/L (ref 55–135)
ALT SERPL W/O P-5'-P-CCNC: 16 U/L (ref 10–44)
ANION GAP SERPL CALC-SCNC: 10 MMOL/L (ref 8–16)
AST SERPL-CCNC: 28 U/L (ref 10–40)
BASOPHILS # BLD AUTO: 0.08 K/UL (ref 0–0.2)
BASOPHILS NFR BLD: 1.1 % (ref 0–1.9)
BILIRUB SERPL-MCNC: 0.4 MG/DL (ref 0.1–1)
BUN SERPL-MCNC: 13 MG/DL (ref 6–20)
CALCIUM SERPL-MCNC: 9.6 MG/DL (ref 8.7–10.5)
CHLORIDE SERPL-SCNC: 106 MMOL/L (ref 95–110)
CHOLEST SERPL-MCNC: 148 MG/DL (ref 120–199)
CHOLEST/HDLC SERPL: 4.2 {RATIO} (ref 2–5)
CO2 SERPL-SCNC: 26 MMOL/L (ref 23–29)
CREAT SERPL-MCNC: 1 MG/DL (ref 0.5–1.4)
CREAT UR-MCNC: 180 MG/DL (ref 23–375)
DIFFERENTIAL METHOD: NORMAL
EOSINOPHIL # BLD AUTO: 0.1 K/UL (ref 0–0.5)
EOSINOPHIL NFR BLD: 1.1 % (ref 0–8)
ERYTHROCYTE [DISTWIDTH] IN BLOOD BY AUTOMATED COUNT: 13.4 % (ref 11.5–14.5)
EST. GFR  (AFRICAN AMERICAN): >60 ML/MIN/1.73 M^2
EST. GFR  (NON AFRICAN AMERICAN): >60 ML/MIN/1.73 M^2
GLUCOSE SERPL-MCNC: 103 MG/DL (ref 70–110)
HCT VFR BLD AUTO: 48.5 % (ref 40–54)
HDLC SERPL-MCNC: 35 MG/DL (ref 40–75)
HDLC SERPL: 23.6 % (ref 20–50)
HGB BLD-MCNC: 15.6 G/DL (ref 14–18)
IMM GRANULOCYTES # BLD AUTO: 0.02 K/UL (ref 0–0.04)
IMM GRANULOCYTES NFR BLD AUTO: 0.3 % (ref 0–0.5)
LDLC SERPL CALC-MCNC: 77.8 MG/DL (ref 63–159)
LYMPHOCYTES # BLD AUTO: 1.6 K/UL (ref 1–4.8)
LYMPHOCYTES NFR BLD: 22 % (ref 18–48)
MCH RBC QN AUTO: 30.8 PG (ref 27–31)
MCHC RBC AUTO-ENTMCNC: 32.2 G/DL (ref 32–36)
MCV RBC AUTO: 96 FL (ref 82–98)
MICROALBUMIN UR DL<=1MG/L-MCNC: 6 UG/ML
MONOCYTES # BLD AUTO: 0.7 K/UL (ref 0.3–1)
MONOCYTES NFR BLD: 8.8 % (ref 4–15)
NEUTROPHILS # BLD AUTO: 5 K/UL (ref 1.8–7.7)
NEUTROPHILS NFR BLD: 66.7 % (ref 38–73)
NONHDLC SERPL-MCNC: 113 MG/DL
NRBC BLD-RTO: 0 /100 WBC
PLATELET # BLD AUTO: 306 K/UL (ref 150–350)
PMV BLD AUTO: 10.8 FL (ref 9.2–12.9)
POTASSIUM SERPL-SCNC: 4.2 MMOL/L (ref 3.5–5.1)
PROT SERPL-MCNC: 7 G/DL (ref 6–8.4)
RBC # BLD AUTO: 5.07 M/UL (ref 4.6–6.2)
SODIUM SERPL-SCNC: 142 MMOL/L (ref 136–145)
T3 SERPL-MCNC: 102 NG/DL (ref 60–180)
T4 SERPL-MCNC: 6.7 UG/DL (ref 4.5–11.5)
TRIGL SERPL-MCNC: 176 MG/DL (ref 30–150)
TSH SERPL DL<=0.005 MIU/L-ACNC: 0.9 UIU/ML (ref 0.4–4)
URATE SERPL-MCNC: 6.5 MG/DL (ref 3.4–7)
WBC # BLD AUTO: 7.41 K/UL (ref 3.9–12.7)

## 2019-03-25 PROCEDURE — 36415 COLL VENOUS BLD VENIPUNCTURE: CPT | Mod: S$GLB,,, | Performed by: NURSE PRACTITIONER

## 2019-03-25 PROCEDURE — 84436 ASSAY OF TOTAL THYROXINE: CPT

## 2019-03-25 PROCEDURE — 84480 ASSAY TRIIODOTHYRONINE (T3): CPT

## 2019-03-25 PROCEDURE — 80061 LIPID PANEL: CPT

## 2019-03-25 PROCEDURE — 84443 ASSAY THYROID STIM HORMONE: CPT

## 2019-03-25 PROCEDURE — 36415 PR COLLECTION VENOUS BLOOD,VENIPUNCTURE: ICD-10-PCS | Mod: S$GLB,,, | Performed by: NURSE PRACTITIONER

## 2019-03-25 PROCEDURE — 84550 ASSAY OF BLOOD/URIC ACID: CPT

## 2019-03-25 PROCEDURE — 85025 COMPLETE CBC W/AUTO DIFF WBC: CPT

## 2019-03-25 PROCEDURE — 82043 UR ALBUMIN QUANTITATIVE: CPT

## 2019-03-25 PROCEDURE — 80053 COMPREHEN METABOLIC PANEL: CPT

## 2019-03-25 PROCEDURE — 36415 COLL VENOUS BLD VENIPUNCTURE: CPT

## 2019-03-29 DIAGNOSIS — F41.9 ANXIETY: ICD-10-CM

## 2019-03-29 RX ORDER — ALPRAZOLAM 1 MG/1
1 TABLET ORAL 3 TIMES DAILY PRN
Qty: 90 TABLET | Refills: 5 | Status: SHIPPED | OUTPATIENT
Start: 2019-03-29 | End: 2019-09-24 | Stop reason: SDUPTHER

## 2019-03-29 NOTE — TELEPHONE ENCOUNTER
----- Message from Crys Muñoz MA sent at 3/29/2019 10:12 AM CDT -----  Contact: Patient   Patient is out of refills on his Xanax.       Send new script to [facility]

## 2019-04-01 ENCOUNTER — OFFICE VISIT (OUTPATIENT)
Dept: INTERNAL MEDICINE | Facility: CLINIC | Age: 54
End: 2019-04-01
Payer: COMMERCIAL

## 2019-04-01 VITALS
WEIGHT: 189.81 LBS | OXYGEN SATURATION: 98 % | RESPIRATION RATE: 20 BRPM | BODY MASS INDEX: 32.41 KG/M2 | HEART RATE: 88 BPM | DIASTOLIC BLOOD PRESSURE: 90 MMHG | HEIGHT: 64 IN | SYSTOLIC BLOOD PRESSURE: 128 MMHG

## 2019-04-01 DIAGNOSIS — F41.9 ANXIETY: ICD-10-CM

## 2019-04-01 DIAGNOSIS — I42.9 CARDIOMYOPATHY, UNSPECIFIED TYPE: ICD-10-CM

## 2019-04-01 DIAGNOSIS — J44.9 CHRONIC OBSTRUCTIVE PULMONARY DISEASE, UNSPECIFIED COPD TYPE: ICD-10-CM

## 2019-04-01 DIAGNOSIS — E78.5 HYPERLIPIDEMIA LDL GOAL <70: ICD-10-CM

## 2019-04-01 DIAGNOSIS — I73.9 PAD (PERIPHERAL ARTERY DISEASE): ICD-10-CM

## 2019-04-01 DIAGNOSIS — Z12.5 PROSTATE CANCER SCREENING: ICD-10-CM

## 2019-04-01 DIAGNOSIS — I10 BENIGN ESSENTIAL HTN: Primary | ICD-10-CM

## 2019-04-01 DIAGNOSIS — I73.9 CLAUDICATION OF CALF MUSCLES: ICD-10-CM

## 2019-04-01 DIAGNOSIS — E05.90 SUBCLINICAL HYPERTHYROIDISM: ICD-10-CM

## 2019-04-01 DIAGNOSIS — E66.9 OBESITY (BMI 30-39.9): ICD-10-CM

## 2019-04-01 PROCEDURE — 3008F BODY MASS INDEX DOCD: CPT | Mod: CPTII,S$GLB,, | Performed by: NURSE PRACTITIONER

## 2019-04-01 PROCEDURE — 3074F SYST BP LT 130 MM HG: CPT | Mod: CPTII,S$GLB,, | Performed by: NURSE PRACTITIONER

## 2019-04-01 PROCEDURE — 3008F PR BODY MASS INDEX (BMI) DOCUMENTED: ICD-10-PCS | Mod: CPTII,S$GLB,, | Performed by: NURSE PRACTITIONER

## 2019-04-01 PROCEDURE — 99214 PR OFFICE/OUTPT VISIT, EST, LEVL IV, 30-39 MIN: ICD-10-PCS | Mod: S$GLB,,, | Performed by: NURSE PRACTITIONER

## 2019-04-01 PROCEDURE — 99999 PR PBB SHADOW E&M-EST. PATIENT-LVL IV: ICD-10-PCS | Mod: PBBFAC,,, | Performed by: NURSE PRACTITIONER

## 2019-04-01 PROCEDURE — 99214 OFFICE O/P EST MOD 30 MIN: CPT | Mod: S$GLB,,, | Performed by: NURSE PRACTITIONER

## 2019-04-01 PROCEDURE — 3074F PR MOST RECENT SYSTOLIC BLOOD PRESSURE < 130 MM HG: ICD-10-PCS | Mod: CPTII,S$GLB,, | Performed by: NURSE PRACTITIONER

## 2019-04-01 PROCEDURE — 3080F PR MOST RECENT DIASTOLIC BLOOD PRESSURE >= 90 MM HG: ICD-10-PCS | Mod: CPTII,S$GLB,, | Performed by: NURSE PRACTITIONER

## 2019-04-01 PROCEDURE — 99999 PR PBB SHADOW E&M-EST. PATIENT-LVL IV: CPT | Mod: PBBFAC,,, | Performed by: NURSE PRACTITIONER

## 2019-04-01 PROCEDURE — 3080F DIAST BP >= 90 MM HG: CPT | Mod: CPTII,S$GLB,, | Performed by: NURSE PRACTITIONER

## 2019-04-01 NOTE — PROGRESS NOTES
Subjective:       Patient ID: Michael Guillaume is a 54 y.o. male.    Chief Complaint: Follow-up    Patient is known, to me and presents with   Chief Complaint   Patient presents with    Follow-up   .  Denies chest pain and shortness of breath.  Patient presents with check up and labs. Up to date with screenings and will see cardiology    HPI  Review of Systems   Constitutional: Negative.  Negative for activity change, appetite change, chills, diaphoresis, fatigue, fever and unexpected weight change.   HENT: Negative.  Negative for congestion, ear discharge, ear pain, facial swelling, hearing loss, nosebleeds, postnasal drip, rhinorrhea, sinus pressure, sneezing, sore throat, tinnitus, trouble swallowing and voice change.    Eyes: Negative.  Negative for photophobia, pain, discharge, redness, itching and visual disturbance.   Respiratory: Negative.  Negative for apnea, cough, choking, chest tightness, shortness of breath, wheezing and stridor.    Cardiovascular: Negative.  Negative for chest pain, palpitations and leg swelling.   Gastrointestinal: Negative for abdominal distention, abdominal pain, anal bleeding, blood in stool, constipation, diarrhea, nausea and vomiting.   Genitourinary: Negative.  Negative for difficulty urinating, discharge, dysuria, enuresis, flank pain, frequency, hematuria, penile pain, penile swelling, scrotal swelling, testicular pain and urgency.   Musculoskeletal: Negative.  Negative for arthralgias, back pain, gait problem, joint swelling, myalgias, neck pain and neck stiffness.   Skin: Negative.  Negative for color change, pallor, rash and wound.   Neurological: Negative for dizziness, tremors, seizures, syncope, facial asymmetry, speech difficulty, weakness, light-headedness, numbness and headaches.   Hematological: Negative for adenopathy. Does not bruise/bleed easily.   Psychiatric/Behavioral: Negative.  Negative for agitation, dysphoric mood, sleep disturbance and suicidal ideas. The  patient is not nervous/anxious.        Objective:      Physical Exam   Constitutional: He is oriented to person, place, and time. He appears well-developed and well-nourished. No distress.   HENT:   Head: Normocephalic and atraumatic.   Right Ear: External ear normal.   Left Ear: External ear normal.   Nose: Nose normal.   Mouth/Throat: Oropharynx is clear and moist. No oropharyngeal exudate.   Eyes: Pupils are equal, round, and reactive to light. Conjunctivae and EOM are normal. Right eye exhibits no discharge. Left eye exhibits no discharge.   Neck: Normal range of motion. Neck supple. No JVD present. No tracheal deviation present. No thyromegaly present.   Cardiovascular: Normal rate, regular rhythm, normal heart sounds and intact distal pulses. Exam reveals no gallop and no friction rub.   No murmur heard.  Pulmonary/Chest: Effort normal and breath sounds normal. No stridor. No respiratory distress. He has no wheezes. He has no rales. He exhibits no tenderness.   Abdominal: Soft. Bowel sounds are normal. He exhibits no distension. There is no tenderness. There is no rebound and no guarding.   Musculoskeletal: Normal range of motion. He exhibits no edema or tenderness.   Lymphadenopathy:     He has no cervical adenopathy.   Neurological: He is alert and oriented to person, place, and time. He has normal reflexes. He displays normal reflexes. No cranial nerve deficit. He exhibits normal muscle tone. Coordination normal.   Skin: Skin is warm and dry. Capillary refill takes less than 2 seconds. No rash noted. He is not diaphoretic. No erythema. No pallor.   Psychiatric: He has a normal mood and affect. His behavior is normal. Judgment and thought content normal.   Nursing note and vitals reviewed.      Assessment:       1. Benign essential HTN    2. Hyperlipidemia LDL goal <70    3. Chronic obstructive pulmonary disease, unspecified COPD type    4. Claudication of calf muscles    5. Cardiomyopathy, unspecified type   "  6. PAD    7. Subclinical hyperthyroidism    8. Anxiety    9. Obesity (BMI 30-39.9)    10. Prostate cancer screening        Plan:   Michael BELL was seen today for follow-up.    Diagnoses and all orders for this visit:    Benign essential HTN  -     CBC auto differential; Future  -     Comprehensive metabolic panel; Future  -     Microalbumin/creatinine urine ratio; Future    Hyperlipidemia LDL goal <70  -     CBC auto differential; Future  -     Comprehensive metabolic panel; Future  -     Lipid panel; Future    Chronic obstructive pulmonary disease, unspecified COPD type  -     CBC auto differential; Future  -     Comprehensive metabolic panel; Future    Claudication of calf muscles  -     CBC auto differential; Future  -     Comprehensive metabolic panel; Future    Cardiomyopathy, unspecified type  -     CBC auto differential; Future  -     Comprehensive metabolic panel; Future    PAD  -     CBC auto differential; Future  -     Comprehensive metabolic panel; Future    Subclinical hyperthyroidism  -     CBC auto differential; Future  -     Comprehensive metabolic panel; Future  -     TSH; Future  -     T3; Future  -     T4; Future    Anxiety  -     CBC auto differential; Future  -     Comprehensive metabolic panel; Future    Obesity (BMI 30-39.9)  -     CBC auto differential; Future  -     Comprehensive metabolic panel; Future    Prostate cancer screening  -     PSA, Screening; Future    "This note will not be shared with the patient."  Lab drawn today CBC, CMP, TSH, FLP  Limit the cholesterol in your diet to less than 300 mg per day.  Fats should contribute no more than 20 to 35% of your daily calories.  Less than 7 to 10% of your calories should come from saturated fat.  Avoid saturated fat products e.g., butter, some oils, meat, and poultry fat contain a lot of saturated fat.  Check food labels for fat and cholesterol content. Choose the foods with less fat per serving.  Limit the amount of butter and margarine you " eat.  Use salad dressings and margarine made with polyunsaturated and monunsaturated fats.  Use egg whites or egg substitutes rather than whole eggs.  Replace whole-milk dairy products with nonfat or low-fat mild, cheese, spreads, and yogurt.  Eat skinless chicken, turkey, fish, and meatless entrees more often than red meat.  Choose lean cuts of meat and trim off all visible fat. Keep portion sizes moderate.  Avoid fatty desserts such as ice cream, cream-filled cakes, and cheesecakes. Choose fresh fruits, nonfat frozen yogurt, Popsicles, etc.  Reduce the amount of fried foods, vending machine food, and fast food you eat.  Eat fruits and vegetables (especially fresh fruits and leafy vegetables), beans, and whole grains daily. The fiber in these foods helps lower cholesterol.  Look for low-fat or nonfat varieties of the foods you like to eat or look for substitutes.  You may need to exercise 60 minutes a day to prevent weight gain and 90 minutes a day to lose weight.  RTC in six months.

## 2019-04-01 NOTE — PATIENT INSTRUCTIONS
4 Steps for Eating Healthier  Changing the way you eat can improve your health. It can lower your cholesterol and blood pressure, and help you stay at a healthy weight. Your diet doesnt have to be bland and boring to be healthy. Just watch your calories and follow these steps:    1. Eat fewer unhealthy fats  · Choose more fish and lean meats instead of fatty cuts of meat.  · Skip butter and lard, and use less margarine.  · Pass on foods that have palm, coconut, or hydrogenated oils.  · Eat fewer high-fat dairy foods like cheese, ice cream, and whole milk.  · Get a heart-healthy cookbook and try some low-fat recipes.  2. Go light on salt  · Keep the saltshaker off the table.  · Limit high-salt ingredients, such as soy sauce, bouillon, and garlic salt.  · Instead of adding salt when cooking, season your food with herbs and flavorings. Try lemon, garlic, and onion.  · Limit convenience foods, such as boxed or canned foods and restaurant food.  · Read food labels and choose lower-sodium options.  3. Limit sugar  · Pause before you add sugars to pancakes, cereal, coffee, or tea. This includes white and brown table sugar, syrup, honey, and molasses. Cut your usual amount by half.  · Use non-sugar sweeteners. Stevia, aspartame, and sucralose can satisfy a sweet tooth without adding calories.  · Swap out sugar-filled soda and other drinks. Buy sugar-free or low-calorie beverages. Remember water is always the best choice.  · Read labels and choose foods with less added sugar. Keep in mind that dairy foods and foods with fruit will have some natural sugar.  · Cut the sugar in recipes by 1/3 to 1/2. Boost the flavor with extracts like almond, vanilla, or orange. Or add spices such as cinnamon or nutmeg.  4. Eat more fiber  · Eat fresh fruits and vegetables every day.  · Boost your diet with whole grains. Go for oats, whole-grain rice, and bran.  · Add beans and lentils to your meals.  · Drink more water to match your fiber  increase. This is to help prevent constipation.  Date Last Reviewed: 5/11/2015  © 6899-5484 [employer]. [address] [place] [address], [place] [address]. All rights reserved. This information is not intended as a substitute for professional medical care. Always follow your healthcare professional's instructions.

## 2019-04-15 DIAGNOSIS — E78.5 HYPERLIPIDEMIA LDL GOAL <70: ICD-10-CM

## 2019-04-15 RX ORDER — ROSUVASTATIN CALCIUM 20 MG/1
TABLET, COATED ORAL
Qty: 30 TABLET | Refills: 4 | Status: SHIPPED | OUTPATIENT
Start: 2019-04-15 | End: 2019-07-09 | Stop reason: SDUPTHER

## 2019-05-15 ENCOUNTER — PES CALL (OUTPATIENT)
Dept: ADMINISTRATIVE | Facility: CLINIC | Age: 54
End: 2019-05-15

## 2019-05-15 DIAGNOSIS — I73.9 PAD (PERIPHERAL ARTERY DISEASE): ICD-10-CM

## 2019-05-16 RX ORDER — CLOPIDOGREL BISULFATE 75 MG/1
TABLET ORAL
Qty: 90 TABLET | Refills: 1 | Status: SHIPPED | OUTPATIENT
Start: 2019-05-16 | End: 2019-11-07 | Stop reason: SDUPTHER

## 2019-06-17 ENCOUNTER — HOSPITAL ENCOUNTER (OUTPATIENT)
Dept: RADIOLOGY | Facility: HOSPITAL | Age: 54
Discharge: HOME | End: 2019-06-17
Attending: NURSE PRACTITIONER
Payer: COMMERCIAL

## 2019-06-17 ENCOUNTER — OFFICE VISIT (OUTPATIENT)
Dept: INTERNAL MEDICINE | Facility: CLINIC | Age: 54
End: 2019-06-17
Payer: COMMERCIAL

## 2019-06-17 VITALS
OXYGEN SATURATION: 98 % | DIASTOLIC BLOOD PRESSURE: 86 MMHG | WEIGHT: 189.38 LBS | RESPIRATION RATE: 18 BRPM | HEIGHT: 64 IN | BODY MASS INDEX: 32.33 KG/M2 | SYSTOLIC BLOOD PRESSURE: 124 MMHG | HEART RATE: 81 BPM

## 2019-06-17 DIAGNOSIS — M54.32 SCIATICA OF LEFT SIDE: ICD-10-CM

## 2019-06-17 DIAGNOSIS — M54.32 SCIATICA OF LEFT SIDE: Primary | ICD-10-CM

## 2019-06-17 PROCEDURE — 3079F PR MOST RECENT DIASTOLIC BLOOD PRESSURE 80-89 MM HG: ICD-10-PCS | Mod: CPTII,S$GLB,, | Performed by: NURSE PRACTITIONER

## 2019-06-17 PROCEDURE — 3008F PR BODY MASS INDEX (BMI) DOCUMENTED: ICD-10-PCS | Mod: CPTII,S$GLB,, | Performed by: NURSE PRACTITIONER

## 2019-06-17 PROCEDURE — 96372 PR INJECTION,THERAP/PROPH/DIAG2ST, IM OR SUBCUT: ICD-10-PCS | Mod: S$GLB,,, | Performed by: NURSE PRACTITIONER

## 2019-06-17 PROCEDURE — 3074F SYST BP LT 130 MM HG: CPT | Mod: CPTII,S$GLB,, | Performed by: NURSE PRACTITIONER

## 2019-06-17 PROCEDURE — 72110 X-RAY EXAM L-2 SPINE 4/>VWS: CPT | Mod: 26,,, | Performed by: RADIOLOGY

## 2019-06-17 PROCEDURE — 96372 THER/PROPH/DIAG INJ SC/IM: CPT | Mod: S$GLB,,, | Performed by: NURSE PRACTITIONER

## 2019-06-17 PROCEDURE — 99213 OFFICE O/P EST LOW 20 MIN: CPT | Mod: 25,S$GLB,, | Performed by: NURSE PRACTITIONER

## 2019-06-17 PROCEDURE — 99999 PR PBB SHADOW E&M-EST. PATIENT-LVL IV: ICD-10-PCS | Mod: PBBFAC,,, | Performed by: NURSE PRACTITIONER

## 2019-06-17 PROCEDURE — 72110 XR LUMBAR SPINE COMPLETE 5 VIEW: ICD-10-PCS | Mod: 26,,, | Performed by: RADIOLOGY

## 2019-06-17 PROCEDURE — 3008F BODY MASS INDEX DOCD: CPT | Mod: CPTII,S$GLB,, | Performed by: NURSE PRACTITIONER

## 2019-06-17 PROCEDURE — 99999 PR PBB SHADOW E&M-EST. PATIENT-LVL IV: CPT | Mod: PBBFAC,,, | Performed by: NURSE PRACTITIONER

## 2019-06-17 PROCEDURE — 3079F DIAST BP 80-89 MM HG: CPT | Mod: CPTII,S$GLB,, | Performed by: NURSE PRACTITIONER

## 2019-06-17 PROCEDURE — 99213 PR OFFICE/OUTPT VISIT, EST, LEVL III, 20-29 MIN: ICD-10-PCS | Mod: 25,S$GLB,, | Performed by: NURSE PRACTITIONER

## 2019-06-17 PROCEDURE — 72110 X-RAY EXAM L-2 SPINE 4/>VWS: CPT | Mod: TC

## 2019-06-17 PROCEDURE — 3074F PR MOST RECENT SYSTOLIC BLOOD PRESSURE < 130 MM HG: ICD-10-PCS | Mod: CPTII,S$GLB,, | Performed by: NURSE PRACTITIONER

## 2019-06-17 RX ORDER — KETOROLAC TROMETHAMINE 30 MG/ML
60 INJECTION, SOLUTION INTRAMUSCULAR; INTRAVENOUS ONCE
Status: COMPLETED | OUTPATIENT
Start: 2019-06-17 | End: 2019-06-17

## 2019-06-17 RX ORDER — CYCLOBENZAPRINE HCL 10 MG
10 TABLET ORAL NIGHTLY PRN
Qty: 30 TABLET | Refills: 0 | Status: SHIPPED | OUTPATIENT
Start: 2019-06-17 | End: 2019-06-27

## 2019-06-17 RX ORDER — METHYLPREDNISOLONE ACETATE 80 MG/ML
80 INJECTION, SUSPENSION INTRA-ARTICULAR; INTRALESIONAL; INTRAMUSCULAR; SOFT TISSUE ONCE
Status: COMPLETED | OUTPATIENT
Start: 2019-06-17 | End: 2019-06-17

## 2019-06-17 RX ORDER — METHYLPREDNISOLONE 4 MG/1
TABLET ORAL
Qty: 1 PACKAGE | Refills: 0 | Status: SHIPPED | OUTPATIENT
Start: 2019-06-17 | End: 2019-07-08

## 2019-06-17 RX ADMIN — METHYLPREDNISOLONE ACETATE 80 MG: 80 INJECTION, SUSPENSION INTRA-ARTICULAR; INTRALESIONAL; INTRAMUSCULAR; SOFT TISSUE at 08:06

## 2019-06-17 RX ADMIN — KETOROLAC TROMETHAMINE 60 MG: 30 INJECTION, SOLUTION INTRAMUSCULAR; INTRAVENOUS at 08:06

## 2019-06-17 NOTE — PATIENT INSTRUCTIONS

## 2019-06-17 NOTE — PROGRESS NOTES
"Subjective:       Patient ID: Michael Guillaume is a 54 y.o. male.    Chief Complaint: Sciatica (L. hip down into leg - x 2 months PS:8)    Patient is known, to me and presents with   Chief Complaint   Patient presents with    Sciatica     L. hip down into leg - x 2 months PS:8   .  Denies chest pain and shortness of breath.  Patient presents with worsening left sided sciatica for the past month. Not improving with the pain.    HPI  Review of Systems   Constitutional: Negative.    Respiratory: Negative.    Cardiovascular: Negative.    Musculoskeletal: Positive for arthralgias and back pain.   Neurological: Negative.        Objective:      Physical Exam   Constitutional: He appears well-developed and well-nourished. No distress.   Neck: Normal range of motion. Neck supple. No JVD present. No tracheal deviation present. No thyromegaly present.   Cardiovascular: Normal rate, regular rhythm and normal heart sounds.   No murmur heard.  Pulmonary/Chest: Effort normal and breath sounds normal. He has no wheezes.   Musculoskeletal: He exhibits tenderness. He exhibits no edema or deformity.        Lumbar back: He exhibits decreased range of motion, tenderness and pain.        Back:    Lymphadenopathy:     He has no cervical adenopathy.   Skin: He is not diaphoretic.       Assessment:       1. Sciatica of left side        Plan:   Michael BELL was seen today for sciatica.    Diagnoses and all orders for this visit:    Sciatica of left side  -     X-Ray Lumbar Spine Complete 5 View; Future  -     methylPREDNISolone acetate injection 80 mg  -     ketorolac injection 60 mg  -     methylPREDNISolone (MEDROL DOSEPACK) 4 mg tablet; use as directed  -     cyclobenzaprine (FLEXERIL) 10 MG tablet; Take 1 tablet (10 mg total) by mouth nightly as needed.  -     Ambulatory consult to Physical Therapy    "This note will not be shared with the patient."  Will order imaging  Last mri in 2013  Start medrol dose pack tomorrow  Also will set up with " PT  RTC in one week

## 2019-06-19 DIAGNOSIS — I10 ESSENTIAL HYPERTENSION: Chronic | ICD-10-CM

## 2019-06-19 RX ORDER — CARVEDILOL 12.5 MG/1
12.5 TABLET ORAL 2 TIMES DAILY WITH MEALS
Qty: 180 TABLET | Refills: 0 | Status: SHIPPED | OUTPATIENT
Start: 2019-06-19 | End: 2019-09-13 | Stop reason: SDUPTHER

## 2019-06-24 ENCOUNTER — OFFICE VISIT (OUTPATIENT)
Dept: INTERNAL MEDICINE | Facility: CLINIC | Age: 54
End: 2019-06-24
Payer: COMMERCIAL

## 2019-06-24 VITALS
HEIGHT: 64 IN | RESPIRATION RATE: 16 BRPM | DIASTOLIC BLOOD PRESSURE: 82 MMHG | BODY MASS INDEX: 31.92 KG/M2 | SYSTOLIC BLOOD PRESSURE: 124 MMHG | HEART RATE: 102 BPM | OXYGEN SATURATION: 98 % | WEIGHT: 187 LBS

## 2019-06-24 DIAGNOSIS — M54.32 LEFT SIDED SCIATICA: Primary | ICD-10-CM

## 2019-06-24 PROCEDURE — 3074F SYST BP LT 130 MM HG: CPT | Mod: CPTII,S$GLB,, | Performed by: NURSE PRACTITIONER

## 2019-06-24 PROCEDURE — 3079F PR MOST RECENT DIASTOLIC BLOOD PRESSURE 80-89 MM HG: ICD-10-PCS | Mod: CPTII,S$GLB,, | Performed by: NURSE PRACTITIONER

## 2019-06-24 PROCEDURE — 3079F DIAST BP 80-89 MM HG: CPT | Mod: CPTII,S$GLB,, | Performed by: NURSE PRACTITIONER

## 2019-06-24 PROCEDURE — 3008F PR BODY MASS INDEX (BMI) DOCUMENTED: ICD-10-PCS | Mod: CPTII,S$GLB,, | Performed by: NURSE PRACTITIONER

## 2019-06-24 PROCEDURE — 99999 PR PBB SHADOW E&M-EST. PATIENT-LVL IV: CPT | Mod: PBBFAC,,, | Performed by: NURSE PRACTITIONER

## 2019-06-24 PROCEDURE — 3008F BODY MASS INDEX DOCD: CPT | Mod: CPTII,S$GLB,, | Performed by: NURSE PRACTITIONER

## 2019-06-24 PROCEDURE — 99213 PR OFFICE/OUTPT VISIT, EST, LEVL III, 20-29 MIN: ICD-10-PCS | Mod: S$GLB,,, | Performed by: NURSE PRACTITIONER

## 2019-06-24 PROCEDURE — 99213 OFFICE O/P EST LOW 20 MIN: CPT | Mod: S$GLB,,, | Performed by: NURSE PRACTITIONER

## 2019-06-24 PROCEDURE — 99999 PR PBB SHADOW E&M-EST. PATIENT-LVL IV: ICD-10-PCS | Mod: PBBFAC,,, | Performed by: NURSE PRACTITIONER

## 2019-06-24 PROCEDURE — 3074F PR MOST RECENT SYSTOLIC BLOOD PRESSURE < 130 MM HG: ICD-10-PCS | Mod: CPTII,S$GLB,, | Performed by: NURSE PRACTITIONER

## 2019-06-24 NOTE — PROGRESS NOTES
Subjective:       Patient ID: Michael Guillaume is a 54 y.o. male.    Chief Complaint: Follow-up (x 1 wk- L. side sciatica PS:4)    Patient is known, to me and presents with   Chief Complaint   Patient presents with    Follow-up     x 1 wk- L. side sciatica PS:4   .  Denies chest pain and shortness of breath.  Patient presents with left sided sciatica follow up and is doing much better. Doing PT which is also helping   HPI  Review of Systems   Constitutional: Negative.  Negative for activity change, appetite change, chills, diaphoresis, fatigue, fever and unexpected weight change.   HENT: Negative.  Negative for congestion, ear discharge, ear pain, facial swelling, hearing loss, nosebleeds, postnasal drip, rhinorrhea, sinus pressure, sneezing, sore throat, tinnitus, trouble swallowing and voice change.    Eyes: Negative.  Negative for photophobia, pain, discharge, redness, itching and visual disturbance.   Respiratory: Negative.  Negative for apnea, cough, choking, chest tightness, shortness of breath, wheezing and stridor.    Cardiovascular: Negative.  Negative for chest pain, palpitations and leg swelling.   Gastrointestinal: Negative for abdominal distention, abdominal pain, anal bleeding, blood in stool, constipation, diarrhea, nausea and vomiting.   Genitourinary: Negative.  Negative for difficulty urinating, discharge, dysuria, enuresis, flank pain, frequency, hematuria, penile pain, penile swelling, scrotal swelling, testicular pain and urgency.   Musculoskeletal: Negative.  Negative for arthralgias, back pain, gait problem, joint swelling, myalgias, neck pain and neck stiffness.   Skin: Negative.  Negative for color change, pallor, rash and wound.   Neurological: Negative for dizziness, tremors, seizures, syncope, facial asymmetry, speech difficulty, weakness, light-headedness, numbness and headaches.   Hematological: Negative for adenopathy. Does not bruise/bleed easily.   Psychiatric/Behavioral: Negative.   "Negative for agitation, dysphoric mood, sleep disturbance and suicidal ideas. The patient is not nervous/anxious.        Objective:      Physical Exam   Constitutional: He is oriented to person, place, and time. He appears well-developed and well-nourished. No distress.   HENT:   Head: Normocephalic and atraumatic.   Right Ear: External ear normal.   Left Ear: External ear normal.   Nose: Nose normal.   Mouth/Throat: Oropharynx is clear and moist. No oropharyngeal exudate.   Eyes: Pupils are equal, round, and reactive to light. Conjunctivae and EOM are normal. Right eye exhibits no discharge. Left eye exhibits no discharge.   Neck: Normal range of motion. Neck supple. No JVD present. No tracheal deviation present. No thyromegaly present.   Cardiovascular: Normal rate, regular rhythm, normal heart sounds and intact distal pulses. Exam reveals no gallop and no friction rub.   No murmur heard.  Pulmonary/Chest: Effort normal and breath sounds normal. No stridor. No respiratory distress. He has no wheezes. He has no rales. He exhibits no tenderness.   Abdominal: Soft. Bowel sounds are normal. He exhibits no distension. There is no tenderness. There is no rebound and no guarding.   Musculoskeletal: Normal range of motion. He exhibits no edema or tenderness.   Lymphadenopathy:     He has no cervical adenopathy.   Neurological: He is alert and oriented to person, place, and time. He has normal reflexes. He displays normal reflexes. No cranial nerve deficit. He exhibits normal muscle tone. Coordination normal.   Skin: Skin is warm and dry. No rash noted. He is not diaphoretic. No erythema. No pallor.   Psychiatric: He has a normal mood and affect. His behavior is normal. Judgment and thought content normal.   Nursing note and vitals reviewed.      Assessment:       1. Left sided sciatica        Plan:   Michael BELL was seen today for follow-up.    Diagnoses and all orders for this visit:    Left sided sciatica    "This note will " "not be shared with the patient."  Continue with PT  Wants to hold off on pain management   rtc as scheduled  "

## 2019-07-09 DIAGNOSIS — E78.5 HYPERLIPIDEMIA LDL GOAL <70: ICD-10-CM

## 2019-07-09 RX ORDER — ROSUVASTATIN CALCIUM 20 MG/1
TABLET, COATED ORAL
Qty: 90 TABLET | Refills: 1 | Status: SHIPPED | OUTPATIENT
Start: 2019-07-09 | End: 2019-07-23

## 2019-07-23 ENCOUNTER — OFFICE VISIT (OUTPATIENT)
Dept: CARDIOLOGY | Facility: CLINIC | Age: 54
End: 2019-07-23
Payer: COMMERCIAL

## 2019-07-23 VITALS
HEIGHT: 64 IN | DIASTOLIC BLOOD PRESSURE: 84 MMHG | BODY MASS INDEX: 32.06 KG/M2 | OXYGEN SATURATION: 96 % | WEIGHT: 187.81 LBS | SYSTOLIC BLOOD PRESSURE: 142 MMHG | HEART RATE: 80 BPM

## 2019-07-23 DIAGNOSIS — E66.9 OBESITY (BMI 30-39.9): ICD-10-CM

## 2019-07-23 DIAGNOSIS — I73.9 CLAUDICATION OF CALF MUSCLES: ICD-10-CM

## 2019-07-23 DIAGNOSIS — I10 ESSENTIAL HYPERTENSION: ICD-10-CM

## 2019-07-23 DIAGNOSIS — E78.5 HYPERLIPIDEMIA LDL GOAL <70: ICD-10-CM

## 2019-07-23 DIAGNOSIS — I42.9 CARDIOMYOPATHY, UNSPECIFIED TYPE: ICD-10-CM

## 2019-07-23 DIAGNOSIS — E05.90 SUBCLINICAL HYPERTHYROIDISM: ICD-10-CM

## 2019-07-23 DIAGNOSIS — I73.9 PAD (PERIPHERAL ARTERY DISEASE): Primary | ICD-10-CM

## 2019-07-23 PROCEDURE — 3008F PR BODY MASS INDEX (BMI) DOCUMENTED: ICD-10-PCS | Mod: CPTII,S$GLB,, | Performed by: NURSE PRACTITIONER

## 2019-07-23 PROCEDURE — 3008F BODY MASS INDEX DOCD: CPT | Mod: CPTII,S$GLB,, | Performed by: NURSE PRACTITIONER

## 2019-07-23 PROCEDURE — 3079F PR MOST RECENT DIASTOLIC BLOOD PRESSURE 80-89 MM HG: ICD-10-PCS | Mod: CPTII,S$GLB,, | Performed by: NURSE PRACTITIONER

## 2019-07-23 PROCEDURE — 3077F PR MOST RECENT SYSTOLIC BLOOD PRESSURE >= 140 MM HG: ICD-10-PCS | Mod: CPTII,S$GLB,, | Performed by: NURSE PRACTITIONER

## 2019-07-23 PROCEDURE — 3079F DIAST BP 80-89 MM HG: CPT | Mod: CPTII,S$GLB,, | Performed by: NURSE PRACTITIONER

## 2019-07-23 PROCEDURE — 93005 ELECTROCARDIOGRAM TRACING: CPT | Mod: S$GLB,,, | Performed by: NURSE PRACTITIONER

## 2019-07-23 PROCEDURE — 93005 EKG 12-LEAD: ICD-10-PCS | Mod: S$GLB,,, | Performed by: NURSE PRACTITIONER

## 2019-07-23 PROCEDURE — 99999 PR PBB SHADOW E&M-EST. PATIENT-LVL IV: CPT | Mod: PBBFAC,,, | Performed by: NURSE PRACTITIONER

## 2019-07-23 PROCEDURE — 93010 EKG 12-LEAD: ICD-10-PCS | Mod: S$GLB,,, | Performed by: INTERNAL MEDICINE

## 2019-07-23 PROCEDURE — 99214 PR OFFICE/OUTPT VISIT, EST, LEVL IV, 30-39 MIN: ICD-10-PCS | Mod: S$GLB,,, | Performed by: NURSE PRACTITIONER

## 2019-07-23 PROCEDURE — 3077F SYST BP >= 140 MM HG: CPT | Mod: CPTII,S$GLB,, | Performed by: NURSE PRACTITIONER

## 2019-07-23 PROCEDURE — 99214 OFFICE O/P EST MOD 30 MIN: CPT | Mod: S$GLB,,, | Performed by: NURSE PRACTITIONER

## 2019-07-23 PROCEDURE — 99999 PR PBB SHADOW E&M-EST. PATIENT-LVL IV: ICD-10-PCS | Mod: PBBFAC,,, | Performed by: NURSE PRACTITIONER

## 2019-07-23 PROCEDURE — 93010 ELECTROCARDIOGRAM REPORT: CPT | Mod: S$GLB,,, | Performed by: INTERNAL MEDICINE

## 2019-07-23 RX ORDER — CYCLOBENZAPRINE HCL 10 MG
10 TABLET ORAL 3 TIMES DAILY PRN
COMMUNITY
End: 2019-10-29 | Stop reason: SDUPTHER

## 2019-07-23 RX ORDER — LOSARTAN POTASSIUM 50 MG/1
50 TABLET ORAL DAILY
Qty: 90 TABLET | Refills: 3 | Status: SHIPPED | OUTPATIENT
Start: 2019-07-23 | End: 2020-07-10

## 2019-07-23 RX ORDER — ROSUVASTATIN CALCIUM 40 MG/1
40 TABLET, COATED ORAL DAILY
Qty: 90 TABLET | Refills: 3 | Status: SHIPPED | OUTPATIENT
Start: 2019-07-23 | End: 2020-07-10

## 2019-07-23 RX ORDER — HYDROGEN PEROXIDE 3 %
20 SOLUTION, NON-ORAL MISCELLANEOUS
COMMUNITY

## 2019-07-23 NOTE — PROGRESS NOTES
"Mr. Guillaume is a patient of Dr. Lopez and was last seen in [facility] Visit 6/12/18    Subjective:   Patient ID:  Michael Guillaume is a 54 y.o. male who presents for follow-up of Peripheral Artery Disease (6 months fu) and Shortness of Breath    Problems:  non-obstructive coronary artery disease   Cardiomyopathy EF 40-45% recovered to 60-65%  COPD   hyperlipidemia   hypertension   hypothyroidism.   PAD s/p stent to mid right SFA and right EIA in 2014  20 pack year h/o smoking, quit in 2011    HPI  Mr. Guillaume is in clinic today for routine follow up.  He is walking about a mile everyday but has to stop more often, 3-4 times d/t claudication.  He gets pain in his legs at night and will sometimes have to get up and rub his legs.   Reports SOB after he gets out of the shower because of the steam.  He doesn't get the SOB when he goes on his walks.  He is treated with low dose ASA and high intensity statin.  Patient is taking rosuvastatin 20 mgand LDL is 78.  He is only taking the ramipril once a day or "it gets too low."  He says that his bp will sometimes go down to low 100s.     Review of Systems   Constitution: Negative for decreased appetite, diaphoresis, malaise/fatigue, weight gain and weight loss.   Eyes: Negative for visual disturbance.   Cardiovascular: Positive for claudication. Negative for chest pain, dyspnea on exertion, irregular heartbeat, leg swelling, near-syncope, orthopnea, palpitations, paroxysmal nocturnal dyspnea and syncope.        Denies chest pressure   Respiratory: Negative for cough, hemoptysis, shortness of breath, sleep disturbances due to breathing and snoring.    Endocrine: Negative for cold intolerance and heat intolerance.   Hematologic/Lymphatic: Negative for bleeding problem. Does not bruise/bleed easily.   Musculoskeletal: Negative for myalgias.   Gastrointestinal: Negative for bloating, abdominal pain, anorexia, change in bowel habit, constipation, diarrhea, nausea and vomiting. " "  Neurological: Negative for difficulty with concentration, disturbances in coordination, excessive daytime sleepiness, dizziness, headaches, light-headedness, loss of balance, numbness and weakness.   Psychiatric/Behavioral: The patient does not have insomnia.        Allergies and current medications updated and reviewed:  Review of patient's allergies indicates:   Allergen Reactions    Lipitor [atorvastatin] Other (See Comments)     Other reaction(s): Severe Myalgias     Current Outpatient Medications   Medication Sig    albuterol-ipratropium (DUO-NEB) 2.5 mg-0.5 mg/3 mL nebulizer solution Take 3 mLs by nebulization every 6 (six) hours while awake. Rescue    ALPRAZolam (XANAX) 1 MG tablet Take 1 tablet (1 mg total) by mouth 3 (three) times daily as needed.    aspirin 81 mg Tab Take 1 tablet by mouth once daily.     carvedilol (COREG) 12.5 MG tablet TAKE 1 TABLET (12.5 MG TOTAL) BY MOUTH 2 (TWO) TIMES DAILY WITH MEALS.    clopidogrel (PLAVIX) 75 mg tablet TAKE 1 TABLET BY MOUTH EVERY DAY    cyclobenzaprine (FLEXERIL) 10 MG tablet Take 10 mg by mouth 3 (three) times daily as needed for Muscle spasms.    esomeprazole (NEXIUM) 20 MG capsule Take 20 mg by mouth before breakfast.    MULTI-VITAMIN HI-PO ORAL Take by mouth once daily.     rosuvastatin (CRESTOR) 40 MG Tab Take 1 tablet (40 mg total) by mouth once daily.    SYMBICORT 80-4.5 mcg/actuation HFAA INHALE 2 PUFFS TWICE A DAY AS DIRECTED    vitamin E 400 UNIT capsule Take 400 Units by mouth once daily.      losartan (COZAAR) 50 MG tablet Take 1 tablet (50 mg total) by mouth once daily.     No current facility-administered medications for this visit.        Objective:     Right Arm BP - Sittin/80 (19 1404)  Left Arm BP - Sittin/84 (19 1404)    BP (!) 142/84 (BP Location: Left arm, Patient Position: Sitting, BP Method: X-Large (Automatic))   Pulse 80   Ht 5' 4" (1.626 m)   Wt 85.2 kg (187 lb 13.3 oz)   SpO2 96%   BMI 32.24 " kg/m²       Physical Exam   Constitutional: He is oriented to person, place, and time. Vital signs are normal. He appears well-developed and well-nourished. He is active. No distress.   HENT:   Head: Normocephalic and atraumatic.   Eyes: Conjunctivae and lids are normal. No scleral icterus.   Neck: Neck supple. Normal carotid pulses, no hepatojugular reflux and no JVD present. Carotid bruit is not present.   Cardiovascular: Normal rate, regular rhythm, S1 normal, S2 normal and intact distal pulses. PMI is not displaced. Exam reveals no gallop and no friction rub.   No murmur heard.  Pulses:       Carotid pulses are 2+ on the right side, and 2+ on the left side.       Radial pulses are 2+ on the right side, and 2+ on the left side.        Dorsalis pedis pulses are 2+ on the right side, and 2+ on the left side.        Posterior tibial pulses are 1+ on the right side, and 1+ on the left side.   Pulmonary/Chest: Effort normal and breath sounds normal. No respiratory distress. He has no decreased breath sounds. He has no wheezes. He has no rhonchi. He has no rales. He exhibits no tenderness.   Abdominal: Soft. Normal appearance and bowel sounds are normal. He exhibits no distension, no fluid wave, no abdominal bruit, no ascites and no pulsatile midline mass. There is no hepatosplenomegaly. There is no tenderness.   Musculoskeletal: He exhibits no edema.   Neurological: He is alert and oriented to person, place, and time. Gait normal.   Skin: Skin is warm, dry and intact. No rash noted. He is not diaphoretic. Nails show no clubbing.   Psychiatric: He has a normal mood and affect. His speech is normal and behavior is normal. Judgment and thought content normal. Cognition and memory are normal.   Nursing note and vitals reviewed.      Chemistry        Component Value Date/Time     03/25/2019 0815    K 4.2 03/25/2019 0815     03/25/2019 0815    CO2 26 03/25/2019 0815    BUN 13 03/25/2019 0815    CREATININE 1.0  03/25/2019 0815     03/25/2019 0815        Component Value Date/Time    CALCIUM 9.6 03/25/2019 0815    ALKPHOS 89 03/25/2019 0815    AST 28 03/25/2019 0815    ALT 16 03/25/2019 0815    BILITOT 0.4 03/25/2019 0815    ESTGFRAFRICA >60.0 03/25/2019 0815    EGFRNONAA >60.0 03/25/2019 0815        Lab Results   Component Value Date    HGBA1C 5.8 03/22/2007     Recent Labs   Lab 03/25/19  0815   WBC 7.41   Hemoglobin 15.6   Hematocrit 48.5   Mean Corpuscular Volume 96   Platelets 306   TSH 0.901   Cholesterol 148   HDL 35 L   LDL Cholesterol 77.8   Triglycerides 176 H   Hdl/Cholesterol Ratio 23.6       Recent Labs   Lab 08/17/16  1126 08/30/16  0611   INR 1.0 0.9        Test(s) Reviewed  I have reviewed the following in detail:  [] Stress test   [] Angiography   [x] Echocardiogram   [x] Labs   [] Other:         Assessment/Plan:   1. PAD  Symptomatic.  Claudication is now at rest and with ambulation.  His exercise tolerance is reduced.     - Ambulatory Referral to Interventional Cardiology  - EKG 12-lead    2. Claudication of calf muscles    - Ambulatory Referral to Interventional Cardiology    3. Hyperlipidemia LDL goal <70  LDL not at goal <70.  Increase rosuvastatin to 40 mg.  CMP/lipids in 6-8 weeks.     - rosuvastatin (CRESTOR) 40 MG Tab; Take 1 tablet (40 mg total) by mouth once daily.  Dispense: 90 tablet; Refill: 3  - EKG 12-lead  - Comprehensive metabolic panel; Future  - Lipid panel; Future    4. Cardiomyopathy, unspecified type    - EKG 12-lead    5. Essential hypertension  BP not at goal <130/80 in clinic today.  Reports home bp are at goal.  ACEI have been associated with a small increased risk for lung CA. Switch ACEI to ARB.  Change ramilpril 10 mg daily to losartan 50 mg daily.  Requested 2 week bp log.     - losartan (COZAAR) 50 MG tablet; Take 1 tablet (50 mg total) by mouth once daily.  Dispense: 90 tablet; Refill: 3    6. Obesity (BMI 30-39.9)  BMI 32.2 Encouraged increased CV exercise to 30  minutes a day for 5 days a week.       7. Subclinical hyperthyroidism      Patient was discussed with but not examined by Dr. Lopez    Follow up in about 6 months (around 1/23/2020).

## 2019-07-23 NOTE — PATIENT INSTRUCTIONS
Ways to lower triglycerides  Cut simple sugars out of your diet (white breads, candies, cookies, cakes, etc.)  Reduce or eliminate your intake of vegetable fats and highly processed trans fatty acids.   Exercise 30 minutes a day for 4-5 days a week.   Consider taking the Omega 3 supplement.  Eat more fiber.    Increase the rosuvastatin to 40 mg by mouth daily.  You can take 2 of the 20 mg tablets a day until you run out.  Then start the new prescription.    Stop the ramipril and start the losartan 50 mg.  Please keep a log of your blood pressure readings and call Dr. Lopez in 2 weeks with your readings.

## 2019-08-08 DIAGNOSIS — I73.9 CLAUDICATION OF CALF MUSCLES: Primary | ICD-10-CM

## 2019-08-14 ENCOUNTER — INITIAL CONSULT (OUTPATIENT)
Dept: CARDIOLOGY | Facility: CLINIC | Age: 54
End: 2019-08-14
Payer: COMMERCIAL

## 2019-08-14 VITALS
HEART RATE: 88 BPM | SYSTOLIC BLOOD PRESSURE: 154 MMHG | OXYGEN SATURATION: 98 % | WEIGHT: 190.94 LBS | HEIGHT: 64 IN | BODY MASS INDEX: 32.6 KG/M2 | DIASTOLIC BLOOD PRESSURE: 108 MMHG

## 2019-08-14 DIAGNOSIS — I25.5 ISCHEMIC CARDIOMYOPATHY: ICD-10-CM

## 2019-08-14 DIAGNOSIS — E78.5 HYPERLIPIDEMIA LDL GOAL <70: ICD-10-CM

## 2019-08-14 DIAGNOSIS — I25.5 ISCHEMIC CARDIOMYOPATHY: Primary | ICD-10-CM

## 2019-08-14 DIAGNOSIS — I10 ESSENTIAL HYPERTENSION: ICD-10-CM

## 2019-08-14 DIAGNOSIS — I73.9 PAD (PERIPHERAL ARTERY DISEASE): Primary | ICD-10-CM

## 2019-08-14 PROCEDURE — 3080F DIAST BP >= 90 MM HG: CPT | Mod: CPTII,S$GLB,, | Performed by: INTERNAL MEDICINE

## 2019-08-14 PROCEDURE — 3008F BODY MASS INDEX DOCD: CPT | Mod: CPTII,S$GLB,, | Performed by: INTERNAL MEDICINE

## 2019-08-14 PROCEDURE — 3077F PR MOST RECENT SYSTOLIC BLOOD PRESSURE >= 140 MM HG: ICD-10-PCS | Mod: CPTII,S$GLB,, | Performed by: INTERNAL MEDICINE

## 2019-08-14 PROCEDURE — 99214 OFFICE O/P EST MOD 30 MIN: CPT | Mod: S$GLB,,, | Performed by: INTERNAL MEDICINE

## 2019-08-14 PROCEDURE — 99999 PR PBB SHADOW E&M-EST. PATIENT-LVL III: ICD-10-PCS | Mod: PBBFAC,,, | Performed by: INTERNAL MEDICINE

## 2019-08-14 PROCEDURE — 3077F SYST BP >= 140 MM HG: CPT | Mod: CPTII,S$GLB,, | Performed by: INTERNAL MEDICINE

## 2019-08-14 PROCEDURE — 3080F PR MOST RECENT DIASTOLIC BLOOD PRESSURE >= 90 MM HG: ICD-10-PCS | Mod: CPTII,S$GLB,, | Performed by: INTERNAL MEDICINE

## 2019-08-14 PROCEDURE — 99999 PR PBB SHADOW E&M-EST. PATIENT-LVL III: CPT | Mod: PBBFAC,,, | Performed by: INTERNAL MEDICINE

## 2019-08-14 PROCEDURE — 3008F PR BODY MASS INDEX (BMI) DOCUMENTED: ICD-10-PCS | Mod: CPTII,S$GLB,, | Performed by: INTERNAL MEDICINE

## 2019-08-14 PROCEDURE — 99214 PR OFFICE/OUTPT VISIT, EST, LEVL IV, 30-39 MIN: ICD-10-PCS | Mod: S$GLB,,, | Performed by: INTERNAL MEDICINE

## 2019-08-14 RX ORDER — CILOSTAZOL 100 MG/1
100 TABLET ORAL 2 TIMES DAILY
Qty: 180 TABLET | Refills: 3 | Status: SHIPPED | OUTPATIENT
Start: 2019-08-14 | End: 2020-08-05 | Stop reason: SDUPTHER

## 2019-08-14 NOTE — ASSESSMENT & PLAN NOTE
LDL 77.8, goal < 70.    - continue medical therapy, primary may consider addition of ezetimibe to obtain goal  - risk factor and lifestyle modification

## 2019-08-14 NOTE — LETTER
August 15, 2019      Jessica Johnson NP  [address]  [facility] [address]           [facility]  [address]  [facility] [address]  Phone: [phone]          Patient: Michael Guillaume   MR Number: [id number]   YOB: 1965   Date of Visit: 8/14/2019       Dear Jessica Johnson:    Thank you for referring Michael Guillaume to me for evaluation. Attached you will find relevant portions of my assessment and plan of care.    If you have questions, please do not hesitate to call me. I look forward to following Michael Guillaume along with you.    Sincerely,    Rohan Neal MD    Enclosure  CC:  No Recipients    If you would like to receive this communication electronically, please contact [email or web address] or [phone] to request more information on Connect Link access.    For providers and/or their staff who would like to refer a patient to [facility], please contact us through our one-stop-shop provider referral line, [facility] , at [phone].    If you feel you have received this communication in error or would no longer like to receive these types of communications, please e-mail [email or web address]

## 2019-08-14 NOTE — ASSESSMENT & PLAN NOTE
BP elevated in clinic however pt monitors BP at home and runs 120/80s.  Compliant w/ medical therapy.  - continue medical therapy  - risk factor and lifestyle modifications

## 2019-08-14 NOTE — ASSESSMENT & PLAN NOTE
Compensated.  LVEF 60% w/ normal wall motion.    - will check echo to eval cardiac function  - no episodes of CHF in past

## 2019-08-14 NOTE — ASSESSMENT & PLAN NOTE
Stable, Gunner IIa claudication.  Pt walks daily at least 1 mile, takes about 3 breaks.  Compliant w/ medical therapy. Pt w/ b/l EIA stents and w/ dopplerable monophasic pulses of pop, PT, and DP.    - BLE arterial doppler  - start cilostazol to improve walking distance (will check echo, LVEF has been normal for years)  - pt to f/u in 6 months, if symptoms worse may consider repeat angio

## 2019-08-14 NOTE — PROGRESS NOTES
Subjective:    Patient ID:  Michael Guillaume is a 54 y.o. male who presents for evaluation of PAD(peripheral artery disease)    Referring: Dr. VARUN Lopez    HPI  Pt is a 53 yo M w/ PMH of PAD w/ B EIA stents (5/27/15), HLD, HTN, and former tobacco abuse (quit 4 yrs ago however smokes a cigarette socially every now and then) who presents for evaluation of PAD.  Pt mentions that he has been dealing with increasing claudication R>L for the past few months.  He notes that he walks a mile daily and has to rest 3x during 2/2 claudication.  He is compliant w/ medical therapy and states that although he has to rest when he ambulates he does not feel as though he is limited.  He admits to sob but denies any cp, edema, orthopnea, PND, presyncope, LOC, and palpitations.  He monitors his BP at home and states it runs 120/80s.  He also notes compliance w/ medical therapy.            Review of Systems   Constitution: Negative for diaphoresis and fever.   HENT: Negative for congestion and hearing loss.    Eyes: Negative for blurred vision and pain.   Cardiovascular: Positive for claudication and dyspnea on exertion. Negative for chest pain, leg swelling, near-syncope, palpitations and syncope.   Respiratory: Positive for shortness of breath. Negative for sleep disturbances due to breathing.    Hematologic/Lymphatic: Negative for bleeding problem. Does not bruise/bleed easily.   Skin: Negative for color change and poor wound healing.   Gastrointestinal: Negative for abdominal pain and nausea.   Genitourinary: Negative for bladder incontinence and flank pain.   Neurological: Negative for focal weakness and light-headedness.        Objective:    Physical Exam   Constitutional: He is oriented to person, place, and time. He appears well-developed and well-nourished.   HENT:   Head: Normocephalic and atraumatic.   Mouth/Throat: Oropharynx is clear and moist.   Eyes: Pupils are equal, round, and reactive to light. EOM are normal. No scleral  icterus.   Neck: Normal range of motion. Neck supple. No JVD present.   Cardiovascular: Normal rate, regular rhythm, S1 normal, S2 normal and intact distal pulses. Exam reveals no gallop and no friction rub.   No murmur heard.  B/l pop, PT, and DP pulses dopplerable and monophasic   Pulmonary/Chest: Effort normal and breath sounds normal. No respiratory distress. He has no wheezes. He has no rales. He exhibits no tenderness.   Abdominal: Soft. Bowel sounds are normal. He exhibits no distension and no mass. There is no tenderness. There is no rebound.   Musculoskeletal: Normal range of motion. He exhibits no edema or tenderness.   No wounds on BLE    Neurological: He is alert and oriented to person, place, and time. He displays normal reflexes. Coordination normal.   Skin: Skin is warm and dry. He is not diaphoretic. No pallor.   Psychiatric: He has a normal mood and affect. His behavior is normal. Judgment normal.         Assessment:       1. PAD    2. Essential hypertension    3. Hyperlipidemia LDL goal <70    4. Ischemic cardiomyopathy         Plan:       PAD  Stable, Gunner IIa claudication.  Pt walks daily at least 1 mile, takes about 3 breaks.  Compliant w/ medical therapy. Pt w/ b/l EIA stents and w/ dopplerable monophasic pulses of pop, PT, and DP.    - BLE arterial doppler  - start cilostazol to improve walking distance (will check echo, LVEF has been normal for years)  - pt to f/u in 6 months, if symptoms worse may consider repeat angio    HTN (hypertension)  BP elevated in clinic however pt monitors BP at home and runs 120/80s.  Compliant w/ medical therapy.  - continue medical therapy  - risk factor and lifestyle modifications    Hyperlipidemia LDL goal <70  LDL 77.8, goal < 70.    - continue medical therapy, primary may consider addition of ezetimibe to obtain goal  - risk factor and lifestyle modification    Cardiomyopathy  Compensated.  LVEF 60% w/ normal wall motion.    - will check echo to erin  cardiac function  - no episodes of CHF in past        Raymundo Monroe M.D.  Interventional Cardiology

## 2019-08-27 ENCOUNTER — CLINICAL SUPPORT (OUTPATIENT)
Dept: CARDIOLOGY | Facility: CLINIC | Age: 54
End: 2019-08-27
Attending: INTERNAL MEDICINE
Payer: COMMERCIAL

## 2019-08-27 ENCOUNTER — HOSPITAL ENCOUNTER (OUTPATIENT)
Dept: CARDIOLOGY | Facility: CLINIC | Age: 54
Discharge: HOME | End: 2019-08-27
Attending: INTERNAL MEDICINE
Payer: COMMERCIAL

## 2019-08-27 VITALS
BODY MASS INDEX: 32.27 KG/M2 | SYSTOLIC BLOOD PRESSURE: 140 MMHG | DIASTOLIC BLOOD PRESSURE: 90 MMHG | HEART RATE: 76 BPM | WEIGHT: 189 LBS | HEIGHT: 64 IN

## 2019-08-27 DIAGNOSIS — I25.5 ISCHEMIC CARDIOMYOPATHY: ICD-10-CM

## 2019-08-27 LAB
ASCENDING AORTA: 3.15 CM
AV INDEX (PROSTH): 0.5
AV MEAN GRADIENT: 6 MMHG
AV PEAK GRADIENT: 12 MMHG
AV VALVE AREA: 1.79 CM2
AV VELOCITY RATIO: 0.46
BSA FOR ECHO PROCEDURE: 1.97 M2
CV ECHO LV RWT: 0.32 CM
DOP CALC AO PEAK VEL: 1.73 M/S
DOP CALC AO VTI: 32.85 CM
DOP CALC LVOT AREA: 3.6 CM2
DOP CALC LVOT DIAMETER: 2.14 CM
DOP CALC LVOT PEAK VEL: 0.8 M/S
DOP CALC LVOT STROKE VOLUME: 58.71 CM3
DOP CALCLVOT PEAK VEL VTI: 16.33 CM
E WAVE DECELERATION TIME: 165.76 MSEC
E/A RATIO: 1.09
E/E' RATIO: 11.41 M/S
ECHO LV POSTERIOR WALL: 0.8 CM (ref 0.6–1.1)
FRACTIONAL SHORTENING: 26 % (ref 28–44)
INTERVENTRICULAR SEPTUM: 0.95 CM (ref 0.6–1.1)
LA MAJOR: 4.62 CM
LA MINOR: 4.72 CM
LA WIDTH: 3.84 CM
LEFT ATRIUM SIZE: 3.59 CM
LEFT ATRIUM VOLUME INDEX: 28.7 ML/M2
LEFT ATRIUM VOLUME: 54.72 CM3
LEFT INTERNAL DIMENSION IN SYSTOLE: 3.65 CM (ref 2.1–4)
LEFT VENTRICLE DIASTOLIC VOLUME INDEX: 59.88 ML/M2
LEFT VENTRICLE DIASTOLIC VOLUME: 114.35 ML
LEFT VENTRICLE MASS INDEX: 78 G/M2
LEFT VENTRICLE SYSTOLIC VOLUME INDEX: 29.4 ML/M2
LEFT VENTRICLE SYSTOLIC VOLUME: 56.09 ML
LEFT VENTRICULAR INTERNAL DIMENSION IN DIASTOLE: 4.93 CM (ref 3.5–6)
LEFT VENTRICULAR MASS: 148.91 G
LV LATERAL E/E' RATIO: 9.7 M/S
LV SEPTAL E/E' RATIO: 13.86 M/S
MV PEAK A VEL: 0.89 M/S
MV PEAK E VEL: 0.97 M/S
PULM VEIN S/D RATIO: 1.23
PV PEAK D VEL: 0.47 M/S
PV PEAK S VEL: 0.58 M/S
RA MAJOR: 4.02 CM
RA PRESSURE: 3 MMHG
RA WIDTH: 3.05 CM
RIGHT VENTRICULAR END-DIASTOLIC DIMENSION: 2.79 CM
SINUS: 3.18 CM
STJ: 2.59 CM
TDI LATERAL: 0.1 M/S
TDI SEPTAL: 0.07 M/S
TDI: 0.09 M/S
TRICUSPID ANNULAR PLANE SYSTOLIC EXCURSION: 1.74 CM

## 2019-08-27 PROCEDURE — 93306 TTE W/DOPPLER COMPLETE: CPT | Mod: 26,,, | Performed by: INTERNAL MEDICINE

## 2019-08-27 PROCEDURE — 93306 TTE W/DOPPLER COMPLETE: CPT

## 2019-08-27 PROCEDURE — 93925 LOWER EXTREMITY STUDY: CPT | Mod: S$GLB,,, | Performed by: INTERNAL MEDICINE

## 2019-08-27 PROCEDURE — 93925 CV US DOPPLER ARTERIAL LEGS BILATERAL (CUPID ONLY): ICD-10-PCS | Mod: S$GLB,,, | Performed by: INTERNAL MEDICINE

## 2019-08-27 PROCEDURE — 93306 TRANSTHORACIC ECHO (TTE) COMPLETE (CUPID ONLY): ICD-10-PCS | Mod: 26,,, | Performed by: INTERNAL MEDICINE

## 2019-08-28 LAB
LEFT ANT TIBIAL SYS PSV: 14 CM/S
LEFT CFA PSV: 123 CM/S
LEFT EXTERNAL ILIAC PSV: 163 CM/S
LEFT PERONEAL SYS PSV: 19 CM/S
LEFT POPLITEAL PSV: 54 CM/S
LEFT POST TIBIAL SYS PSV: 46 CM/S
LEFT PROFUNDA SYS PSV: 34 CM/S
LEFT SUPER FEMORAL MID SYS PSV: 177 CM/S
LEFT SUPER FEMORAL OSTIAL SYS PSV: 142 CM/S
LEFT SUPER FEMORAL PROX SYS PSV: 159 CM/S
LEFT TIB/PER TRUNK SYS PSV: 41 CM/S
OHS CV LEFT LOWER EXTREMITY ABI (NO CALC): 0.93
OHS CV LOWER EXTREMITY STENT MEASUREMENTS - LEFT - DSFA S1 - DIST: 146
OHS CV LOWER EXTREMITY STENT MEASUREMENTS - LEFT - DSFA S1 - MID: 143
OHS CV LOWER EXTREMITY STENT MEASUREMENTS - LEFT - DSFA S1 - PROX: 125
OHS CV RIGHT ABI LOWER EXTREMITY (NO CALC): 0.99
RIGHT ANT TIBIAL SYS PSV: 12 CM/S
RIGHT CFA PSV: 131 CM/S
RIGHT EXTERNAL ILLIAC PSV: 158 CM/S
RIGHT PERONEAL SYS PSV: 25 CM/S
RIGHT POPLITEAL PSV: 42 CM/S
RIGHT POST TIBIAL SYS PSV: 43 CM/S
RIGHT PROFUNDA SYS PSV: 237 CM/S
RIGHT SUPER FEMORAL DIST SYS PSV: 35 CM/S
RIGHT SUPER FEMORAL MID SYS PSV: 95 CM/S
RIGHT SUPER FEMORAL OSTIAL SYS PSV: 397 CM/S
RIGHT SUPER FEMORAL PROX SYS PSV: 142 CM/S
RIGHT TIB/PER TRUNK SYS PSV: 50 CM/S

## 2019-09-03 ENCOUNTER — CLINICAL SUPPORT (OUTPATIENT)
Dept: INTERNAL MEDICINE | Facility: CLINIC | Age: 54
End: 2019-09-03
Payer: COMMERCIAL

## 2019-09-03 DIAGNOSIS — E78.5 HYPERLIPIDEMIA LDL GOAL <70: ICD-10-CM

## 2019-09-03 LAB
ALBUMIN SERPL BCP-MCNC: 3.8 G/DL (ref 3.5–5.2)
ALP SERPL-CCNC: 91 U/L (ref 55–135)
ALT SERPL W/O P-5'-P-CCNC: 25 U/L (ref 10–44)
ANION GAP SERPL CALC-SCNC: 9 MMOL/L (ref 8–16)
AST SERPL-CCNC: 41 U/L (ref 10–40)
BILIRUB SERPL-MCNC: 0.2 MG/DL (ref 0.1–1)
BUN SERPL-MCNC: 14 MG/DL (ref 6–20)
CALCIUM SERPL-MCNC: 9.4 MG/DL (ref 8.7–10.5)
CHLORIDE SERPL-SCNC: 105 MMOL/L (ref 95–110)
CHOLEST SERPL-MCNC: 146 MG/DL (ref 120–199)
CHOLEST/HDLC SERPL: 3.4 {RATIO} (ref 2–5)
CO2 SERPL-SCNC: 29 MMOL/L (ref 23–29)
CREAT SERPL-MCNC: 1.1 MG/DL (ref 0.5–1.4)
EST. GFR  (AFRICAN AMERICAN): >60 ML/MIN/1.73 M^2
EST. GFR  (NON AFRICAN AMERICAN): >60 ML/MIN/1.73 M^2
GLUCOSE SERPL-MCNC: 104 MG/DL (ref 70–110)
HDLC SERPL-MCNC: 43 MG/DL (ref 40–75)
HDLC SERPL: 29.5 % (ref 20–50)
LDLC SERPL CALC-MCNC: 54.2 MG/DL (ref 63–159)
NONHDLC SERPL-MCNC: 103 MG/DL
POTASSIUM SERPL-SCNC: 4.4 MMOL/L (ref 3.5–5.1)
PROT SERPL-MCNC: 7 G/DL (ref 6–8.4)
SODIUM SERPL-SCNC: 143 MMOL/L (ref 136–145)
TRIGL SERPL-MCNC: 244 MG/DL (ref 30–150)

## 2019-09-03 PROCEDURE — 36415 COLL VENOUS BLD VENIPUNCTURE: CPT | Mod: S$GLB,,, | Performed by: NURSE PRACTITIONER

## 2019-09-03 PROCEDURE — 80061 LIPID PANEL: CPT

## 2019-09-03 PROCEDURE — 36415 PR COLLECTION VENOUS BLOOD,VENIPUNCTURE: ICD-10-PCS | Mod: S$GLB,,, | Performed by: NURSE PRACTITIONER

## 2019-09-03 PROCEDURE — 80053 COMPREHEN METABOLIC PANEL: CPT

## 2019-09-09 ENCOUNTER — PATIENT MESSAGE (OUTPATIENT)
Dept: CARDIOLOGY | Facility: CLINIC | Age: 54
End: 2019-09-09

## 2019-09-13 DIAGNOSIS — I10 ESSENTIAL HYPERTENSION: Chronic | ICD-10-CM

## 2019-09-13 RX ORDER — CARVEDILOL 12.5 MG/1
TABLET ORAL
Qty: 180 TABLET | Refills: 3 | Status: SHIPPED | OUTPATIENT
Start: 2019-09-13 | End: 2020-12-30

## 2019-09-24 ENCOUNTER — TELEPHONE (OUTPATIENT)
Dept: INTERNAL MEDICINE | Facility: CLINIC | Age: 54
End: 2019-09-24

## 2019-09-24 DIAGNOSIS — F41.9 ANXIETY: ICD-10-CM

## 2019-09-24 RX ORDER — ALPRAZOLAM 1 MG/1
1 TABLET ORAL 3 TIMES DAILY PRN
Qty: 90 TABLET | Refills: 5 | Status: SHIPPED | OUTPATIENT
Start: 2019-09-24 | End: 2020-03-10

## 2019-09-24 NOTE — TELEPHONE ENCOUNTER
----- Message from Crys Muñoz MA sent at 9/24/2019  9:06 AM CDT -----  Contact: Patient  Patient is out of refills on Xanax.      Please send new script for to [facility] ([place])

## 2019-09-25 ENCOUNTER — OFFICE VISIT (OUTPATIENT)
Dept: INTERNAL MEDICINE | Facility: CLINIC | Age: 54
End: 2019-09-25
Payer: COMMERCIAL

## 2019-09-25 VITALS
DIASTOLIC BLOOD PRESSURE: 86 MMHG | HEIGHT: 64 IN | HEART RATE: 92 BPM | SYSTOLIC BLOOD PRESSURE: 126 MMHG | BODY MASS INDEX: 32.27 KG/M2 | TEMPERATURE: 98 F | OXYGEN SATURATION: 95 % | WEIGHT: 189 LBS | RESPIRATION RATE: 16 BRPM

## 2019-09-25 DIAGNOSIS — J44.1 CHRONIC OBSTRUCTIVE PULMONARY DISEASE WITH ACUTE EXACERBATION: Primary | ICD-10-CM

## 2019-09-25 PROCEDURE — 99213 OFFICE O/P EST LOW 20 MIN: CPT | Mod: 25,S$GLB,, | Performed by: INTERNAL MEDICINE

## 2019-09-25 PROCEDURE — 99999 PR PBB SHADOW E&M-EST. PATIENT-LVL III: CPT | Mod: PBBFAC,,, | Performed by: INTERNAL MEDICINE

## 2019-09-25 PROCEDURE — 96372 PR INJECTION,THERAP/PROPH/DIAG2ST, IM OR SUBCUT: ICD-10-PCS | Mod: S$GLB,,, | Performed by: INTERNAL MEDICINE

## 2019-09-25 PROCEDURE — 99999 PR PBB SHADOW E&M-EST. PATIENT-LVL III: ICD-10-PCS | Mod: PBBFAC,,, | Performed by: INTERNAL MEDICINE

## 2019-09-25 PROCEDURE — 3008F PR BODY MASS INDEX (BMI) DOCUMENTED: ICD-10-PCS | Mod: CPTII,S$GLB,, | Performed by: INTERNAL MEDICINE

## 2019-09-25 PROCEDURE — 3008F BODY MASS INDEX DOCD: CPT | Mod: CPTII,S$GLB,, | Performed by: INTERNAL MEDICINE

## 2019-09-25 PROCEDURE — 3079F PR MOST RECENT DIASTOLIC BLOOD PRESSURE 80-89 MM HG: ICD-10-PCS | Mod: CPTII,S$GLB,, | Performed by: INTERNAL MEDICINE

## 2019-09-25 PROCEDURE — 3079F DIAST BP 80-89 MM HG: CPT | Mod: CPTII,S$GLB,, | Performed by: INTERNAL MEDICINE

## 2019-09-25 PROCEDURE — 96372 THER/PROPH/DIAG INJ SC/IM: CPT | Mod: S$GLB,,, | Performed by: INTERNAL MEDICINE

## 2019-09-25 PROCEDURE — 3074F SYST BP LT 130 MM HG: CPT | Mod: CPTII,S$GLB,, | Performed by: INTERNAL MEDICINE

## 2019-09-25 PROCEDURE — 99213 PR OFFICE/OUTPT VISIT, EST, LEVL III, 20-29 MIN: ICD-10-PCS | Mod: 25,S$GLB,, | Performed by: INTERNAL MEDICINE

## 2019-09-25 PROCEDURE — 3074F PR MOST RECENT SYSTOLIC BLOOD PRESSURE < 130 MM HG: ICD-10-PCS | Mod: CPTII,S$GLB,, | Performed by: INTERNAL MEDICINE

## 2019-09-25 RX ORDER — METHYLPREDNISOLONE ACETATE 80 MG/ML
80 INJECTION, SUSPENSION INTRA-ARTICULAR; INTRALESIONAL; INTRAMUSCULAR; SOFT TISSUE
Status: COMPLETED | OUTPATIENT
Start: 2019-09-25 | End: 2019-09-25

## 2019-09-25 RX ADMIN — METHYLPREDNISOLONE ACETATE 80 MG: 80 INJECTION, SUSPENSION INTRA-ARTICULAR; INTRALESIONAL; INTRAMUSCULAR; SOFT TISSUE at 08:09

## 2019-09-25 NOTE — PROGRESS NOTES
Subjective:       Patient ID: Michael Guillaume is a 54 y.o. male.    Chief Complaint: URI (cough with green mucus- x 4 days)      HPI:  Patient is new to me but known to clinic and present with cough and congestion. Sx started 4 days ago. Cough productive green sputum + wheezing. Mild SOB. + smoker and COPD. Mild sore thraot but improving. No otalgia. Last night did a breathing treatment but hasn't tried anything OTC. Family sick with same sx. No fevers.     Past Medical History:   Diagnosis Date    Anticoagulant long-term use     Arthritis     Blood transfusion     mid 40's age    CHF (congestive heart failure)     COPD (chronic obstructive pulmonary disease)     COPD (chronic obstructive pulmonary disease)     Coronary artery disease     Encounter for blood transfusion     GERD (gastroesophageal reflux disease)     Hyperlipidemia 7/3/2004    Hypertension     Myocardial infarction     PAD (peripheral artery disease)     Sleep apnea     Thoracic or lumbosacral neuritis or radiculitis, unspecified 5/13/2013    Thyroid disorder 9/8/2015       Family History   Problem Relation Age of Onset    Clotting disorder Mother         ?    Heart failure Mother     Hypertension Mother     Heart disease Mother     Clotting disorder Father         ?    Heart attack Father     Hypertension Father     Heart disease Father     Heart disease Brother     Anesthesia problems Neg Hx        Social History     Socioeconomic History    Marital status:      Spouse name: Not on file    Number of children: Not on file    Years of education: Not on file    Highest education level: Not on file   Occupational History    Not on file   Social Needs    Financial resource strain: Not on file    Food insecurity:     Worry: Not on file     Inability: Not on file    Transportation needs:     Medical: Not on file     Non-medical: Not on file   Tobacco Use    Smoking status: Former Smoker     Packs/day: 1.00      Years: 20.00     Pack years: 20.00     Types: Cigarettes     Last attempt to quit: 2011     Years since quittin.9    Smokeless tobacco: Never Used    Tobacco comment: 5-7 cigarettes in 3 months   Substance and Sexual Activity    Alcohol use: Yes     Alcohol/week: 6.0 standard drinks     Types: 2 - 3 Cans of beer, 6 Shots of liquor per week     Comment: wine- occasions    Drug use: Yes     Types: Marijuana     Comment: daily    Sexual activity: Yes     Partners: Female     Birth control/protection: None   Lifestyle    Physical activity:     Days per week: Not on file     Minutes per session: Not on file    Stress: Not on file   Relationships    Social connections:     Talks on phone: Not on file     Gets together: Not on file     Attends Orthodox service: Not on file     Active member of club or organization: Not on file     Attends meetings of clubs or organizations: Not on file     Relationship status: Not on file   Other Topics Concern    Not on file   Social History Narrative    Not on file       Review of Systems   Constitutional: Negative for activity change, fatigue, fever and unexpected weight change.   HENT: Positive for congestion. Negative for ear pain, hearing loss, rhinorrhea, sore throat and tinnitus.    Eyes: Negative for pain, redness and visual disturbance.   Respiratory: Positive for cough, shortness of breath and wheezing.    Cardiovascular: Negative for chest pain, palpitations and leg swelling.   Gastrointestinal: Negative for abdominal pain, blood in stool, constipation, diarrhea, nausea and vomiting.   Genitourinary: Negative for decreased urine volume, dysuria, frequency and urgency.   Musculoskeletal: Negative for back pain, joint swelling and neck pain.   Skin: Negative for color change, rash and wound.   Neurological: Negative for dizziness, tremors, weakness, light-headedness and headaches.         Objective:      Physical Exam   Constitutional: He is oriented to  person, place, and time. He appears well-developed and well-nourished. No distress.   HENT:   Head: Normocephalic and atraumatic.   Right Ear: External ear normal.   Left Ear: External ear normal.   Mouth/Throat: No oropharyngeal exudate.   Eyes: Pupils are equal, round, and reactive to light. Conjunctivae and EOM are normal. Right eye exhibits no discharge. Left eye exhibits no discharge.   Neck: Neck supple. No tracheal deviation present.   Cardiovascular: Normal rate and regular rhythm. Exam reveals no gallop and no friction rub.   No murmur heard.  Pulmonary/Chest: Effort normal. No respiratory distress. He has wheezes. He has no rales (diffuse).   Abdominal: Soft. Bowel sounds are normal. He exhibits no distension. There is no tenderness.   Neurological: He is alert and oriented to person, place, and time. No cranial nerve deficit.   Skin: Skin is warm and dry.   Psychiatric: He has a normal mood and affect. His behavior is normal.   Vitals reviewed.      Assessment:       1. Chronic obstructive pulmonary disease with acute exacerbation        Plan:       Michael BELL was seen today for uri.    Diagnoses and all orders for this visit:    Chronic obstructive pulmonary disease with acute exacerbation  -     methylPREDNISolone acetate injection 80 mg    IM steroids  Nebs TID  No need for antibx today   If worsening sx call or fever > 101 F

## 2019-10-01 ENCOUNTER — OFFICE VISIT (OUTPATIENT)
Dept: INTERNAL MEDICINE | Facility: CLINIC | Age: 54
End: 2019-10-01
Payer: COMMERCIAL

## 2019-10-01 ENCOUNTER — CLINICAL SUPPORT (OUTPATIENT)
Dept: INTERNAL MEDICINE | Facility: CLINIC | Age: 54
End: 2019-10-01
Payer: COMMERCIAL

## 2019-10-01 VITALS
RESPIRATION RATE: 16 BRPM | HEART RATE: 83 BPM | HEIGHT: 64 IN | WEIGHT: 187 LBS | DIASTOLIC BLOOD PRESSURE: 86 MMHG | BODY MASS INDEX: 31.92 KG/M2 | TEMPERATURE: 97 F | SYSTOLIC BLOOD PRESSURE: 130 MMHG | OXYGEN SATURATION: 98 %

## 2019-10-01 DIAGNOSIS — I73.9 PAD (PERIPHERAL ARTERY DISEASE): ICD-10-CM

## 2019-10-01 DIAGNOSIS — I10 BENIGN ESSENTIAL HTN: ICD-10-CM

## 2019-10-01 DIAGNOSIS — E66.9 OBESITY (BMI 30-39.9): ICD-10-CM

## 2019-10-01 DIAGNOSIS — J44.1 COPD WITH ACUTE EXACERBATION: Primary | ICD-10-CM

## 2019-10-01 DIAGNOSIS — E78.5 HYPERLIPIDEMIA LDL GOAL <70: ICD-10-CM

## 2019-10-01 DIAGNOSIS — E05.90 SUBCLINICAL HYPERTHYROIDISM: ICD-10-CM

## 2019-10-01 DIAGNOSIS — I73.9 CLAUDICATION OF CALF MUSCLES: ICD-10-CM

## 2019-10-01 DIAGNOSIS — F41.9 ANXIETY: ICD-10-CM

## 2019-10-01 DIAGNOSIS — I42.9 CARDIOMYOPATHY, UNSPECIFIED TYPE: ICD-10-CM

## 2019-10-01 DIAGNOSIS — J44.9 CHRONIC OBSTRUCTIVE PULMONARY DISEASE, UNSPECIFIED COPD TYPE: ICD-10-CM

## 2019-10-01 LAB
ALBUMIN/CREAT UR: 21.4 UG/MG (ref 0–30)
BASOPHILS # BLD AUTO: 0.07 K/UL (ref 0–0.2)
BASOPHILS NFR BLD: 0.5 % (ref 0–1.9)
CREAT UR-MCNC: 126 MG/DL (ref 23–375)
DIFFERENTIAL METHOD: ABNORMAL
EOSINOPHIL # BLD AUTO: 0.1 K/UL (ref 0–0.5)
EOSINOPHIL NFR BLD: 0.6 % (ref 0–8)
ERYTHROCYTE [DISTWIDTH] IN BLOOD BY AUTOMATED COUNT: 13.4 % (ref 11.5–14.5)
HCT VFR BLD AUTO: 49.2 % (ref 40–54)
HGB BLD-MCNC: 15.5 G/DL (ref 14–18)
IMM GRANULOCYTES # BLD AUTO: 0.06 K/UL (ref 0–0.04)
IMM GRANULOCYTES NFR BLD AUTO: 0.4 % (ref 0–0.5)
LYMPHOCYTES # BLD AUTO: 1.8 K/UL (ref 1–4.8)
LYMPHOCYTES NFR BLD: 12.5 % (ref 18–48)
MCH RBC QN AUTO: 30 PG (ref 27–31)
MCHC RBC AUTO-ENTMCNC: 31.5 G/DL (ref 32–36)
MCV RBC AUTO: 95 FL (ref 82–98)
MICROALBUMIN UR DL<=1MG/L-MCNC: 27 UG/ML
MONOCYTES # BLD AUTO: 1.1 K/UL (ref 0.3–1)
MONOCYTES NFR BLD: 8.1 % (ref 4–15)
NEUTROPHILS # BLD AUTO: 10.9 K/UL (ref 1.8–7.7)
NEUTROPHILS NFR BLD: 77.9 % (ref 38–73)
NRBC BLD-RTO: 0 /100 WBC
PLATELET # BLD AUTO: 406 K/UL (ref 150–350)
PMV BLD AUTO: 10.1 FL (ref 9.2–12.9)
RBC # BLD AUTO: 5.17 M/UL (ref 4.6–6.2)
T3 SERPL-MCNC: 107 NG/DL (ref 60–180)
T4 FREE SERPL-MCNC: 1.12 NG/DL (ref 0.71–1.51)
T4 SERPL-MCNC: 8 UG/DL (ref 4.5–11.5)
TSH SERPL DL<=0.005 MIU/L-ACNC: 0.39 UIU/ML (ref 0.4–4)
WBC # BLD AUTO: 13.96 K/UL (ref 3.9–12.7)

## 2019-10-01 PROCEDURE — 99999 PR PBB SHADOW E&M-EST. PATIENT-LVL IV: ICD-10-PCS | Mod: PBBFAC,,, | Performed by: NURSE PRACTITIONER

## 2019-10-01 PROCEDURE — 3079F PR MOST RECENT DIASTOLIC BLOOD PRESSURE 80-89 MM HG: ICD-10-PCS | Mod: CPTII,S$GLB,, | Performed by: NURSE PRACTITIONER

## 2019-10-01 PROCEDURE — 36415 COLL VENOUS BLD VENIPUNCTURE: CPT

## 2019-10-01 PROCEDURE — 84439 ASSAY OF FREE THYROXINE: CPT

## 2019-10-01 PROCEDURE — 99213 PR OFFICE/OUTPT VISIT, EST, LEVL III, 20-29 MIN: ICD-10-PCS | Mod: S$GLB,,, | Performed by: NURSE PRACTITIONER

## 2019-10-01 PROCEDURE — 3008F PR BODY MASS INDEX (BMI) DOCUMENTED: ICD-10-PCS | Mod: CPTII,S$GLB,, | Performed by: NURSE PRACTITIONER

## 2019-10-01 PROCEDURE — 99213 OFFICE O/P EST LOW 20 MIN: CPT | Mod: S$GLB,,, | Performed by: NURSE PRACTITIONER

## 2019-10-01 PROCEDURE — 3075F SYST BP GE 130 - 139MM HG: CPT | Mod: CPTII,S$GLB,, | Performed by: NURSE PRACTITIONER

## 2019-10-01 PROCEDURE — 84480 ASSAY TRIIODOTHYRONINE (T3): CPT

## 2019-10-01 PROCEDURE — 3008F BODY MASS INDEX DOCD: CPT | Mod: CPTII,S$GLB,, | Performed by: NURSE PRACTITIONER

## 2019-10-01 PROCEDURE — 85025 COMPLETE CBC W/AUTO DIFF WBC: CPT

## 2019-10-01 PROCEDURE — 84443 ASSAY THYROID STIM HORMONE: CPT

## 2019-10-01 PROCEDURE — 84436 ASSAY OF TOTAL THYROXINE: CPT

## 2019-10-01 PROCEDURE — 99999 PR PBB SHADOW E&M-EST. PATIENT-LVL IV: CPT | Mod: PBBFAC,,, | Performed by: NURSE PRACTITIONER

## 2019-10-01 PROCEDURE — 82043 UR ALBUMIN QUANTITATIVE: CPT

## 2019-10-01 PROCEDURE — 3079F DIAST BP 80-89 MM HG: CPT | Mod: CPTII,S$GLB,, | Performed by: NURSE PRACTITIONER

## 2019-10-01 PROCEDURE — 3075F PR MOST RECENT SYSTOLIC BLOOD PRESS GE 130-139MM HG: ICD-10-PCS | Mod: CPTII,S$GLB,, | Performed by: NURSE PRACTITIONER

## 2019-10-01 RX ORDER — AZITHROMYCIN 250 MG/1
TABLET, FILM COATED ORAL
Qty: 6 TABLET | Refills: 0 | Status: SHIPPED | OUTPATIENT
Start: 2019-10-01 | End: 2019-10-06

## 2019-10-01 RX ORDER — METHYLPREDNISOLONE 4 MG/1
TABLET ORAL
Qty: 1 PACKAGE | Refills: 0 | Status: SHIPPED | OUTPATIENT
Start: 2019-10-01 | End: 2019-10-22

## 2019-10-01 NOTE — PROGRESS NOTES
"Subjective:       Patient ID: Michael Guillaume is a 54 y.o. male.    Chief Complaint: Cough (with wheezing- green mucus )    Patient is known, to me and presents with   Chief Complaint   Patient presents with    Cough     with wheezing- green mucus    .  Denies chest pain but has some shortness of breath.  Was seen a week ago by another provider for these same s/s but now worsened. Doing treatments as ordered  HPI  Review of Systems   Constitutional: Negative.    HENT: Positive for congestion, postnasal drip and sore throat.    Eyes: Negative.    Respiratory: Positive for cough, shortness of breath and wheezing.    Cardiovascular: Negative.    Skin: Negative.    Hematological: Negative.        Objective:      Physical Exam   Constitutional: He appears well-developed and well-nourished. No distress.   HENT:   Head: Normocephalic and atraumatic.   Right Ear: External ear normal.   Left Ear: External ear normal.   Nose: Nose normal.   Mouth/Throat: Oropharynx is clear and moist. No oropharyngeal exudate.   Eyes: Conjunctivae are normal. Right eye exhibits no discharge. Left eye exhibits no discharge.   Neck: Normal range of motion. Neck supple. No JVD present. No tracheal deviation present. No thyromegaly present.   Cardiovascular: Normal rate, regular rhythm and normal heart sounds.   No murmur heard.  Pulmonary/Chest: Effort normal. He has wheezes in the right upper field, the right middle field, the left upper field and the left middle field.   Lymphadenopathy:     He has no cervical adenopathy.   Skin: He is not diaphoretic.       Assessment:       1. COPD with acute exacerbation        Plan:   Michael BELL was seen today for cough.    Diagnoses and all orders for this visit:    COPD with acute exacerbation  -     azithromycin (Z-SINDI) 250 MG tablet; Take 2 tablets by mouth on day 1; Take 1 tablet by mouth on days 2-5  -     methylPREDNISolone (MEDROL DOSEPACK) 4 mg tablet; use as directed    "This note will not be " "shared with the patient."  If no improvement in couple of days return to clinic  Continue with neb tx  rtc as scheduled  "

## 2019-10-08 ENCOUNTER — OFFICE VISIT (OUTPATIENT)
Dept: INTERNAL MEDICINE | Facility: CLINIC | Age: 54
End: 2019-10-08
Payer: COMMERCIAL

## 2019-10-08 VITALS
BODY MASS INDEX: 31.92 KG/M2 | SYSTOLIC BLOOD PRESSURE: 122 MMHG | DIASTOLIC BLOOD PRESSURE: 86 MMHG | OXYGEN SATURATION: 96 % | HEART RATE: 88 BPM | RESPIRATION RATE: 18 BRPM | WEIGHT: 187 LBS | HEIGHT: 64 IN

## 2019-10-08 DIAGNOSIS — E05.90 SUBCLINICAL HYPERTHYROIDISM: ICD-10-CM

## 2019-10-08 DIAGNOSIS — E66.9 OBESITY (BMI 30-39.9): ICD-10-CM

## 2019-10-08 DIAGNOSIS — K21.9 GASTROESOPHAGEAL REFLUX DISEASE WITHOUT ESOPHAGITIS: ICD-10-CM

## 2019-10-08 DIAGNOSIS — I10 ESSENTIAL HYPERTENSION: Primary | ICD-10-CM

## 2019-10-08 DIAGNOSIS — E78.5 HYPERLIPIDEMIA LDL GOAL <70: ICD-10-CM

## 2019-10-08 DIAGNOSIS — J44.9 CHRONIC OBSTRUCTIVE PULMONARY DISEASE, UNSPECIFIED COPD TYPE: ICD-10-CM

## 2019-10-08 PROCEDURE — 99999 PR PBB SHADOW E&M-EST. PATIENT-LVL IV: ICD-10-PCS | Mod: PBBFAC,,, | Performed by: NURSE PRACTITIONER

## 2019-10-08 PROCEDURE — 3079F PR MOST RECENT DIASTOLIC BLOOD PRESSURE 80-89 MM HG: ICD-10-PCS | Mod: CPTII,S$GLB,, | Performed by: NURSE PRACTITIONER

## 2019-10-08 PROCEDURE — 3008F PR BODY MASS INDEX (BMI) DOCUMENTED: ICD-10-PCS | Mod: CPTII,S$GLB,, | Performed by: NURSE PRACTITIONER

## 2019-10-08 PROCEDURE — 3074F PR MOST RECENT SYSTOLIC BLOOD PRESSURE < 130 MM HG: ICD-10-PCS | Mod: CPTII,S$GLB,, | Performed by: NURSE PRACTITIONER

## 2019-10-08 PROCEDURE — 3079F DIAST BP 80-89 MM HG: CPT | Mod: CPTII,S$GLB,, | Performed by: NURSE PRACTITIONER

## 2019-10-08 PROCEDURE — 3074F SYST BP LT 130 MM HG: CPT | Mod: CPTII,S$GLB,, | Performed by: NURSE PRACTITIONER

## 2019-10-08 PROCEDURE — 3008F BODY MASS INDEX DOCD: CPT | Mod: CPTII,S$GLB,, | Performed by: NURSE PRACTITIONER

## 2019-10-08 PROCEDURE — 99214 OFFICE O/P EST MOD 30 MIN: CPT | Mod: S$GLB,,, | Performed by: NURSE PRACTITIONER

## 2019-10-08 PROCEDURE — 99214 PR OFFICE/OUTPT VISIT, EST, LEVL IV, 30-39 MIN: ICD-10-PCS | Mod: S$GLB,,, | Performed by: NURSE PRACTITIONER

## 2019-10-08 PROCEDURE — 99999 PR PBB SHADOW E&M-EST. PATIENT-LVL IV: CPT | Mod: PBBFAC,,, | Performed by: NURSE PRACTITIONER

## 2019-10-08 RX ORDER — ROSUVASTATIN CALCIUM 20 MG/1
TABLET, COATED ORAL
COMMUNITY
Start: 2019-10-06 | End: 2020-01-05

## 2019-10-08 NOTE — PATIENT INSTRUCTIONS
4 Steps for Eating Healthier  Changing the way you eat can improve your health. It can lower your cholesterol and blood pressure, and help you stay at a healthy weight. Your diet doesnt have to be bland and boring to be healthy. Just watch your calories and follow these steps:    1. Eat fewer unhealthy fats  · Choose more fish and lean meats instead of fatty cuts of meat.  · Skip butter and lard, and use less margarine.  · Pass on foods that have palm, coconut, or hydrogenated oils.  · Eat fewer high-fat dairy foods like cheese, ice cream, and whole milk.  · Get a heart-healthy cookbook and try some low-fat recipes.  2. Go light on salt  · Keep the saltshaker off the table.  · Limit high-salt ingredients, such as soy sauce, bouillon, and garlic salt.  · Instead of adding salt when cooking, season your food with herbs and flavorings. Try lemon, garlic, and onion.  · Limit convenience foods, such as boxed or canned foods and restaurant food.  · Read food labels and choose lower-sodium options.  3. Limit sugar  · Pause before you add sugars to pancakes, cereal, coffee, or tea. This includes white and brown table sugar, syrup, honey, and molasses. Cut your usual amount by half.  · Use non-sugar sweeteners. Stevia, aspartame, and sucralose can satisfy a sweet tooth without adding calories.  · Swap out sugar-filled soda and other drinks. Buy sugar-free or low-calorie beverages. Remember water is always the best choice.  · Read labels and choose foods with less added sugar. Keep in mind that dairy foods and foods with fruit will have some natural sugar.  · Cut the sugar in recipes by 1/3 to 1/2. Boost the flavor with extracts like almond, vanilla, or orange. Or add spices such as cinnamon or nutmeg.  4. Eat more fiber  · Eat fresh fruits and vegetables every day.  · Boost your diet with whole grains. Go for oats, whole-grain rice, and bran.  · Add beans and lentils to your meals.  · Drink more water to match your fiber  increase. This is to help prevent constipation.  Date Last Reviewed: 5/11/2015  © 6297-5697 [employer]. [address] [place] [address], [place] [address]. All rights reserved. This information is not intended as a substitute for professional medical care. Always follow your healthcare professional's instructions.

## 2019-10-08 NOTE — PROGRESS NOTES
Subjective:       Patient ID: Michael Guillaume is a 54 y.o. male.    Chief Complaint: Follow-up (x 6 months-results)    Patient is known, to me and presents with   Chief Complaint   Patient presents with    Follow-up     x 6 months-results   .  Denies chest pain and shortness of breath.  Patient presents with check up and labs. Up to date with screenings. He does see his cardiologists on a regular basis  HPI  Review of Systems   Constitutional: Negative.  Negative for activity change, appetite change, chills, diaphoresis, fatigue, fever and unexpected weight change.   HENT: Negative.  Negative for congestion, ear discharge, ear pain, facial swelling, hearing loss, nosebleeds, postnasal drip, rhinorrhea, sinus pressure, sneezing, sore throat, tinnitus, trouble swallowing and voice change.    Eyes: Negative.  Negative for photophobia, pain, discharge, redness, itching and visual disturbance.   Respiratory: Negative.  Negative for apnea, cough, choking, chest tightness, shortness of breath, wheezing and stridor.    Cardiovascular: Negative.  Negative for chest pain, palpitations and leg swelling.   Gastrointestinal: Negative for abdominal distention, abdominal pain, anal bleeding, blood in stool, constipation, diarrhea, nausea and vomiting.   Genitourinary: Negative.  Negative for difficulty urinating, discharge, dysuria, enuresis, flank pain, frequency, hematuria, penile pain, penile swelling, scrotal swelling, testicular pain and urgency.   Musculoskeletal: Negative.  Negative for arthralgias, back pain, gait problem, joint swelling, myalgias, neck pain and neck stiffness.   Skin: Negative.  Negative for color change, pallor, rash and wound.   Neurological: Negative for dizziness, tremors, seizures, syncope, facial asymmetry, speech difficulty, weakness, light-headedness, numbness and headaches.   Hematological: Negative for adenopathy. Does not bruise/bleed easily.   Psychiatric/Behavioral: Negative.  Negative for  agitation, dysphoric mood, sleep disturbance and suicidal ideas. The patient is not nervous/anxious.        Objective:      Physical Exam   Constitutional: He is oriented to person, place, and time. He appears well-developed and well-nourished. No distress.   HENT:   Head: Normocephalic and atraumatic.   Right Ear: External ear normal.   Left Ear: External ear normal.   Nose: Nose normal.   Mouth/Throat: Oropharynx is clear and moist. No oropharyngeal exudate.   Eyes: Pupils are equal, round, and reactive to light. Conjunctivae and EOM are normal. Right eye exhibits no discharge. Left eye exhibits no discharge.   Neck: Normal range of motion. Neck supple. No JVD present. No tracheal deviation present. No thyromegaly present.   Cardiovascular: Normal rate, regular rhythm, normal heart sounds and intact distal pulses. Exam reveals no gallop and no friction rub.   No murmur heard.  Pulmonary/Chest: Effort normal and breath sounds normal. No stridor. No respiratory distress. He has no wheezes. He has no rales. He exhibits no tenderness.   Abdominal: Soft. Bowel sounds are normal. He exhibits no distension. There is no tenderness. There is no rebound and no guarding.   Musculoskeletal: Normal range of motion. He exhibits no edema or tenderness.   Lymphadenopathy:     He has no cervical adenopathy.   Neurological: He is alert and oriented to person, place, and time. He has normal reflexes. He displays normal reflexes. No cranial nerve deficit. He exhibits normal muscle tone. Coordination normal.   Skin: Skin is warm and dry. Capillary refill takes less than 2 seconds. No rash noted. He is not diaphoretic. No erythema. No pallor.   Psychiatric: He has a normal mood and affect. His behavior is normal. Judgment and thought content normal.   Nursing note and vitals reviewed.      Assessment:       1. Essential hypertension    2. Hyperlipidemia LDL goal <70    3. Chronic obstructive pulmonary disease, unspecified COPD type   "  4. Gastroesophageal reflux disease without esophagitis    5. Subclinical hyperthyroidism    6. Obesity (BMI 30-39.9)        Plan:   Michael BELL was seen today for follow-up.    Diagnoses and all orders for this visit:    Essential hypertension  -     CBC auto differential; Future  -     Comprehensive metabolic panel; Future  -     Microalbumin/creatinine urine ratio; Future    Hyperlipidemia LDL goal <70  -     CBC auto differential; Future  -     Comprehensive metabolic panel; Future  -     Lipid panel; Future    Chronic obstructive pulmonary disease, unspecified COPD type  -     CBC auto differential; Future  -     Comprehensive metabolic panel; Future    Gastroesophageal reflux disease without esophagitis  -     CBC auto differential; Future  -     Comprehensive metabolic panel; Future    Subclinical hyperthyroidism  -     CBC auto differential; Future  -     Comprehensive metabolic panel; Future  -     TSH; Future  -     T3; Future  -     T4; Future    Obesity (BMI 30-39.9)  -     CBC auto differential; Future  -     Comprehensive metabolic panel; Future    "This note will not be shared with the patient."  Lab drawn today CBC, CMP, TSH, FLP  Limit the cholesterol in your diet to less than 300 mg per day.  Fats should contribute no more than 20 to 35% of your daily calories.  Less than 7 to 10% of your calories should come from saturated fat.  Avoid saturated fat products e.g., butter, some oils, meat, and poultry fat contain a lot of saturated fat.  Check food labels for fat and cholesterol content. Choose the foods with less fat per serving.  Limit the amount of butter and margarine you eat.  Use salad dressings and margarine made with polyunsaturated and monunsaturated fats.  Use egg whites or egg substitutes rather than whole eggs.  Replace whole-milk dairy products with nonfat or low-fat mild, cheese, spreads, and yogurt.  Eat skinless chicken, turkey, fish, and meatless entrees more often than red " meat.  Choose lean cuts of meat and trim off all visible fat. Keep portion sizes moderate.  Avoid fatty desserts such as ice cream, cream-filled cakes, and cheesecakes. Choose fresh fruits, nonfat frozen yogurt, Popsicles, etc.  Reduce the amount of fried foods, vending machine food, and fast food you eat.  Eat fruits and vegetables (especially fresh fruits and leafy vegetables), beans, and whole grains daily. The fiber in these foods helps lower cholesterol.  Look for low-fat or nonfat varieties of the foods you like to eat or look for substitutes.  You may need to exercise 60 minutes a day to prevent weight gain and 90 minutes a day to lose weight.  RTC in six months.

## 2019-10-11 ENCOUNTER — OFFICE VISIT (OUTPATIENT)
Dept: INTERNAL MEDICINE | Facility: CLINIC | Age: 54
End: 2019-10-11
Payer: COMMERCIAL

## 2019-10-11 VITALS
SYSTOLIC BLOOD PRESSURE: 108 MMHG | HEART RATE: 75 BPM | DIASTOLIC BLOOD PRESSURE: 68 MMHG | WEIGHT: 189.63 LBS | BODY MASS INDEX: 32.37 KG/M2 | HEIGHT: 64 IN | OXYGEN SATURATION: 98 % | RESPIRATION RATE: 16 BRPM

## 2019-10-11 DIAGNOSIS — M54.32 SCIATICA, LEFT SIDE: Primary | ICD-10-CM

## 2019-10-11 PROCEDURE — 96372 THER/PROPH/DIAG INJ SC/IM: CPT | Mod: S$GLB,,, | Performed by: NURSE PRACTITIONER

## 2019-10-11 PROCEDURE — 3074F SYST BP LT 130 MM HG: CPT | Mod: CPTII,S$GLB,, | Performed by: NURSE PRACTITIONER

## 2019-10-11 PROCEDURE — 99213 PR OFFICE/OUTPT VISIT, EST, LEVL III, 20-29 MIN: ICD-10-PCS | Mod: 25,S$GLB,, | Performed by: NURSE PRACTITIONER

## 2019-10-11 PROCEDURE — 3078F PR MOST RECENT DIASTOLIC BLOOD PRESSURE < 80 MM HG: ICD-10-PCS | Mod: CPTII,S$GLB,, | Performed by: NURSE PRACTITIONER

## 2019-10-11 PROCEDURE — 99999 PR PBB SHADOW E&M-EST. PATIENT-LVL V: ICD-10-PCS | Mod: PBBFAC,,, | Performed by: NURSE PRACTITIONER

## 2019-10-11 PROCEDURE — 96372 PR INJECTION,THERAP/PROPH/DIAG2ST, IM OR SUBCUT: ICD-10-PCS | Mod: S$GLB,,, | Performed by: NURSE PRACTITIONER

## 2019-10-11 PROCEDURE — 3008F PR BODY MASS INDEX (BMI) DOCUMENTED: ICD-10-PCS | Mod: CPTII,S$GLB,, | Performed by: NURSE PRACTITIONER

## 2019-10-11 PROCEDURE — 3008F BODY MASS INDEX DOCD: CPT | Mod: CPTII,S$GLB,, | Performed by: NURSE PRACTITIONER

## 2019-10-11 PROCEDURE — 99213 OFFICE O/P EST LOW 20 MIN: CPT | Mod: 25,S$GLB,, | Performed by: NURSE PRACTITIONER

## 2019-10-11 PROCEDURE — 99999 PR PBB SHADOW E&M-EST. PATIENT-LVL V: CPT | Mod: PBBFAC,,, | Performed by: NURSE PRACTITIONER

## 2019-10-11 PROCEDURE — 3078F DIAST BP <80 MM HG: CPT | Mod: CPTII,S$GLB,, | Performed by: NURSE PRACTITIONER

## 2019-10-11 PROCEDURE — 3074F PR MOST RECENT SYSTOLIC BLOOD PRESSURE < 130 MM HG: ICD-10-PCS | Mod: CPTII,S$GLB,, | Performed by: NURSE PRACTITIONER

## 2019-10-11 RX ORDER — METHYLPREDNISOLONE ACETATE 80 MG/ML
80 INJECTION, SUSPENSION INTRA-ARTICULAR; INTRALESIONAL; INTRAMUSCULAR; SOFT TISSUE
Status: COMPLETED | OUTPATIENT
Start: 2019-10-11 | End: 2019-10-11

## 2019-10-11 RX ORDER — KETOROLAC TROMETHAMINE 30 MG/ML
60 INJECTION, SOLUTION INTRAMUSCULAR; INTRAVENOUS
Status: COMPLETED | OUTPATIENT
Start: 2019-10-11 | End: 2019-10-11

## 2019-10-11 RX ADMIN — METHYLPREDNISOLONE ACETATE 80 MG: 80 INJECTION, SUSPENSION INTRA-ARTICULAR; INTRALESIONAL; INTRAMUSCULAR; SOFT TISSUE at 02:10

## 2019-10-11 RX ADMIN — KETOROLAC TROMETHAMINE 60 MG: 30 INJECTION, SOLUTION INTRAMUSCULAR; INTRAVENOUS at 02:10

## 2019-10-11 NOTE — PROGRESS NOTES
"Subjective:       Patient ID: Michael Guillaume is a 54 y.o. male.    Chief Complaint: Sciatica (Left side)    Patient is known, to me and presents with   Chief Complaint   Patient presents with    Sciatica     Left side   .  Denies chest pain and shortness of breath.  Patient presents with left sided sciatica yesterday and would like to go back to PT  HPI  Review of Systems   Constitutional: Negative.    Respiratory: Negative.    Cardiovascular: Negative.    Musculoskeletal: Positive for arthralgias and back pain.   Skin: Negative.    Neurological: Negative.        Objective:      Physical Exam   Constitutional: He is oriented to person, place, and time. He appears well-developed and well-nourished. No distress.   Neck: Normal range of motion. Neck supple. No JVD present. No tracheal deviation present. No thyromegaly present.   Cardiovascular: Normal rate, regular rhythm and normal heart sounds.   No murmur heard.  Pulmonary/Chest: Effort normal and breath sounds normal. He has no wheezes.   Musculoskeletal: He exhibits tenderness. He exhibits no edema or deformity.        Lumbar back: He exhibits decreased range of motion, tenderness, pain and spasm.        Back:    Lymphadenopathy:     He has no cervical adenopathy.   Neurological: He is alert and oriented to person, place, and time. He displays normal reflexes. No cranial nerve deficit or sensory deficit. He exhibits normal muscle tone. Coordination normal.   Skin: Skin is warm and dry. Capillary refill takes less than 2 seconds. No rash noted. He is not diaphoretic. No erythema. No pallor.       Assessment:       1. Sciatica, left side        Plan:   Michael BELL was seen today for sciatica.    Diagnoses and all orders for this visit:    Sciatica, left side  -     methylPREDNISolone acetate injection 80 mg  -     ketorolac injection 60 mg  -     Ambulatory consult to Physical Therapy    "This note will not be shared with the patient."  Take muscle relaxer at " night  Start back with PT  Does not want to see pain management as of yet  rtc as scheduled

## 2019-10-11 NOTE — PATIENT INSTRUCTIONS

## 2019-10-29 ENCOUNTER — OFFICE VISIT (OUTPATIENT)
Dept: INTERNAL MEDICINE | Facility: CLINIC | Age: 54
End: 2019-10-29
Payer: COMMERCIAL

## 2019-10-29 VITALS
OXYGEN SATURATION: 98 % | HEIGHT: 64 IN | HEART RATE: 109 BPM | RESPIRATION RATE: 20 BRPM | WEIGHT: 189 LBS | DIASTOLIC BLOOD PRESSURE: 86 MMHG | BODY MASS INDEX: 32.27 KG/M2 | SYSTOLIC BLOOD PRESSURE: 138 MMHG

## 2019-10-29 DIAGNOSIS — M71.122 INFECTION OF LEFT OLECRANON BURSA: Primary | ICD-10-CM

## 2019-10-29 PROCEDURE — 3075F SYST BP GE 130 - 139MM HG: CPT | Mod: CPTII,S$GLB,, | Performed by: NURSE PRACTITIONER

## 2019-10-29 PROCEDURE — 96372 THER/PROPH/DIAG INJ SC/IM: CPT | Mod: S$GLB,,, | Performed by: NURSE PRACTITIONER

## 2019-10-29 PROCEDURE — 3075F PR MOST RECENT SYSTOLIC BLOOD PRESS GE 130-139MM HG: ICD-10-PCS | Mod: CPTII,S$GLB,, | Performed by: NURSE PRACTITIONER

## 2019-10-29 PROCEDURE — 3008F PR BODY MASS INDEX (BMI) DOCUMENTED: ICD-10-PCS | Mod: CPTII,S$GLB,, | Performed by: NURSE PRACTITIONER

## 2019-10-29 PROCEDURE — 3008F BODY MASS INDEX DOCD: CPT | Mod: CPTII,S$GLB,, | Performed by: NURSE PRACTITIONER

## 2019-10-29 PROCEDURE — 3079F DIAST BP 80-89 MM HG: CPT | Mod: CPTII,S$GLB,, | Performed by: NURSE PRACTITIONER

## 2019-10-29 PROCEDURE — 99213 PR OFFICE/OUTPT VISIT, EST, LEVL III, 20-29 MIN: ICD-10-PCS | Mod: 25,S$GLB,, | Performed by: NURSE PRACTITIONER

## 2019-10-29 PROCEDURE — 96372 PR INJECTION,THERAP/PROPH/DIAG2ST, IM OR SUBCUT: ICD-10-PCS | Mod: S$GLB,,, | Performed by: NURSE PRACTITIONER

## 2019-10-29 PROCEDURE — 99999 PR PBB SHADOW E&M-EST. PATIENT-LVL III: ICD-10-PCS | Mod: PBBFAC,,, | Performed by: NURSE PRACTITIONER

## 2019-10-29 PROCEDURE — 3079F PR MOST RECENT DIASTOLIC BLOOD PRESSURE 80-89 MM HG: ICD-10-PCS | Mod: CPTII,S$GLB,, | Performed by: NURSE PRACTITIONER

## 2019-10-29 PROCEDURE — 99213 OFFICE O/P EST LOW 20 MIN: CPT | Mod: 25,S$GLB,, | Performed by: NURSE PRACTITIONER

## 2019-10-29 PROCEDURE — 99999 PR PBB SHADOW E&M-EST. PATIENT-LVL III: CPT | Mod: PBBFAC,,, | Performed by: NURSE PRACTITIONER

## 2019-10-29 RX ORDER — METHYLPREDNISOLONE ACETATE 80 MG/ML
80 INJECTION, SUSPENSION INTRA-ARTICULAR; INTRALESIONAL; INTRAMUSCULAR; SOFT TISSUE
Status: COMPLETED | OUTPATIENT
Start: 2019-10-29 | End: 2019-10-29

## 2019-10-29 RX ORDER — CEPHALEXIN 500 MG/1
500 CAPSULE ORAL EVERY 12 HOURS
Qty: 14 CAPSULE | Refills: 0 | Status: SHIPPED | OUTPATIENT
Start: 2019-10-29 | End: 2019-10-31

## 2019-10-29 RX ORDER — LIDOCAINE HYDROCHLORIDE 10 MG/ML
2.1 INJECTION INFILTRATION; PERINEURAL
Status: COMPLETED | OUTPATIENT
Start: 2019-10-29 | End: 2019-10-29

## 2019-10-29 RX ORDER — CEFTRIAXONE 1 G/1
1 INJECTION, POWDER, FOR SOLUTION INTRAMUSCULAR; INTRAVENOUS
Status: COMPLETED | OUTPATIENT
Start: 2019-10-29 | End: 2019-10-29

## 2019-10-29 RX ORDER — CYCLOBENZAPRINE HCL 10 MG
10 TABLET ORAL NIGHTLY
Qty: 30 TABLET | Refills: 0 | Status: SHIPPED | OUTPATIENT
Start: 2019-10-29 | End: 2019-12-02 | Stop reason: SDUPTHER

## 2019-10-29 RX ORDER — KETOROLAC TROMETHAMINE 30 MG/ML
60 INJECTION, SOLUTION INTRAMUSCULAR; INTRAVENOUS
Status: COMPLETED | OUTPATIENT
Start: 2019-10-29 | End: 2019-10-29

## 2019-10-29 RX ADMIN — KETOROLAC TROMETHAMINE 60 MG: 30 INJECTION, SOLUTION INTRAMUSCULAR; INTRAVENOUS at 09:10

## 2019-10-29 RX ADMIN — CEFTRIAXONE 1 G: 1 INJECTION, POWDER, FOR SOLUTION INTRAMUSCULAR; INTRAVENOUS at 09:10

## 2019-10-29 RX ADMIN — LIDOCAINE HYDROCHLORIDE 2.1 ML: 10 INJECTION INFILTRATION; PERINEURAL at 09:10

## 2019-10-29 RX ADMIN — METHYLPREDNISOLONE ACETATE 80 MG: 80 INJECTION, SUSPENSION INTRA-ARTICULAR; INTRALESIONAL; INTRAMUSCULAR; SOFT TISSUE at 09:10

## 2019-10-29 NOTE — PROGRESS NOTES
Subjective:       Patient ID: Michael Guillaume is a 54 y.o. male.    Chief Complaint: Arm Swelling (L. arm - x sat PS:9)    Patient is known, to me and presents with   Chief Complaint   Patient presents with    Arm Swelling     L. arm - x sat PS:9   .  Denies chest pain and shortness of breath.  Patient presents with left arm swelling and pain. States that he was playing with his grandchild on the floor and now with aforementioned problem. Complains of throbbing pain and tenderness. No fever or chills. States that it is warm   HPI  Review of Systems   Constitutional: Negative.    Respiratory: Negative.    Cardiovascular: Negative.    Musculoskeletal: Positive for arthralgias.   Skin: Positive for color change. Negative for pallor, rash and wound.       Objective:      Physical Exam   Constitutional: He appears well-developed and well-nourished. No distress.   Neck: Normal range of motion. Neck supple. No JVD present. No tracheal deviation present. No thyromegaly present.   Cardiovascular: Normal rate, regular rhythm and normal heart sounds.   No murmur heard.  Pulmonary/Chest: Effort normal and breath sounds normal. He has no wheezes.   Musculoskeletal: He exhibits edema and tenderness. He exhibits no deformity.        Left elbow: He exhibits decreased range of motion and swelling. Tenderness found. Olecranon process tenderness noted.        Arms:  Lymphadenopathy:     He has no cervical adenopathy.   Skin: Skin is warm and dry. Capillary refill takes less than 2 seconds. No rash noted. He is not diaphoretic. There is erythema. No pallor.            Assessment:       1. Infection of left olecranon bursa        Plan:   Michael BELL was seen today for arm swelling.    Diagnoses and all orders for this visit:    Infection of left olecranon bursa  -     ketorolac injection 60 mg  -     methylPREDNISolone acetate injection 80 mg  -     cephALEXin (KEFLEX) 500 MG capsule; Take 1 capsule (500 mg total) by mouth every 12  "(twelve) hours. for 7 days  -     cyclobenzaprine (FLEXERIL) 10 MG tablet; Take 1 tablet (10 mg total) by mouth every evening.  -     cefTRIAXone injection 1 g  -     lidocaine HCL 10 mg/ml (1%) injection 2.1 mL    "This note will not be shared with the patient."  If any worsening of redness and swelling go to ER  Apply cold packs to site  Cannot take nsaid's orally due to pletal   He will let me know how he is doing on Thursday   rtc as scheduled  "

## 2019-10-29 NOTE — PATIENT INSTRUCTIONS
Bursitis  You have bursitis. This is an inflammation of the bursa. These are small, fluid-filled sacs that surround the larger joints of the body. The bursa help the muscles and tendons move smoothly over the joints.  Bursitis often happens in the shoulder. But it can also affect the elbows, hips, pelvis, knees, toes, and heels. Bursitis can be caused by injury, overuse of the joint, or infection of the bursa. Symptoms include pain and tenderness over a joint. Symptoms get worse with movement.  Bursitis is treated with an anti-inflammatory medicine and by resting the joint. More severe cases require injection of medicine directly into the bursa.    Home care  · Rest the painful joint and protect it from movement. This will allow the inflammation to heal faster.  · Apply an ice pack over the injured area for no more than 15 to 20 minutes. Do this every 3 to 6 hours for the first 24 to 48 hours. Keep using ice packs 3 to 4 times a day until the pain and swelling improves.   · To make an ice pack, put ice cubes in a sealed plastic zip-lock bag. Wrap the bag in a clean, thin towel or cloth. Never put ice or an ice pack directly on the skin. As the ice melts, be careful to avoid getting any wrap or splint wet.  · You may take over-the-counter pain medicine to treat pain and inflammation, unless another medicine was prescribed. Anti-inflammatory pain medicines may be more effective. Talk with your provider beforeusing these medicines if you have chronic liver or kidney disease, or ever had a stomach ulcer or GI (gastrointestinal) bleeding.  · As your symptoms improve, slowly begin to move the joint. Do not overuse the joint. This may cause the symptoms to flare up again.  When to seek medical advice  Call your healthcare provider right away if any of these occur:  · Redness over the painful area  · Increasing pain or swelling at the joint  · Fever of 100.4°F (38°C) or above lasting for 24 to 48 hours  Date Last  Reviewed: 11/21/2015  © 4456-5862 [employer]. [address] [place] [address], [place] [address]. All rights reserved. This information is not intended as a substitute for professional medical care. Always follow your healthcare professional's instructions.

## 2019-10-31 ENCOUNTER — TELEPHONE (OUTPATIENT)
Dept: INTERNAL MEDICINE | Facility: CLINIC | Age: 54
End: 2019-10-31

## 2019-10-31 ENCOUNTER — HOSPITAL ENCOUNTER (OUTPATIENT)
Dept: RADIOLOGY | Facility: HOSPITAL | Age: 54
Discharge: HOME | End: 2019-10-31
Attending: NURSE PRACTITIONER
Payer: COMMERCIAL

## 2019-10-31 DIAGNOSIS — L03.90 CELLULITIS, UNSPECIFIED CELLULITIS SITE: Primary | ICD-10-CM

## 2019-10-31 DIAGNOSIS — L03.114 CELLULITIS OF LEFT ARM: ICD-10-CM

## 2019-10-31 DIAGNOSIS — L03.114 CELLULITIS OF LEFT ARM: Primary | ICD-10-CM

## 2019-10-31 PROCEDURE — 73090 X-RAY EXAM OF FOREARM: CPT | Mod: TC,LT

## 2019-10-31 PROCEDURE — 73070 X-RAY EXAM OF ELBOW: CPT | Mod: TC,LT

## 2019-10-31 RX ORDER — CLINDAMYCIN HYDROCHLORIDE 300 MG/1
300 CAPSULE ORAL EVERY 8 HOURS
Qty: 21 CAPSULE | Refills: 0 | Status: SHIPPED | OUTPATIENT
Start: 2019-10-31 | End: 2019-11-07

## 2019-10-31 NOTE — TELEPHONE ENCOUNTER
I sent in clindamycin which is stronger than keflex. Have him stop the keflex and start the clindamycin Also have him come get a rocephin shot

## 2019-10-31 NOTE — TELEPHONE ENCOUNTER
----- Message from Crys Muñoz MA sent at 10/31/2019  7:44 AM CDT -----  Contact: Patient  Patient was seen Tuesday for Left arm redness and swelling, He called this morning stating he has seen little improvement but still very red.     Latrice to order xray.     Please call when order is in, [phone]

## 2019-11-07 DIAGNOSIS — I73.9 PAD (PERIPHERAL ARTERY DISEASE): ICD-10-CM

## 2019-11-07 RX ORDER — CLOPIDOGREL BISULFATE 75 MG/1
TABLET ORAL
Qty: 90 TABLET | Refills: 1 | Status: SHIPPED | OUTPATIENT
Start: 2019-11-07 | End: 2020-03-10

## 2019-12-02 DIAGNOSIS — M71.122 INFECTION OF LEFT OLECRANON BURSA: ICD-10-CM

## 2019-12-03 RX ORDER — CYCLOBENZAPRINE HCL 10 MG
TABLET ORAL
Qty: 30 TABLET | Refills: 0 | Status: SHIPPED | OUTPATIENT
Start: 2019-12-03 | End: 2019-12-31 | Stop reason: SDUPTHER

## 2019-12-31 DIAGNOSIS — M71.122 INFECTION OF LEFT OLECRANON BURSA: ICD-10-CM

## 2019-12-31 RX ORDER — CYCLOBENZAPRINE HCL 10 MG
TABLET ORAL
Qty: 30 TABLET | Refills: 0 | Status: SHIPPED | OUTPATIENT
Start: 2019-12-31 | End: 2020-02-04

## 2020-01-05 RX ORDER — ROSUVASTATIN CALCIUM 20 MG/1
TABLET, COATED ORAL
Qty: 90 TABLET | Refills: 1 | Status: SHIPPED | OUTPATIENT
Start: 2020-01-05 | End: 2020-08-05 | Stop reason: DRUGHIGH

## 2020-02-04 DIAGNOSIS — M71.122 INFECTION OF LEFT OLECRANON BURSA: ICD-10-CM

## 2020-02-04 RX ORDER — CYCLOBENZAPRINE HCL 10 MG
TABLET ORAL
Qty: 30 TABLET | Refills: 0 | Status: SHIPPED | OUTPATIENT
Start: 2020-02-04 | End: 2020-03-10

## 2020-02-11 ENCOUNTER — PATIENT OUTREACH (OUTPATIENT)
Dept: ADMINISTRATIVE | Facility: OTHER | Age: 55
End: 2020-02-11

## 2020-02-12 ENCOUNTER — DOCUMENTATION ONLY (OUTPATIENT)
Dept: CARDIOLOGY | Facility: CLINIC | Age: 55
End: 2020-02-12

## 2020-02-12 ENCOUNTER — OFFICE VISIT (OUTPATIENT)
Dept: CARDIOLOGY | Facility: CLINIC | Age: 55
End: 2020-02-12
Payer: COMMERCIAL

## 2020-02-12 VITALS
WEIGHT: 195.31 LBS | SYSTOLIC BLOOD PRESSURE: 169 MMHG | HEIGHT: 64 IN | BODY MASS INDEX: 33.34 KG/M2 | DIASTOLIC BLOOD PRESSURE: 105 MMHG | HEART RATE: 84 BPM | OXYGEN SATURATION: 98 %

## 2020-02-12 DIAGNOSIS — E78.5 HYPERLIPIDEMIA LDL GOAL <70: ICD-10-CM

## 2020-02-12 DIAGNOSIS — I73.9 CLAUDICATION OF CALF MUSCLES: ICD-10-CM

## 2020-02-12 DIAGNOSIS — F12.90 MARIJUANA USE: ICD-10-CM

## 2020-02-12 DIAGNOSIS — I10 ESSENTIAL HYPERTENSION: ICD-10-CM

## 2020-02-12 DIAGNOSIS — I73.9 PAD (PERIPHERAL ARTERY DISEASE): Primary | ICD-10-CM

## 2020-02-12 DIAGNOSIS — E66.9 OBESITY (BMI 30-39.9): ICD-10-CM

## 2020-02-12 PROCEDURE — 3008F PR BODY MASS INDEX (BMI) DOCUMENTED: ICD-10-PCS | Mod: CPTII,S$GLB,, | Performed by: INTERNAL MEDICINE

## 2020-02-12 PROCEDURE — 3008F BODY MASS INDEX DOCD: CPT | Mod: CPTII,S$GLB,, | Performed by: INTERNAL MEDICINE

## 2020-02-12 PROCEDURE — 99999 PR PBB SHADOW E&M-EST. PATIENT-LVL III: CPT | Mod: PBBFAC,,, | Performed by: INTERNAL MEDICINE

## 2020-02-12 PROCEDURE — 3077F PR MOST RECENT SYSTOLIC BLOOD PRESSURE >= 140 MM HG: ICD-10-PCS | Mod: CPTII,S$GLB,, | Performed by: INTERNAL MEDICINE

## 2020-02-12 PROCEDURE — 99213 OFFICE O/P EST LOW 20 MIN: CPT | Mod: S$GLB,,, | Performed by: INTERNAL MEDICINE

## 2020-02-12 PROCEDURE — 3080F DIAST BP >= 90 MM HG: CPT | Mod: CPTII,S$GLB,, | Performed by: INTERNAL MEDICINE

## 2020-02-12 PROCEDURE — 3077F SYST BP >= 140 MM HG: CPT | Mod: CPTII,S$GLB,, | Performed by: INTERNAL MEDICINE

## 2020-02-12 PROCEDURE — 99213 PR OFFICE/OUTPT VISIT, EST, LEVL III, 20-29 MIN: ICD-10-PCS | Mod: S$GLB,,, | Performed by: INTERNAL MEDICINE

## 2020-02-12 PROCEDURE — 3080F PR MOST RECENT DIASTOLIC BLOOD PRESSURE >= 90 MM HG: ICD-10-PCS | Mod: CPTII,S$GLB,, | Performed by: INTERNAL MEDICINE

## 2020-02-12 PROCEDURE — 99999 PR PBB SHADOW E&M-EST. PATIENT-LVL III: ICD-10-PCS | Mod: PBBFAC,,, | Performed by: INTERNAL MEDICINE

## 2020-02-12 RX ORDER — SODIUM CHLORIDE 9 MG/ML
3 INJECTION, SOLUTION INTRAVENOUS CONTINUOUS
Status: CANCELLED | OUTPATIENT
Start: 2020-02-12 | End: 2020-02-12

## 2020-02-12 RX ORDER — DIPHENHYDRAMINE HCL 25 MG
50 CAPSULE ORAL ONCE
Status: CANCELLED | OUTPATIENT
Start: 2020-02-12 | End: 2020-02-12

## 2020-02-12 NOTE — PROGRESS NOTES
Interventional Cardiology Clinic Note  Reason for Visit: Peripheral arterial disease  Referring Physician: Dr. Lopez    HPI:   Michael Guillaume is a 55 y.o. male who presents for peripheral arterial disease.     Pt is a 56 yo M w/ PMH of PAD w/ B EIA stents (5/27/15), HLD, HTN, and former tobacco abuse (quit 4 yrs ago however smokes marijuana) who presents for evaluation of PAD.  Pt mentions that he has been dealing with increasing claudication since his last visit here 6 months ago. He notes that he walks a mile daily and has to rest 3-5x during 2/2 claudication. He develops numbness in his bilateral calves which progresses to a burning pain with walking about 100 yards. He also gets a soreness in his bilateral groin. He stops and rests for 4-5 minutes and then continues walking. He walks with his brother every AM and they try to push each other, but he can no longer keep up with his brother. He denies rest pain or lower extremity wounds.     During his last clinic visit on 8/15/19, he underwent a lower extremity arterial ultrasound and a TTE. His LE arterial ultrasound showed: L JAMISON 0.93, R JAMISON 0.99, >50% stenosis of ostial R SFA ( cm/s), patent ostial L SFA/PFA stents, and no significant EIA stenosis b/l. His TTE showed normal LVEF and no abnormalities, so he was started on Cilostazol 100 mg bid. He does not remember starting a new medication, but states he stakes everything he has and did not notice any improvement after his last visit. He admits to sob (only when showering) but denies any cp, orthopnea, PND, presyncope, LOC, and palpitations. He does report mild LE edema 2-3 months. At his last visit with his general cardiologist he was switched from Ramipril to Losartan due to increased risk of lung ca, and his BP has been elevated since then. He was also switched to a higher dose statin, which he states has made his legs hurt more.     ROS:    Constitution: Negative for diaphoresis and  malaise/fatigue.   HENT: Negative for nosebleeds. Negative for vision changes.   Cardiovascular: Negative for chest pain, near-syncope, orthopnea, palpitations, paroxysmal nocturnal dyspnea and syncope. Positive for claudication, LE swelling, and dyspnea on exertion.   Respiratory: Negative for cough, snoring and wheezing.    Hematologic/Lymphatic: Negative for bleeding problem. Does not bruise/bleed easily.   Musculoskeletal: Negative for muscle cramps and myalgias.   Gastrointestinal: Negative for bloating, abdominal pain, nausea and vomiting.   Neurological: Negative for extremity weakness, dizziness, headaches and light-headedness. Positive for leg numbness  PMH:     Past Medical History:   Diagnosis Date    Anticoagulant long-term use     Arthritis     Blood transfusion     mid 40's age    CHF (congestive heart failure)     COPD (chronic obstructive pulmonary disease)     COPD (chronic obstructive pulmonary disease)     Coronary artery disease     Encounter for blood transfusion     GERD (gastroesophageal reflux disease)     Hyperlipidemia 7/3/2004    Hypertension     Myocardial infarction     PAD (peripheral artery disease)     Sleep apnea     Thoracic or lumbosacral neuritis or radiculitis, unspecified 5/13/2013    Thyroid disorder 9/8/2015     Past Surgical History:   Procedure Laterality Date    ABDOMINAL SURGERY      ANGIOPLASTY      aortogram  2013    CARDIAC CATHETERIZATION      COLONOSCOPY N/A 10/29/2015    Procedure: COLONOSCOPY;  Surgeon: Mino Wells MD;  [facility];  Service: Endoscopy;  Laterality: N/A;  ALLERGY - LIPITOR    FRACTURE SURGERY      TONSILLECTOMY       Allergies:     Review of patient's allergies indicates:   Allergen Reactions    Lipitor [atorvastatin] Other (See Comments)     Other reaction(s): Severe Myalgias     Medications:     Current Outpatient Medications on File Prior to Visit   Medication Sig Dispense Refill    albuterol-ipratropium  (DUO-NEB) 2.5 mg-0.5 mg/3 mL nebulizer solution Take 3 mLs by nebulization every 6 (six) hours while awake. Rescue 1 Box 2    ALPRAZolam (XANAX) 1 MG tablet Take 1 tablet (1 mg total) by mouth 3 (three) times daily as needed. 90 tablet 5    aspirin 81 mg Tab Take 1 tablet by mouth once daily.       carvedilol (COREG) 12.5 MG tablet TAKE 1 TABLET BY MOUTH TWICE A DAY WITH MEALS 180 tablet 3    cilostazol (PLETAL) 100 MG Tab Take 1 tablet (100 mg total) by mouth 2 (two) times daily. 180 tablet 3    clopidogrel (PLAVIX) 75 mg tablet TAKE 1 TABLET BY MOUTH EVERY DAY 90 tablet 1    cyclobenzaprine (FLEXERIL) 10 MG tablet TAKE 1 TABLET BY MOUTH EVERY DAY IN THE EVENING 30 tablet 0    esomeprazole (NEXIUM) 20 MG capsule Take 20 mg by mouth before breakfast.      losartan (COZAAR) 50 MG tablet Take 1 tablet (50 mg total) by mouth once daily. 90 tablet 3    MULTI-VITAMIN HI-PO ORAL Take by mouth once daily.       rosuvastatin (CRESTOR) 40 MG Tab Take 1 tablet (40 mg total) by mouth once daily. 90 tablet 3    SYMBICORT 80-4.5 mcg/actuation HFAA INHALE 2 PUFFS TWICE A DAY AS DIRECTED 10.2 g 5    vitamin E 400 UNIT capsule Take 400 Units by mouth once daily.        rosuvastatin (CRESTOR) 20 MG tablet TAKE 1 TABLET BY MOUTH EVERY DAY 90 tablet 1     No current facility-administered medications on file prior to visit.      Social History:     Social History     Tobacco Use    Smoking status: Former Smoker     Packs/day: 1.00     Years: 20.00     Pack years: 20.00     Types: Cigarettes     Last attempt to quit: 2011     Years since quittin.2    Smokeless tobacco: Never Used    Tobacco comment: 5-7 cigarettes in 3 months   Substance Use Topics    Alcohol use: Yes     Alcohol/week: 6.0 standard drinks     Types: 2 - 3 Cans of beer, 6 Shots of liquor per week     Comment: wine- occasions     Family History:     Family History   Problem Relation Age of Onset    Clotting disorder Mother         ?    Heart  "failure Mother     Hypertension Mother     Heart disease Mother     Clotting disorder Father         ?    Heart attack Father     Hypertension Father     Heart disease Father     Heart disease Brother     Anesthesia problems Neg Hx      Physical Exam:   BP (!) 169/105 (BP Location: Left arm, Patient Position: Sitting, BP Method: Large (Automatic))   Pulse 84   Ht 5' 4" (1.626 m)   Wt 88.6 kg (195 lb 5.2 oz)   SpO2 98%   BMI 33.53 kg/m²      Constitutional: NAD, conversant  HEENT: Sclera anicteric, PERRLA, EOMI  Neck: No JVD, no carotid bruits  CV: RRR, no murmur, normal S1/S2  Vascular:  L radial  1+,   R radial  1+,     L posterior tibial  present on doppler,  R posterior tibial  present on doppler    L dorsalis pedis  present on doppler, R dorsalis pedis  present on doppler     Pulm:   GI: Abdomen soft, NTND, +BS  Extremities: No LE edema, cool  Skin: No ecchymosis, erythema, or ulcers  Psych: AOx3, appropriate affect  Neuro: No gross deficits    Labs:     Lab Results   Component Value Date     2019    K 4.4 2019     2019    CO2 29 2019    BUN 14 2019    CREATININE 1.1 2019    ANIONGAP 9 2019     Lab Results   Component Value Date    HGBA1C 5.8 2007     Lab Results   Component Value Date    BNP 22 2014    Lab Results   Component Value Date    WBC 13.96 (H) 10/01/2019    HGB 15.5 10/01/2019    HCT 49.2 10/01/2019     (H) 10/01/2019    GRAN 10.9 (H) 10/01/2019    GRAN 77.9 (H) 10/01/2019     Lab Results   Component Value Date    CHOL 146 2019    HDL 43 2019    LDLCALC 54.2 (L) 2019    TRIG 244 (H) 2019          Imagin. Lower extremity arterial ultrasound (19)  Normal JAMISON bilaterally.  Diffuse PAD without significant stenosis of EIA bilaterally. (History of stents in these vessels, stents not well visualized).  >50% ostial R SFA stenosis, dampened monophasic waveforms noted distally.  Patent ostial " L SFA/PFA stents (again by history, stent struts not well visualized).  Note made of increased flow velocity/plaque in mid L SFA (not meeting criteria for >50% stenosis) with dampened/monophasic waveforms distally.    2. TTE (8/27/19)  · Normal left ventricular systolic function. The estimated ejection fraction is 55%  · Normal LV diastolic function.  · No wall motion abnormalities.  · Normal right ventricular systolic function.  · Normal central venous pressure (3 mm Hg).    Previous Agram with run-off imaging reviewed by Dr. Neal.   Assessment:     1. PAD    2. Claudication of calf muscles    3. Essential hypertension    4. Hyperlipidemia LDL goal <70    5. Obesity (BMI 30-39.9)    6. Marijuana use      Plan:     PAD  -- Worsening. Gunner 2B symptoms. Pt walks daily at least 1 mile, takes about 5 breaks. Symptoms begin with walking 100 yards. On maximized medical therapy and compliant. Pt has dopplerable monophasic pulses of pop, PT, and DP. Last LE art US and Agram (2016) showed significant R SFA stenosis.   -- Continue Aspirin 81 mg daily  -- Continue Plavix 75 mg daily   -- Continue Cilostazol 100 mg bid   -- Continue Crestor 40 mg qhs   -- Continue daily walking exercise  -- The risks, benefits, and alternatives of peripheral angiography and possible intervention were discussed with the patient. I had a detailed discussion with the patient regarding risk of stroke, MI, bleeding access site complications, renal failure, emergent need for heart surgery, acute limb complications including ischemia and loss, contrast allergy and death.  -- I also had a detailed discussion regarding the recent FDA statement based on data published last year regarding the possible increase in long-term mortality that may be associated with drug-coated balloons. -- Patient understands the risks and benefits of the procedure and wishes to proceed, including the use of drug coated balloons. All questions were answered and informed  consent was obtained.    HTN (hypertension)  -- Uncontrolled since switching from Ramipril to Losartan.   -- Follow up with general cardiologist for medication adjustment.     Hyperlipidemia LDL goal <70  -- Most recent lipid profile reviewed and at goal.   -- Continue Crestor 40 mg qhs      Signed:  Vandana Joseph PA-C  Interventional Cardiology   [id number]  2/12/2020 10:54 AM

## 2020-02-12 NOTE — H&P (VIEW-ONLY)
Interventional Cardiology Clinic Note  Reason for Visit: Peripheral arterial disease  Referring Physician: Dr. Lopez    HPI:   Michael Guillaume is a 55 y.o. male who presents for peripheral arterial disease.     Pt is a 54 yo M w/ PMH of PAD w/ B EIA stents (5/27/15), HLD, HTN, and former tobacco abuse (quit 4 yrs ago however smokes marijuana) who presents for evaluation of PAD.  Pt mentions that he has been dealing with increasing claudication since his last visit here 6 months ago. He notes that he walks a mile daily and has to rest 3-5x during 2/2 claudication. He develops numbness in his bilateral calves which progresses to a burning pain with walking about 100 yards. He also gets a soreness in his bilateral groin. He stops and rests for 4-5 minutes and then continues walking. He walks with his brother every AM and they try to push each other, but he can no longer keep up with his brother. He denies rest pain or lower extremity wounds.     During his last clinic visit on 8/15/19, he underwent a lower extremity arterial ultrasound and a TTE. His LE arterial ultrasound showed: L JAMISON 0.93, R JAMISON 0.99, >50% stenosis of ostial R SFA ( cm/s), patent ostial L SFA/PFA stents, and no significant EIA stenosis b/l. His TTE showed normal LVEF and no abnormalities, so he was started on Cilostazol 100 mg bid. He does not remember starting a new medication, but states he stakes everything he has and did not notice any improvement after his last visit. He admits to sob (only when showering) but denies any cp, orthopnea, PND, presyncope, LOC, and palpitations. He does report mild LE edema 2-3 months. At his last visit with his general cardiologist he was switched from Ramipril to Losartan due to increased risk of lung ca, and his BP has been elevated since then. He was also switched to a higher dose statin, which he states has made his legs hurt more.     ROS:    Constitution: Negative for diaphoresis and  malaise/fatigue.   HENT: Negative for nosebleeds. Negative for vision changes.   Cardiovascular: Negative for chest pain, near-syncope, orthopnea, palpitations, paroxysmal nocturnal dyspnea and syncope. Positive for claudication, LE swelling, and dyspnea on exertion.   Respiratory: Negative for cough, snoring and wheezing.    Hematologic/Lymphatic: Negative for bleeding problem. Does not bruise/bleed easily.   Musculoskeletal: Negative for muscle cramps and myalgias.   Gastrointestinal: Negative for bloating, abdominal pain, nausea and vomiting.   Neurological: Negative for extremity weakness, dizziness, headaches and light-headedness. Positive for leg numbness  PMH:     Past Medical History:   Diagnosis Date    Anticoagulant long-term use     Arthritis     Blood transfusion     mid 40's age    CHF (congestive heart failure)     COPD (chronic obstructive pulmonary disease)     COPD (chronic obstructive pulmonary disease)     Coronary artery disease     Encounter for blood transfusion     GERD (gastroesophageal reflux disease)     Hyperlipidemia 7/3/2004    Hypertension     Myocardial infarction     PAD (peripheral artery disease)     Sleep apnea     Thoracic or lumbosacral neuritis or radiculitis, unspecified 5/13/2013    Thyroid disorder 9/8/2015     Past Surgical History:   Procedure Laterality Date    ABDOMINAL SURGERY      ANGIOPLASTY      aortogram  2013    CARDIAC CATHETERIZATION      COLONOSCOPY N/A 10/29/2015    Procedure: COLONOSCOPY;  Surgeon: Mino Wells MD;  Location: [facility];  Service: Endoscopy;  Laterality: N/A;  ALLERGY - LIPITOR    FRACTURE SURGERY      TONSILLECTOMY       Allergies:     Review of patient's allergies indicates:   Allergen Reactions    Lipitor [atorvastatin] Other (See Comments)     Other reaction(s): Severe Myalgias     Medications:     Current Outpatient Medications on File Prior to Visit   Medication Sig Dispense Refill    albuterol-ipratropium  (DUO-NEB) 2.5 mg-0.5 mg/3 mL nebulizer solution Take 3 mLs by nebulization every 6 (six) hours while awake. Rescue 1 Box 2    ALPRAZolam (XANAX) 1 MG tablet Take 1 tablet (1 mg total) by mouth 3 (three) times daily as needed. 90 tablet 5    aspirin 81 mg Tab Take 1 tablet by mouth once daily.       carvedilol (COREG) 12.5 MG tablet TAKE 1 TABLET BY MOUTH TWICE A DAY WITH MEALS 180 tablet 3    cilostazol (PLETAL) 100 MG Tab Take 1 tablet (100 mg total) by mouth 2 (two) times daily. 180 tablet 3    clopidogrel (PLAVIX) 75 mg tablet TAKE 1 TABLET BY MOUTH EVERY DAY 90 tablet 1    cyclobenzaprine (FLEXERIL) 10 MG tablet TAKE 1 TABLET BY MOUTH EVERY DAY IN THE EVENING 30 tablet 0    esomeprazole (NEXIUM) 20 MG capsule Take 20 mg by mouth before breakfast.      losartan (COZAAR) 50 MG tablet Take 1 tablet (50 mg total) by mouth once daily. 90 tablet 3    MULTI-VITAMIN HI-PO ORAL Take by mouth once daily.       rosuvastatin (CRESTOR) 40 MG Tab Take 1 tablet (40 mg total) by mouth once daily. 90 tablet 3    SYMBICORT 80-4.5 mcg/actuation HFAA INHALE 2 PUFFS TWICE A DAY AS DIRECTED 10.2 g 5    vitamin E 400 UNIT capsule Take 400 Units by mouth once daily.        rosuvastatin (CRESTOR) 20 MG tablet TAKE 1 TABLET BY MOUTH EVERY DAY 90 tablet 1     No current facility-administered medications on file prior to visit.      Social History:     Social History     Tobacco Use    Smoking status: Former Smoker     Packs/day: 1.00     Years: 20.00     Pack years: 20.00     Types: Cigarettes     Last attempt to quit: 2011     Years since quittin.2    Smokeless tobacco: Never Used    Tobacco comment: 5-7 cigarettes in 3 months   Substance Use Topics    Alcohol use: Yes     Alcohol/week: 6.0 standard drinks     Types: 2 - 3 Cans of beer, 6 Shots of liquor per week     Comment: wine- occasions     Family History:     Family History   Problem Relation Age of Onset    Clotting disorder Mother         ?    Heart  "failure Mother     Hypertension Mother     Heart disease Mother     Clotting disorder Father         ?    Heart attack Father     Hypertension Father     Heart disease Father     Heart disease Brother     Anesthesia problems Neg Hx      Physical Exam:   BP (!) 169/105 (BP Location: Left arm, Patient Position: Sitting, BP Method: Large (Automatic))   Pulse 84   Ht 5' 4" (1.626 m)   Wt 88.6 kg (195 lb 5.2 oz)   SpO2 98%   BMI 33.53 kg/m²      Constitutional: NAD, conversant  HEENT: Sclera anicteric, PERRLA, EOMI  Neck: No JVD, no carotid bruits  CV: RRR, no murmur, normal S1/S2  Vascular:  L radial  1+,   R radial  1+,     L posterior tibial  present on doppler,  R posterior tibial  present on doppler    L dorsalis pedis  present on doppler, R dorsalis pedis  present on doppler     Pulm:   GI: Abdomen soft, NTND, +BS  Extremities: No LE edema, cool  Skin: No ecchymosis, erythema, or ulcers  Psych: AOx3, appropriate affect  Neuro: No gross deficits    Labs:     Lab Results   Component Value Date     2019    K 4.4 2019     2019    CO2 29 2019    BUN 14 2019    CREATININE 1.1 2019    ANIONGAP 9 2019     Lab Results   Component Value Date    HGBA1C 5.8 2007     Lab Results   Component Value Date    BNP 22 2014    Lab Results   Component Value Date    WBC 13.96 (H) 10/01/2019    HGB 15.5 10/01/2019    HCT 49.2 10/01/2019     (H) 10/01/2019    GRAN 10.9 (H) 10/01/2019    GRAN 77.9 (H) 10/01/2019     Lab Results   Component Value Date    CHOL 146 2019    HDL 43 2019    LDLCALC 54.2 (L) 2019    TRIG 244 (H) 2019          Imagin. Lower extremity arterial ultrasound (19)  Normal JAMISON bilaterally.  Diffuse PAD without significant stenosis of EIA bilaterally. (History of stents in these vessels, stents not well visualized).  >50% ostial R SFA stenosis, dampened monophasic waveforms noted distally.  Patent ostial " L SFA/PFA stents (again by history, stent struts not well visualized).  Note made of increased flow velocity/plaque in mid L SFA (not meeting criteria for >50% stenosis) with dampened/monophasic waveforms distally.    2. TTE (8/27/19)  · Normal left ventricular systolic function. The estimated ejection fraction is 55%  · Normal LV diastolic function.  · No wall motion abnormalities.  · Normal right ventricular systolic function.  · Normal central venous pressure (3 mm Hg).    Previous Agram with run-off imaging reviewed by Dr. Neal.   Assessment:     1. PAD    2. Claudication of calf muscles    3. Essential hypertension    4. Hyperlipidemia LDL goal <70    5. Obesity (BMI 30-39.9)    6. Marijuana use      Plan:     PAD  -- Worsening. Gunner 2B symptoms. Pt walks daily at least 1 mile, takes about 5 breaks. Symptoms begin with walking 100 yards. On maximized medical therapy and compliant. Pt has dopplerable monophasic pulses of pop, PT, and DP. Last LE art US and Agram (2016) showed significant R SFA stenosis.   -- Continue Aspirin 81 mg daily  -- Continue Plavix 75 mg daily   -- Continue Cilostazol 100 mg bid   -- Continue Crestor 40 mg qhs   -- Continue daily walking exercise  -- The risks, benefits, and alternatives of peripheral angiography and possible intervention were discussed with the patient. I had a detailed discussion with the patient regarding risk of stroke, MI, bleeding access site complications, renal failure, emergent need for heart surgery, acute limb complications including ischemia and loss, contrast allergy and death.  -- I also had a detailed discussion regarding the recent FDA statement based on data published last year regarding the possible increase in long-term mortality that may be associated with drug-coated balloons. -- Patient understands the risks and benefits of the procedure and wishes to proceed, including the use of drug coated balloons. All questions were answered and informed  consent was obtained.    HTN (hypertension)  -- Uncontrolled since switching from Ramipril to Losartan.   -- Follow up with general cardiologist for medication adjustment.     Hyperlipidemia LDL goal <70  -- Most recent lipid profile reviewed and at goal.   -- Continue Crestor 40 mg qhs      Signed:  Vandana Joseph PA-C  Interventional Cardiology   [id number]  2/12/2020 10:54 AM

## 2020-02-12 NOTE — PROGRESS NOTES
OUTPATIENT CATHETERIZATION INSTRUCTIONS    You have been scheduled for a procedure in the catheterization lab on Thursday, February 20, 2020.     Please report to the Cardiology Waiting Area on the Third floor of the hospital and check in at 9 AM.   You will then be taken to the SSCU (Short Stay Cardiac Unit) and prepared for your procedure. Please be aware that this is not the time of your procedure but the time you are to arrive. The procedures are scheduled on an hourly basis; however, emergency cases take precedence over all other cases.       You may not have anything to eat or drink after midnight the night before your test. You may take your regular morning medications with water. If there are any medications that you should not take you will be instructed to hold them that morning. If you are diabetic and on Metformin (Glucophage) do not take it the day before, the day of, and for 2 days after your procedure.      The procedure will take 1-2 hours to perform. After the procedure, you will return to SSCU on the third floor of the hospital. You will need to lie still (or keep your arm still) for the next 4 to 6 hours to minimize bleeding from the puncture site. Your family may remain in the room with you during this time.       You may be able to be discharged home that same afternoon if there is someone to drive you home and there were no complications. If you have one of the balloon, stent, or device procedures you may spend the night in the hospital. Your doctor will determine, based on your progress, the date and time of your discharge. The results of your procedure will be discussed with you before you are discharged. Any further testing or procedures will be scheduled for you either before you leave or you will be called with these appointments.       If you should have any questions, concerns, or need to change the date of your procedure, please call LOIS King @ [phone]    Special  Instructions:    None        THE ABOVE INSTRUCTIONS WERE GIVEN TO THE PATIENT VERBALLY AND THEY VERBALIZED UNDERSTANDING.  THEY DO NOT REQUIRE ANY SPECIAL NEEDS AND DO NOT HAVE ANY LEARNING BARRIERS.          Directions for Reporting to Cardiology Waiting Area in the Hospital  If you park in the Parking Garage:  Take elevators to the1st floor of the parking garage.  Continue past the gift shop, coffee shop, and piano.  Take a right and go to the gold elevators. (Elevator B)  Take the elevator to the 3rd floor.  Follow the arrow on the sign on the wall that says Cath Lab Registration/EP Lab Registration.  Follow the long hallway all the way around until you come to a big open area.  This is the registration area.  Check in at Reception Desk.    OR    If family is dropping you off:  Have them drop you off at the front of the Hospital under the green overhang.  Enter through the doors and take a right.  Take the E elevators to the 3rd floor Cardiology Waiting Area.  Check in at the Reception Desk in the waiting room.

## 2020-02-20 ENCOUNTER — HOSPITAL ENCOUNTER (OUTPATIENT)
Facility: HOSPITAL | Age: 55
Discharge: HOME | End: 2020-02-21
Attending: INTERNAL MEDICINE | Admitting: INTERNAL MEDICINE
Payer: COMMERCIAL

## 2020-02-20 DIAGNOSIS — I73.9 PAD (PERIPHERAL ARTERY DISEASE): Primary | ICD-10-CM

## 2020-02-20 DIAGNOSIS — Z98.62 S/P PERIPHERAL ARTERY ANGIOPLASTY: ICD-10-CM

## 2020-02-20 DIAGNOSIS — I73.9 CLAUDICATION OF CALF MUSCLES: ICD-10-CM

## 2020-02-20 DIAGNOSIS — I73.9 PAD (PERIPHERAL ARTERY DISEASE): ICD-10-CM

## 2020-02-20 LAB
ABO GROUP BLD: NORMAL
ANION GAP SERPL CALC-SCNC: 9 MMOL/L (ref 8–16)
BASOPHILS # BLD AUTO: 0.07 K/UL (ref 0–0.2)
BASOPHILS NFR BLD: 0.8 % (ref 0–1.9)
BLD GP AB SCN CELLS X3 SERPL QL: NORMAL
BUN SERPL-MCNC: 14 MG/DL (ref 6–20)
CALCIUM SERPL-MCNC: 9.2 MG/DL (ref 8.7–10.5)
CHLORIDE SERPL-SCNC: 105 MMOL/L (ref 95–110)
CO2 SERPL-SCNC: 28 MMOL/L (ref 23–29)
CREAT SERPL-MCNC: 0.9 MG/DL (ref 0.5–1.4)
DIFFERENTIAL METHOD: NORMAL
EOSINOPHIL # BLD AUTO: 0.1 K/UL (ref 0–0.5)
EOSINOPHIL NFR BLD: 1.4 % (ref 0–8)
ERYTHROCYTE [DISTWIDTH] IN BLOOD BY AUTOMATED COUNT: 13.8 % (ref 11.5–14.5)
EST. GFR  (AFRICAN AMERICAN): >60 ML/MIN/1.73 M^2
EST. GFR  (NON AFRICAN AMERICAN): >60 ML/MIN/1.73 M^2
GLUCOSE SERPL-MCNC: 82 MG/DL (ref 70–110)
HCT VFR BLD AUTO: 49.7 % (ref 40–54)
HGB BLD-MCNC: 16.2 G/DL (ref 14–18)
IMM GRANULOCYTES # BLD AUTO: 0.02 K/UL (ref 0–0.04)
IMM GRANULOCYTES NFR BLD AUTO: 0.2 % (ref 0–0.5)
LYMPHOCYTES # BLD AUTO: 2 K/UL (ref 1–4.8)
LYMPHOCYTES NFR BLD: 23.7 % (ref 18–48)
MCH RBC QN AUTO: 30.1 PG (ref 27–31)
MCHC RBC AUTO-ENTMCNC: 32.6 G/DL (ref 32–36)
MCV RBC AUTO: 92 FL (ref 82–98)
MONOCYTES # BLD AUTO: 0.9 K/UL (ref 0.3–1)
MONOCYTES NFR BLD: 10.4 % (ref 4–15)
NEUTROPHILS # BLD AUTO: 5.3 K/UL (ref 1.8–7.7)
NEUTROPHILS NFR BLD: 63.5 % (ref 38–73)
NRBC BLD-RTO: 0 /100 WBC
PLATELET # BLD AUTO: 314 K/UL (ref 150–350)
PMV BLD AUTO: 10.1 FL (ref 9.2–12.9)
POTASSIUM SERPL-SCNC: 4.2 MMOL/L (ref 3.5–5.1)
RBC # BLD AUTO: 5.38 M/UL (ref 4.6–6.2)
RH BLD: NORMAL
SODIUM SERPL-SCNC: 142 MMOL/L (ref 136–145)
WBC # BLD AUTO: 8.38 K/UL (ref 3.9–12.7)

## 2020-02-20 PROCEDURE — 27201423 OPTIME MED/SURG SUP & DEVICES STERILE SUPPLY: Performed by: INTERNAL MEDICINE

## 2020-02-20 PROCEDURE — C1769 GUIDE WIRE: HCPCS | Performed by: INTERNAL MEDICINE

## 2020-02-20 PROCEDURE — 93010 EKG 12-LEAD: ICD-10-PCS | Mod: ,,, | Performed by: INTERNAL MEDICINE

## 2020-02-20 PROCEDURE — 99152 PR MOD CONSCIOUS SEDATION, SAME PHYS, 5+ YRS, FIRST 15 MIN: ICD-10-PCS | Mod: ,,, | Performed by: INTERNAL MEDICINE

## 2020-02-20 PROCEDURE — 37224 PR FEM/POPL REVAS W/TLA: ICD-10-PCS | Mod: RT,,, | Performed by: INTERNAL MEDICINE

## 2020-02-20 PROCEDURE — 25000003 PHARM REV CODE 250: Performed by: PHYSICIAN ASSISTANT

## 2020-02-20 PROCEDURE — 86850 RBC ANTIBODY SCREEN: CPT

## 2020-02-20 PROCEDURE — C2623 CATH, TRANSLUMIN, DRUG-COAT: HCPCS | Performed by: INTERNAL MEDICINE

## 2020-02-20 PROCEDURE — 63600175 PHARM REV CODE 636 W HCPCS: Performed by: INTERNAL MEDICINE

## 2020-02-20 PROCEDURE — 25000003 PHARM REV CODE 250: Performed by: STUDENT IN AN ORGANIZED HEALTH CARE EDUCATION/TRAINING PROGRAM

## 2020-02-20 PROCEDURE — 93010 ELECTROCARDIOGRAM REPORT: CPT | Mod: ,,, | Performed by: INTERNAL MEDICINE

## 2020-02-20 PROCEDURE — 99153 MOD SED SAME PHYS/QHP EA: CPT | Performed by: INTERNAL MEDICINE

## 2020-02-20 PROCEDURE — 25500020 PHARM REV CODE 255: Performed by: INTERNAL MEDICINE

## 2020-02-20 PROCEDURE — 99152 MOD SED SAME PHYS/QHP 5/>YRS: CPT | Mod: ,,, | Performed by: INTERNAL MEDICINE

## 2020-02-20 PROCEDURE — 75710 PR  ANGIO EXTREMITY UNILAT: ICD-10-PCS | Mod: 26,59,, | Performed by: INTERNAL MEDICINE

## 2020-02-20 PROCEDURE — 75710 ARTERY X-RAYS ARM/LEG: CPT | Mod: 26,59,, | Performed by: INTERNAL MEDICINE

## 2020-02-20 PROCEDURE — C1725 CATH, TRANSLUMIN NON-LASER: HCPCS | Performed by: INTERNAL MEDICINE

## 2020-02-20 PROCEDURE — 63600175 PHARM REV CODE 636 W HCPCS: Performed by: STUDENT IN AN ORGANIZED HEALTH CARE EDUCATION/TRAINING PROGRAM

## 2020-02-20 PROCEDURE — 36415 COLL VENOUS BLD VENIPUNCTURE: CPT

## 2020-02-20 PROCEDURE — 63600175 PHARM REV CODE 636 W HCPCS: Performed by: PHYSICIAN ASSISTANT

## 2020-02-20 PROCEDURE — C1760 CLOSURE DEV, VASC: HCPCS | Performed by: INTERNAL MEDICINE

## 2020-02-20 PROCEDURE — 86900 BLOOD TYPING SEROLOGIC ABO: CPT

## 2020-02-20 PROCEDURE — 85347 COAGULATION TIME ACTIVATED: CPT | Performed by: INTERNAL MEDICINE

## 2020-02-20 PROCEDURE — 25000003 PHARM REV CODE 250: Performed by: INTERNAL MEDICINE

## 2020-02-20 PROCEDURE — 99152 MOD SED SAME PHYS/QHP 5/>YRS: CPT | Performed by: INTERNAL MEDICINE

## 2020-02-20 PROCEDURE — 37224 HC FEM/POPL REVAS W/TLA: CPT | Mod: RT | Performed by: INTERNAL MEDICINE

## 2020-02-20 PROCEDURE — 85025 COMPLETE CBC W/AUTO DIFF WBC: CPT

## 2020-02-20 PROCEDURE — 75710 ARTERY X-RAYS ARM/LEG: CPT | Mod: 59 | Performed by: INTERNAL MEDICINE

## 2020-02-20 PROCEDURE — 37224 PR FEM/POPL REVAS W/TLA: CPT | Mod: RT,,, | Performed by: INTERNAL MEDICINE

## 2020-02-20 PROCEDURE — C1887 CATHETER, GUIDING: HCPCS | Performed by: INTERNAL MEDICINE

## 2020-02-20 PROCEDURE — C1894 INTRO/SHEATH, NON-LASER: HCPCS | Performed by: INTERNAL MEDICINE

## 2020-02-20 PROCEDURE — 80048 BASIC METABOLIC PNL TOTAL CA: CPT

## 2020-02-20 PROCEDURE — 86901 BLOOD TYPING SEROLOGIC RH(D): CPT

## 2020-02-20 PROCEDURE — 93005 ELECTROCARDIOGRAM TRACING: CPT

## 2020-02-20 RX ORDER — HEPARIN SODIUM 200 [USP'U]/100ML
INJECTION, SOLUTION INTRAVENOUS
Status: DISCONTINUED | OUTPATIENT
Start: 2020-02-20 | End: 2020-02-21 | Stop reason: HOSPADM

## 2020-02-20 RX ORDER — ALPRAZOLAM 1 MG/1
1 TABLET ORAL 3 TIMES DAILY PRN
Status: DISCONTINUED | OUTPATIENT
Start: 2020-02-20 | End: 2020-02-21 | Stop reason: HOSPADM

## 2020-02-20 RX ORDER — NAPROXEN SODIUM 220 MG/1
81 TABLET, FILM COATED ORAL DAILY
Status: DISCONTINUED | OUTPATIENT
Start: 2020-02-21 | End: 2020-02-21 | Stop reason: HOSPADM

## 2020-02-20 RX ORDER — CARVEDILOL 3.12 MG/1
12.5 TABLET ORAL 2 TIMES DAILY WITH MEALS
Status: DISCONTINUED | OUTPATIENT
Start: 2020-02-20 | End: 2020-02-21 | Stop reason: HOSPADM

## 2020-02-20 RX ORDER — SODIUM CHLORIDE 9 MG/ML
INJECTION, SOLUTION INTRAVENOUS CONTINUOUS
Status: ACTIVE | OUTPATIENT
Start: 2020-02-20 | End: 2020-02-20

## 2020-02-20 RX ORDER — ACETAMINOPHEN 325 MG/1
650 TABLET ORAL EVERY 4 HOURS PRN
Status: DISCONTINUED | OUTPATIENT
Start: 2020-02-20 | End: 2020-02-21 | Stop reason: HOSPADM

## 2020-02-20 RX ORDER — ROSUVASTATIN CALCIUM 40 MG/1
40 TABLET, COATED ORAL DAILY
Status: DISCONTINUED | OUTPATIENT
Start: 2020-02-21 | End: 2020-02-21 | Stop reason: HOSPADM

## 2020-02-20 RX ORDER — CLOPIDOGREL BISULFATE 75 MG/1
75 TABLET ORAL DAILY
Status: DISCONTINUED | OUTPATIENT
Start: 2020-02-21 | End: 2020-02-21 | Stop reason: HOSPADM

## 2020-02-20 RX ORDER — DIPHENHYDRAMINE HCL 50 MG
50 CAPSULE ORAL ONCE
Status: COMPLETED | OUTPATIENT
Start: 2020-02-20 | End: 2020-02-20

## 2020-02-20 RX ORDER — CYCLOBENZAPRINE HCL 10 MG
10 TABLET ORAL NIGHTLY
Status: DISCONTINUED | OUTPATIENT
Start: 2020-02-20 | End: 2020-02-21 | Stop reason: HOSPADM

## 2020-02-20 RX ORDER — HEPARIN SODIUM 1000 [USP'U]/ML
INJECTION, SOLUTION INTRAVENOUS; SUBCUTANEOUS
Status: DISCONTINUED | OUTPATIENT
Start: 2020-02-20 | End: 2020-02-21 | Stop reason: HOSPADM

## 2020-02-20 RX ORDER — FENTANYL CITRATE 50 UG/ML
INJECTION, SOLUTION INTRAMUSCULAR; INTRAVENOUS
Status: DISCONTINUED | OUTPATIENT
Start: 2020-02-20 | End: 2020-02-21 | Stop reason: HOSPADM

## 2020-02-20 RX ORDER — ONDANSETRON 2 MG/ML
4 INJECTION INTRAMUSCULAR; INTRAVENOUS EVERY 12 HOURS PRN
Status: DISCONTINUED | OUTPATIENT
Start: 2020-02-20 | End: 2020-02-21 | Stop reason: HOSPADM

## 2020-02-20 RX ORDER — SODIUM CHLORIDE 9 MG/ML
3 INJECTION, SOLUTION INTRAVENOUS CONTINUOUS
Status: ACTIVE | OUTPATIENT
Start: 2020-02-20 | End: 2020-02-20

## 2020-02-20 RX ORDER — PANTOPRAZOLE SODIUM 40 MG/1
40 TABLET, DELAYED RELEASE ORAL DAILY
Status: DISCONTINUED | OUTPATIENT
Start: 2020-02-21 | End: 2020-02-21 | Stop reason: HOSPADM

## 2020-02-20 RX ORDER — LIDOCAINE HYDROCHLORIDE 20 MG/ML
INJECTION, SOLUTION EPIDURAL; INFILTRATION; INTRACAUDAL; PERINEURAL
Status: DISCONTINUED | OUTPATIENT
Start: 2020-02-20 | End: 2020-02-21 | Stop reason: HOSPADM

## 2020-02-20 RX ORDER — MIDAZOLAM HYDROCHLORIDE 1 MG/ML
INJECTION, SOLUTION INTRAMUSCULAR; INTRAVENOUS
Status: DISCONTINUED | OUTPATIENT
Start: 2020-02-20 | End: 2020-02-21 | Stop reason: HOSPADM

## 2020-02-20 RX ORDER — IODIXANOL 320 MG/ML
INJECTION, SOLUTION INTRAVASCULAR
Status: DISCONTINUED | OUTPATIENT
Start: 2020-02-20 | End: 2020-02-21 | Stop reason: HOSPADM

## 2020-02-20 RX ORDER — LOSARTAN POTASSIUM 50 MG/1
50 TABLET ORAL DAILY
Status: DISCONTINUED | OUTPATIENT
Start: 2020-02-21 | End: 2020-02-21 | Stop reason: HOSPADM

## 2020-02-20 RX ADMIN — ALPRAZOLAM 1 MG: 1 TABLET ORAL at 09:02

## 2020-02-20 RX ADMIN — ONDANSETRON 4 MG: 2 INJECTION INTRAMUSCULAR; INTRAVENOUS at 07:02

## 2020-02-20 RX ADMIN — DIPHENHYDRAMINE HYDROCHLORIDE 50 MG: 50 CAPSULE ORAL at 09:02

## 2020-02-20 RX ADMIN — SODIUM CHLORIDE 3 ML/KG/HR: 0.9 INJECTION, SOLUTION INTRAVENOUS at 09:02

## 2020-02-20 RX ADMIN — CYCLOBENZAPRINE 10 MG: 10 TABLET, FILM COATED ORAL at 09:02

## 2020-02-20 NOTE — Clinical Note
Angiography performed of the right superficial femoral artery. via hand injection with 5 mL of contrast.

## 2020-02-20 NOTE — PLAN OF CARE
Pt transferred from cath lab via stretcher.   L groin site remains gloria.  0 bleed, 0 hematoma.  Post op orders and assessment initiated.  Pt aao, tolerating po.  Will continue to monitor.  Call bell within reach.  Dr Bingham aware of elevated b/p.  Pt reports no complaints at present.  Family at bedside.  Will continue to monitor.  Call bell within  reach.

## 2020-02-20 NOTE — PROGRESS NOTES
02/20/20 0859   Pulse Dorsalis Pedis   Right Dorsalis Pedis Pulse 0 (absent)  (MD assessed and aware)

## 2020-02-20 NOTE — INTERVAL H&P NOTE
The patient has been examined and the H&P has been reviewed:    I concur with the findings and changes have been noted since the H&P was written:     Patient with progressive bilateral claudication with RLE >> LLE. R fem pulse absent and unable to appreciate R DP/PT by doppler.     L fem +2/4  L DP +2/4  L PT +1/4    Proceed with peripheral angio +/- PTAS via L CFA access.     Anesthesia/Surgery risks, benefits and alternative options discussed and understood by patient/family.          Active Hospital Problems    Diagnosis  POA    *PAD [I73.9]  Yes      Resolved Hospital Problems   No resolved problems to display.

## 2020-02-20 NOTE — Clinical Note
Angiography performed of the right common femoral artery. via hand injection with 5 mL of contrast.

## 2020-02-20 NOTE — PROGRESS NOTES
02/20/20 0859   Pulse Dorsalis Pedis   Left Dorsalis Pedis Pulse 1+ (weak)  (MD assessed and aware)

## 2020-02-20 NOTE — Clinical Note
Angiography performed of the right . via hand injection with 5 mL of contrast. Common iliac arteries

## 2020-02-20 NOTE — Clinical Note
375 ml injected throughout the case. 25 mL total wasted during the case. 400 mL total used in the case.

## 2020-02-20 NOTE — PROCEDURES
"    Post Cath Note  Referring Physician: Rohan Neal MD  Procedure: AORTOGRAM, WITH SERIALOGRAPHY (N/A), Angiogram, Extremity, Unilateral, Pta, Artery, Peripheral       Access: Left CFA    Approx 90% stenosis at the level of bifurcation of the R common femoral into the R profunda and R SFA    See full report for further details    Intervention:     S/P successful PTAs x3 of R profunda and R SFA with good angiographic result    Closure device: Mynx    Post Cath Exam:   BP (!) 143/84 (BP Location: Left arm, Patient Position: Lying)   Pulse 79   Temp 96.9 °F (36.1 °C) (Oral)   Resp 18   Ht 5' 4" (1.626 m)   Wt 87.5 kg (193 lb)   SpO2 98%   BMI 33.13 kg/m²   No unusual pain, hematoma, thrill or bruit at vascular access site.  Distal pulse present without signs of ischemia.    Recommendations:   - Routine post-cath care  - IVF at 3 cc/kg/hr for 4 hrs  - Continue medical management  - Risk factor reduction  - Plavix for at least 1 year and ASA 81 mg indefinitely  - Admit overnight for observation   - Follow-up in 1 month with repeat LE arterial duplex      Signed:  Keily Bingham MD  Interventional Cardiology   2/20/2020 4:52 PM      "

## 2020-02-20 NOTE — PLAN OF CARE
Patient arrived on unit awake, alert, and oriented. Wife at bedside. Patient denies pain and dyspnea. Assessments completed.  Patient and wife deny questions at this time. Will continue to monitor.   Problem: Adult Inpatient Plan of Care  Goal: Plan of Care Review  Outcome: Ongoing, Progressing     Problem: Arrhythmia/Dysrhythmia (Cardiac Catheterization)  Goal: Stable Heart Rate and Rhythm  Outcome: Ongoing, Progressing     Problem: Bleeding (Cardiac Catheterization)  Goal: Absence of Bleeding  Outcome: Ongoing, Progressing     Problem: Contrast-Induced Injury Risk (Cardiac Catheterization)  Goal: Absence of Contrast-Induced Injury  Outcome: Ongoing, Progressing     Problem: Embolism (Cardiac Catheterization)  Goal: Absence of Embolism Signs/Symptoms  Outcome: Ongoing, Progressing     Problem: Ongoing Anesthesia/Sedation Effects (Cardiac Catheterization)  Goal: Anesthesia/Sedation Recovery  Outcome: Ongoing, Progressing     Problem: Pain (Cardiac Catheterization)  Goal: Acceptable Pain Control  Outcome: Ongoing, Progressing     Problem: Vascular Access Protection (Cardiac Catheterization)  Goal: Absence of Vascular Access Complication  Outcome: Ongoing, Progressing

## 2020-02-21 VITALS
RESPIRATION RATE: 18 BRPM | HEIGHT: 64 IN | TEMPERATURE: 98 F | HEART RATE: 100 BPM | DIASTOLIC BLOOD PRESSURE: 79 MMHG | SYSTOLIC BLOOD PRESSURE: 139 MMHG | BODY MASS INDEX: 32.95 KG/M2 | OXYGEN SATURATION: 96 % | WEIGHT: 193 LBS

## 2020-02-21 NOTE — PLAN OF CARE
Pt AAO x3, NAD, s/p LHC with balloon angioplasty to Rt LE, Lt groin site, dsg CDI, no active bleeding and no hematoma noted. Pt voids and ambulates without any difficulty. Plan of care reviewed with pt. Call light within pt reach, pt instructed to call staff for any needed assistance, safety maintained. Pt denies needs/concerns at this time, will continue to monitor.

## 2020-02-21 NOTE — DISCHARGE SUMMARY
Discharge Summary  Interventional Cardiology      Admit Date: 2/20/2020    Discharge Date:  2/21/2020    Attending Physician: Rohan Neal MD    Discharge Physician: Ej Goddard MD    Principal Diagnoses: PAD (peripheral artery disease)  Indication for Admission: AORTOGRAM, WITH SERIALOGRAPHY (N/A), Angiogram, Extremity, Unilateral, Pta, Artery, Peripheral    Discharged Condition: Good    Hospital Course: Patient presented for outpatient peripheral angiogram which went without complication. Peripheral angiogram revealed significant stenosis at the level of the mid to distal R CFA into the ostial R profunda and proximal to distal R SFA bifurcation. Successful PTAs was performed. See full cath report in Epic for details. Hemostasis of patient's L CFA access site was achieved with Mynx closure device. Patient was monitored per post-cath protocol overnight, and his L groin access site was c/d/i with no hematoma. His R PT was biphasic by doppler post procedure ; still unable to appreciate R DP. His L distal pulses remained intact. Patient was able to ambulate without difficulty. He was feeling well and anticipating discharge home today. He will follow up with Dr Neal in 1 month with repeat bilateral LE arterial US.     Peripheral angiogram 2/20/20:  · Ost R PFA lesion , 90% stenosed reduced to 0%..  · Dist R SFA to Mid R Popliteal lesion , 75% stenosed reduced to 0%..  · Prox R SFA to Mid R SFA lesion , 80% stenosed reduced to 10%..  · Estimated blood loss: <50 mL  · Successful PTA.  · Three vessel below the knee on right,  · Mid R CFA to Dist R CFA lesion , 90% stenosed reduced to 30%..    Outpatient Plan:  - There were no medication changes    Diet: Cardiac diet    Activity: Activity and groin care instructions provided    Disposition: Home or Self Care    Follow Up: Dr Neal with repeat BL LE arterial US just before (1 month)    Discharge Medications:      Medication List      CONTINUE taking these  medications    albuterol-ipratropium 2.5 mg-0.5 mg/3 mL nebulizer solution  Commonly known as:  DUO-NEB  Take 3 mLs by nebulization every 6 (six) hours while awake. Rescue     ALPRAZolam 1 MG tablet  Commonly known as:  XANAX  Take 1 tablet (1 mg total) by mouth 3 (three) times daily as needed.     aspirin 81 mg Tab     carvediloL 12.5 MG tablet  Commonly known as:  COREG  TAKE 1 TABLET BY MOUTH TWICE A DAY WITH MEALS     cilostazoL 100 MG Tab  Commonly known as:  PLETAL  Take 1 tablet (100 mg total) by mouth 2 (two) times daily.     clopidogreL 75 mg tablet  Commonly known as:  PLAVIX  TAKE 1 TABLET BY MOUTH EVERY DAY     cyclobenzaprine 10 MG tablet  Commonly known as:  FLEXERIL  TAKE 1 TABLET BY MOUTH EVERY DAY IN THE EVENING     esomeprazole 20 MG capsule  Commonly known as:  NEXIUM     losartan 50 MG tablet  Commonly known as:  COZAAR  Take 1 tablet (50 mg total) by mouth once daily.     MULTI-VITAMIN HI-PO ORAL     * rosuvastatin 40 MG Tab  Commonly known as:  CRESTOR  Take 1 tablet (40 mg total) by mouth once daily.     * rosuvastatin 20 MG tablet  Commonly known as:  CRESTOR  TAKE 1 TABLET BY MOUTH EVERY DAY     Symbicort 80-4.5 mcg/actuation Hfaa  Generic drug:  budesonide-formoterol 80-4.5 mcg  INHALE 2 PUFFS TWICE A DAY AS DIRECTED     vitamin E 400 UNIT capsule         * This list has 2 medication(s) that are the same as other medications prescribed for you. Read the directions carefully, and ask your doctor or other care provider to review them with you.

## 2020-02-21 NOTE — DISCHARGE INSTRUCTIONS
Angiogram discharge instructions:  -You may resume your previous diet immediately following procedure. If you should experience nausea or vomiting, try drinking clear liquids, then advance your diet as tolerated. You should drink a large amount of fluids for 24 hours and expect to have an increased urine output.    Puncture site care:  -You may have some bruising and/or tenderness around your incision.  -If you notice any bright red blood or swelling at the puncture site, place your fingers on the groin or arm and slightly above the puncture site and hold FIRM pressure for 15 minutes. Stop activity at this time and keep your leg or arm straight. If you are unable to stop the bleeding with firm pressure applied to the puncture site, call your nearest ambulance (Dial 911).  -If you notice slight oozing of blood at the site, apply pressure, reinforce with small dressing or BandAid, and decrease activity. If you are discharged on the day of your procedure, you should rest and only get up to go to the bathroom until the following morning.  -If you have the following new symptoms in the leg or arm, promptly notify your doctor:          Numbness or tingling         Pale (white) or cold skin         Sharp pain         Inability to move extremity (arm or leg)         Sudden or persistent drainage at the puncture site (pus)         Swelling or hard lump that has increased in size since hospital discharge         Temperature over 101 degrees    -You may remove your bandage in 24 hours.   -Shower for 5 days after procedure. If shower is not available, use a freshly cleaned tub and fill with shallow water below puncture site. Keep puncture site clean and dry.  -Clean and inspect puncture site daily until wound is healed.  -If a closure device had been placed, please follow instructions on the handout given at discharge.    Activity:  -Do not drive, operate machinery, cook, sign legal documents, and care for young children for 24  hours after procedure.  -Do not strain, lift heavy objects (10 lbs or more) or do deep knee bends for 1 week for groin sites. Gradually resume your previous activity.     Home care  Follow these guidelines when you get home:  · For the next 8 hours, you should be watched by a responsible adult. This person should make sure your condition is not getting worse.  · Don't drink any alcohol for the next 24 hours.  · Don't drive, operate dangerous machinery, or make important business or personal decisions during the next 24 hours.    Follow-up care  Follow up with your healthcare provider if you are not alert and back to your usual level of activity within 12 hours.    When to seek medical advice  Call your healthcare provider right away if any of these occur:  · Drowsiness gets worse  · Weakness or dizziness gets worse  · Repeated vomiting  · You can't be awakened

## 2020-02-21 NOTE — NURSING
Bedrest completed, pt ambulated in hallway without any difficulty, Lt groin site dsg CDI, no active bleeding and no hematoma noted, will continue to monitor.

## 2020-02-21 NOTE — NURSING
Pt is AAOx3 and in no apparent distress. L groin site c/d/i without redness or swelling.  Pt states he will take his morning medications when he gets home.  Provided a copy of discharge instructions.  Teaching performed.  Pt verbalized understanding and denied any questions.  PIV d/c catheter tip intact.  2x2 applied and no active bleeding noted.  Pt waiting for wheelchair to escort to garage.  Wife at the bedside.

## 2020-02-27 DIAGNOSIS — I73.9 PAD (PERIPHERAL ARTERY DISEASE): Primary | ICD-10-CM

## 2020-03-05 ENCOUNTER — OFFICE VISIT (OUTPATIENT)
Dept: CARDIOLOGY | Facility: CLINIC | Age: 55
End: 2020-03-05
Payer: COMMERCIAL

## 2020-03-05 VITALS
OXYGEN SATURATION: 99 % | SYSTOLIC BLOOD PRESSURE: 148 MMHG | DIASTOLIC BLOOD PRESSURE: 86 MMHG | BODY MASS INDEX: 32.14 KG/M2 | WEIGHT: 192.88 LBS | HEART RATE: 91 BPM | HEIGHT: 65 IN

## 2020-03-05 DIAGNOSIS — I73.9 PAD (PERIPHERAL ARTERY DISEASE): ICD-10-CM

## 2020-03-05 DIAGNOSIS — S80.11XA LEG HEMATOMA, RIGHT, INITIAL ENCOUNTER: ICD-10-CM

## 2020-03-05 PROCEDURE — 99024 PR POST-OP FOLLOW-UP VISIT: ICD-10-PCS | Mod: S$GLB,,, | Performed by: INTERNAL MEDICINE

## 2020-03-05 PROCEDURE — 99999 PR PBB SHADOW E&M-EST. PATIENT-LVL III: CPT | Mod: PBBFAC,,, | Performed by: INTERNAL MEDICINE

## 2020-03-05 PROCEDURE — 99024 POSTOP FOLLOW-UP VISIT: CPT | Mod: S$GLB,,, | Performed by: INTERNAL MEDICINE

## 2020-03-05 PROCEDURE — 99999 PR PBB SHADOW E&M-EST. PATIENT-LVL III: ICD-10-PCS | Mod: PBBFAC,,, | Performed by: INTERNAL MEDICINE

## 2020-03-05 NOTE — PROGRESS NOTES
"    Interventional Cardiology Clinic Note  Reason for Visit: Peripheral arterial disease  Referring Physician: Dr. Lopez    HPI:   Michael Guillaume is a 55 y.o. male who presents for Groin Swelling (bruising rt leg)    Pt is a 56 yo M w/ PMH of PAD w/ recent PTAs to R PFA/SFA/CFA/popliteal, hx of B/l EIA stents (5/27/15), HLD, HTN, and former tobacco abuse (quit 4 yrs ago however smokes marijuana). Patient had recent multiple PTAs to RLE on 2/20/20 via L CFA access. Following the procedure, he developed swelling and bruising in his right medial and posterior thigh. He has not resumed his daily one mile walk, but has noticed improvement in his claudication pain. He walked quickly from the parking lot to the clinic without experiencing any "numbness" that he used to get. The pain he has now is localized to the area of swelling and bruising, which has been continuing to improve. He is concerned, because he never had this happen following any of the stent placements in the past. He states his L groin healed well.    ROS:    Constitution: Negative for diaphoresis and malaise/fatigue.   HENT: Negative for nosebleeds. Negative for vision changes.   Cardiovascular: Negative for chest pain, claudication, dyspnea on exertion, leg swelling, near-syncope, orthopnea, palpitations, paroxysmal nocturnal dyspnea and syncope.   Respiratory: Negative for shortness of breath, snoring and wheezing.    Hematologic/Lymphatic: Negative for bleeding problem. Positive for bruising.   Musculoskeletal: Negative for muscle cramps and myalgias. Positive for right thigh swelling  Gastrointestinal: Negative for bloating, abdominal pain, nausea and vomiting.   Neurological: Negative for extremity weakness or numbness, dizziness, headaches and light-headedness.   PMH:     Past Medical History:   Diagnosis Date    Anticoagulant long-term use     Arthritis     Blood transfusion     mid 40's age    CHF (congestive heart failure)     COPD (chronic " obstructive pulmonary disease)     COPD (chronic obstructive pulmonary disease)     Coronary artery disease     Encounter for blood transfusion     GERD (gastroesophageal reflux disease)     Hyperlipidemia 7/3/2004    Hypertension     Myocardial infarction     PAD (peripheral artery disease)     Sleep apnea     Thoracic or lumbosacral neuritis or radiculitis, unspecified 5/13/2013    Thyroid disorder 9/8/2015     Past Surgical History:   Procedure Laterality Date    ABDOMINAL SURGERY      ANGIOPLASTY      aortogram  2013    AORTOGRAPHY WITH SERIALOGRAPHY N/A 2/20/2020    Procedure: AORTOGRAM, WITH SERIALOGRAPHY;  Surgeon: Rohan Neal MD;  Location: [facility] CATH LAB;  Service: Cardiology;  Laterality: N/A;    CARDIAC CATHETERIZATION      COLONOSCOPY N/A 10/29/2015    Procedure: COLONOSCOPY;  Surgeon: Mino Wells MD;  Location: [facility];  Service: Endoscopy;  Laterality: N/A;  ALLERGY - LIPITOR    FRACTURE SURGERY      TONSILLECTOMY       Allergies:     Review of patient's allergies indicates:   Allergen Reactions    Lipitor [atorvastatin] Other (See Comments)     Other reaction(s): Severe Myalgias     Medications:     Current Outpatient Medications on File Prior to Visit   Medication Sig Dispense Refill    albuterol-ipratropium (DUO-NEB) 2.5 mg-0.5 mg/3 mL nebulizer solution Take 3 mLs by nebulization every 6 (six) hours while awake. Rescue 1 Box 2    ALPRAZolam (XANAX) 1 MG tablet Take 1 tablet (1 mg total) by mouth 3 (three) times daily as needed. 90 tablet 5    aspirin 81 mg Tab Take 1 tablet by mouth once daily.       carvedilol (COREG) 12.5 MG tablet TAKE 1 TABLET BY MOUTH TWICE A DAY WITH MEALS 180 tablet 3    cilostazol (PLETAL) 100 MG Tab Take 1 tablet (100 mg total) by mouth 2 (two) times daily. 180 tablet 3    clopidogrel (PLAVIX) 75 mg tablet TAKE 1 TABLET BY MOUTH EVERY DAY 90 tablet 1    cyclobenzaprine (FLEXERIL) 10 MG tablet TAKE 1 TABLET BY MOUTH EVERY DAY IN THE  "EVENING 30 tablet 0    esomeprazole (NEXIUM) 20 MG capsule Take 20 mg by mouth before breakfast.      losartan (COZAAR) 50 MG tablet Take 1 tablet (50 mg total) by mouth once daily. 90 tablet 3    MULTI-VITAMIN HI-PO ORAL Take by mouth once daily.       rosuvastatin (CRESTOR) 40 MG Tab Take 1 tablet (40 mg total) by mouth once daily. 90 tablet 3    SYMBICORT 80-4.5 mcg/actuation HFAA INHALE 2 PUFFS TWICE A DAY AS DIRECTED 10.2 g 5    vitamin E 400 UNIT capsule Take 400 Units by mouth once daily.        rosuvastatin (CRESTOR) 20 MG tablet TAKE 1 TABLET BY MOUTH EVERY DAY (Patient not taking: Reported on 3/5/2020) 90 tablet 1     No current facility-administered medications on file prior to visit.      Social History:     Social History     Tobacco Use    Smoking status: Former Smoker     Packs/day: 1.00     Years: 20.00     Pack years: 20.00     Types: Cigarettes     Last attempt to quit: 2011     Years since quittin.3    Smokeless tobacco: Never Used    Tobacco comment: 5-7 cigarettes in 3 months   Substance Use Topics    Alcohol use: Yes     Alcohol/week: 6.0 standard drinks     Types: 2 - 3 Cans of beer, 6 Shots of liquor per week     Comment: wine- occasions     Family History:     Family History   Problem Relation Age of Onset    Clotting disorder Mother         ?    Heart failure Mother     Hypertension Mother     Heart disease Mother     Clotting disorder Father         ?    Heart attack Father     Hypertension Father     Heart disease Father     Heart disease Brother     Anesthesia problems Neg Hx      Physical Exam:   BP (!) 148/86 (BP Location: Right arm, Patient Position: Sitting, BP Method: Large (Automatic))   Pulse 91   Ht 5' 5.35" (1.66 m)   Wt 87.5 kg (192 lb 14.4 oz)   SpO2 99%   BMI 31.75 kg/m²      Constitutional: NAD, conversant  HEENT: Sclera anicteric, PERRLA, EOMI  Neck: No JVD, no carotid bruits  CV: RRR, no murmur, normal S1/S2  Vascular:  L radial  1+,   R " radial  1+,     L posterior tibial  + doppler,  R posterior tibial  + doppler    L dorsalis pedis  + doppler, R dorsalis pedis  0     Pulm: CTAB, no wheezes, rales, or ronchi  GI: Abdomen soft, NTND, +BS  Extremities: No pedal edema, warm and well perfused, diffuse areas of ecchymosis right posterior and medial thigh, extending distally, and localized area of swelling to medial thigh. L groin with small hematoma ~1 cm in diameter, no pulsatile masses, no bruits   Skin: No erythema, or ulcers  Psych: AOx3, appropriate affect  Neuro: No gross deficits    Labs:     Lab Results   Component Value Date     2020    K 4.2 2020     2020    CO2 28 2020    BUN 14 2020    CREATININE 0.9 2020    ANIONGAP 9 2020     Lab Results   Component Value Date    HGBA1C 5.8 2007     Lab Results   Component Value Date    BNP 22 2014    Lab Results   Component Value Date    WBC 8.38 2020    HGB 16.2 2020    HCT 49.7 2020     2020    GRAN 5.3 2020    GRAN 63.5 2020     Lab Results   Component Value Date    CHOL 146 2019    HDL 43 2019    LDLCALC 54.2 (L) 2019    TRIG 244 (H) 2019          Imagin. Agram with run off (20)  · Ost R PFA lesion , 90% stenosed reduced to 0%..  · Dist R SFA to Mid R Popliteal lesion , 75% stenosed reduced to 0%..  · Prox R SFA to Mid R SFA lesion , 80% stenosed reduced to 10%..  · Estimated blood loss: <50 mL  · Successful PTA.  · Three vessel below the knee on right,  · Mid R CFA to Dist R CFA lesion , 90% stenosed reduced to 30%..    Assessment:     1. Leg hematoma, right, initial encounter    2. PAD      Plan:     Leg hematoma, right, initial encounter  -- During PTA of R SFA, there was perforation of the vessel, causing extravasation of blood into the muscle. He subsequently developed hematoma in right medial thigh, which has been gradually self resolving. Reassured  patient that hematoma will taking at least one month to fully resolve.     PAD  -- Claudication improved post PTAs. Pt has not resumed his walking exercises yet. Encouraged him to start back with his daily walks.   -- Keep f/u in 2 weeks with LE art US before.   -- Continue Aspirin 81 mg daily  -- Continue Plavix 75 mg daily   -- Continue Cilostazol 100 mg bid   -- Continue Crestor 40 mg qhs     Signed:  Vandana Joseph PA-C  Interventional Cardiology   [id number]  3/5/2020 9:13 AM

## 2020-03-08 DIAGNOSIS — I73.9 PAD (PERIPHERAL ARTERY DISEASE): ICD-10-CM

## 2020-03-08 DIAGNOSIS — M71.122 INFECTION OF LEFT OLECRANON BURSA: ICD-10-CM

## 2020-03-08 DIAGNOSIS — F41.9 ANXIETY: ICD-10-CM

## 2020-03-10 RX ORDER — ALPRAZOLAM 1 MG/1
1 TABLET ORAL 3 TIMES DAILY PRN
Qty: 90 TABLET | Refills: 2 | Status: SHIPPED | OUTPATIENT
Start: 2020-03-10 | End: 2020-07-02

## 2020-03-10 RX ORDER — CLOPIDOGREL BISULFATE 75 MG/1
TABLET ORAL
Qty: 90 TABLET | Refills: 1 | Status: SHIPPED | OUTPATIENT
Start: 2020-03-10 | End: 2020-08-12 | Stop reason: SDUPTHER

## 2020-03-10 RX ORDER — CYCLOBENZAPRINE HCL 10 MG
TABLET ORAL
Qty: 30 TABLET | Refills: 0 | Status: SHIPPED | OUTPATIENT
Start: 2020-03-10 | End: 2020-04-17

## 2020-04-17 DIAGNOSIS — M71.122 INFECTION OF LEFT OLECRANON BURSA: ICD-10-CM

## 2020-04-17 RX ORDER — CYCLOBENZAPRINE HCL 10 MG
TABLET ORAL
Qty: 30 TABLET | Refills: 0 | Status: SHIPPED | OUTPATIENT
Start: 2020-04-17 | End: 2020-05-28

## 2020-05-27 DIAGNOSIS — M71.122 INFECTION OF LEFT OLECRANON BURSA: ICD-10-CM

## 2020-05-28 RX ORDER — CYCLOBENZAPRINE HCL 10 MG
TABLET ORAL
Qty: 30 TABLET | Refills: 0 | Status: SHIPPED | OUTPATIENT
Start: 2020-05-28 | End: 2020-06-30

## 2020-06-29 DIAGNOSIS — M71.122 INFECTION OF LEFT OLECRANON BURSA: ICD-10-CM

## 2020-06-30 DIAGNOSIS — F41.9 ANXIETY: ICD-10-CM

## 2020-06-30 RX ORDER — CYCLOBENZAPRINE HCL 10 MG
TABLET ORAL
Qty: 30 TABLET | Refills: 0 | Status: SHIPPED | OUTPATIENT
Start: 2020-06-30 | End: 2020-07-27

## 2020-07-02 RX ORDER — ALPRAZOLAM 1 MG/1
1 TABLET ORAL 3 TIMES DAILY PRN
Qty: 90 TABLET | Refills: 2 | Status: SHIPPED | OUTPATIENT
Start: 2020-07-02 | End: 2020-08-21 | Stop reason: SDUPTHER

## 2020-07-27 DIAGNOSIS — M71.122 INFECTION OF LEFT OLECRANON BURSA: ICD-10-CM

## 2020-07-27 RX ORDER — CYCLOBENZAPRINE HCL 10 MG
TABLET ORAL
Qty: 30 TABLET | Refills: 5 | Status: SHIPPED | OUTPATIENT
Start: 2020-07-27 | End: 2020-08-21 | Stop reason: SDUPTHER

## 2020-08-04 ENCOUNTER — PATIENT OUTREACH (OUTPATIENT)
Dept: ADMINISTRATIVE | Facility: OTHER | Age: 55
End: 2020-08-04

## 2020-08-05 ENCOUNTER — HOSPITAL ENCOUNTER (OUTPATIENT)
Dept: CARDIOLOGY | Facility: HOSPITAL | Age: 55
Discharge: HOME | End: 2020-08-05
Attending: INTERNAL MEDICINE
Payer: COMMERCIAL

## 2020-08-05 ENCOUNTER — OFFICE VISIT (OUTPATIENT)
Dept: CARDIOLOGY | Facility: CLINIC | Age: 55
End: 2020-08-05
Payer: MEDICARE

## 2020-08-05 ENCOUNTER — OFFICE VISIT (OUTPATIENT)
Dept: CARDIOLOGY | Facility: CLINIC | Age: 55
End: 2020-08-05
Payer: COMMERCIAL

## 2020-08-05 VITALS
HEIGHT: 64 IN | HEART RATE: 79 BPM | OXYGEN SATURATION: 98 % | WEIGHT: 197.06 LBS | BODY MASS INDEX: 33.64 KG/M2 | DIASTOLIC BLOOD PRESSURE: 82 MMHG | SYSTOLIC BLOOD PRESSURE: 174 MMHG

## 2020-08-05 VITALS
DIASTOLIC BLOOD PRESSURE: 95 MMHG | HEART RATE: 85 BPM | BODY MASS INDEX: 32.55 KG/M2 | OXYGEN SATURATION: 100 % | WEIGHT: 197.75 LBS | SYSTOLIC BLOOD PRESSURE: 174 MMHG

## 2020-08-05 DIAGNOSIS — E78.5 HYPERLIPIDEMIA LDL GOAL <70: ICD-10-CM

## 2020-08-05 DIAGNOSIS — I10 ESSENTIAL HYPERTENSION: ICD-10-CM

## 2020-08-05 DIAGNOSIS — F12.90 MARIJUANA USE: ICD-10-CM

## 2020-08-05 DIAGNOSIS — I73.9 PAD (PERIPHERAL ARTERY DISEASE): ICD-10-CM

## 2020-08-05 DIAGNOSIS — E66.9 OBESITY (BMI 30.0-34.9): ICD-10-CM

## 2020-08-05 DIAGNOSIS — I73.9 CLAUDICATION OF CALF MUSCLES: ICD-10-CM

## 2020-08-05 DIAGNOSIS — I25.5 ISCHEMIC CARDIOMYOPATHY: Primary | ICD-10-CM

## 2020-08-05 DIAGNOSIS — I73.9 PAD (PERIPHERAL ARTERY DISEASE): Primary | ICD-10-CM

## 2020-08-05 LAB
OHS CV RIGHT ABI LOWER EXTREMITY (NO CALC): 0.74
RIGHT ANT TIBIAL SYS PSV: 22 CM/S
RIGHT CFA PSV: 154 CM/S
RIGHT EXTERNAL ILLIAC PSV: 160 CM/S
RIGHT PERONEAL SYS PSV: 18 CM/S
RIGHT POPLITEAL PSV: 34 CM/S
RIGHT POST TIBIAL SYS PSV: 23 CM/S
RIGHT PROFUNDA SYS PSV: 127 CM/S
RIGHT SUPER FEMORAL DIST SYS PSV: 89 CM/S
RIGHT SUPER FEMORAL MID SYS PSV: 91 CM/S
RIGHT SUPER FEMORAL OSTIAL SYS PSV: 257 CM/S
RIGHT SUPER FEMORAL PROX SYS PSV: 199 CM/S
RIGHT TIB/PER TRUNK SYS PSV: 54 CM/S

## 2020-08-05 PROCEDURE — 3077F SYST BP >= 140 MM HG: CPT | Mod: CPTII,S$GLB,, | Performed by: INTERNAL MEDICINE

## 2020-08-05 PROCEDURE — 93926 CV US DOPPLER ARTERIAL LEG RIGHT (CUPID ONLY): ICD-10-PCS | Mod: 26,RT,, | Performed by: INTERNAL MEDICINE

## 2020-08-05 PROCEDURE — 3078F PR MOST RECENT DIASTOLIC BLOOD PRESSURE < 80 MM HG: ICD-10-PCS | Mod: CPTII,S$GLB,, | Performed by: INTERNAL MEDICINE

## 2020-08-05 PROCEDURE — 93926 LOWER EXTREMITY STUDY: CPT | Mod: RT

## 2020-08-05 PROCEDURE — 3008F PR BODY MASS INDEX (BMI) DOCUMENTED: ICD-10-PCS | Mod: CPTII,S$GLB,, | Performed by: INTERNAL MEDICINE

## 2020-08-05 PROCEDURE — 3008F BODY MASS INDEX DOCD: CPT | Mod: CPTII,S$GLB,, | Performed by: INTERNAL MEDICINE

## 2020-08-05 PROCEDURE — 3077F PR MOST RECENT SYSTOLIC BLOOD PRESSURE >= 140 MM HG: ICD-10-PCS | Mod: CPTII,S$GLB,, | Performed by: INTERNAL MEDICINE

## 2020-08-05 PROCEDURE — 99999 PR PBB SHADOW E&M-EST. PATIENT-LVL V: ICD-10-PCS | Mod: PBBFAC,,, | Performed by: NURSE PRACTITIONER

## 2020-08-05 PROCEDURE — 99999 PR PBB SHADOW E&M-EST. PATIENT-LVL III: CPT | Mod: PBBFAC,,, | Performed by: INTERNAL MEDICINE

## 2020-08-05 PROCEDURE — 99214 OFFICE O/P EST MOD 30 MIN: CPT | Mod: S$GLB,,, | Performed by: NURSE PRACTITIONER

## 2020-08-05 PROCEDURE — 99215 OFFICE O/P EST HI 40 MIN: CPT | Mod: PBBFAC,25 | Performed by: NURSE PRACTITIONER

## 2020-08-05 PROCEDURE — 99999 PR PBB SHADOW E&M-EST. PATIENT-LVL III: ICD-10-PCS | Mod: PBBFAC,,, | Performed by: INTERNAL MEDICINE

## 2020-08-05 PROCEDURE — 3078F DIAST BP <80 MM HG: CPT | Mod: CPTII,S$GLB,, | Performed by: INTERNAL MEDICINE

## 2020-08-05 PROCEDURE — 99214 PR OFFICE/OUTPT VISIT, EST, LEVL IV, 30-39 MIN: ICD-10-PCS | Mod: S$GLB,,, | Performed by: NURSE PRACTITIONER

## 2020-08-05 PROCEDURE — 93926 LOWER EXTREMITY STUDY: CPT | Mod: 26,RT,, | Performed by: INTERNAL MEDICINE

## 2020-08-05 PROCEDURE — 99213 PR OFFICE/OUTPT VISIT, EST, LEVL III, 20-29 MIN: ICD-10-PCS | Mod: S$GLB,,, | Performed by: INTERNAL MEDICINE

## 2020-08-05 PROCEDURE — 99213 OFFICE O/P EST LOW 20 MIN: CPT | Mod: S$GLB,,, | Performed by: INTERNAL MEDICINE

## 2020-08-05 PROCEDURE — 99999 PR PBB SHADOW E&M-EST. PATIENT-LVL V: CPT | Mod: PBBFAC,,, | Performed by: NURSE PRACTITIONER

## 2020-08-05 RX ORDER — CILOSTAZOL 50 MG/1
50 TABLET ORAL 2 TIMES DAILY
Qty: 180 TABLET | Refills: 3 | Status: SHIPPED | OUTPATIENT
Start: 2020-08-05 | End: 2021-02-03 | Stop reason: SDUPTHER

## 2020-08-05 RX ORDER — LOSARTAN POTASSIUM 100 MG/1
100 TABLET ORAL DAILY
Qty: 90 TABLET | Refills: 3 | Status: SHIPPED | OUTPATIENT
Start: 2020-08-05 | End: 2021-02-26 | Stop reason: SDUPTHER

## 2020-08-05 NOTE — PROGRESS NOTES
Interventional Cardiology Clinic Note  Reason for Visit: Peripheral arterial disease  Referring Physician: Dr. Lopez  PCP:  Latrice Zafar NP  Subjective:   Patient ID:  Michael Guillaume is a 55 y.o. male who presents for follow up of Peripheral Artery Disease.       HPI: Michael Guillaume is a 54 yo M with PAD s/p PTAs to R PFA/SFA/CFA/popliteal, hx of bilateral EIA stents (5/27/2015), HTN, DLD, former tobacco use who presents for follow up of PAD. Patient had recent PTAs to RLE on 2/20/20. His post procedure course was c/b right medial thigh hematoma (resolved). Since then he reports significant improvement in symptoms - he walks 1 mile per day and is now able to walk 0.5 miles before developing claudication which he describes as symmetrical cramping in his bilateral hips and calves. He reports compliance on aspirin, clopidogrel, and cilostazol. While he quit tobacco (~ 10 months ago), he continues to smoke marijuana. Patient completed LE arterial doppler today (reviewed results).     Aortogram with run off 02/20/20:     Ost R PFA lesion , 90% stenosed reduced to 0%.  Dist R SFA to Mid R Popliteal lesion , 75% stenosed reduced to 0%.  Prox R SFA to Mid R SFA lesion , 80% stenosed reduced to 10%.   Estimated blood loss: <50 mL   Successful PTA.   Three vessel below the knee on right,   Mid R CFA to Dist R CFA lesion , 90% stenosed reduced to 30%..    History:     Past Medical History:   Diagnosis Date    Anticoagulant long-term use     Arthritis     Blood transfusion     mid 40's age    CHF (congestive heart failure)     COPD (chronic obstructive pulmonary disease)     COPD (chronic obstructive pulmonary disease)     Coronary artery disease     Encounter for blood transfusion     GERD (gastroesophageal reflux disease)     Hyperlipidemia 7/3/2004    Hypertension     Myocardial infarction     PAD (peripheral artery disease)     Sleep apnea     Thoracic or lumbosacral neuritis or radiculitis,  unspecified 2013    Thyroid disorder 2015     Past Surgical History:   Procedure Laterality Date    ABDOMINAL SURGERY      ANGIOPLASTY      aortogram      AORTOGRAPHY WITH SERIALOGRAPHY N/A 2020    Procedure: AORTOGRAM, WITH SERIALOGRAPHY;  Surgeon: Rohan Neal MD;  Location: [facility] CATH LAB;  Service: Cardiology;  Laterality: N/A;    CARDIAC CATHETERIZATION      COLONOSCOPY N/A 10/29/2015    Procedure: COLONOSCOPY;  Surgeon: Mino Wells MD;  Location: [facility];  Service: Endoscopy;  Laterality: N/A;  ALLERGY - LIPITOR    FRACTURE SURGERY      TONSILLECTOMY       Social History     Tobacco Use    Smoking status: Former Smoker     Packs/day: 1.00     Years: 20.00     Pack years: 20.00     Types: Cigarettes     Quit date: 2011     Years since quittin.7    Smokeless tobacco: Never Used    Tobacco comment: 5-7 cigarettes in 3 months   Substance Use Topics    Alcohol use: Yes     Alcohol/week: 6.0 standard drinks     Types: 2 - 3 Cans of beer, 6 Shots of liquor per week     Comment: wine- occasions     Family History   Problem Relation Age of Onset    Clotting disorder Mother         ?    Heart failure Mother     Hypertension Mother     Heart disease Mother     Clotting disorder Father         ?    Heart attack Father     Hypertension Father     Heart disease Father     Heart disease Brother     Anesthesia problems Neg Hx        Meds:     Review of patient's allergies indicates:   Allergen Reactions    Lipitor [atorvastatin] Other (See Comments)     Other reaction(s): Severe Myalgias       Current Outpatient Medications:     albuterol-ipratropium (DUO-NEB) 2.5 mg-0.5 mg/3 mL nebulizer solution, Take 3 mLs by nebulization every 6 (six) hours while awake. Rescue, Disp: 1 Box, Rfl: 2    ALPRAZolam (XANAX) 1 MG tablet, TAKE 1 TABLET (1 MG TOTAL) BY MOUTH 3 (THREE) TIMES DAILY AS NEEDED., Disp: 90 tablet, Rfl: 2    aspirin 81 mg Tab, Take 1 tablet by mouth once  daily. , Disp: , Rfl:     carvedilol (COREG) 12.5 MG tablet, TAKE 1 TABLET BY MOUTH TWICE A DAY WITH MEALS, Disp: 180 tablet, Rfl: 3    cilostazol (PLETAL) 100 MG Tab, Take 1 tablet (100 mg total) by mouth 2 (two) times daily., Disp: 180 tablet, Rfl: 3    clopidogreL (PLAVIX) 75 mg tablet, TAKE 1 TABLET BY MOUTH EVERY DAY, Disp: 90 tablet, Rfl: 1    cyclobenzaprine (FLEXERIL) 10 MG tablet, TAKE 1 TABLET BY MOUTH EVERY DAY IN THE EVENING, Disp: 30 tablet, Rfl: 5    esomeprazole (NEXIUM) 20 MG capsule, Take 20 mg by mouth before breakfast., Disp: , Rfl:     losartan (COZAAR) 50 MG tablet, TAKE 1 TABLET BY MOUTH EVERY DAY, Disp: 90 tablet, Rfl: 0    MULTI-VITAMIN HI-PO ORAL, Take by mouth once daily. , Disp: , Rfl:     rosuvastatin (CRESTOR) 20 MG tablet, TAKE 1 TABLET BY MOUTH EVERY DAY, Disp: 90 tablet, Rfl: 1    rosuvastatin (CRESTOR) 40 MG Tab, TAKE 1 TABLET BY MOUTH EVERY DAY, Disp: 90 tablet, Rfl: 0    SYMBICORT 80-4.5 mcg/actuation HFAA, INHALE 2 PUFFS TWICE A DAY AS DIRECTED, Disp: 10.2 g, Rfl: 5    vitamin E 400 UNIT capsule, Take 400 Units by mouth once daily.  , Disp: , Rfl:       Review of Systems   Constitutional: Negative for chills, diaphoresis, fever, malaise/fatigue and weight loss.   HENT: Negative.    Eyes: Negative for blurred vision, double vision, pain and redness.   Respiratory: Negative for cough, shortness of breath and wheezing.    Cardiovascular: Positive for claudication (improved). Negative for chest pain, palpitations, orthopnea, leg swelling and PND.   Gastrointestinal: Negative for abdominal pain, constipation, diarrhea, nausea and vomiting.   Genitourinary: Negative.    Musculoskeletal: Negative.    Skin: Negative.    Neurological: Negative for dizziness, focal weakness, seizures, loss of consciousness, weakness and headaches.   Endo/Heme/Allergies: Negative.    Psychiatric/Behavioral: Negative for depression. The patient is not nervous/anxious.        Objective:   BP (!)  174/95 (BP Location: Left arm, Patient Position: Sitting, BP Method: Large (Automatic))   Pulse 85   Wt 89.7 kg (197 lb 12 oz)   SpO2 100%   BMI 32.55 kg/m²   Physical Exam   Constitutional: He is oriented to person, place, and time and well-developed, well-nourished, and in no distress. No distress.   HENT:   Head: Normocephalic and atraumatic.   Mouth/Throat: Oropharynx is clear and moist.   Eyes: Pupils are equal, round, and reactive to light. Conjunctivae and EOM are normal.   Neck: Normal range of motion. Neck supple. No JVD present.   Cardiovascular: Normal rate, regular rhythm and normal heart sounds. Exam reveals no gallop and no friction rub.   No murmur heard.  R DP weakly biphasic by doppler, R PT and L DP/PT biphasic by doppler   Pulmonary/Chest: Effort normal and breath sounds normal. No respiratory distress. He has no wheezes. He has no rales. He exhibits no tenderness.   Abdominal: Soft. Bowel sounds are normal. He exhibits no distension. There is no abdominal tenderness.   Musculoskeletal: Normal range of motion.         General: No edema.   Neurological: He is alert and oriented to person, place, and time.   Skin: Skin is warm and dry. No erythema.   Psychiatric: Mood, memory, affect and judgment normal.       Labs:     Lab Results   Component Value Date     02/20/2020    K 4.2 02/20/2020     02/20/2020    CO2 28 02/20/2020    BUN 14 02/20/2020    CREATININE 0.9 02/20/2020    ANIONGAP 9 02/20/2020     Lab Results   Component Value Date    HGBA1C 5.8 03/22/2007     Lab Results   Component Value Date    BNP 22 03/19/2014       Lab Results   Component Value Date    WBC 8.38 02/20/2020    HGB 16.2 02/20/2020    HCT 49.7 02/20/2020     02/20/2020    GRAN 5.3 02/20/2020    GRAN 63.5 02/20/2020     Lab Results   Component Value Date    CHOL 146 09/03/2019    HDL 43 09/03/2019    LDLCALC 54.2 (L) 09/03/2019    TRIG 244 (H) 09/03/2019       Lab Results   Component Value Date      02/20/2020    K 4.2 02/20/2020     02/20/2020    CO2 28 02/20/2020    BUN 14 02/20/2020    CREATININE 0.9 02/20/2020    ANIONGAP 9 02/20/2020     Lab Results   Component Value Date    HGBA1C 5.8 03/22/2007     Lab Results   Component Value Date    BNP 22 03/19/2014    Lab Results   Component Value Date    WBC 8.38 02/20/2020    HGB 16.2 02/20/2020    HCT 49.7 02/20/2020     02/20/2020    GRAN 5.3 02/20/2020    GRAN 63.5 02/20/2020     Lab Results   Component Value Date    CHOL 146 09/03/2019    HDL 43 09/03/2019    LDLCALC 54.2 (L) 09/03/2019    TRIG 244 (H) 09/03/2019              Assessment & Plan:     Peripheral artery disease   Claudication of calf muscles  - Claudication improved following RLE PTAs in 02/2020, able to walk further distances --> Gunner Class IIA symptoms.   - Continue daily walking exercises.   - Continue DAPT, statin therapy, and cilostazol.  - Smoking cessation counseling.   - Follow up in 6 months with repeat LE arterial doppler with ABIs beforehand.     HTN (hypertension)  - Patient hypertensive on today's visit --> rechecked /90 mmHg.   - BP control being managed by his primary cardiologist whom he has an appointment with today.     Hyperlipidemia LDL goal <70  - Last LDL at goal < 70. Continue statin therapy.     Marijuana use  - Counseling on smoking cessation.       RTC 6 months       Discussed with Dr Neal.    Signed:  Keily Bingham M.D.  Cardiovascular Fellow PGY-6  [facility]

## 2020-08-05 NOTE — PROGRESS NOTES
Mr. Guillaume is a patient of Dr. Lopez and was last seen in [facility] Cardiology Visit 7/23/19.      Subjective:   Patient ID:  Michael Guillaume is a 55 y.o. male who presents for follow-up of Peripheral Arterial Disease and Leg Problem (cramping)    Problems:  non-obstructive coronary artery disease   Cardiomyopathy EF 40-45% recovered to 60-65%  COPD   hyperlipidemia   hypertension   hypothyroidism.   PAD s/p stent to mid right SFA and right EIA in 2014  20 pack year h/o smoking, quit in 2011    HPI  Mr. Guillaume is in clinic today for routine follow up.  Reports leg pain since increasing the rosuvastatin from 20 mg to 40 mg.   Patient denies chest pain with exertion or at rest, palpitations, SOB, SCHWARTZ, dizziness, syncope, edema, orthopnea, or PND.  He is treated with low dose ASA and high intensity statin.  LDL is 54 while on rosuvastatin 40 mg.      ECG 2/20/20 shows NSR with a rate of 88 bpm    Review of Systems   Constitution: Negative for decreased appetite, diaphoresis, malaise/fatigue, weight gain and weight loss.   Eyes: Negative for visual disturbance.   Cardiovascular: Negative for chest pain, claudication, dyspnea on exertion, irregular heartbeat, leg swelling, near-syncope, orthopnea, palpitations, paroxysmal nocturnal dyspnea and syncope.        Denies chest pressure   Respiratory: Negative for cough, hemoptysis, shortness of breath, sleep disturbances due to breathing and snoring.    Endocrine: Negative for cold intolerance and heat intolerance.   Hematologic/Lymphatic: Negative for bleeding problem. Does not bruise/bleed easily.   Musculoskeletal: Negative for myalgias.   Gastrointestinal: Negative for bloating, abdominal pain, anorexia, change in bowel habit, constipation, diarrhea, nausea and vomiting.   Neurological: Negative for difficulty with concentration, disturbances in coordination, excessive daytime sleepiness, dizziness, headaches, light-headedness, loss of balance, numbness and weakness.  "  Psychiatric/Behavioral: The patient does not have insomnia.        Allergies and current medications updated and reviewed:  Review of patient's allergies indicates:   Allergen Reactions    Lipitor [atorvastatin] Other (See Comments)     Other reaction(s): Severe Myalgias     Current Outpatient Medications   Medication Sig    albuterol-ipratropium (DUO-NEB) 2.5 mg-0.5 mg/3 mL nebulizer solution Take 3 mLs by nebulization every 6 (six) hours while awake. Rescue    ALPRAZolam (XANAX) 1 MG tablet TAKE 1 TABLET (1 MG TOTAL) BY MOUTH 3 (THREE) TIMES DAILY AS NEEDED.    aspirin 81 mg Tab Take 1 tablet by mouth once daily.     carvedilol (COREG) 12.5 MG tablet TAKE 1 TABLET BY MOUTH TWICE A DAY WITH MEALS    clopidogreL (PLAVIX) 75 mg tablet TAKE 1 TABLET BY MOUTH EVERY DAY    cyclobenzaprine (FLEXERIL) 10 MG tablet TAKE 1 TABLET BY MOUTH EVERY DAY IN THE EVENING    esomeprazole (NEXIUM) 20 MG capsule Take 20 mg by mouth before breakfast.    losartan (COZAAR) 50 MG tablet TAKE 1 TABLET BY MOUTH EVERY DAY    MULTI-VITAMIN HI-PO ORAL Take by mouth once daily.     rosuvastatin (CRESTOR) 40 MG Tab TAKE 1 TABLET BY MOUTH EVERY DAY    SYMBICORT 80-4.5 mcg/actuation HFAA INHALE 2 PUFFS TWICE A DAY AS DIRECTED    vitamin E 400 UNIT capsule Take 400 Units by mouth once daily.      cilostazol (PLETAL) 100 MG Tab Take 1 tablet (100 mg total) by mouth 2 (two) times daily. (Patient not taking: Reported on 2020)    rosuvastatin (CRESTOR) 20 MG tablet TAKE 1 TABLET BY MOUTH EVERY DAY (Patient not taking: Reported on 2020)     No current facility-administered medications for this visit.        Objective:     Right Arm BP - Sittin/93 (20 0959)  Left Arm BP - Sittin/82 (20 0959)    BP (!) 174/82 (BP Location: Left arm, Patient Position: Sitting, BP Method: Large (Automatic))   Pulse 79   Ht 5' 4" (1.626 m)   Wt 89.4 kg (197 lb 1.5 oz)   SpO2 98%   BMI 33.83 kg/m²       Physical Exam "   Constitutional: He is oriented to person, place, and time. Vital signs are normal. He appears well-developed and well-nourished. He is active. No distress.   HENT:   Head: Normocephalic and atraumatic.   Eyes: Conjunctivae and lids are normal. No scleral icterus.   Neck: Neck supple. Normal carotid pulses, no hepatojugular reflux and no JVD present. Carotid bruit is not present.   Cardiovascular: Normal rate, regular rhythm, S1 normal, S2 normal and intact distal pulses. PMI is not displaced. Exam reveals no gallop and no friction rub.   No murmur heard.  Pulses:       Carotid pulses are 2+ on the right side and 2+ on the left side.       Radial pulses are 2+ on the right side and 2+ on the left side.        Dorsalis pedis pulses are 2+ on the right side and 2+ on the left side.        Posterior tibial pulses are 1+ on the right side and 1+ on the left side.   Pulmonary/Chest: Effort normal and breath sounds normal. No respiratory distress. He has no decreased breath sounds. He has no wheezes. He has no rhonchi. He has no rales. He exhibits no tenderness.   Abdominal: Soft. Normal appearance and bowel sounds are normal. He exhibits no distension, no fluid wave, no abdominal bruit, no ascites and no pulsatile midline mass. There is no hepatosplenomegaly. There is no abdominal tenderness.   Musculoskeletal:         General: No edema.   Neurological: He is alert and oriented to person, place, and time. Gait normal.   Skin: Skin is warm, dry and intact. No rash noted. He is not diaphoretic. Nails show no clubbing.   Psychiatric: He has a normal mood and affect. His speech is normal and behavior is normal. Judgment and thought content normal. Cognition and memory are normal.   Nursing note and vitals reviewed.      Chemistry        Component Value Date/Time     02/20/2020 1135    K 4.2 02/20/2020 1135     02/20/2020 1135    CO2 28 02/20/2020 1135    BUN 14 02/20/2020 1135    CREATININE 0.9 02/20/2020 1135     GLU 82 02/20/2020 1135        Component Value Date/Time    CALCIUM 9.2 02/20/2020 1135    ALKPHOS 91 09/03/2019 0945    AST 41 (H) 09/03/2019 0945    ALT 25 09/03/2019 0945    BILITOT 0.2 09/03/2019 0945    ESTGFRAFRICA >60.0 02/20/2020 1135    EGFRNONAA >60.0 02/20/2020 1135        Lab Results   Component Value Date    HGBA1C 5.8 03/22/2007     Recent Labs   Lab 09/03/19  0945 10/01/19  0850 02/20/20  0838   WBC  --  13.96 H 8.38   Hemoglobin  --  15.5 16.2   Hematocrit  --  49.2 49.7   Mean Corpuscular Volume  --  95 92   Platelets  --  406 H 314   TSH  --  0.390 L  --    Cholesterol 146  --   --    HDL 43  --   --    LDL Cholesterol 54.2 L  --   --    Triglycerides 244 H  --   --    Hdl/Cholesterol Ratio 29.5  --   --               Test(s) Reviewed  I have reviewed the following in detail:  [] Stress test   [] Angiography   [x] Echocardiogram   [x] Labs   [] Other:         Assessment/Plan:   1. Ischemic cardiomyopathy  Asymptomatic. Euvolemic on exam. No changes.     - Magnesium; Future  - CBC Without Differential; Future    2. Hyperlipidemia LDL goal <70  LDL at goal <70. TG not at goal <150.  Discussed ways to lower TG levels.     - Comprehensive metabolic panel; Future  - Lipid Panel; Future    3. Essential hypertension  BP not at goal <130/80. Increase losartan to 100 mg daily. CMP in 2 weeks.     - losartan (COZAAR) 100 MG tablet; Take 1 tablet (100 mg total) by mouth once daily.  Dispense: 90 tablet; Refill: 3  - Comprehensive metabolic panel; Future    4. PAD  Symptomatic. Reports walking a mile a day but stops 3-4 times during his walk.  Not taking his cilostazol because he didn't know what it was for. Discussed role of cilostazol.  Will order 50 mg as the nexium can increase the cilostazol levels.     - cilostazoL (PLETAL) 50 MG Tab; Take 1 tablet (50 mg total) by mouth 2 (two) times daily.  Dispense: 180 tablet; Refill: 3    5. Claudication of calf muscles    - cilostazoL (PLETAL) 50 MG Tab; Take 1  tablet (50 mg total) by mouth 2 (two) times daily.  Dispense: 180 tablet; Refill: 3    6. Obesity (BMI 30.0-34.9)  BMI 33.8 Encouraged CV exercise to 30 minutes a day for 5 days a week.     Patient was discussed with but not examined by Dr. Adams    Follow up in about 3 months (around 11/5/2020).

## 2020-08-05 NOTE — PATIENT INSTRUCTIONS
Increase the losartan from 50 mg to 100 mg daily.  You can take 2 of the losartan 50 mg tablets a day until you run out.  Then start the new prescription.     Please check your blood pressure daily for 2 weeks after making the change and either email or call with your readings.      Start the cilostazol (pletal) 50 mg by mouth two times a day for the pain in your legs when you walk.      Ways to lower triglycerides  Cut simple sugars out of your diet (white breads, candies, cookies, cakes, etc.)  Reduce or eliminate your intake of vegetable fats and highly processed trans fatty acids.   Exercise 30 minutes a day for 4-5 days a week.   Consider taking the Omega 3 supplement.  Eat more fiber.

## 2020-08-19 ENCOUNTER — CLINICAL SUPPORT (OUTPATIENT)
Dept: INTERNAL MEDICINE | Facility: CLINIC | Age: 55
End: 2020-08-19
Payer: COMMERCIAL

## 2020-08-19 DIAGNOSIS — E66.9 OBESITY (BMI 30-39.9): ICD-10-CM

## 2020-08-19 DIAGNOSIS — J44.9 CHRONIC OBSTRUCTIVE PULMONARY DISEASE, UNSPECIFIED COPD TYPE: ICD-10-CM

## 2020-08-19 DIAGNOSIS — E05.90 SUBCLINICAL HYPERTHYROIDISM: ICD-10-CM

## 2020-08-19 DIAGNOSIS — E78.5 HYPERLIPIDEMIA LDL GOAL <70: ICD-10-CM

## 2020-08-19 DIAGNOSIS — I25.5 ISCHEMIC CARDIOMYOPATHY: ICD-10-CM

## 2020-08-19 DIAGNOSIS — I10 ESSENTIAL HYPERTENSION: ICD-10-CM

## 2020-08-19 DIAGNOSIS — K21.9 GASTROESOPHAGEAL REFLUX DISEASE WITHOUT ESOPHAGITIS: ICD-10-CM

## 2020-08-19 LAB
ALBUMIN SERPL BCP-MCNC: 3.9 G/DL (ref 3.5–5.2)
ALP SERPL-CCNC: 87 U/L (ref 55–135)
ALT SERPL W/O P-5'-P-CCNC: 22 U/L (ref 10–44)
ANION GAP SERPL CALC-SCNC: 8 MMOL/L (ref 8–16)
AST SERPL-CCNC: 30 U/L (ref 10–40)
BASOPHILS # BLD AUTO: 0.08 K/UL (ref 0–0.2)
BASOPHILS NFR BLD: 0.8 % (ref 0–1.9)
BILIRUB SERPL-MCNC: 0.4 MG/DL (ref 0.1–1)
BUN SERPL-MCNC: 14 MG/DL (ref 6–20)
CALCIUM SERPL-MCNC: 9.9 MG/DL (ref 8.7–10.5)
CHLORIDE SERPL-SCNC: 107 MMOL/L (ref 95–110)
CHOLEST SERPL-MCNC: 164 MG/DL (ref 120–199)
CHOLEST/HDLC SERPL: 4.4 {RATIO} (ref 2–5)
CO2 SERPL-SCNC: 28 MMOL/L (ref 23–29)
CREAT SERPL-MCNC: 1 MG/DL (ref 0.5–1.4)
DIFFERENTIAL METHOD: ABNORMAL
EOSINOPHIL # BLD AUTO: 0.1 K/UL (ref 0–0.5)
EOSINOPHIL NFR BLD: 1 % (ref 0–8)
ERYTHROCYTE [DISTWIDTH] IN BLOOD BY AUTOMATED COUNT: 14.9 % (ref 11.5–14.5)
EST. GFR  (AFRICAN AMERICAN): >60 ML/MIN/1.73 M^2
EST. GFR  (NON AFRICAN AMERICAN): >60 ML/MIN/1.73 M^2
GLUCOSE SERPL-MCNC: 97 MG/DL (ref 70–110)
HCT VFR BLD AUTO: 46.8 % (ref 40–54)
HDLC SERPL-MCNC: 37 MG/DL (ref 40–75)
HDLC SERPL: 22.6 % (ref 20–50)
HGB BLD-MCNC: 15 G/DL (ref 14–18)
IMM GRANULOCYTES # BLD AUTO: 0.03 K/UL (ref 0–0.04)
IMM GRANULOCYTES NFR BLD AUTO: 0.3 % (ref 0–0.5)
LDLC SERPL CALC-MCNC: 83.6 MG/DL (ref 63–159)
LYMPHOCYTES # BLD AUTO: 2 K/UL (ref 1–4.8)
LYMPHOCYTES NFR BLD: 20.1 % (ref 18–48)
MAGNESIUM SERPL-MCNC: 2.1 MG/DL (ref 1.6–2.6)
MCH RBC QN AUTO: 29.7 PG (ref 27–31)
MCHC RBC AUTO-ENTMCNC: 32.1 G/DL (ref 32–36)
MCV RBC AUTO: 93 FL (ref 82–98)
MONOCYTES # BLD AUTO: 0.9 K/UL (ref 0.3–1)
MONOCYTES NFR BLD: 9.2 % (ref 4–15)
NEUTROPHILS # BLD AUTO: 6.7 K/UL (ref 1.8–7.7)
NEUTROPHILS NFR BLD: 68.6 % (ref 38–73)
NONHDLC SERPL-MCNC: 127 MG/DL
NRBC BLD-RTO: 0 /100 WBC
PLATELET # BLD AUTO: 384 K/UL (ref 150–350)
PMV BLD AUTO: 9.6 FL (ref 9.2–12.9)
POTASSIUM SERPL-SCNC: 4.3 MMOL/L (ref 3.5–5.1)
PROT SERPL-MCNC: 7.1 G/DL (ref 6–8.4)
RBC # BLD AUTO: 5.05 M/UL (ref 4.6–6.2)
SODIUM SERPL-SCNC: 143 MMOL/L (ref 136–145)
T3 SERPL-MCNC: 91 NG/DL (ref 60–180)
T4 SERPL-MCNC: 6.3 UG/DL (ref 4.5–11.5)
TRIGL SERPL-MCNC: 217 MG/DL (ref 30–150)
TSH SERPL DL<=0.005 MIU/L-ACNC: 1.09 UIU/ML (ref 0.4–4)
WBC # BLD AUTO: 9.77 K/UL (ref 3.9–12.7)

## 2020-08-19 PROCEDURE — 84480 ASSAY TRIIODOTHYRONINE (T3): CPT

## 2020-08-19 PROCEDURE — 84443 ASSAY THYROID STIM HORMONE: CPT

## 2020-08-19 PROCEDURE — 36415 COLL VENOUS BLD VENIPUNCTURE: CPT

## 2020-08-19 PROCEDURE — 85025 COMPLETE CBC W/AUTO DIFF WBC: CPT

## 2020-08-19 PROCEDURE — 80053 COMPREHEN METABOLIC PANEL: CPT

## 2020-08-19 PROCEDURE — 84436 ASSAY OF TOTAL THYROXINE: CPT

## 2020-08-19 PROCEDURE — 83735 ASSAY OF MAGNESIUM: CPT

## 2020-08-19 PROCEDURE — 80061 LIPID PANEL: CPT

## 2020-08-21 ENCOUNTER — OFFICE VISIT (OUTPATIENT)
Dept: INTERNAL MEDICINE | Facility: CLINIC | Age: 55
End: 2020-08-21
Payer: COMMERCIAL

## 2020-08-21 VITALS
WEIGHT: 196.19 LBS | SYSTOLIC BLOOD PRESSURE: 126 MMHG | HEART RATE: 87 BPM | HEIGHT: 64 IN | DIASTOLIC BLOOD PRESSURE: 88 MMHG | RESPIRATION RATE: 18 BRPM | OXYGEN SATURATION: 96 % | TEMPERATURE: 98 F | BODY MASS INDEX: 33.49 KG/M2

## 2020-08-21 DIAGNOSIS — J44.9 CHRONIC OBSTRUCTIVE PULMONARY DISEASE, UNSPECIFIED COPD TYPE: ICD-10-CM

## 2020-08-21 DIAGNOSIS — F41.9 ANXIETY: ICD-10-CM

## 2020-08-21 DIAGNOSIS — G89.29 CHRONIC BILATERAL BACK PAIN, UNSPECIFIED BACK LOCATION: ICD-10-CM

## 2020-08-21 DIAGNOSIS — Z12.5 PROSTATE CANCER SCREENING: ICD-10-CM

## 2020-08-21 DIAGNOSIS — E78.5 HYPERLIPIDEMIA LDL GOAL <70: ICD-10-CM

## 2020-08-21 DIAGNOSIS — M54.9 CHRONIC BILATERAL BACK PAIN, UNSPECIFIED BACK LOCATION: ICD-10-CM

## 2020-08-21 DIAGNOSIS — E66.9 OBESITY (BMI 30-39.9): ICD-10-CM

## 2020-08-21 DIAGNOSIS — I25.5 ISCHEMIC CARDIOMYOPATHY: ICD-10-CM

## 2020-08-21 DIAGNOSIS — I10 ESSENTIAL HYPERTENSION: Primary | ICD-10-CM

## 2020-08-21 LAB — COMPLEXED PSA SERPL-MCNC: 0.74 NG/ML (ref 0–4)

## 2020-08-21 PROCEDURE — 3074F SYST BP LT 130 MM HG: CPT | Mod: CPTII,S$GLB,, | Performed by: NURSE PRACTITIONER

## 2020-08-21 PROCEDURE — 99999 PR PBB SHADOW E&M-EST. PATIENT-LVL V: ICD-10-PCS | Mod: PBBFAC,,, | Performed by: NURSE PRACTITIONER

## 2020-08-21 PROCEDURE — 99999 PR PBB SHADOW E&M-EST. PATIENT-LVL V: CPT | Mod: PBBFAC,,, | Performed by: NURSE PRACTITIONER

## 2020-08-21 PROCEDURE — 3008F BODY MASS INDEX DOCD: CPT | Mod: CPTII,S$GLB,, | Performed by: NURSE PRACTITIONER

## 2020-08-21 PROCEDURE — 3074F PR MOST RECENT SYSTOLIC BLOOD PRESSURE < 130 MM HG: ICD-10-PCS | Mod: CPTII,S$GLB,, | Performed by: NURSE PRACTITIONER

## 2020-08-21 PROCEDURE — 3079F DIAST BP 80-89 MM HG: CPT | Mod: CPTII,S$GLB,, | Performed by: NURSE PRACTITIONER

## 2020-08-21 PROCEDURE — 3008F PR BODY MASS INDEX (BMI) DOCUMENTED: ICD-10-PCS | Mod: CPTII,S$GLB,, | Performed by: NURSE PRACTITIONER

## 2020-08-21 PROCEDURE — 3079F PR MOST RECENT DIASTOLIC BLOOD PRESSURE 80-89 MM HG: ICD-10-PCS | Mod: CPTII,S$GLB,, | Performed by: NURSE PRACTITIONER

## 2020-08-21 PROCEDURE — 99214 PR OFFICE/OUTPT VISIT, EST, LEVL IV, 30-39 MIN: ICD-10-PCS | Mod: S$GLB,,, | Performed by: NURSE PRACTITIONER

## 2020-08-21 PROCEDURE — 99214 OFFICE O/P EST MOD 30 MIN: CPT | Mod: S$GLB,,, | Performed by: NURSE PRACTITIONER

## 2020-08-21 PROCEDURE — 84153 ASSAY OF PSA TOTAL: CPT

## 2020-08-21 RX ORDER — CYCLOBENZAPRINE HCL 10 MG
10 TABLET ORAL NIGHTLY
Qty: 30 TABLET | Refills: 5 | Status: SHIPPED | OUTPATIENT
Start: 2020-08-21 | End: 2021-02-26 | Stop reason: SDUPTHER

## 2020-08-21 RX ORDER — ALPRAZOLAM 1 MG/1
1 TABLET ORAL 3 TIMES DAILY PRN
Qty: 90 TABLET | Refills: 2 | Status: SHIPPED | OUTPATIENT
Start: 2020-08-21 | End: 2020-12-22 | Stop reason: SDUPTHER

## 2020-08-21 NOTE — PROGRESS NOTES
Subjective:       Patient ID: Michael Guillaume is a 55 y.o. male.    Chief Complaint: Follow-up (check up - refills)    Patient is known, to me and presents with   Chief Complaint   Patient presents with    Follow-up     check up - refills   .  Denies chest pain and shortness of breath.  Patient presents for check up and labs. Will need psa done today. Sees his cardiologists on a regular basis. Doing well with no complaints   HPI  Review of Systems   Constitutional: Negative.  Negative for activity change, appetite change, chills, diaphoresis, fatigue, fever and unexpected weight change.   HENT: Negative.  Negative for congestion, ear discharge, ear pain, facial swelling, hearing loss, nosebleeds, postnasal drip, rhinorrhea, sinus pressure, sneezing, sore throat, tinnitus, trouble swallowing and voice change.    Eyes: Negative.  Negative for photophobia, pain, discharge, redness, itching and visual disturbance.   Respiratory: Negative.  Negative for apnea, cough, choking, chest tightness, shortness of breath, wheezing and stridor.    Cardiovascular: Negative.  Negative for chest pain, palpitations and leg swelling.   Gastrointestinal: Negative for abdominal distention, abdominal pain, anal bleeding, blood in stool, constipation, diarrhea, nausea and vomiting.   Genitourinary: Negative.  Negative for difficulty urinating, discharge, dysuria, enuresis, flank pain, frequency, hematuria, penile pain, penile swelling, scrotal swelling, testicular pain and urgency.   Musculoskeletal: Negative.  Negative for arthralgias, back pain, gait problem, joint swelling, myalgias, neck pain and neck stiffness.   Skin: Negative.  Negative for color change, pallor, rash and wound.   Neurological: Negative for dizziness, tremors, seizures, syncope, facial asymmetry, speech difficulty, weakness, light-headedness, numbness and headaches.   Hematological: Negative for adenopathy. Does not bruise/bleed easily.   Psychiatric/Behavioral:  Negative.  Negative for agitation, dysphoric mood, sleep disturbance and suicidal ideas. The patient is not nervous/anxious.        Objective:      Physical Exam  Vitals signs and nursing note reviewed.   Constitutional:       General: He is not in acute distress.     Appearance: He is well-developed. He is not diaphoretic.   HENT:      Head: Normocephalic and atraumatic.      Right Ear: External ear normal.      Left Ear: External ear normal.      Nose: Nose normal.      Mouth/Throat:      Pharynx: No oropharyngeal exudate.   Eyes:      General:         Right eye: No discharge.         Left eye: No discharge.      Conjunctiva/sclera: Conjunctivae normal.      Pupils: Pupils are equal, round, and reactive to light.   Neck:      Musculoskeletal: Normal range of motion and neck supple.      Thyroid: No thyromegaly.      Vascular: No JVD.      Trachea: No tracheal deviation.   Cardiovascular:      Rate and Rhythm: Normal rate and regular rhythm.      Heart sounds: Normal heart sounds. No murmur. No friction rub. No gallop.    Pulmonary:      Effort: Pulmonary effort is normal. No respiratory distress.      Breath sounds: Normal breath sounds. No stridor. No wheezing or rales.   Chest:      Chest wall: No tenderness.   Abdominal:      General: Bowel sounds are normal. There is no distension.      Palpations: Abdomen is soft.      Tenderness: There is no abdominal tenderness. There is no guarding or rebound.   Musculoskeletal: Normal range of motion.         General: No tenderness.   Lymphadenopathy:      Cervical: No cervical adenopathy.   Skin:     General: Skin is warm and dry.      Capillary Refill: Capillary refill takes less than 2 seconds.      Coloration: Skin is not pale.      Findings: No erythema or rash.   Neurological:      General: No focal deficit present.      Mental Status: He is alert and oriented to person, place, and time.      Cranial Nerves: No cranial nerve deficit.      Motor: No abnormal muscle  "tone.      Coordination: Coordination normal.      Deep Tendon Reflexes: Reflexes are normal and symmetric. Reflexes normal.   Psychiatric:         Mood and Affect: Mood normal.         Behavior: Behavior normal.         Thought Content: Thought content normal.         Judgment: Judgment normal.         Assessment:       1. Essential hypertension    2. Hyperlipidemia LDL goal <70    3. Ischemic cardiomyopathy    4. Chronic obstructive pulmonary disease, unspecified COPD type    5. Anxiety    6. Chronic bilateral back pain, unspecified back location    7. Obesity (BMI 30-39.9)    8. Prostate cancer screening        Plan:   Michael was seen today for follow-up.    Diagnoses and all orders for this visit:    Essential hypertension  -     CBC auto differential; Future  -     Comprehensive metabolic panel; Future  -     Microalbumin/creatinine urine ratio; Future    Hyperlipidemia LDL goal <70  -     CBC auto differential; Future  -     Comprehensive metabolic panel; Future  -     TSH; Future  -     Lipid Panel; Future    Ischemic cardiomyopathy  -     CBC auto differential; Future  -     Comprehensive metabolic panel; Future    Chronic obstructive pulmonary disease, unspecified COPD type  -     CBC auto differential; Future  -     Comprehensive metabolic panel; Future    Anxiety  -     CBC auto differential; Future  -     Comprehensive metabolic panel; Future  -     ALPRAZolam (XANAX) 1 MG tablet; Take 1 tablet (1 mg total) by mouth 3 (three) times daily as needed.    Chronic bilateral back pain, unspecified back location  -     cyclobenzaprine (FLEXERIL) 10 MG tablet; Take 1 tablet (10 mg total) by mouth every evening.    Obesity (BMI 30-39.9)  -     CBC auto differential; Future  -     Comprehensive metabolic panel; Future    Prostate cancer screening  -     PSA, Screening; Future    "This note will not be shared with the patient."  Lab drawn today CBC, CMP, TSH, FLP  Limit the cholesterol in your diet to less than 300 " mg per day.  Fats should contribute no more than 20 to 35% of your daily calories.  Less than 7 to 10% of your calories should come from saturated fat.  Avoid saturated fat products e.g., butter, some oils, meat, and poultry fat contain a lot of saturated fat.  Check food labels for fat and cholesterol content. Choose the foods with less fat per serving.  Limit the amount of butter and margarine you eat.  Use salad dressings and margarine made with polyunsaturated and monunsaturated fats.  Use egg whites or egg substitutes rather than whole eggs.  Replace whole-milk dairy products with nonfat or low-fat mild, cheese, spreads, and yogurt.  Eat skinless chicken, turkey, fish, and meatless entrees more often than red meat.  Choose lean cuts of meat and trim off all visible fat. Keep portion sizes moderate.  Avoid fatty desserts such as ice cream, cream-filled cakes, and cheesecakes. Choose fresh fruits, nonfat frozen yogurt, Popsicles, etc.  Reduce the amount of fried foods, vending machine food, and fast food you eat.  Eat fruits and vegetables (especially fresh fruits and leafy vegetables), beans, and whole grains daily. The fiber in these foods helps lower cholesterol.  Look for low-fat or nonfat varieties of the foods you like to eat or look for substitutes.  You may need to exercise 60 minutes a day to prevent weight gain and 90 minutes a day to lose weight.  RTC in six months.

## 2020-08-21 NOTE — PATIENT INSTRUCTIONS
4 Steps for Eating Healthier  Changing the way you eat can improve your health. It can lower your cholesterol and blood pressure, and help you stay at a healthy weight. Your diet doesnt have to be bland and boring to be healthy. Just watch your calories and follow these steps:    1. Eat fewer unhealthy fats  · Choose more fish and lean meats instead of fatty cuts of meat.  · Skip butter and lard, and use less margarine.  · Pass on foods that have palm, coconut, or hydrogenated oils.  · Eat fewer high-fat dairy foods like cheese, ice cream, and whole milk.  · Get a heart-healthy cookbook and try some low-fat recipes.  2. Go light on salt  · Keep the saltshaker off the table.  · Limit high-salt ingredients, such as soy sauce, bouillon, and garlic salt.  · Instead of adding salt when cooking, season your food with herbs and flavorings. Try lemon, garlic, and onion.  · Limit convenience foods, such as boxed or canned foods and restaurant food.  · Read food labels and choose lower-sodium options.  3. Limit sugar  · Pause before you add sugars to pancakes, cereal, coffee, or tea. This includes white and brown table sugar, syrup, honey, and molasses. Cut your usual amount by half.  · Use non-sugar sweeteners. Stevia, aspartame, and sucralose can satisfy a sweet tooth without adding calories.  · Swap out sugar-filled soda and other drinks. Buy sugar-free or low-calorie beverages. Remember water is always the best choice.  · Read labels and choose foods with less added sugar. Keep in mind that dairy foods and foods with fruit will have some natural sugar.  · Cut the sugar in recipes by 1/3 to 1/2. Boost the flavor with extracts like almond, vanilla, or orange. Or add spices such as cinnamon or nutmeg.  4. Eat more fiber  · Eat fresh fruits and vegetables every day.  · Boost your diet with whole grains. Go for oats, whole-grain rice, and bran.  · Add beans and lentils to your meals.  · Drink more water to match your fiber  increase. This is to help prevent constipation.  Date Last Reviewed: 5/11/2015  © 2757-8643 [employer]. [address] [place] [address], [place] [address]. All rights reserved. This information is not intended as a substitute for professional medical care. Always follow your healthcare professional's instructions.

## 2020-11-17 ENCOUNTER — OFFICE VISIT (OUTPATIENT)
Dept: INTERNAL MEDICINE | Facility: CLINIC | Age: 55
End: 2020-11-17
Payer: COMMERCIAL

## 2020-11-17 VITALS
HEART RATE: 110 BPM | OXYGEN SATURATION: 95 % | HEIGHT: 64 IN | TEMPERATURE: 98 F | SYSTOLIC BLOOD PRESSURE: 130 MMHG | WEIGHT: 201.5 LBS | BODY MASS INDEX: 34.4 KG/M2 | RESPIRATION RATE: 20 BRPM | DIASTOLIC BLOOD PRESSURE: 86 MMHG

## 2020-11-17 DIAGNOSIS — Z23 NEEDS FLU SHOT: ICD-10-CM

## 2020-11-17 DIAGNOSIS — R60.0 LOCALIZED EDEMA: Primary | ICD-10-CM

## 2020-11-17 PROCEDURE — 3079F DIAST BP 80-89 MM HG: CPT | Mod: CPTII,S$GLB,, | Performed by: NURSE PRACTITIONER

## 2020-11-17 PROCEDURE — 99999 PR PBB SHADOW E&M-EST. PATIENT-LVL V: ICD-10-PCS | Mod: PBBFAC,,, | Performed by: NURSE PRACTITIONER

## 2020-11-17 PROCEDURE — 90471 FLU VACCINE (QUAD) GREATER THAN OR EQUAL TO 3YO PRESERVATIVE FREE IM: ICD-10-PCS | Mod: S$GLB,,, | Performed by: NURSE PRACTITIONER

## 2020-11-17 PROCEDURE — 1126F AMNT PAIN NOTED NONE PRSNT: CPT | Mod: S$GLB,,, | Performed by: NURSE PRACTITIONER

## 2020-11-17 PROCEDURE — 1126F PR PAIN SEVERITY QUANTIFIED, NO PAIN PRESENT: ICD-10-PCS | Mod: S$GLB,,, | Performed by: NURSE PRACTITIONER

## 2020-11-17 PROCEDURE — 3079F PR MOST RECENT DIASTOLIC BLOOD PRESSURE 80-89 MM HG: ICD-10-PCS | Mod: CPTII,S$GLB,, | Performed by: NURSE PRACTITIONER

## 2020-11-17 PROCEDURE — 3008F PR BODY MASS INDEX (BMI) DOCUMENTED: ICD-10-PCS | Mod: CPTII,S$GLB,, | Performed by: NURSE PRACTITIONER

## 2020-11-17 PROCEDURE — 90471 IMMUNIZATION ADMIN: CPT | Mod: S$GLB,,, | Performed by: NURSE PRACTITIONER

## 2020-11-17 PROCEDURE — 3008F BODY MASS INDEX DOCD: CPT | Mod: CPTII,S$GLB,, | Performed by: NURSE PRACTITIONER

## 2020-11-17 PROCEDURE — 3075F PR MOST RECENT SYSTOLIC BLOOD PRESS GE 130-139MM HG: ICD-10-PCS | Mod: CPTII,S$GLB,, | Performed by: NURSE PRACTITIONER

## 2020-11-17 PROCEDURE — 90686 FLU VACCINE (QUAD) GREATER THAN OR EQUAL TO 3YO PRESERVATIVE FREE IM: ICD-10-PCS | Mod: S$GLB,,, | Performed by: NURSE PRACTITIONER

## 2020-11-17 PROCEDURE — 3075F SYST BP GE 130 - 139MM HG: CPT | Mod: CPTII,S$GLB,, | Performed by: NURSE PRACTITIONER

## 2020-11-17 PROCEDURE — 99214 OFFICE O/P EST MOD 30 MIN: CPT | Mod: 25,S$GLB,, | Performed by: NURSE PRACTITIONER

## 2020-11-17 PROCEDURE — 99999 PR PBB SHADOW E&M-EST. PATIENT-LVL V: CPT | Mod: PBBFAC,,, | Performed by: NURSE PRACTITIONER

## 2020-11-17 PROCEDURE — 90686 IIV4 VACC NO PRSV 0.5 ML IM: CPT | Mod: S$GLB,,, | Performed by: NURSE PRACTITIONER

## 2020-11-17 PROCEDURE — 99214 PR OFFICE/OUTPT VISIT, EST, LEVL IV, 30-39 MIN: ICD-10-PCS | Mod: 25,S$GLB,, | Performed by: NURSE PRACTITIONER

## 2020-11-17 RX ORDER — FUROSEMIDE 20 MG/1
20 TABLET ORAL DAILY PRN
Qty: 30 TABLET | Refills: 2 | Status: SHIPPED | OUTPATIENT
Start: 2020-11-17 | End: 2021-02-08

## 2020-11-17 NOTE — PROGRESS NOTES
Subjective:       Patient ID: Michael Guillaume is a 55 y.o. male.    Chief Complaint: Edema (swelling in both ankles- x few months)    Patient is known, to me and presents with   Chief Complaint   Patient presents with    Edema     swelling in both ankles- x few months   .  Denies chest pain and shortness of breath.  Noticed for the past month or so his LE have been swelling. No sob or mortensen. Used to be on lasix as needed. Also wants to have flu shot    HPI  Review of Systems   Constitutional: Negative.  Negative for activity change, appetite change, chills, diaphoresis, fatigue, fever and unexpected weight change.   HENT: Negative.  Negative for congestion, ear discharge, ear pain, facial swelling, hearing loss, nosebleeds, postnasal drip, rhinorrhea, sinus pressure, sneezing, sore throat, tinnitus, trouble swallowing and voice change.    Eyes: Negative.  Negative for photophobia, pain, discharge, redness, itching and visual disturbance.   Respiratory: Negative.  Negative for apnea, cough, choking, chest tightness, shortness of breath, wheezing and stridor.    Cardiovascular: Positive for leg swelling. Negative for chest pain and palpitations.   Gastrointestinal: Negative for abdominal distention, abdominal pain, anal bleeding, blood in stool, constipation, diarrhea, nausea and vomiting.   Genitourinary: Negative.  Negative for difficulty urinating, discharge, dysuria, enuresis, flank pain, frequency, hematuria, penile pain, penile swelling, scrotal swelling, testicular pain and urgency.   Musculoskeletal: Negative.  Negative for arthralgias, back pain, gait problem, joint swelling, myalgias, neck pain and neck stiffness.   Skin: Negative.  Negative for color change, pallor, rash and wound.   Neurological: Negative for dizziness, tremors, seizures, syncope, facial asymmetry, speech difficulty, weakness, light-headedness, numbness and headaches.   Hematological: Negative for adenopathy. Does not bruise/bleed easily.    Psychiatric/Behavioral: Negative.  Negative for agitation, dysphoric mood, sleep disturbance and suicidal ideas. The patient is not nervous/anxious.        Objective:      Physical Exam  Vitals signs and nursing note reviewed.   Constitutional:       General: He is not in acute distress.     Appearance: He is well-developed. He is not diaphoretic.   HENT:      Head: Normocephalic and atraumatic.      Right Ear: External ear normal.      Left Ear: External ear normal.      Nose: Nose normal.      Mouth/Throat:      Pharynx: No oropharyngeal exudate.   Eyes:      General:         Right eye: No discharge.         Left eye: No discharge.      Conjunctiva/sclera: Conjunctivae normal.      Pupils: Pupils are equal, round, and reactive to light.   Neck:      Musculoskeletal: Normal range of motion and neck supple.      Thyroid: No thyromegaly.      Vascular: No JVD.      Trachea: No tracheal deviation.   Cardiovascular:      Rate and Rhythm: Normal rate and regular rhythm.      Heart sounds: Normal heart sounds. No murmur. No friction rub. No gallop.    Pulmonary:      Effort: Pulmonary effort is normal. No respiratory distress.      Breath sounds: Normal breath sounds. No stridor. No wheezing or rales.   Chest:      Chest wall: No tenderness.   Abdominal:      General: Bowel sounds are normal. There is no distension.      Palpations: Abdomen is soft.      Tenderness: There is no abdominal tenderness. There is no guarding or rebound.   Musculoskeletal: Normal range of motion.         General: No swelling, tenderness, deformity or signs of injury.      Right lower leg: Edema present.      Left lower leg: Edema present.      Comments: +1 pitting to LE   Lymphadenopathy:      Cervical: No cervical adenopathy.   Skin:     General: Skin is warm and dry.      Capillary Refill: Capillary refill takes less than 2 seconds.      Coloration: Skin is not jaundiced or pale.      Findings: No bruising, erythema, lesion or rash.  "  Neurological:      Mental Status: He is alert and oriented to person, place, and time.      Cranial Nerves: No cranial nerve deficit.      Motor: No abnormal muscle tone.      Coordination: Coordination normal.      Deep Tendon Reflexes: Reflexes are normal and symmetric. Reflexes normal.   Psychiatric:         Mood and Affect: Mood normal.         Behavior: Behavior normal.         Thought Content: Thought content normal.         Judgment: Judgment normal.         Assessment:       1. Localized edema    2. Needs flu shot        Plan:   Michael was seen today for edema.    Diagnoses and all orders for this visit:    Localized edema  -     furosemide (LASIX) 20 MG tablet; Take 1 tablet (20 mg total) by mouth daily as needed.    Needs flu shot  -     Influenza - Quadrivalent *Preferred* (6 months+) (PF)    "This note will not be shared with the patient."  Eat something with potassium such as [place] when taking lasix  Will let me know how he is doing on lasix and take as needed  rtc as scheduled  "

## 2020-11-17 NOTE — PATIENT INSTRUCTIONS
Leg Swelling in Both Legs    Swelling of the feet, ankles, and legs is called edema. It is caused by excess fluid that has collected in the tissues. Extra fluid in the body settles in the lowest part because of gravity. This is why the legs and feet are most affected.  Some of the causes for edema include:  · Disease of the heart like congestive heart failure  · Standing or sitting for long periods of time  · Infection of the feet or legs  · Blood pooling in the veins of your legs (venous insufficiency)  · Dilated veins in your lower leg (varicose veins)  · Garters or other clothing that is tight on your legs. This will cause blood to pool in your legs because the clothing limits blood flow.  · Some medicines such as hormones like birth control pills, some blood pressure medicines like calcium channel blockers (amlodipine) and steroids, some antidepressants like MAO inhibitors and tricyclics  · Menstrual periods that cause you to retain fluids  · Many types of renal disease  · Liver failure or cirrhosis  · Pregnancy, some swelling is normal, but a sudden increase in leg swelling or weight gain can be a sign of a dangerous complication of pregnancy  · Poor nutrition  · Thyroid disease  Medical treatment will depend on what is causing the swelling in your legs. Your healthcare provider may prescribe water pills (diuretics) to get rid of the extra fluid.  Home care  Follow these guidelines when caring for yourself at home:  · Don't wear clothing like garters that is tight on your legs.  · Keep your legs up while lying or sitting.  · If infection, injury, or recent surgery is causing the swelling, stay off your legs as much as possible until symptoms get better.  · If your healthcare provider says that your leg swelling is caused by venous insufficiency or varicose veins, don't sit or  one place for long periods of time. Take breaks and walk about every few hours. Brisk walking is a good exercise. It helps  circulate the blood that has collected in your leg. Talk with your provider about using support stockings to stop daytime leg swelling.  · If your provider says that heart disease is causing your leg swelling, follow a low-salt diet to stop extra fluid from staying in your body. You may also need medicine.  Follow-up care  Follow up with your healthcare provider, or as advised.  When to seek medical advice  Call your healthcare provider right away if any of these occur:  · New shortness of breath or chest pain  · Shortness of breath or chest pain that gets worse  · Swelling in both legs or ankles that gets worse  · Swelling of the abdomen  · Redness, warmth, or swelling in one leg  · Fever of 100.4ºF (38ºC) or higher, or as directed by your healthcare provider  · Yellow color to your skin or eyes  · Rapid, unexplained weight gain  · Having to sleep upright or use an increased number of pillows  Date Last Reviewed: 3/31/2016  © 0744-1591 [employer]. [address] [place] [address], [place] [address]. All rights reserved. This information is not intended as a substitute for professional medical care. Always follow your healthcare professional's instructions.

## 2020-12-22 ENCOUNTER — TELEPHONE (OUTPATIENT)
Dept: INTERNAL MEDICINE | Facility: CLINIC | Age: 55
End: 2020-12-22

## 2020-12-22 DIAGNOSIS — F41.9 ANXIETY: ICD-10-CM

## 2020-12-22 RX ORDER — ALPRAZOLAM 1 MG/1
TABLET ORAL
Qty: 90 TABLET | Refills: 2 | Status: SHIPPED | OUTPATIENT
Start: 2020-12-22 | End: 2021-02-26 | Stop reason: SDUPTHER

## 2020-12-22 RX ORDER — ALPRAZOLAM 1 MG/1
1 TABLET ORAL 3 TIMES DAILY PRN
Qty: 90 TABLET | Refills: 2 | Status: SHIPPED | OUTPATIENT
Start: 2020-12-22 | End: 2020-12-22

## 2020-12-22 NOTE — TELEPHONE ENCOUNTER
----- Message from Crys Muñoz MA sent at 12/22/2020  7:48 AM CST -----  Patient is out of refills for Xanax.     Please send new script to [facility] ([place])

## 2020-12-30 DIAGNOSIS — I10 ESSENTIAL HYPERTENSION: Chronic | ICD-10-CM

## 2020-12-30 RX ORDER — CARVEDILOL 12.5 MG/1
TABLET ORAL
Qty: 180 TABLET | Refills: 3 | Status: SHIPPED | OUTPATIENT
Start: 2020-12-30 | End: 2021-02-03 | Stop reason: SDUPTHER

## 2021-02-02 ENCOUNTER — PATIENT OUTREACH (OUTPATIENT)
Dept: ADMINISTRATIVE | Facility: OTHER | Age: 56
End: 2021-02-02

## 2021-02-03 ENCOUNTER — OFFICE VISIT (OUTPATIENT)
Dept: CARDIOLOGY | Facility: CLINIC | Age: 56
End: 2021-02-03
Payer: COMMERCIAL

## 2021-02-03 ENCOUNTER — HOSPITAL ENCOUNTER (OUTPATIENT)
Dept: CARDIOLOGY | Facility: HOSPITAL | Age: 56
Discharge: HOME | End: 2021-02-03
Attending: STUDENT IN AN ORGANIZED HEALTH CARE EDUCATION/TRAINING PROGRAM
Payer: COMMERCIAL

## 2021-02-03 VITALS
OXYGEN SATURATION: 97 % | WEIGHT: 205 LBS | HEART RATE: 107 BPM | SYSTOLIC BLOOD PRESSURE: 152 MMHG | BODY MASS INDEX: 35 KG/M2 | DIASTOLIC BLOOD PRESSURE: 102 MMHG | HEIGHT: 64 IN

## 2021-02-03 DIAGNOSIS — I10 ESSENTIAL HYPERTENSION: ICD-10-CM

## 2021-02-03 DIAGNOSIS — E78.5 HYPERLIPIDEMIA LDL GOAL <70: ICD-10-CM

## 2021-02-03 DIAGNOSIS — I73.9 PAD (PERIPHERAL ARTERY DISEASE): ICD-10-CM

## 2021-02-03 DIAGNOSIS — I73.9 PAD (PERIPHERAL ARTERY DISEASE): Primary | ICD-10-CM

## 2021-02-03 LAB
LEFT ANT TIBIAL SYS PSV: 19 CM/S
LEFT CFA PSV: 112 CM/S
LEFT DORSALIS PEDIS PSV: 24 CM/S
LEFT EXTERNAL ILIAC PSV: 312 CM/S
LEFT POPLITEAL PSV: 35 CM/S
LEFT POST TIBIAL SYS PSV: 21 CM/S
LEFT PROFUNDA SYS PSV: 104 CM/S
LEFT SUPER FEMORAL DIST SYS PSV: 74 CM/S
LEFT SUPER FEMORAL MID SYS PSV: 175 CM/S
LEFT SUPER FEMORAL OSTIAL SYS PSV: 71 CM/S
LEFT SUPER FEMORAL PROX SYS PSV: 110 CM/S
LEFT TIB/PER TRUNK SYS PSV: 42 CM/S
OHS CV LEFT LOWER EXTREMITY ABI (NO CALC): 0.83
OHS CV LOWER EXTREMITY STENT MEASUREMENTS - RIGHT - EIL S1 - DIST: 135
OHS CV LOWER EXTREMITY STENT MEASUREMENTS - RIGHT - EIL S1 - MID: 122
OHS CV RIGHT ABI LOWER EXTREMITY (NO CALC): 0.76
RIGHT ANT TIBIAL SYS PSV: 13 CM/S
RIGHT CFA PSV: 111 CM/S
RIGHT POPLITEAL PSV: 50 CM/S
RIGHT POST TIBIAL SYS PSV: 14 CM/S
RIGHT PROFUNDA SYS PSV: 168 CM/S
RIGHT SUPER FEMORAL DIST SYS PSV: 71 CM/S
RIGHT SUPER FEMORAL MID SYS PSV: 75 CM/S
RIGHT SUPER FEMORAL OSTIAL SYS PSV: 161 CM/S
RIGHT SUPER FEMORAL PROX SYS PSV: 283 CM/S
RIGHT TIB/PER TRUNK SYS PSV: 44 CM/S

## 2021-02-03 PROCEDURE — 93925 LOWER EXTREMITY STUDY: CPT

## 2021-02-03 PROCEDURE — 99213 OFFICE O/P EST LOW 20 MIN: CPT | Mod: S$GLB,,, | Performed by: INTERNAL MEDICINE

## 2021-02-03 PROCEDURE — 3080F PR MOST RECENT DIASTOLIC BLOOD PRESSURE >= 90 MM HG: ICD-10-PCS | Mod: CPTII,S$GLB,, | Performed by: INTERNAL MEDICINE

## 2021-02-03 PROCEDURE — 3080F DIAST BP >= 90 MM HG: CPT | Mod: CPTII,S$GLB,, | Performed by: INTERNAL MEDICINE

## 2021-02-03 PROCEDURE — 3077F SYST BP >= 140 MM HG: CPT | Mod: CPTII,S$GLB,, | Performed by: INTERNAL MEDICINE

## 2021-02-03 PROCEDURE — 99999 PR PBB SHADOW E&M-EST. PATIENT-LVL III: CPT | Mod: PBBFAC,,, | Performed by: INTERNAL MEDICINE

## 2021-02-03 PROCEDURE — 93925 LOWER EXTREMITY STUDY: CPT | Mod: 26,,, | Performed by: INTERNAL MEDICINE

## 2021-02-03 PROCEDURE — 99213 PR OFFICE/OUTPT VISIT, EST, LEVL III, 20-29 MIN: ICD-10-PCS | Mod: S$GLB,,, | Performed by: INTERNAL MEDICINE

## 2021-02-03 PROCEDURE — 93925 CV US DOPPLER ARTERIAL LEGS BILATERAL (CUPID ONLY): ICD-10-PCS | Mod: 26,,, | Performed by: INTERNAL MEDICINE

## 2021-02-03 PROCEDURE — 3077F PR MOST RECENT SYSTOLIC BLOOD PRESSURE >= 140 MM HG: ICD-10-PCS | Mod: CPTII,S$GLB,, | Performed by: INTERNAL MEDICINE

## 2021-02-03 PROCEDURE — 99999 PR PBB SHADOW E&M-EST. PATIENT-LVL III: ICD-10-PCS | Mod: PBBFAC,,, | Performed by: INTERNAL MEDICINE

## 2021-02-03 PROCEDURE — 99213 OFFICE O/P EST LOW 20 MIN: CPT | Mod: PBBFAC,25 | Performed by: INTERNAL MEDICINE

## 2021-02-08 DIAGNOSIS — R60.0 LOCALIZED EDEMA: ICD-10-CM

## 2021-02-08 RX ORDER — FUROSEMIDE 20 MG/1
TABLET ORAL
Qty: 90 TABLET | Refills: 0 | Status: SHIPPED | OUTPATIENT
Start: 2021-02-08 | End: 2021-06-07

## 2021-02-19 ENCOUNTER — CLINICAL SUPPORT (OUTPATIENT)
Dept: INTERNAL MEDICINE | Facility: CLINIC | Age: 56
End: 2021-02-19
Payer: COMMERCIAL

## 2021-02-19 DIAGNOSIS — I10 ESSENTIAL HYPERTENSION: ICD-10-CM

## 2021-02-19 DIAGNOSIS — J44.9 CHRONIC OBSTRUCTIVE PULMONARY DISEASE, UNSPECIFIED COPD TYPE: ICD-10-CM

## 2021-02-19 DIAGNOSIS — E66.9 OBESITY (BMI 30-39.9): ICD-10-CM

## 2021-02-19 DIAGNOSIS — F41.9 ANXIETY: ICD-10-CM

## 2021-02-19 DIAGNOSIS — I25.5 ISCHEMIC CARDIOMYOPATHY: ICD-10-CM

## 2021-02-19 DIAGNOSIS — E78.5 HYPERLIPIDEMIA LDL GOAL <70: ICD-10-CM

## 2021-02-19 LAB
ALBUMIN SERPL BCP-MCNC: 4 G/DL (ref 3.5–5.2)
ALP SERPL-CCNC: 89 U/L (ref 55–135)
ALT SERPL W/O P-5'-P-CCNC: 13 U/L (ref 10–44)
ANION GAP SERPL CALC-SCNC: 11 MMOL/L (ref 8–16)
AST SERPL-CCNC: 25 U/L (ref 10–40)
BASOPHILS # BLD AUTO: 0.06 K/UL (ref 0–0.2)
BASOPHILS NFR BLD: 0.7 % (ref 0–1.9)
BILIRUB SERPL-MCNC: 0.3 MG/DL (ref 0.1–1)
BUN SERPL-MCNC: 24 MG/DL (ref 6–20)
CALCIUM SERPL-MCNC: 9.1 MG/DL (ref 8.7–10.5)
CHLORIDE SERPL-SCNC: 101 MMOL/L (ref 95–110)
CHOLEST SERPL-MCNC: 158 MG/DL (ref 120–199)
CHOLEST/HDLC SERPL: 4.9 {RATIO} (ref 2–5)
CO2 SERPL-SCNC: 28 MMOL/L (ref 23–29)
CREAT SERPL-MCNC: 1.3 MG/DL (ref 0.5–1.4)
DIFFERENTIAL METHOD: NORMAL
EOSINOPHIL # BLD AUTO: 0.1 K/UL (ref 0–0.5)
EOSINOPHIL NFR BLD: 1.1 % (ref 0–8)
ERYTHROCYTE [DISTWIDTH] IN BLOOD BY AUTOMATED COUNT: 13.2 % (ref 11.5–14.5)
EST. GFR  (AFRICAN AMERICAN): >60 ML/MIN/1.73 M^2
EST. GFR  (NON AFRICAN AMERICAN): >60 ML/MIN/1.73 M^2
GLUCOSE SERPL-MCNC: 104 MG/DL (ref 70–110)
HCT VFR BLD AUTO: 47.1 % (ref 40–54)
HDLC SERPL-MCNC: 32 MG/DL (ref 40–75)
HDLC SERPL: 20.3 % (ref 20–50)
HGB BLD-MCNC: 15.1 G/DL (ref 14–18)
IMM GRANULOCYTES # BLD AUTO: 0.02 K/UL (ref 0–0.04)
IMM GRANULOCYTES NFR BLD AUTO: 0.2 % (ref 0–0.5)
LDLC SERPL CALC-MCNC: 67.6 MG/DL (ref 63–159)
LYMPHOCYTES # BLD AUTO: 2 K/UL (ref 1–4.8)
LYMPHOCYTES NFR BLD: 22 % (ref 18–48)
MCH RBC QN AUTO: 29.9 PG (ref 27–31)
MCHC RBC AUTO-ENTMCNC: 32.1 G/DL (ref 32–36)
MCV RBC AUTO: 93 FL (ref 82–98)
MONOCYTES # BLD AUTO: 0.9 K/UL (ref 0.3–1)
MONOCYTES NFR BLD: 9.6 % (ref 4–15)
NEUTROPHILS # BLD AUTO: 5.9 K/UL (ref 1.8–7.7)
NEUTROPHILS NFR BLD: 66.4 % (ref 38–73)
NONHDLC SERPL-MCNC: 126 MG/DL
NRBC BLD-RTO: 0 /100 WBC
PLATELET # BLD AUTO: 348 K/UL (ref 150–350)
PMV BLD AUTO: 10.4 FL (ref 9.2–12.9)
POTASSIUM SERPL-SCNC: 4.1 MMOL/L (ref 3.5–5.1)
PROT SERPL-MCNC: 7.1 G/DL (ref 6–8.4)
RBC # BLD AUTO: 5.05 M/UL (ref 4.6–6.2)
SODIUM SERPL-SCNC: 140 MMOL/L (ref 136–145)
TRIGL SERPL-MCNC: 292 MG/DL (ref 30–150)
TSH SERPL DL<=0.005 MIU/L-ACNC: 1.28 UIU/ML (ref 0.4–4)
WBC # BLD AUTO: 8.86 K/UL (ref 3.9–12.7)

## 2021-02-19 PROCEDURE — 85025 COMPLETE CBC W/AUTO DIFF WBC: CPT

## 2021-02-19 PROCEDURE — 80061 LIPID PANEL: CPT

## 2021-02-19 PROCEDURE — 80053 COMPREHEN METABOLIC PANEL: CPT

## 2021-02-19 PROCEDURE — 84443 ASSAY THYROID STIM HORMONE: CPT

## 2021-02-19 PROCEDURE — 36415 COLL VENOUS BLD VENIPUNCTURE: CPT

## 2021-02-26 ENCOUNTER — OFFICE VISIT (OUTPATIENT)
Dept: INTERNAL MEDICINE | Facility: CLINIC | Age: 56
End: 2021-02-26
Payer: COMMERCIAL

## 2021-02-26 VITALS
HEIGHT: 64 IN | OXYGEN SATURATION: 96 % | BODY MASS INDEX: 34.89 KG/M2 | DIASTOLIC BLOOD PRESSURE: 88 MMHG | HEART RATE: 110 BPM | SYSTOLIC BLOOD PRESSURE: 128 MMHG | RESPIRATION RATE: 18 BRPM | WEIGHT: 204.38 LBS | TEMPERATURE: 98 F

## 2021-02-26 DIAGNOSIS — E78.5 HYPERLIPIDEMIA LDL GOAL <70: ICD-10-CM

## 2021-02-26 DIAGNOSIS — E66.9 OBESITY (BMI 30-39.9): ICD-10-CM

## 2021-02-26 DIAGNOSIS — M54.9 CHRONIC BILATERAL BACK PAIN, UNSPECIFIED BACK LOCATION: ICD-10-CM

## 2021-02-26 DIAGNOSIS — I73.9 PAD (PERIPHERAL ARTERY DISEASE): ICD-10-CM

## 2021-02-26 DIAGNOSIS — I10 BENIGN ESSENTIAL HTN: Primary | ICD-10-CM

## 2021-02-26 DIAGNOSIS — F41.9 ANXIETY: ICD-10-CM

## 2021-02-26 DIAGNOSIS — G89.29 CHRONIC BILATERAL BACK PAIN, UNSPECIFIED BACK LOCATION: ICD-10-CM

## 2021-02-26 DIAGNOSIS — Z12.5 PROSTATE CANCER SCREENING: ICD-10-CM

## 2021-02-26 DIAGNOSIS — J44.9 CHRONIC OBSTRUCTIVE PULMONARY DISEASE, UNSPECIFIED COPD TYPE: ICD-10-CM

## 2021-02-26 PROCEDURE — 99999 PR PBB SHADOW E&M-EST. PATIENT-LVL IV: CPT | Mod: PBBFAC,,, | Performed by: NURSE PRACTITIONER

## 2021-02-26 PROCEDURE — 99999 PR PBB SHADOW E&M-EST. PATIENT-LVL IV: ICD-10-PCS | Mod: PBBFAC,,, | Performed by: NURSE PRACTITIONER

## 2021-02-26 PROCEDURE — 99214 OFFICE O/P EST MOD 30 MIN: CPT | Mod: PBBFAC,PN | Performed by: NURSE PRACTITIONER

## 2021-02-26 PROCEDURE — 99214 OFFICE O/P EST MOD 30 MIN: CPT | Mod: S$GLB,,, | Performed by: NURSE PRACTITIONER

## 2021-02-26 PROCEDURE — 99214 PR OFFICE/OUTPT VISIT, EST, LEVL IV, 30-39 MIN: ICD-10-PCS | Mod: S$GLB,,, | Performed by: NURSE PRACTITIONER

## 2021-02-26 RX ORDER — LOSARTAN POTASSIUM 100 MG/1
100 TABLET ORAL DAILY
Qty: 90 TABLET | Refills: 3 | Status: SHIPPED | OUTPATIENT
Start: 2021-02-26 | End: 2022-04-25

## 2021-02-26 RX ORDER — ALPRAZOLAM 1 MG/1
1 TABLET ORAL 3 TIMES DAILY PRN
Qty: 90 TABLET | Refills: 5 | Status: SHIPPED | OUTPATIENT
Start: 2021-02-26 | End: 2021-09-09

## 2021-02-26 RX ORDER — CYCLOBENZAPRINE HCL 10 MG
10 TABLET ORAL NIGHTLY
Qty: 30 TABLET | Refills: 5 | Status: SHIPPED | OUTPATIENT
Start: 2021-02-26 | End: 2022-04-26

## 2021-03-29 NOTE — Clinical Note
Closure device deployed in the left femoral artery.  Patient presents to the liver disease clinic with Dr. Wolff for follow up care regarding EtOH cirrhosis.  Medications and allergies reviewed via [employer]. Assessment deferred to MD.  Per Dr. Wolff - patient needs paracentesis followed by CT multiphase of the liver ([facility] 3/20/21).  He will increase spironolactone to 100 mg daily and start Bumex 1 mg twice daily on 4/1/2021; prescriptions sent.  Labs in one week; ordered.  Request for rifaxamin has been sent to [facility].  He will repeat labs in one week and has been referred for a transplant evaluation.  Follow up in 4 months or sooner based on results and recommendations. Plan reviewed with patient and his wife who is here in the clinic.  Orders entered.

## 2021-06-07 DIAGNOSIS — R60.0 LOCALIZED EDEMA: ICD-10-CM

## 2021-06-07 RX ORDER — FUROSEMIDE 20 MG/1
TABLET ORAL
Qty: 90 TABLET | Refills: 0 | Status: SHIPPED | OUTPATIENT
Start: 2021-06-07 | End: 2021-08-02

## 2021-07-01 ENCOUNTER — PATIENT MESSAGE (OUTPATIENT)
Dept: ADMINISTRATIVE | Facility: OTHER | Age: 56
End: 2021-07-01

## 2021-08-02 DIAGNOSIS — R60.0 LOCALIZED EDEMA: ICD-10-CM

## 2021-08-02 RX ORDER — FUROSEMIDE 20 MG/1
TABLET ORAL
Qty: 90 TABLET | Refills: 0 | Status: SHIPPED | OUTPATIENT
Start: 2021-08-02 | End: 2021-12-13

## 2021-08-11 ENCOUNTER — HOSPITAL ENCOUNTER (OUTPATIENT)
Dept: CARDIOLOGY | Facility: HOSPITAL | Age: 56
Discharge: HOME | End: 2021-08-11
Attending: INTERNAL MEDICINE
Payer: COMMERCIAL

## 2021-08-11 ENCOUNTER — OFFICE VISIT (OUTPATIENT)
Dept: CARDIOLOGY | Facility: CLINIC | Age: 56
End: 2021-08-11
Payer: COMMERCIAL

## 2021-08-11 VITALS
BODY MASS INDEX: 34.7 KG/M2 | HEIGHT: 64 IN | HEART RATE: 90 BPM | SYSTOLIC BLOOD PRESSURE: 126 MMHG | OXYGEN SATURATION: 96 % | DIASTOLIC BLOOD PRESSURE: 77 MMHG | WEIGHT: 203.25 LBS

## 2021-08-11 DIAGNOSIS — I73.9 PAD (PERIPHERAL ARTERY DISEASE): ICD-10-CM

## 2021-08-11 DIAGNOSIS — E78.5 HYPERLIPIDEMIA LDL GOAL <70: ICD-10-CM

## 2021-08-11 DIAGNOSIS — I10 ESSENTIAL HYPERTENSION: ICD-10-CM

## 2021-08-11 LAB
LEFT ANT TIBIAL SYS PSV: 35 CM/S
LEFT CFA PSV: 170 CM/S
LEFT DORSALIS PEDIS PSV: 31 CM/S
LEFT PERONEAL SYS PSV: 17 CM/S
LEFT POPLITEAL PSV: 47 CM/S
LEFT POST TIBIAL SYS PSV: 42 CM/S
LEFT PROFUNDA SYS PSV: 344 CM/S
LEFT SUPER FEMORAL DIST SYS PSV: 208 CM/S
LEFT SUPER FEMORAL MID SYS PSV: 240 CM/S
LEFT SUPER FEMORAL PROX SYS PSV: 75 CM/S
LEFT TIB/PER TRUNK SYS PSV: 52 CM/S
Lab: 227
OHS CV LOWER EXTREMITY STENT MEASUREMENTS - LEFT - EIL S1 - DIST: 238
OHS CV LOWER EXTREMITY STENT MEASUREMENTS - LEFT - EIL S1 - MID: 212
OHS CV LOWER EXTREMITY STENT MEASUREMENTS - LEFT - EIL S1 - PROX: 199
OHS CV LOWER EXTREMITY STENT MEASUREMENTS - LEFT - OSFA S1 - DIST: 81
OHS CV LOWER EXTREMITY STENT MEASUREMENTS - LEFT - OSFA S1 - MID: 84
OHS CV LOWER EXTREMITY STENT MEASUREMENTS - LEFT - OSFA S1 - OST: 144
OHS CV LOWER EXTREMITY STENT MEASUREMENTS - LEFT - OSFA S1 - PROX: 120
OHS CV LOWER EXTREMITY STENT MEASUREMENTS - RIGHT - EIL S1 - DIST: 161
OHS CV LOWER EXTREMITY STENT MEASUREMENTS - RIGHT - EIL S1 - MID: 185
OHS CV LOWER EXTREMITY STENT MEASUREMENTS - RIGHT - EIL S1 - OST: 147
OHS CV LOWER EXTREMITY STENT MEASUREMENTS - RIGHT - EIL S1 - PROX: 147
OHS CV RIGHT ABI LOWER EXTREMITY (NO CALC): 0.73
RIGHT ANT TIBIAL SYS PSV: 27 CM/S
RIGHT CFA PSV: 124 CM/S
RIGHT PERONEAL SYS PSV: 13 CM/S
RIGHT POPLITEAL PSV: 51 CM/S
RIGHT POST TIBIAL SYS PSV: 46 CM/S
RIGHT PROFUNDA SYS PSV: 228 CM/S
RIGHT SUPER FEMORAL DIST SYS PSV: 79 CM/S
RIGHT SUPER FEMORAL MID SYS PSV: 74 CM/S
RIGHT SUPER FEMORAL OSTIAL SYS PSV: 137 CM/S
RIGHT SUPER FEMORAL PROX SYS PSV: 78 CM/S
RIGHT TIB/PER TRUNK SYS PSV: 78 CM/S

## 2021-08-11 PROCEDURE — 93925 LOWER EXTREMITY STUDY: CPT | Mod: 26,,, | Performed by: INTERNAL MEDICINE

## 2021-08-11 PROCEDURE — 99214 OFFICE O/P EST MOD 30 MIN: CPT | Mod: PBBFAC,25 | Performed by: INTERNAL MEDICINE

## 2021-08-11 PROCEDURE — 99999 PR PBB SHADOW E&M-EST. PATIENT-LVL IV: ICD-10-PCS | Mod: PBBFAC,,, | Performed by: INTERNAL MEDICINE

## 2021-08-11 PROCEDURE — 99214 OFFICE O/P EST MOD 30 MIN: CPT | Mod: S$GLB,,, | Performed by: INTERNAL MEDICINE

## 2021-08-11 PROCEDURE — 99999 PR PBB SHADOW E&M-EST. PATIENT-LVL IV: CPT | Mod: PBBFAC,,, | Performed by: INTERNAL MEDICINE

## 2021-08-11 PROCEDURE — 93925 LOWER EXTREMITY STUDY: CPT

## 2021-08-11 PROCEDURE — 93925 CV US DOPPLER ARTERIAL LEGS BILATERAL (CUPID ONLY): ICD-10-PCS | Mod: 26,,, | Performed by: INTERNAL MEDICINE

## 2021-08-11 PROCEDURE — 99214 PR OFFICE/OUTPT VISIT, EST, LEVL IV, 30-39 MIN: ICD-10-PCS | Mod: S$GLB,,, | Performed by: INTERNAL MEDICINE

## 2021-08-11 RX ORDER — CILOSTAZOL 100 MG/1
100 TABLET ORAL 2 TIMES DAILY
Qty: 60 TABLET | Refills: 11 | Status: SHIPPED | OUTPATIENT
Start: 2021-08-11 | End: 2022-09-30

## 2021-08-19 ENCOUNTER — PATIENT OUTREACH (OUTPATIENT)
Dept: ADMINISTRATIVE | Facility: HOSPITAL | Age: 56
End: 2021-08-19

## 2021-08-26 ENCOUNTER — CLINICAL SUPPORT (OUTPATIENT)
Dept: INTERNAL MEDICINE | Facility: CLINIC | Age: 56
End: 2021-08-26
Payer: COMMERCIAL

## 2021-08-26 DIAGNOSIS — J44.9 CHRONIC OBSTRUCTIVE PULMONARY DISEASE, UNSPECIFIED COPD TYPE: ICD-10-CM

## 2021-08-26 DIAGNOSIS — I10 BENIGN ESSENTIAL HTN: ICD-10-CM

## 2021-08-26 DIAGNOSIS — E66.9 OBESITY (BMI 30-39.9): ICD-10-CM

## 2021-08-26 DIAGNOSIS — I73.9 PAD (PERIPHERAL ARTERY DISEASE): ICD-10-CM

## 2021-08-26 DIAGNOSIS — Z12.5 PROSTATE CANCER SCREENING: ICD-10-CM

## 2021-08-26 DIAGNOSIS — E78.5 HYPERLIPIDEMIA LDL GOAL <70: ICD-10-CM

## 2021-08-26 LAB
ALBUMIN SERPL BCP-MCNC: 3.8 G/DL (ref 3.5–5.2)
ALBUMIN/CREAT UR: NORMAL UG/MG (ref 0–30)
ALP SERPL-CCNC: 85 U/L (ref 55–135)
ALT SERPL W/O P-5'-P-CCNC: 17 U/L (ref 10–44)
ANION GAP SERPL CALC-SCNC: 12 MMOL/L (ref 8–16)
AST SERPL-CCNC: 29 U/L (ref 10–40)
BASOPHILS # BLD AUTO: 0.08 K/UL (ref 0–0.2)
BASOPHILS NFR BLD: 0.9 % (ref 0–1.9)
BILIRUB SERPL-MCNC: 0.3 MG/DL (ref 0.1–1)
BUN SERPL-MCNC: 14 MG/DL (ref 6–20)
CALCIUM SERPL-MCNC: 9.6 MG/DL (ref 8.7–10.5)
CHLORIDE SERPL-SCNC: 104 MMOL/L (ref 95–110)
CHOLEST SERPL-MCNC: 146 MG/DL (ref 120–199)
CHOLEST/HDLC SERPL: 4.3 {RATIO} (ref 2–5)
CO2 SERPL-SCNC: 24 MMOL/L (ref 23–29)
COMPLEXED PSA SERPL-MCNC: 0.91 NG/ML (ref 0–4)
CREAT SERPL-MCNC: 1 MG/DL (ref 0.5–1.4)
CREAT UR-MCNC: 28 MG/DL (ref 23–375)
DIFFERENTIAL METHOD: NORMAL
EOSINOPHIL # BLD AUTO: 0.1 K/UL (ref 0–0.5)
EOSINOPHIL NFR BLD: 1.1 % (ref 0–8)
ERYTHROCYTE [DISTWIDTH] IN BLOOD BY AUTOMATED COUNT: 13.5 % (ref 11.5–14.5)
EST. GFR  (AFRICAN AMERICAN): >60 ML/MIN/1.73 M^2
EST. GFR  (NON AFRICAN AMERICAN): >60 ML/MIN/1.73 M^2
GLUCOSE SERPL-MCNC: 108 MG/DL (ref 70–110)
HCT VFR BLD AUTO: 44.9 % (ref 40–54)
HDLC SERPL-MCNC: 34 MG/DL (ref 40–75)
HDLC SERPL: 23.3 % (ref 20–50)
HGB BLD-MCNC: 15.2 G/DL (ref 14–18)
IMM GRANULOCYTES # BLD AUTO: 0.02 K/UL (ref 0–0.04)
IMM GRANULOCYTES NFR BLD AUTO: 0.2 % (ref 0–0.5)
LDLC SERPL CALC-MCNC: 78.4 MG/DL (ref 63–159)
LYMPHOCYTES # BLD AUTO: 1.8 K/UL (ref 1–4.8)
LYMPHOCYTES NFR BLD: 19.6 % (ref 18–48)
MCH RBC QN AUTO: 30.6 PG (ref 27–31)
MCHC RBC AUTO-ENTMCNC: 33.9 G/DL (ref 32–36)
MCV RBC AUTO: 91 FL (ref 82–98)
MICROALBUMIN UR DL<=1MG/L-MCNC: <5 UG/ML
MONOCYTES # BLD AUTO: 0.7 K/UL (ref 0.3–1)
MONOCYTES NFR BLD: 8.1 % (ref 4–15)
NEUTROPHILS # BLD AUTO: 6.3 K/UL (ref 1.8–7.7)
NEUTROPHILS NFR BLD: 70.1 % (ref 38–73)
NONHDLC SERPL-MCNC: 112 MG/DL
NRBC BLD-RTO: 0 /100 WBC
PLATELET # BLD AUTO: 369 K/UL (ref 150–450)
PMV BLD AUTO: 9.9 FL (ref 9.2–12.9)
POTASSIUM SERPL-SCNC: 4.2 MMOL/L (ref 3.5–5.1)
PROT SERPL-MCNC: 6.8 G/DL (ref 6–8.4)
RBC # BLD AUTO: 4.96 M/UL (ref 4.6–6.2)
SODIUM SERPL-SCNC: 140 MMOL/L (ref 136–145)
TRIGL SERPL-MCNC: 168 MG/DL (ref 30–150)
TSH SERPL DL<=0.005 MIU/L-ACNC: 1.44 UIU/ML (ref 0.4–4)
WBC # BLD AUTO: 9.02 K/UL (ref 3.9–12.7)

## 2021-08-26 PROCEDURE — 84153 ASSAY OF PSA TOTAL: CPT | Mod: GA | Performed by: NURSE PRACTITIONER

## 2021-08-26 PROCEDURE — 36415 COLL VENOUS BLD VENIPUNCTURE: CPT | Performed by: NURSE PRACTITIONER

## 2021-08-26 PROCEDURE — 85025 COMPLETE CBC W/AUTO DIFF WBC: CPT | Performed by: NURSE PRACTITIONER

## 2021-08-26 PROCEDURE — 82570 ASSAY OF URINE CREATININE: CPT | Performed by: NURSE PRACTITIONER

## 2021-08-26 PROCEDURE — 80053 COMPREHEN METABOLIC PANEL: CPT | Performed by: NURSE PRACTITIONER

## 2021-08-26 PROCEDURE — 80061 LIPID PANEL: CPT | Performed by: NURSE PRACTITIONER

## 2021-08-26 PROCEDURE — 84443 ASSAY THYROID STIM HORMONE: CPT | Performed by: NURSE PRACTITIONER

## 2021-09-07 DIAGNOSIS — F41.9 ANXIETY: ICD-10-CM

## 2021-09-09 RX ORDER — ALPRAZOLAM 1 MG/1
TABLET ORAL
Qty: 90 TABLET | Refills: 5 | Status: SHIPPED | OUTPATIENT
Start: 2021-09-09 | End: 2021-09-27 | Stop reason: SDUPTHER

## 2021-09-27 ENCOUNTER — OFFICE VISIT (OUTPATIENT)
Dept: INTERNAL MEDICINE | Facility: CLINIC | Age: 56
End: 2021-09-27
Payer: COMMERCIAL

## 2021-09-27 VITALS
DIASTOLIC BLOOD PRESSURE: 78 MMHG | RESPIRATION RATE: 18 BRPM | HEIGHT: 64 IN | BODY MASS INDEX: 34.1 KG/M2 | WEIGHT: 199.75 LBS | SYSTOLIC BLOOD PRESSURE: 116 MMHG | HEART RATE: 80 BPM | OXYGEN SATURATION: 98 %

## 2021-09-27 DIAGNOSIS — I10 ESSENTIAL HYPERTENSION: Primary | ICD-10-CM

## 2021-09-27 DIAGNOSIS — E78.5 HYPERLIPIDEMIA LDL GOAL <70: ICD-10-CM

## 2021-09-27 DIAGNOSIS — E66.9 OBESITY (BMI 30-39.9): ICD-10-CM

## 2021-09-27 DIAGNOSIS — I50.9 CONGESTIVE HEART FAILURE, UNSPECIFIED HF CHRONICITY, UNSPECIFIED HEART FAILURE TYPE: ICD-10-CM

## 2021-09-27 DIAGNOSIS — J44.9 CHRONIC OBSTRUCTIVE PULMONARY DISEASE, UNSPECIFIED COPD TYPE: ICD-10-CM

## 2021-09-27 DIAGNOSIS — F41.9 ANXIETY: ICD-10-CM

## 2021-09-27 PROCEDURE — 99999 PR PBB SHADOW E&M-EST. PATIENT-LVL V: CPT | Mod: PBBFAC,,, | Performed by: NURSE PRACTITIONER

## 2021-09-27 PROCEDURE — 99215 OFFICE O/P EST HI 40 MIN: CPT | Mod: PBBFAC | Performed by: NURSE PRACTITIONER

## 2021-09-27 PROCEDURE — 99214 OFFICE O/P EST MOD 30 MIN: CPT | Mod: S$PBB | Performed by: NURSE PRACTITIONER

## 2021-09-27 PROCEDURE — 99999 PR STA SHADOW: ICD-10-PCS | Mod: PBBFAC,,, | Performed by: NURSE PRACTITIONER

## 2021-09-27 PROCEDURE — 99999 PR STA SHADOW: CPT | Mod: PBBFAC,,, | Performed by: NURSE PRACTITIONER

## 2021-09-27 RX ORDER — ALPRAZOLAM 1 MG/1
1 TABLET ORAL 3 TIMES DAILY PRN
Qty: 90 TABLET | Refills: 5 | Status: SHIPPED | OUTPATIENT
Start: 2021-09-27 | End: 2022-03-28 | Stop reason: SDUPTHER

## 2021-12-10 ENCOUNTER — PATIENT OUTREACH (OUTPATIENT)
Dept: ADMINISTRATIVE | Facility: HOSPITAL | Age: 56
End: 2021-12-10
Payer: COMMERCIAL

## 2022-03-09 ENCOUNTER — DOCUMENTATION ONLY (OUTPATIENT)
Dept: CARDIOLOGY | Facility: CLINIC | Age: 57
End: 2022-03-09
Payer: MEDICARE

## 2022-03-09 ENCOUNTER — OFFICE VISIT (OUTPATIENT)
Dept: CARDIOLOGY | Facility: CLINIC | Age: 57
End: 2022-03-09
Payer: COMMERCIAL

## 2022-03-09 ENCOUNTER — LAB VISIT (OUTPATIENT)
Dept: LAB | Facility: HOSPITAL | Age: 57
End: 2022-03-09
Attending: INTERNAL MEDICINE
Payer: COMMERCIAL

## 2022-03-09 VITALS
HEIGHT: 64 IN | BODY MASS INDEX: 35.49 KG/M2 | WEIGHT: 207.88 LBS | HEART RATE: 99 BPM | OXYGEN SATURATION: 97 % | SYSTOLIC BLOOD PRESSURE: 168 MMHG | DIASTOLIC BLOOD PRESSURE: 104 MMHG

## 2022-03-09 DIAGNOSIS — I73.9 PAD (PERIPHERAL ARTERY DISEASE): Primary | ICD-10-CM

## 2022-03-09 DIAGNOSIS — E66.9 OBESITY (BMI 30-39.9): ICD-10-CM

## 2022-03-09 DIAGNOSIS — J44.9 CHRONIC OBSTRUCTIVE PULMONARY DISEASE, UNSPECIFIED COPD TYPE: ICD-10-CM

## 2022-03-09 DIAGNOSIS — I70.223 ATHEROSCLEROSIS OF NATIVE ARTERY OF BOTH LOWER EXTREMITIES WITH REST PAIN: ICD-10-CM

## 2022-03-09 DIAGNOSIS — I73.9 PAD (PERIPHERAL ARTERY DISEASE): ICD-10-CM

## 2022-03-09 DIAGNOSIS — I70.219 ATHEROSCLEROSIS OF LOWER EXTREMITY WITH CLAUDICATION: ICD-10-CM

## 2022-03-09 DIAGNOSIS — N52.9 IMPOTENCE DUE TO ERECTILE DYSFUNCTION: Primary | ICD-10-CM

## 2022-03-09 LAB
ABO + RH BLD: NORMAL
ANION GAP SERPL CALC-SCNC: 10 MMOL/L (ref 8–16)
BASOPHILS # BLD AUTO: 0.05 K/UL (ref 0–0.2)
BASOPHILS NFR BLD: 0.6 % (ref 0–1.9)
BLD GP AB SCN CELLS X3 SERPL QL: NORMAL
BUN SERPL-MCNC: 21 MG/DL (ref 6–20)
CALCIUM SERPL-MCNC: 9.9 MG/DL (ref 8.7–10.5)
CHLORIDE SERPL-SCNC: 101 MMOL/L (ref 95–110)
CO2 SERPL-SCNC: 30 MMOL/L (ref 23–29)
CREAT SERPL-MCNC: 1.2 MG/DL (ref 0.5–1.4)
DIFFERENTIAL METHOD: ABNORMAL
EOSINOPHIL # BLD AUTO: 0.1 K/UL (ref 0–0.5)
EOSINOPHIL NFR BLD: 0.7 % (ref 0–8)
ERYTHROCYTE [DISTWIDTH] IN BLOOD BY AUTOMATED COUNT: 13.2 % (ref 11.5–14.5)
EST. GFR  (AFRICAN AMERICAN): >60 ML/MIN/1.73 M^2
EST. GFR  (NON AFRICAN AMERICAN): >60 ML/MIN/1.73 M^2
GLUCOSE SERPL-MCNC: 102 MG/DL (ref 70–110)
HCT VFR BLD AUTO: 52.4 % (ref 40–54)
HGB BLD-MCNC: 17 G/DL (ref 14–18)
IMM GRANULOCYTES # BLD AUTO: 0.04 K/UL (ref 0–0.04)
IMM GRANULOCYTES NFR BLD AUTO: 0.5 % (ref 0–0.5)
LYMPHOCYTES # BLD AUTO: 1.4 K/UL (ref 1–4.8)
LYMPHOCYTES NFR BLD: 17.1 % (ref 18–48)
MCH RBC QN AUTO: 30.4 PG (ref 27–31)
MCHC RBC AUTO-ENTMCNC: 32.4 G/DL (ref 32–36)
MCV RBC AUTO: 94 FL (ref 82–98)
MONOCYTES # BLD AUTO: 1 K/UL (ref 0.3–1)
MONOCYTES NFR BLD: 12.6 % (ref 4–15)
NEUTROPHILS # BLD AUTO: 5.7 K/UL (ref 1.8–7.7)
NEUTROPHILS NFR BLD: 68.5 % (ref 38–73)
NRBC BLD-RTO: 0 /100 WBC
PLATELET # BLD AUTO: 314 K/UL (ref 150–450)
PMV BLD AUTO: 10 FL (ref 9.2–12.9)
POTASSIUM SERPL-SCNC: 4.4 MMOL/L (ref 3.5–5.1)
RBC # BLD AUTO: 5.59 M/UL (ref 4.6–6.2)
SODIUM SERPL-SCNC: 141 MMOL/L (ref 136–145)
WBC # BLD AUTO: 8.26 K/UL (ref 3.9–12.7)

## 2022-03-09 PROCEDURE — 4010F ACE/ARB THERAPY RXD/TAKEN: CPT | Mod: CPTII,S$GLB,, | Performed by: INTERNAL MEDICINE

## 2022-03-09 PROCEDURE — 99999 PR PBB SHADOW E&M-EST. PATIENT-LVL IV: CPT | Mod: PBBFAC,,, | Performed by: INTERNAL MEDICINE

## 2022-03-09 PROCEDURE — 99999 PR PBB SHADOW E&M-EST. PATIENT-LVL IV: ICD-10-PCS | Mod: PBBFAC,,, | Performed by: INTERNAL MEDICINE

## 2022-03-09 PROCEDURE — 99214 PR OFFICE/OUTPT VISIT, EST, LEVL IV, 30-39 MIN: ICD-10-PCS | Mod: S$GLB,,, | Performed by: INTERNAL MEDICINE

## 2022-03-09 PROCEDURE — 3080F DIAST BP >= 90 MM HG: CPT | Mod: CPTII,S$GLB,, | Performed by: INTERNAL MEDICINE

## 2022-03-09 PROCEDURE — 3077F SYST BP >= 140 MM HG: CPT | Mod: CPTII,S$GLB,, | Performed by: INTERNAL MEDICINE

## 2022-03-09 PROCEDURE — 36415 COLL VENOUS BLD VENIPUNCTURE: CPT | Performed by: INTERNAL MEDICINE

## 2022-03-09 PROCEDURE — 99214 OFFICE O/P EST MOD 30 MIN: CPT | Mod: S$GLB,,, | Performed by: INTERNAL MEDICINE

## 2022-03-09 PROCEDURE — 3008F BODY MASS INDEX DOCD: CPT | Mod: CPTII,S$GLB,, | Performed by: INTERNAL MEDICINE

## 2022-03-09 PROCEDURE — 3008F PR BODY MASS INDEX (BMI) DOCUMENTED: ICD-10-PCS | Mod: CPTII,S$GLB,, | Performed by: INTERNAL MEDICINE

## 2022-03-09 PROCEDURE — 86901 BLOOD TYPING SEROLOGIC RH(D): CPT | Performed by: INTERNAL MEDICINE

## 2022-03-09 PROCEDURE — 3080F PR MOST RECENT DIASTOLIC BLOOD PRESSURE >= 90 MM HG: ICD-10-PCS | Mod: CPTII,S$GLB,, | Performed by: INTERNAL MEDICINE

## 2022-03-09 PROCEDURE — 1159F MED LIST DOCD IN RCRD: CPT | Mod: CPTII,S$GLB,, | Performed by: INTERNAL MEDICINE

## 2022-03-09 PROCEDURE — 3077F PR MOST RECENT SYSTOLIC BLOOD PRESSURE >= 140 MM HG: ICD-10-PCS | Mod: CPTII,S$GLB,, | Performed by: INTERNAL MEDICINE

## 2022-03-09 PROCEDURE — 80048 BASIC METABOLIC PNL TOTAL CA: CPT | Performed by: INTERNAL MEDICINE

## 2022-03-09 PROCEDURE — 85025 COMPLETE CBC W/AUTO DIFF WBC: CPT | Performed by: INTERNAL MEDICINE

## 2022-03-09 PROCEDURE — 1159F PR MEDICATION LIST DOCUMENTED IN MEDICAL RECORD: ICD-10-PCS | Mod: CPTII,S$GLB,, | Performed by: INTERNAL MEDICINE

## 2022-03-09 PROCEDURE — 4010F PR ACE/ARB THEARPY RXD/TAKEN: ICD-10-PCS | Mod: CPTII,S$GLB,, | Performed by: INTERNAL MEDICINE

## 2022-03-09 RX ORDER — DIPHENHYDRAMINE HCL 50 MG
50 CAPSULE ORAL ONCE
Status: CANCELLED | OUTPATIENT
Start: 2022-03-09 | End: 2022-03-09

## 2022-03-09 NOTE — H&P (VIEW-ONLY)
PCP - Latrice Zafar NP  Subjective:   Patient ID:  Michael Guillaume Sr. is a 57 y.o. male who presents for  Follow up of Peripheral Artery Disease.     HPI: Michael Guillaume is a 57 yo M with PAD s/p PTAs to R CFA/SFA/PFA/POP, hx bilateral EIA stents (5/27/2015), HTN, DLD, former tobacco use who presents for follow up of PAD. Patients last peripheral intervention was to his RLE on 2/20/20 (see below). His post procedure course c/b right medial thigh hematoma (since resolved).   He had improvement of claudication, but complaining of problem with erection >2 years, worsening, causing significant problem. He has bilateral internal iliac artery stents.    Aortogram with run off 02/20/20:   Ost R PFA lesion , 90% stenosed reduced to 0%.  Dist R SFA to Mid R Popliteal lesion , 75% stenosed reduced to 0%.  Prox R SFA to Mid R SFA lesion , 80% stenosed reduced to 10%.   Successful PTA.   Three vessel below the knee on right,   Mid R CFA to Dist R CFA lesion , 90% stenosed reduced to 30%..    History:     Past Medical History:   Diagnosis Date    Anticoagulant long-term use     Arthritis     Blood transfusion     mid 40's age    CHF (congestive heart failure)     COPD (chronic obstructive pulmonary disease)     COPD (chronic obstructive pulmonary disease)     Coronary artery disease     Encounter for blood transfusion     GERD (gastroesophageal reflux disease)     Hyperlipidemia 7/3/2004    Hypertension     Myocardial infarction     PAD (peripheral artery disease)     Sleep apnea     Thoracic or lumbosacral neuritis or radiculitis, unspecified 5/13/2013    Thyroid disorder 9/8/2015     Past Surgical History:   Procedure Laterality Date    ABDOMINAL SURGERY      ANGIOPLASTY      aortogram  2013    AORTOGRAPHY WITH SERIALOGRAPHY N/A 2/20/2020    Procedure: AORTOGRAM, WITH SERIALOGRAPHY;  Surgeon: Rohan Neal MD;  Location: [facility] CATH LAB;  Service: Cardiology;  Laterality: N/A;    CARDIAC  CATHETERIZATION      COLONOSCOPY N/A 10/29/2015    Procedure: COLONOSCOPY;  Surgeon: Mino Wells MD;  [facility];  Service: Endoscopy;  Laterality: N/A;  ALLERGY - LIPITOR    FRACTURE SURGERY      TONSILLECTOMY       Social History     Tobacco Use    Smoking status: Former Smoker     Packs/day: 1.00     Years: 20.00     Pack years: 20.00     Types: Cigarettes     Quit date: 12/1/2011     Years since quitting: 10.2    Smokeless tobacco: Never Used    Tobacco comment: 5-7 cigarettes in 3 months   Substance Use Topics    Alcohol use: Not Currently     Alcohol/week: 6.0 standard drinks     Types: 2 - 3 Cans of beer, 6 Shots of liquor per week     Comment: wine- occasions     Family History   Problem Relation Age of Onset    Clotting disorder Mother         ?    Heart failure Mother     Hypertension Mother     Heart disease Mother     Clotting disorder Father         ?    Heart attack Father     Hypertension Father     Heart disease Father     Heart disease Brother     Anesthesia problems Neg Hx        Meds:     Review of patient's allergies indicates:   Allergen Reactions    Lipitor [atorvastatin] Other (See Comments)     Other reaction(s): Severe Myalgias       Current Outpatient Medications:     ALPRAZolam (XANAX) 1 MG tablet, Take 1 tablet (1 mg total) by mouth 3 (three) times daily as needed., Disp: 90 tablet, Rfl: 5    aspirin 81 mg Tab, Take 1 tablet by mouth once daily. , Disp: , Rfl:     carvediloL (COREG) 12.5 MG tablet, Take 1 tablet (12.5 mg total) by mouth 2 (two) times daily with meals., Disp: 180 tablet, Rfl: 3    clopidogreL (PLAVIX) 75 mg tablet, Take 1 tablet (75 mg total) by mouth once daily., Disp: 90 tablet, Rfl: 3    cyclobenzaprine (FLEXERIL) 10 MG tablet, Take 1 tablet (10 mg total) by mouth every evening., Disp: 30 tablet, Rfl: 5    esomeprazole (NEXIUM) 20 MG capsule, Take 20 mg by mouth before breakfast., Disp: , Rfl:     furosemide (LASIX) 20 MG tablet,  "TAKE 1 TABLET BY MOUTH EVERY DAY AS NEEDED, Disp: 90 tablet, Rfl: 0    losartan (COZAAR) 100 MG tablet, Take 1 tablet (100 mg total) by mouth once daily., Disp: 90 tablet, Rfl: 3    MULTI-VITAMIN HI-PO ORAL, Take by mouth once daily. , Disp: , Rfl:     rosuvastatin (CRESTOR) 40 MG Tab, Take 1 tablet (40 mg total) by mouth once daily., Disp: 90 tablet, Rfl: 3    vitamin E 400 UNIT capsule, Take 400 Units by mouth once daily., Disp: , Rfl:     albuterol-ipratropium (DUO-NEB) 2.5 mg-0.5 mg/3 mL nebulizer solution, Take 3 mLs by nebulization every 6 (six) hours while awake. Rescue, Disp: 1 Box, Rfl: 2    cilostazoL (PLETAL) 100 MG Tab, Take 1 tablet (100 mg total) by mouth 2 (two) times daily. (Patient not taking: Reported on 3/9/2022), Disp: 60 tablet, Rfl: 11    SYMBICORT 80-4.5 mcg/actuation HFAA, INHALE 2 PUFFS TWICE A DAY AS DIRECTED (Patient not taking: Reported on 3/9/2022), Disp: 10.2 g, Rfl: 5      Review of Systems   Constitutional: Negative for chills, diaphoresis, fever, malaise/fatigue and weight loss.   HENT: Negative for nosebleeds and sinus pain.    Eyes: Negative.    Respiratory: Negative for cough, sputum production and shortness of breath.    Cardiovascular: Positive for claudication. Negative for chest pain, palpitations, orthopnea, leg swelling and PND.   Gastrointestinal: Negative for abdominal pain, blood in stool, constipation, diarrhea, nausea and vomiting.   Genitourinary: Negative for dysuria, frequency, hematuria and urgency.        Erectile dysfunction   Musculoskeletal: Negative.    Skin: Negative.    Neurological: Negative for dizziness, loss of consciousness and weakness.   Endo/Heme/Allergies: Negative.    Psychiatric/Behavioral: Negative for depression. The patient is not nervous/anxious.        Objective:   BP (!) 168/104 (BP Location: Right arm, Patient Position: Sitting, BP Method: Large (Automatic))   Pulse 99   Ht 5' 4" (1.626 m)   Wt 94.3 kg (207 lb 14.3 oz)   SpO2 97%  "  BMI 35.68 kg/m²   Physical Exam  Constitutional:       General: He is not in acute distress.  HENT:      Head: Normocephalic and atraumatic.   Eyes:      Conjunctiva/sclera: Conjunctivae normal.      Pupils: Pupils are equal, round, and reactive to light.   Neck:      Vascular: No JVD.   Cardiovascular:      Rate and Rhythm: Normal rate and regular rhythm.      Pulses:           Radial pulses are 1+ on the right side and 1+ on the left side.      Heart sounds: Normal heart sounds. No murmur heard.    No friction rub. No gallop.   Pulmonary:      Effort: Pulmonary effort is normal. No respiratory distress.      Breath sounds: Normal breath sounds. No wheezing or rales.   Chest:      Chest wall: No tenderness.   Abdominal:      General: Bowel sounds are normal. There is no distension.      Palpations: Abdomen is soft.      Tenderness: There is no abdominal tenderness.   Musculoskeletal:         General: Normal range of motion.      Cervical back: Normal range of motion and neck supple.   Skin:     General: Skin is warm and dry.      Findings: No erythema.   Neurological:      Mental Status: He is alert and oriented to person, place, and time.   Psychiatric:         Mood and Affect: Mood and affect normal.         Cognition and Memory: Memory normal.         Judgment: Judgment normal.         Labs:     Lab Results   Component Value Date     03/09/2022    K 4.4 03/09/2022     03/09/2022    CO2 30 (H) 03/09/2022    BUN 21 (H) 03/09/2022    CREATININE 1.2 03/09/2022    ANIONGAP 10 03/09/2022     Lab Results   Component Value Date    HGBA1C 5.8 03/22/2007     Lab Results   Component Value Date    BNP 22 03/19/2014       Lab Results   Component Value Date    WBC 8.26 03/09/2022    HGB 17.0 03/09/2022    HCT 52.4 03/09/2022     03/09/2022    GRAN 5.7 03/09/2022    GRAN 68.5 03/09/2022     Lab Results   Component Value Date    CHOL 146 08/26/2021    HDL 34 (L) 08/26/2021    LDLCALC 78.4 08/26/2021    TRIG  168 (H) 08/26/2021       Lab Results   Component Value Date     03/09/2022    K 4.4 03/09/2022     03/09/2022    CO2 30 (H) 03/09/2022    BUN 21 (H) 03/09/2022    CREATININE 1.2 03/09/2022    ANIONGAP 10 03/09/2022     Lab Results   Component Value Date    HGBA1C 5.8 03/22/2007     Lab Results   Component Value Date    BNP 22 03/19/2014    Lab Results   Component Value Date    WBC 8.26 03/09/2022    HGB 17.0 03/09/2022    HCT 52.4 03/09/2022     03/09/2022    GRAN 5.7 03/09/2022    GRAN 68.5 03/09/2022     Lab Results   Component Value Date    CHOL 146 08/26/2021    HDL 34 (L) 08/26/2021    LDLCALC 78.4 08/26/2021    TRIG 168 (H) 08/26/2021                  Assessment & Plan:     PAD  - Patient with severe PAD s/p bilateral EIA and IIA stenting and PTAs to R CFA/SFA/PFA/POP (2/2020) who presents for follow up.   Mild claudication bilateral, R>L  Dificulty in erection  Has bilateral IIA stents   Cont aspirin, Plavix, and high intensity statin.  Continue with structured exercise program.   Schedule bilateral LE angiography, rt radial access, diagnostic angiography, possible internal iliac PTA/stent if suitable, otherwise will discuss after diagnostic angiography and plan/stage intervention     HTN (hypertension)  - BP uncontrolled, goal BP <140/90 mmHg. Continue current antihypertensive regimen. Low salt diet.    Hyperlipidemia LDL goal <70  - Lipid panel reviewed.Continue with high intensity statin. Low fat diet.      Jose Head MD   Interventional Cardiology  Vascular Intervention   [facility] [address], [place] [address]

## 2022-03-09 NOTE — PROGRESS NOTES
PCP - Latrice Zafar NP  Subjective:   Patient ID:  Michael Guillaume Sr. is a 57 y.o. male who presents for  Follow up of Peripheral Artery Disease.     HPI: Michael Guillaume is a 57 yo M with PAD s/p PTAs to R CFA/SFA/PFA/POP, hx bilateral EIA stents (5/27/2015), HTN, DLD, former tobacco use who presents for follow up of PAD. Patients last peripheral intervention was to his RLE on 2/20/20 (see below). His post procedure course c/b right medial thigh hematoma (since resolved).   He had improvement of claudication, but complaining of problem with erection >2 years, worsening, causing significant problem. He has bilateral internal iliac artery stents.    Aortogram with run off 02/20/20:   Ost R PFA lesion , 90% stenosed reduced to 0%.  Dist R SFA to Mid R Popliteal lesion , 75% stenosed reduced to 0%.  Prox R SFA to Mid R SFA lesion , 80% stenosed reduced to 10%.   Successful PTA.   Three vessel below the knee on right,   Mid R CFA to Dist R CFA lesion , 90% stenosed reduced to 30%..    History:     Past Medical History:   Diagnosis Date    Anticoagulant long-term use     Arthritis     Blood transfusion     mid 40's age    CHF (congestive heart failure)     COPD (chronic obstructive pulmonary disease)     COPD (chronic obstructive pulmonary disease)     Coronary artery disease     Encounter for blood transfusion     GERD (gastroesophageal reflux disease)     Hyperlipidemia 7/3/2004    Hypertension     Myocardial infarction     PAD (peripheral artery disease)     Sleep apnea     Thoracic or lumbosacral neuritis or radiculitis, unspecified 5/13/2013    Thyroid disorder 9/8/2015     Past Surgical History:   Procedure Laterality Date    ABDOMINAL SURGERY      ANGIOPLASTY      aortogram  2013    AORTOGRAPHY WITH SERIALOGRAPHY N/A 2/20/2020    Procedure: AORTOGRAM, WITH SERIALOGRAPHY;  Surgeon: Rohan Neal MD;  Location: [facility] CATH LAB;  Service: Cardiology;  Laterality: N/A;    CARDIAC  CATHETERIZATION      COLONOSCOPY N/A 10/29/2015    Procedure: COLONOSCOPY;  Surgeon: Mino Wells MD;  Location: [facility];  Service: Endoscopy;  Laterality: N/A;  ALLERGY - LIPITOR    FRACTURE SURGERY      TONSILLECTOMY       Social History     Tobacco Use    Smoking status: Former Smoker     Packs/day: 1.00     Years: 20.00     Pack years: 20.00     Types: Cigarettes     Quit date: 12/1/2011     Years since quitting: 10.2    Smokeless tobacco: Never Used    Tobacco comment: 5-7 cigarettes in 3 months   Substance Use Topics    Alcohol use: Not Currently     Alcohol/week: 6.0 standard drinks     Types: 2 - 3 Cans of beer, 6 Shots of liquor per week     Comment: wine- occasions     Family History   Problem Relation Age of Onset    Clotting disorder Mother         ?    Heart failure Mother     Hypertension Mother     Heart disease Mother     Clotting disorder Father         ?    Heart attack Father     Hypertension Father     Heart disease Father     Heart disease Brother     Anesthesia problems Neg Hx        Meds:     Review of patient's allergies indicates:   Allergen Reactions    Lipitor [atorvastatin] Other (See Comments)     Other reaction(s): Severe Myalgias       Current Outpatient Medications:     ALPRAZolam (XANAX) 1 MG tablet, Take 1 tablet (1 mg total) by mouth 3 (three) times daily as needed., Disp: 90 tablet, Rfl: 5    aspirin 81 mg Tab, Take 1 tablet by mouth once daily. , Disp: , Rfl:     carvediloL (COREG) 12.5 MG tablet, Take 1 tablet (12.5 mg total) by mouth 2 (two) times daily with meals., Disp: 180 tablet, Rfl: 3    clopidogreL (PLAVIX) 75 mg tablet, Take 1 tablet (75 mg total) by mouth once daily., Disp: 90 tablet, Rfl: 3    cyclobenzaprine (FLEXERIL) 10 MG tablet, Take 1 tablet (10 mg total) by mouth every evening., Disp: 30 tablet, Rfl: 5    esomeprazole (NEXIUM) 20 MG capsule, Take 20 mg by mouth before breakfast., Disp: , Rfl:     furosemide (LASIX) 20 MG tablet,  "TAKE 1 TABLET BY MOUTH EVERY DAY AS NEEDED, Disp: 90 tablet, Rfl: 0    losartan (COZAAR) 100 MG tablet, Take 1 tablet (100 mg total) by mouth once daily., Disp: 90 tablet, Rfl: 3    MULTI-VITAMIN HI-PO ORAL, Take by mouth once daily. , Disp: , Rfl:     rosuvastatin (CRESTOR) 40 MG Tab, Take 1 tablet (40 mg total) by mouth once daily., Disp: 90 tablet, Rfl: 3    vitamin E 400 UNIT capsule, Take 400 Units by mouth once daily., Disp: , Rfl:     albuterol-ipratropium (DUO-NEB) 2.5 mg-0.5 mg/3 mL nebulizer solution, Take 3 mLs by nebulization every 6 (six) hours while awake. Rescue, Disp: 1 Box, Rfl: 2    cilostazoL (PLETAL) 100 MG Tab, Take 1 tablet (100 mg total) by mouth 2 (two) times daily. (Patient not taking: Reported on 3/9/2022), Disp: 60 tablet, Rfl: 11    SYMBICORT 80-4.5 mcg/actuation HFAA, INHALE 2 PUFFS TWICE A DAY AS DIRECTED (Patient not taking: Reported on 3/9/2022), Disp: 10.2 g, Rfl: 5      Review of Systems   Constitutional: Negative for chills, diaphoresis, fever, malaise/fatigue and weight loss.   HENT: Negative for nosebleeds and sinus pain.    Eyes: Negative.    Respiratory: Negative for cough, sputum production and shortness of breath.    Cardiovascular: Positive for claudication. Negative for chest pain, palpitations, orthopnea, leg swelling and PND.   Gastrointestinal: Negative for abdominal pain, blood in stool, constipation, diarrhea, nausea and vomiting.   Genitourinary: Negative for dysuria, frequency, hematuria and urgency.        Erectile dysfunction   Musculoskeletal: Negative.    Skin: Negative.    Neurological: Negative for dizziness, loss of consciousness and weakness.   Endo/Heme/Allergies: Negative.    Psychiatric/Behavioral: Negative for depression. The patient is not nervous/anxious.        Objective:   BP (!) 168/104 (BP Location: Right arm, Patient Position: Sitting, BP Method: Large (Automatic))   Pulse 99   Ht 5' 4" (1.626 m)   Wt 94.3 kg (207 lb 14.3 oz)   SpO2 97%  "  BMI 35.68 kg/m²   Physical Exam  Constitutional:       General: He is not in acute distress.  HENT:      Head: Normocephalic and atraumatic.   Eyes:      Conjunctiva/sclera: Conjunctivae normal.      Pupils: Pupils are equal, round, and reactive to light.   Neck:      Vascular: No JVD.   Cardiovascular:      Rate and Rhythm: Normal rate and regular rhythm.      Pulses:           Radial pulses are 1+ on the right side and 1+ on the left side.      Heart sounds: Normal heart sounds. No murmur heard.    No friction rub. No gallop.   Pulmonary:      Effort: Pulmonary effort is normal. No respiratory distress.      Breath sounds: Normal breath sounds. No wheezing or rales.   Chest:      Chest wall: No tenderness.   Abdominal:      General: Bowel sounds are normal. There is no distension.      Palpations: Abdomen is soft.      Tenderness: There is no abdominal tenderness.   Musculoskeletal:         General: Normal range of motion.      Cervical back: Normal range of motion and neck supple.   Skin:     General: Skin is warm and dry.      Findings: No erythema.   Neurological:      Mental Status: He is alert and oriented to person, place, and time.   Psychiatric:         Mood and Affect: Mood and affect normal.         Cognition and Memory: Memory normal.         Judgment: Judgment normal.         Labs:     Lab Results   Component Value Date     03/09/2022    K 4.4 03/09/2022     03/09/2022    CO2 30 (H) 03/09/2022    BUN 21 (H) 03/09/2022    CREATININE 1.2 03/09/2022    ANIONGAP 10 03/09/2022     Lab Results   Component Value Date    HGBA1C 5.8 03/22/2007     Lab Results   Component Value Date    BNP 22 03/19/2014       Lab Results   Component Value Date    WBC 8.26 03/09/2022    HGB 17.0 03/09/2022    HCT 52.4 03/09/2022     03/09/2022    GRAN 5.7 03/09/2022    GRAN 68.5 03/09/2022     Lab Results   Component Value Date    CHOL 146 08/26/2021    HDL 34 (L) 08/26/2021    LDLCALC 78.4 08/26/2021    TRIG  168 (H) 08/26/2021       Lab Results   Component Value Date     03/09/2022    K 4.4 03/09/2022     03/09/2022    CO2 30 (H) 03/09/2022    BUN 21 (H) 03/09/2022    CREATININE 1.2 03/09/2022    ANIONGAP 10 03/09/2022     Lab Results   Component Value Date    HGBA1C 5.8 03/22/2007     Lab Results   Component Value Date    BNP 22 03/19/2014    Lab Results   Component Value Date    WBC 8.26 03/09/2022    HGB 17.0 03/09/2022    HCT 52.4 03/09/2022     03/09/2022    GRAN 5.7 03/09/2022    GRAN 68.5 03/09/2022     Lab Results   Component Value Date    CHOL 146 08/26/2021    HDL 34 (L) 08/26/2021    LDLCALC 78.4 08/26/2021    TRIG 168 (H) 08/26/2021                  Assessment & Plan:     PAD  - Patient with severe PAD s/p bilateral EIA and IIA stenting and PTAs to R CFA/SFA/PFA/POP (2/2020) who presents for follow up.   Mild claudication bilateral, R>L  Dificulty in erection  Has bilateral IIA stents   Cont aspirin, Plavix, and high intensity statin.  Continue with structured exercise program.   Schedule bilateral LE angiography, rt radial access, diagnostic angiography, possible internal iliac PTA/stent if suitable, otherwise will discuss after diagnostic angiography and plan/stage intervention     HTN (hypertension)  - BP uncontrolled, goal BP <140/90 mmHg. Continue current antihypertensive regimen. Low salt diet.    Hyperlipidemia LDL goal <70  - Lipid panel reviewed.Continue with high intensity statin. Low fat diet.      Jose Head MD   Interventional Cardiology  Vascular Intervention   [facility] [address], [place] [address]

## 2022-03-09 NOTE — PROGRESS NOTES
OUTPATIENT CATHETERIZATION INSTRUCTIONS    You have been scheduled for a procedure in the catheterization lab on Friday, March 18, 2022.     Please report to the Cardiology Waiting Area on the Third floor of the hospital and check in at 7:30 AM.   You will then be taken to the SSCU (Short Stay Cardiac Unit) and prepared for your procedure. Please be aware that this is not the time of your procedure but the time you are to arrive. The procedures are scheduled on an hourly basis; however, emergency cases take precedence over all other cases.       You may not have anything to eat or drink after midnight the night before your test. You may take your regular morning medications with water. If there are any medications that you should not take you will be instructed to hold them that morning. If you are diabetic and on Metformin (Glucophage) do not take it the day before, the day of, and for 2 days after your procedure.      The procedure will take 1-2 hours to perform. After the procedure, you will return to SSCU on the third floor of the hospital. You will need to lie still (or keep your arm still) for the next 4 to 6 hours to minimize bleeding from the puncture site. Your family may remain in the room with you during this time.       You may be able to be discharged home that same afternoon if there is someone to drive you home and there were no complications. If you have one of the balloon, stent, or device procedures you may spend the night in the hospital. Your doctor will determine, based on your progress, the date and time of your discharge. The results of your procedure will be discussed with you before you are discharged. Any further testing or procedures will be scheduled for you either before you leave or you will be called with these appointments.       If you should have any questions, concerns, or need to change the date of your procedure, please call  LOIS Lee @ [phone]    Special  Instructions:    Do Covid home test the morning of procedure. Take a picture of the negative result and bring with you when you come. If result is positive, call me and don't come in.  Drink plenty of water the day before and after your procedure.         THE ABOVE INSTRUCTIONS WERE GIVEN TO THE PATIENT VERBALLY AND THEY VERBALIZED UNDERSTANDING.  THEY DO NOT REQUIRE ANY SPECIAL NEEDS AND DO NOT HAVE ANY LEARNING BARRIERS.          Directions for Reporting to Cardiology Waiting Area in the Hospital  If you park in the Parking Garage:  Take elevators to the1st floor of the parking garage.  Continue past the gift shop, coffee shop, and piano.  Take a right and go to the gold elevators. (Elevator B)  Take the elevator to the 3rd floor.  Follow the arrow on the sign on the wall that says Cath Lab Registration/EP Lab Registration.  Follow the long hallway all the way around until you come to a big open area.  This is the registration area.  Check in at Reception Desk.    OR    If family is dropping you off:  Have them drop you off at the front of the Hospital under the green overhang.  Enter through the doors and take a right.  Take the E elevators to the 3rd floor Cardiology Waiting Area.  Check in at the Reception Desk in the waiting room.

## 2022-03-18 ENCOUNTER — HOSPITAL ENCOUNTER (OUTPATIENT)
Facility: HOSPITAL | Age: 57
Discharge: HOME | End: 2022-03-18
Attending: INTERNAL MEDICINE | Admitting: INTERNAL MEDICINE
Payer: COMMERCIAL

## 2022-03-18 VITALS
SYSTOLIC BLOOD PRESSURE: 161 MMHG | BODY MASS INDEX: 35.34 KG/M2 | OXYGEN SATURATION: 95 % | DIASTOLIC BLOOD PRESSURE: 90 MMHG | RESPIRATION RATE: 16 BRPM | HEIGHT: 64 IN | WEIGHT: 207 LBS | TEMPERATURE: 98 F | HEART RATE: 80 BPM

## 2022-03-18 DIAGNOSIS — N52.01 ERECTILE DYSFUNCTION DUE TO ARTERIAL INSUFFICIENCY: Primary | ICD-10-CM

## 2022-03-18 DIAGNOSIS — I70.219 ATHEROSCLEROSIS OF LOWER EXTREMITY WITH CLAUDICATION: ICD-10-CM

## 2022-03-18 DIAGNOSIS — N52.9 IMPOTENCE DUE TO ERECTILE DYSFUNCTION: ICD-10-CM

## 2022-03-18 LAB
ABO + RH BLD: NORMAL
BASOPHILS # BLD AUTO: 0.07 K/UL (ref 0–0.2)
BASOPHILS NFR BLD: 0.7 % (ref 0–1.9)
BLD GP AB SCN CELLS X3 SERPL QL: NORMAL
DIFFERENTIAL METHOD: NORMAL
EOSINOPHIL # BLD AUTO: 0.1 K/UL (ref 0–0.5)
EOSINOPHIL NFR BLD: 0.9 % (ref 0–8)
ERYTHROCYTE [DISTWIDTH] IN BLOOD BY AUTOMATED COUNT: 13.3 % (ref 11.5–14.5)
HCT VFR BLD AUTO: 50.8 % (ref 40–54)
HGB BLD-MCNC: 16.3 G/DL (ref 14–18)
IMM GRANULOCYTES # BLD AUTO: 0.04 K/UL (ref 0–0.04)
IMM GRANULOCYTES NFR BLD AUTO: 0.4 % (ref 0–0.5)
LYMPHOCYTES # BLD AUTO: 2.1 K/UL (ref 1–4.8)
LYMPHOCYTES NFR BLD: 21.7 % (ref 18–48)
MCH RBC QN AUTO: 29.9 PG (ref 27–31)
MCHC RBC AUTO-ENTMCNC: 32.1 G/DL (ref 32–36)
MCV RBC AUTO: 93 FL (ref 82–98)
MONOCYTES # BLD AUTO: 0.8 K/UL (ref 0.3–1)
MONOCYTES NFR BLD: 7.9 % (ref 4–15)
NEUTROPHILS # BLD AUTO: 6.5 K/UL (ref 1.8–7.7)
NEUTROPHILS NFR BLD: 68.4 % (ref 38–73)
NRBC BLD-RTO: 0 /100 WBC
PLATELET # BLD AUTO: 313 K/UL (ref 150–450)
PMV BLD AUTO: 9.8 FL (ref 9.2–12.9)
RBC # BLD AUTO: 5.46 M/UL (ref 4.6–6.2)
WBC # BLD AUTO: 9.54 K/UL (ref 3.9–12.7)

## 2022-03-18 PROCEDURE — 75716 ARTERY X-RAYS ARMS/LEGS: CPT | Mod: 26,GC,, | Performed by: INTERNAL MEDICINE

## 2022-03-18 PROCEDURE — 75716 ARTERY X-RAYS ARMS/LEGS: CPT | Mod: GC | Performed by: INTERNAL MEDICINE

## 2022-03-18 PROCEDURE — 75716 PR  ANGIO EXTERMITY BILAT: ICD-10-PCS | Mod: 26,GC,, | Performed by: INTERNAL MEDICINE

## 2022-03-18 PROCEDURE — 75625 CONTRAST EXAM ABDOMINL AORTA: CPT | Mod: 26,GC,, | Performed by: INTERNAL MEDICINE

## 2022-03-18 PROCEDURE — 75625 CONTRAST EXAM ABDOMINL AORTA: CPT | Mod: GC | Performed by: INTERNAL MEDICINE

## 2022-03-18 PROCEDURE — 99152 PR MOD CONSCIOUS SEDATION, SAME PHYS, 5+ YRS, FIRST 15 MIN: ICD-10-PCS | Mod: GC,,, | Performed by: INTERNAL MEDICINE

## 2022-03-18 PROCEDURE — C1894 INTRO/SHEATH, NON-LASER: HCPCS | Performed by: INTERNAL MEDICINE

## 2022-03-18 PROCEDURE — 36415 COLL VENOUS BLD VENIPUNCTURE: CPT | Performed by: INTERNAL MEDICINE

## 2022-03-18 PROCEDURE — 36246 PR INS CATH ABD/L-EXT ART 2ND ORDER: ICD-10-PCS | Mod: RT,GC,, | Performed by: INTERNAL MEDICINE

## 2022-03-18 PROCEDURE — 99152 MOD SED SAME PHYS/QHP 5/>YRS: CPT | Performed by: INTERNAL MEDICINE

## 2022-03-18 PROCEDURE — 85025 COMPLETE CBC W/AUTO DIFF WBC: CPT | Performed by: INTERNAL MEDICINE

## 2022-03-18 PROCEDURE — C1769 GUIDE WIRE: HCPCS | Performed by: INTERNAL MEDICINE

## 2022-03-18 PROCEDURE — 36246 INS CATH ABD/L-EXT ART 2ND: CPT | Mod: RT,GC,, | Performed by: INTERNAL MEDICINE

## 2022-03-18 PROCEDURE — 86850 RBC ANTIBODY SCREEN: CPT | Performed by: INTERNAL MEDICINE

## 2022-03-18 PROCEDURE — 25000003 PHARM REV CODE 250: Performed by: INTERNAL MEDICINE

## 2022-03-18 PROCEDURE — 75625 PR  ANGIO AORTOGRAM ABD SERIAL: ICD-10-PCS | Mod: 26,GC,, | Performed by: INTERNAL MEDICINE

## 2022-03-18 PROCEDURE — 25500020 PHARM REV CODE 255: Performed by: INTERNAL MEDICINE

## 2022-03-18 PROCEDURE — 63600175 PHARM REV CODE 636 W HCPCS: Performed by: INTERNAL MEDICINE

## 2022-03-18 PROCEDURE — 36246 INS CATH ABD/L-EXT ART 2ND: CPT | Mod: GC | Performed by: INTERNAL MEDICINE

## 2022-03-18 PROCEDURE — 99152 MOD SED SAME PHYS/QHP 5/>YRS: CPT | Mod: GC,,, | Performed by: INTERNAL MEDICINE

## 2022-03-18 RX ORDER — SODIUM CHLORIDE 9 MG/ML
INJECTION, SOLUTION INTRAVENOUS CONTINUOUS
Status: DISCONTINUED | OUTPATIENT
Start: 2022-03-18 | End: 2022-03-18

## 2022-03-18 RX ORDER — ACETAMINOPHEN 325 MG/1
650 TABLET ORAL EVERY 4 HOURS PRN
Status: DISCONTINUED | OUTPATIENT
Start: 2022-03-18 | End: 2022-03-18 | Stop reason: HOSPADM

## 2022-03-18 RX ORDER — MIDAZOLAM HYDROCHLORIDE 1 MG/ML
INJECTION, SOLUTION INTRAMUSCULAR; INTRAVENOUS
Status: DISCONTINUED | OUTPATIENT
Start: 2022-03-18 | End: 2022-03-18 | Stop reason: HOSPADM

## 2022-03-18 RX ORDER — IODIXANOL 320 MG/ML
INJECTION, SOLUTION INTRAVASCULAR
Status: DISCONTINUED | OUTPATIENT
Start: 2022-03-18 | End: 2022-03-18 | Stop reason: HOSPADM

## 2022-03-18 RX ORDER — LIDOCAINE HYDROCHLORIDE 20 MG/ML
INJECTION, SOLUTION EPIDURAL; INFILTRATION; INTRACAUDAL; PERINEURAL
Status: DISCONTINUED | OUTPATIENT
Start: 2022-03-18 | End: 2022-03-18 | Stop reason: HOSPADM

## 2022-03-18 RX ORDER — HEPARIN SOD,PORCINE/0.9 % NACL 1000/500ML
INTRAVENOUS SOLUTION INTRAVENOUS
Status: DISCONTINUED | OUTPATIENT
Start: 2022-03-18 | End: 2022-03-18 | Stop reason: HOSPADM

## 2022-03-18 RX ORDER — SODIUM CHLORIDE 9 MG/ML
INJECTION, SOLUTION INTRAVENOUS CONTINUOUS
Status: ACTIVE | OUTPATIENT
Start: 2022-03-18 | End: 2022-03-18

## 2022-03-18 RX ORDER — FENTANYL CITRATE 50 UG/ML
INJECTION, SOLUTION INTRAMUSCULAR; INTRAVENOUS
Status: DISCONTINUED | OUTPATIENT
Start: 2022-03-18 | End: 2022-03-18 | Stop reason: HOSPADM

## 2022-03-18 RX ORDER — DIPHENHYDRAMINE HCL 50 MG
50 CAPSULE ORAL ONCE
Status: DISCONTINUED | OUTPATIENT
Start: 2022-03-18 | End: 2022-03-18 | Stop reason: HOSPADM

## 2022-03-18 RX ORDER — HEPARIN SODIUM 1000 [USP'U]/ML
INJECTION, SOLUTION INTRAVENOUS; SUBCUTANEOUS
Status: DISCONTINUED | OUTPATIENT
Start: 2022-03-18 | End: 2022-03-18 | Stop reason: HOSPADM

## 2022-03-18 RX ORDER — ONDANSETRON 8 MG/1
8 TABLET, ORALLY DISINTEGRATING ORAL EVERY 8 HOURS PRN
Status: DISCONTINUED | OUTPATIENT
Start: 2022-03-18 | End: 2022-03-18 | Stop reason: HOSPADM

## 2022-03-18 RX ADMIN — SODIUM CHLORIDE: 0.9 INJECTION, SOLUTION INTRAVENOUS at 10:03

## 2022-03-18 NOTE — PLAN OF CARE
Patient arrived to room. PIV placed, labs sent. Admit assessment completed. Plan of care discussed with patient. Will monitor. Call light within reach, onstructed in use

## 2022-03-18 NOTE — BRIEF OP NOTE
Post Cath Note  Preoperative Diagnosis: Impotence due to erectile dysfunction [N52.9]  Atherosclerosis of lower extremity with claudication [I70.219]   Postop Diagnosis: Impotence due to erectile dysfunction [N52.9]  Atherosclerosis of lower extremity with claudication [I70.219]  Referring Physician: Ignacio Isaac     Procedure: AORTOGRAM, ABDOMINAL (N/A), Angiogram, Lower Arterial, Bilateral       Access: Right radial  : Jose Head MD   All Operators: Surgeon(s):  MD Ignacio Monk MD Justin L. Price, MD     Intervention:     - S/P diagnostic peripheral angioplasty please see full report. Patent B/L internal iliac arteries      Treatments/Procedures: Procedure(s) (LRB):  AORTOGRAM, ABDOMINAL (N/A)  Angiogram, Lower Arterial, Bilateral     -Closure device: Radial band    Findings:Severe peripheral disease is present. See catheterization report for full details.    Estimated Blood loss: 20 cc    Specimens removed: No  Post Cath Exam:     Vitals:    03/18/22 0741   BP: (!) 139/100   Pulse:    Resp:    Temp:      No unusual pain, hematoma, thrill or bruit at vascular access site.  Distal pulse present without signs of ischemia.    Recommendations:   - Patient tolerated procedure well. No immediate complications  - Routine post-cath care  - Continue aspirin and Plavix   - Routine post cath protocol  - Maximize medical management  - IVF at 150cc/hr for 2 hours   - Continue medical management, Risk factor reduction, Follow-up with outpatient cardiologist   - referral to urology for impotence    Bam Sr - Pager# [phone]  3/18/2022  9:44 AM  Cardiovascular Fellow  [facility]

## 2022-03-18 NOTE — DISCHARGE INSTRUCTIONS
Discharge Instructions and Care of Your Wrist After a Cardiac Catheterization Procedure Performed via the Radial Artery    For 3-5 days following the procedure:  Do not subject your affected hand/arm to any forceful movements (i.e. supporting weight when rising from a chair or bed)  Avoid excessive (extension/flexion) wrist movement (i.e. supporting weight when rising from a chair or bed, push-ups, lifting garage doors, etc.)  Do not drive a car for 24 hours  The dressing on the puncture site may be removed after 24 hours and left open to air. If there is minor oozing, you may apply a Band-aid and remove after 12 hours  You may shower on the day following your procedure. Do not take a tub bath or submerge the puncture site in water for 3 days following the procedure  Do not lift anything heavier than 3 to 5 pounds with the affected hand/arm  Do not operate a lawnmower, motorcycle, chainsaw, or all-terrain vehicle   Do not engage in vigorous exercise (i.e. Tennis, Golf, Bowling) using the affected arm    If bleeding should occur following discharge:  Sit down and apply firm pressure to the puncture site with your fingers for 10 minutes   If the bleeding stops, continue to sit quietly, keeping your wrist straight for 2 hours. Notify your physician as soon as possible   If bleeding does not stop after 10 minutes or if there is a large amount of bleeding or spurting, call 911 immediately. Do not drive yourself to the hospital.     You should expect mild tingling in your hand and tenderness at the puncture site for up to 3 days.     Notify your physician if these symptoms persist or if you experience:  Change in color or temperature of the hand or arm  Redness, heat, or pus at the puncture site  Chills or fever greater than 101.0 F

## 2022-03-18 NOTE — Clinical Note
75 ml of contrast were injected throughout the case. 225 mL of contrast was the total wasted during the case. 300 mL was the total amount used during the case.

## 2022-03-18 NOTE — DISCHARGE SUMMARY
[facility] - Cath Lab  Interventional Cardiology  Discharge Summary      Patient Name: Michael Guillaume Sr.  MRN: [id number]  Admission Date: 3/18/2022  Hospital Length of Stay: 0 days  Discharge Date and Time:  03/18/2022 9:48 AM  Attending Physician: Jose Head MD  Discharging Provider: aBm Sr MD  Primary Care Physician: Latrice Zafar NP    HPI:  No notes on file    Procedure(s) (LRB):  AORTOGRAM, ABDOMINAL (N/A)  Angiogram, Lower Arterial, Bilateral     Indwelling Lines/Drains at time of discharge:  Lines/Drains/Airways     None                 Hospital Course:  Hospital Course:   Patient presented for outpatient peripheral angiogram which went without complication. Peripheral angiogram revealed patent iliac arteries B/L . See full cath report in Epic for details. Hemostasis of patient's R Radial access site was achieved with VascBand. Patient was monitored per post-cath protocol, and his R radial access site was c/d/i with no hematoma. Patient was able to ambulate without difficulty. He was feeling well and anticipating discharge home today.     Outpatient Plan:  - There were no medication changes  - Continue aspirin and Plavix   - amb referral to urology for impotence         Goals of Care Treatment Preferences:             Significant Diagnostic Studies: Labs:   BMP: No results for input(s): GLU, NA, K, CL, CO2, BUN, CREATININE, CALCIUM, MG in the last 48 hours., CMP No results for input(s): NA, K, CL, CO2, GLU, BUN, CREATININE, CALCIUM, PROT, ALBUMIN, BILITOT, ALKPHOS, AST, ALT, ANIONGAP, ESTGFRAFRICA, EGFRNONAA in the last 48 hours., CBC   Recent Labs   Lab 03/18/22  0724   WBC 9.54   HGB 16.3   HCT 50.8      , INR   Lab Results   Component Value Date    INR 0.9 08/30/2016    INR 1.0 08/17/2016    INR 1.0 05/27/2014   , Lipid Panel   Lab Results   Component Value Date    CHOL 146 08/26/2021    HDL 34 (L) 08/26/2021    LDLCALC 78.4 08/26/2021    TRIG 168 (H) 08/26/2021    CHOLHDL 23.3  08/26/2021   , Troponin No results for input(s): TROPONINI in the last 168 hours., A1C: No results for input(s): HGBA1C in the last 4320 hours. and All labs within the past 24 hours have been reviewed    Pending Diagnostic Studies:     None        No new Assessment & Plan notes have been filed under this hospital service since the last note was generated.  Service: Interventional Cardiology      Discharged Condition: good    Follow Up:    Patient Instructions:      Ambulatory referral/consult to Urology   Standing Status: Future   Referral Priority: Routine Referral Type: Consultation   Referral Reason: Specialty Services Required   Requested Specialty: Urology   Number of Visits Requested: 1     Medications:  Reconciled Home Medications:      Medication List      CONTINUE taking these medications    ALPRAZolam 1 MG tablet  Commonly known as: XANAX  Take 1 tablet (1 mg total) by mouth 3 (three) times daily as needed.     aspirin 81 mg Tab  Take 1 tablet by mouth once daily.     carvediloL 12.5 MG tablet  Commonly known as: COREG  Take 1 tablet (12.5 mg total) by mouth 2 (two) times daily with meals.     cilostazoL 100 MG Tab  Commonly known as: PLETAL  Take 1 tablet (100 mg total) by mouth 2 (two) times daily.     clopidogreL 75 mg tablet  Commonly known as: PLAVIX  Take 1 tablet (75 mg total) by mouth once daily.     cyclobenzaprine 10 MG tablet  Commonly known as: FLEXERIL  Take 1 tablet (10 mg total) by mouth every evening.     esomeprazole 20 MG capsule  Commonly known as: NEXIUM  Take 20 mg by mouth before breakfast.     furosemide 20 MG tablet  Commonly known as: LASIX  TAKE 1 TABLET BY MOUTH EVERY DAY AS NEEDED     losartan 100 MG tablet  Commonly known as: COZAAR  Take 1 tablet (100 mg total) by mouth once daily.     MULTI-VITAMIN HI-PO ORAL  Take by mouth once daily.     rosuvastatin 40 MG Tab  Commonly known as: CRESTOR  Take 1 tablet (40 mg total) by mouth once daily.     SYMBICORT 80-4.5 mcg/actuation  Hfaa  Generic drug: budesonide-formoterol 80-4.5 mcg  INHALE 2 PUFFS TWICE A DAY AS DIRECTED     vitamin E 400 UNIT capsule  Take 400 Units by mouth once daily.        ASK your doctor about these medications    albuterol-ipratropium 2.5 mg-0.5 mg/3 mL nebulizer solution  Commonly known as: DUO-NEB  Take 3 mLs by nebulization every 6 (six) hours while awake. Rescue            Time spent on the discharge of patient: 25 minutes    Bam Sr MD  Interventional Cardiology  [facility]

## 2022-03-18 NOTE — PROGRESS NOTES
Report received from LOIS Oseguera. Patient s/p BLE angiogram. Radial vasc band cdi, no bleeding or hematoma noted. Post procedure protocol discussed with patient and family. Vs per flowsheet. Call light in reach.

## 2022-03-18 NOTE — HOSPITAL COURSE
Hospital Course:   Patient presented for outpatient peripheral angiogram which went without complication. Peripheral angiogram revealed patent iliac arteries B/L . See full cath report in Epic for details. Hemostasis of patient's R Radial access site was achieved with VascBand. Patient was monitored per post-cath protocol, and his R radial access site was c/d/i with no hematoma. Patient was able to ambulate without difficulty. He was feeling well and anticipating discharge home today.     Outpatient Plan:  - There were no medication changes  - Continue aspirin and Plavix   - amb referral to urology for impotence

## 2022-03-18 NOTE — Clinical Note
An angiography was performed of the infrarenal abdominal aorta  via power injection. Injection was performed with 25 mL contrast at 15 mL/s. The power injection PSI was 1000.   And angio performed on bilateral common iliac arteries.

## 2022-03-18 NOTE — INTERVAL H&P NOTE
The patient has been examined and the H&P has been reviewed:    I concur with the findings and no changes have occurred since H&P was written.    Anesthesia/Surgery risks, benefits and alternative options discussed and understood by patient/family.    Bilateral LE angiography, rt radial access, diagnostic angiography, possible internal iliac PTA/stent if suitable, otherwise will discuss after diagnostic angiography and plan/stage intervention     There are no hospital problems to display for this patient.

## 2022-03-28 ENCOUNTER — OFFICE VISIT (OUTPATIENT)
Dept: INTERNAL MEDICINE | Facility: CLINIC | Age: 57
End: 2022-03-28
Payer: COMMERCIAL

## 2022-03-28 VITALS
TEMPERATURE: 98 F | RESPIRATION RATE: 18 BRPM | OXYGEN SATURATION: 95 % | HEART RATE: 75 BPM | DIASTOLIC BLOOD PRESSURE: 86 MMHG | BODY MASS INDEX: 35.71 KG/M2 | WEIGHT: 209.19 LBS | HEIGHT: 64 IN | SYSTOLIC BLOOD PRESSURE: 126 MMHG

## 2022-03-28 DIAGNOSIS — J44.1 ACUTE EXACERBATION OF CHRONIC OBSTRUCTIVE PULMONARY DISEASE (COPD): Primary | ICD-10-CM

## 2022-03-28 DIAGNOSIS — F41.9 ANXIETY: ICD-10-CM

## 2022-03-28 PROCEDURE — 99999 PR PBB SHADOW E&M-EST. PATIENT-LVL V: ICD-10-PCS | Mod: PBBFAC,,, | Performed by: NURSE PRACTITIONER

## 2022-03-28 PROCEDURE — 99999 PR PBB SHADOW E&M-EST. PATIENT-LVL V: CPT | Mod: PBBFAC,,, | Performed by: NURSE PRACTITIONER

## 2022-03-28 PROCEDURE — 96372 PR INJECTION,THERAP/PROPH/DIAG2ST, IM OR SUBCUT: ICD-10-PCS | Mod: S$GLB,,, | Performed by: NURSE PRACTITIONER

## 2022-03-28 PROCEDURE — 3008F PR BODY MASS INDEX (BMI) DOCUMENTED: ICD-10-PCS | Mod: CPTII,S$GLB,, | Performed by: NURSE PRACTITIONER

## 2022-03-28 PROCEDURE — 99213 OFFICE O/P EST LOW 20 MIN: CPT | Mod: 25,S$GLB,, | Performed by: NURSE PRACTITIONER

## 2022-03-28 PROCEDURE — 4010F PR ACE/ARB THEARPY RXD/TAKEN: ICD-10-PCS | Mod: CPTII,S$GLB,, | Performed by: NURSE PRACTITIONER

## 2022-03-28 PROCEDURE — 3079F DIAST BP 80-89 MM HG: CPT | Mod: CPTII,S$GLB,, | Performed by: NURSE PRACTITIONER

## 2022-03-28 PROCEDURE — 99213 PR OFFICE/OUTPT VISIT, EST, LEVL III, 20-29 MIN: ICD-10-PCS | Mod: 25,S$GLB,, | Performed by: NURSE PRACTITIONER

## 2022-03-28 PROCEDURE — 4010F ACE/ARB THERAPY RXD/TAKEN: CPT | Mod: CPTII,S$GLB,, | Performed by: NURSE PRACTITIONER

## 2022-03-28 PROCEDURE — 1159F MED LIST DOCD IN RCRD: CPT | Mod: CPTII,S$GLB,, | Performed by: NURSE PRACTITIONER

## 2022-03-28 PROCEDURE — 3079F PR MOST RECENT DIASTOLIC BLOOD PRESSURE 80-89 MM HG: ICD-10-PCS | Mod: CPTII,S$GLB,, | Performed by: NURSE PRACTITIONER

## 2022-03-28 PROCEDURE — 1160F PR REVIEW ALL MEDS BY PRESCRIBER/CLIN PHARMACIST DOCUMENTED: ICD-10-PCS | Mod: CPTII,S$GLB,, | Performed by: NURSE PRACTITIONER

## 2022-03-28 PROCEDURE — 1159F PR MEDICATION LIST DOCUMENTED IN MEDICAL RECORD: ICD-10-PCS | Mod: CPTII,S$GLB,, | Performed by: NURSE PRACTITIONER

## 2022-03-28 PROCEDURE — 1160F RVW MEDS BY RX/DR IN RCRD: CPT | Mod: CPTII,S$GLB,, | Performed by: NURSE PRACTITIONER

## 2022-03-28 PROCEDURE — 3074F PR MOST RECENT SYSTOLIC BLOOD PRESSURE < 130 MM HG: ICD-10-PCS | Mod: CPTII,S$GLB,, | Performed by: NURSE PRACTITIONER

## 2022-03-28 PROCEDURE — 96372 THER/PROPH/DIAG INJ SC/IM: CPT | Mod: S$GLB,,, | Performed by: NURSE PRACTITIONER

## 2022-03-28 PROCEDURE — 3008F BODY MASS INDEX DOCD: CPT | Mod: CPTII,S$GLB,, | Performed by: NURSE PRACTITIONER

## 2022-03-28 PROCEDURE — 3074F SYST BP LT 130 MM HG: CPT | Mod: CPTII,S$GLB,, | Performed by: NURSE PRACTITIONER

## 2022-03-28 RX ORDER — ALBUTEROL SULFATE 1.25 MG/3ML
1.25 SOLUTION RESPIRATORY (INHALATION) EVERY 6 HOURS PRN
Qty: 75 ML | Refills: 2 | Status: SHIPPED | OUTPATIENT
Start: 2022-03-28 | End: 2023-04-05

## 2022-03-28 RX ORDER — METHYLPREDNISOLONE 4 MG/1
TABLET ORAL
Qty: 21 TABLET | Refills: 0 | Status: SHIPPED | OUTPATIENT
Start: 2022-03-28 | End: 2022-04-18

## 2022-03-28 RX ORDER — TRIAMCINOLONE ACETONIDE 40 MG/ML
40 INJECTION, SUSPENSION INTRA-ARTICULAR; INTRAMUSCULAR
Status: COMPLETED | OUTPATIENT
Start: 2022-03-28 | End: 2022-03-28

## 2022-03-28 RX ORDER — ALPRAZOLAM 1 MG/1
1 TABLET ORAL 3 TIMES DAILY PRN
Qty: 90 TABLET | Refills: 5 | Status: SHIPPED | OUTPATIENT
Start: 2022-03-28 | End: 2022-10-11

## 2022-03-28 RX ORDER — AZITHROMYCIN 250 MG/1
TABLET, FILM COATED ORAL
Qty: 6 TABLET | Refills: 0 | Status: SHIPPED | OUTPATIENT
Start: 2022-03-28 | End: 2022-04-02

## 2022-03-28 RX ADMIN — TRIAMCINOLONE ACETONIDE 40 MG: 40 INJECTION, SUSPENSION INTRA-ARTICULAR; INTRAMUSCULAR at 11:03

## 2022-03-28 NOTE — PROGRESS NOTES
Subjective:       Patient ID: Michael Guillaume Sr. is a 57 y.o. male.    Chief Complaint: URI    Patient is known, to me and presents with   Chief Complaint   Patient presents with    URI   .  Denies chest pain and shortness of breath. Patient presents copd exacerbation. Now with congestion and cough. No fever or chills. Doing neb tx but not helping much.    HPI  Review of Systems   Constitutional: Negative.    HENT: Positive for congestion and postnasal drip.    Eyes: Negative.    Respiratory: Positive for cough and wheezing.    Cardiovascular: Negative.    Skin: Negative.    Hematological: Negative.    Psychiatric/Behavioral: Negative for dysphoric mood, sleep disturbance and suicidal ideas. The patient is not nervous/anxious.        Objective:      Physical Exam  Constitutional:       General: He is not in acute distress.     Appearance: Normal appearance. He is obese. He is not ill-appearing, toxic-appearing or diaphoretic.   HENT:      Head: Normocephalic and atraumatic.      Right Ear: Tympanic membrane normal.      Left Ear: Tympanic membrane normal.      Nose: Nose normal. No congestion.      Mouth/Throat:      Mouth: Mucous membranes are moist.      Pharynx: Oropharynx is clear. No posterior oropharyngeal erythema.   Eyes:      General:         Right eye: No discharge.         Left eye: No discharge.      Conjunctiva/sclera: Conjunctivae normal.   Neck:      Vascular: No carotid bruit.   Cardiovascular:      Rate and Rhythm: Regular rhythm.      Heart sounds: Normal heart sounds. No murmur heard.  Pulmonary:      Breath sounds: Examination of the right-upper field reveals wheezing. Examination of the left-upper field reveals wheezing. Examination of the right-middle field reveals wheezing. Examination of the left-middle field reveals wheezing. Examination of the right-lower field reveals wheezing. Examination of the left-lower field reveals wheezing. Wheezing present.   Musculoskeletal:      Cervical back:  "Normal range of motion and neck supple. No rigidity or tenderness.   Lymphadenopathy:      Cervical: No cervical adenopathy.   Skin:     General: Skin is warm and dry.      Capillary Refill: Capillary refill takes less than 2 seconds.      Coloration: Skin is not jaundiced or pale.      Findings: No bruising, erythema, lesion or rash.   Neurological:      Mental Status: He is alert.   Psychiatric:         Mood and Affect: Mood normal.         Behavior: Behavior normal.         Thought Content: Thought content normal.         Judgment: Judgment normal.      Comments: Negative sihi noted         Assessment:       1. Acute exacerbation of chronic obstructive pulmonary disease (COPD)    2. Anxiety        Plan:   Michael was seen today for uri.    Diagnoses and all orders for this visit:    Acute exacerbation of chronic obstructive pulmonary disease (COPD)  -     triamcinolone acetonide injection 40 mg  -     azithromycin (Z-SINDI) 250 MG tablet; Take 2 tablets by mouth on day 1; Take 1 tablet by mouth on days 2-5  -     methylPREDNISolone (MEDROL DOSEPACK) 4 mg tablet; use as directed  -     albuterol (ACCUNEB) 1.25 mg/3 mL Nebu; Take 3 mLs (1.25 mg total) by nebulization every 6 (six) hours as needed. Rescue    Anxiety  -     ALPRAZolam (XANAX) 1 MG tablet; Take 1 tablet (1 mg total) by mouth 3 (three) times daily as needed.    "This note will not be shared with the patient."  If   "This note will not be shared with the patient."  Do breathing tx qid   If any worsening call me  rtc as scheduled  "

## 2022-04-21 ENCOUNTER — TELEPHONE (OUTPATIENT)
Dept: INTERNAL MEDICINE | Facility: CLINIC | Age: 57
End: 2022-04-21
Payer: COMMERCIAL

## 2022-04-21 NOTE — TELEPHONE ENCOUNTER
colchicine 0.6 mg tablet (Discontinued) 10 tablet 0 6/19/2018 7/6/2018 --   Sig: Take 1.2 mg po x 1, then 0.6 mg po one hour later x1   Sent to pharmacy as: colchicine 0.6 mg tablet   Class: Normal   Order: [id number]   Date/Time Signed: 6/19/2018 10:21       E-Prescribing Status: Receipt confirmed by pharmacy (6/19/2018 10:34 AM CDT)     Kaycee slater its just been a while, hasnt had an episode since 2018

## 2022-04-22 RX ORDER — COLCHICINE 0.6 MG/1
0.6 TABLET ORAL DAILY
Qty: 30 TABLET | Refills: 0 | Status: SHIPPED | OUTPATIENT
Start: 2022-04-22 | End: 2022-04-26

## 2022-04-24 DIAGNOSIS — I10 BENIGN ESSENTIAL HTN: ICD-10-CM

## 2022-04-25 RX ORDER — LOSARTAN POTASSIUM 100 MG/1
TABLET ORAL
Qty: 90 TABLET | Refills: 3 | Status: SHIPPED | OUTPATIENT
Start: 2022-04-25 | End: 2022-09-30

## 2022-04-26 DIAGNOSIS — M54.9 CHRONIC BILATERAL BACK PAIN, UNSPECIFIED BACK LOCATION: ICD-10-CM

## 2022-04-26 DIAGNOSIS — G89.29 CHRONIC BILATERAL BACK PAIN, UNSPECIFIED BACK LOCATION: ICD-10-CM

## 2022-04-26 DIAGNOSIS — R60.0 LOCALIZED EDEMA: ICD-10-CM

## 2022-04-26 RX ORDER — COLCHICINE 0.6 MG/1
TABLET ORAL
Qty: 30 TABLET | Refills: 0 | Status: SHIPPED | OUTPATIENT
Start: 2022-04-26 | End: 2022-06-14 | Stop reason: SDUPTHER

## 2022-04-26 RX ORDER — CYCLOBENZAPRINE HCL 10 MG
TABLET ORAL
Qty: 30 TABLET | Refills: 5 | Status: SHIPPED | OUTPATIENT
Start: 2022-04-26

## 2022-04-26 RX ORDER — FUROSEMIDE 20 MG/1
TABLET ORAL
Qty: 90 TABLET | Refills: 0 | Status: SHIPPED | OUTPATIENT
Start: 2022-04-26 | End: 2022-09-01 | Stop reason: SDUPTHER

## 2022-08-27 ENCOUNTER — HOSPITAL ENCOUNTER (EMERGENCY)
Facility: HOSPITAL | Age: 57
Discharge: HOME | End: 2022-08-27
Attending: SURGERY
Payer: COMMERCIAL

## 2022-08-27 VITALS
TEMPERATURE: 98 F | HEART RATE: 89 BPM | SYSTOLIC BLOOD PRESSURE: 155 MMHG | RESPIRATION RATE: 24 BRPM | DIASTOLIC BLOOD PRESSURE: 72 MMHG | OXYGEN SATURATION: 95 % | BODY MASS INDEX: 39.65 KG/M2 | WEIGHT: 231 LBS

## 2022-08-27 DIAGNOSIS — S02.2XXA CLOSED FRACTURE OF NASAL BONE, INITIAL ENCOUNTER: ICD-10-CM
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
## 2022-08-27 DIAGNOSIS — S09.90XA INJURY OF HEAD, INITIAL ENCOUNTER: Primary | ICD-10-CM

## 2022-08-27 DIAGNOSIS — H44.811: ICD-10-CM

## 2022-08-27 DIAGNOSIS — S01.111A LACERATION OF RIGHT EYEBROW, INITIAL ENCOUNTER: ICD-10-CM

## 2022-08-27 LAB
ALBUMIN SERPL BCP-MCNC: 3.8 G/DL (ref 3.5–5.2)
ALP SERPL-CCNC: 87 U/L (ref 55–135)
ALT SERPL W/O P-5'-P-CCNC: 15 U/L (ref 10–44)
AMPHET+METHAMPHET UR QL: NEGATIVE
ANION GAP SERPL CALC-SCNC: 17 MMOL/L (ref 8–16)
AST SERPL-CCNC: 16 U/L (ref 10–40)
BACTERIA #/AREA URNS HPF: ABNORMAL /HPF
BARBITURATES UR QL SCN>200 NG/ML: NEGATIVE
BASOPHILS # BLD AUTO: 0.03 K/UL (ref 0–0.2)
BASOPHILS NFR BLD: 0.2 % (ref 0–1.9)
BENZODIAZ UR QL SCN>200 NG/ML: ABNORMAL
BILIRUB SERPL-MCNC: 0.2 MG/DL (ref 0.1–1)
BILIRUB UR QL STRIP: NEGATIVE
BNP SERPL-MCNC: 109 PG/ML (ref 0–99)
BUN SERPL-MCNC: 22 MG/DL (ref 6–20)
BZE UR QL SCN: NEGATIVE
CALCIUM SERPL-MCNC: 9 MG/DL (ref 8.7–10.5)
CANNABINOIDS UR QL SCN: ABNORMAL
CHLORIDE SERPL-SCNC: 98 MMOL/L (ref 95–110)
CK SERPL-CCNC: 79 U/L (ref 20–200)
CLARITY UR: CLEAR
CO2 SERPL-SCNC: 25 MMOL/L (ref 23–29)
COLOR UR: YELLOW
CREAT SERPL-MCNC: 1.8 MG/DL (ref 0.5–1.4)
CREAT UR-MCNC: 232.4 MG/DL (ref 23–375)
DIFFERENTIAL METHOD: ABNORMAL
EOSINOPHIL # BLD AUTO: 0 K/UL (ref 0–0.5)
EOSINOPHIL NFR BLD: 0 % (ref 0–8)
ERYTHROCYTE [DISTWIDTH] IN BLOOD BY AUTOMATED COUNT: 13.8 % (ref 11.5–14.5)
EST. GFR  (NO RACE VARIABLE): 43 ML/MIN/1.73 M^2
ETHANOL SERPL-MCNC: 167 MG/DL
GLUCOSE SERPL-MCNC: 143 MG/DL (ref 70–110)
GLUCOSE UR QL STRIP: NEGATIVE
GRAN CASTS #/AREA URNS LPF: 1 /LPF
HCT VFR BLD AUTO: 44.6 % (ref 40–54)
HGB BLD-MCNC: 14.8 G/DL (ref 14–18)
HGB UR QL STRIP: NEGATIVE
HYALINE CASTS #/AREA URNS LPF: 22 /LPF
IMM GRANULOCYTES # BLD AUTO: 0.06 K/UL (ref 0–0.04)
IMM GRANULOCYTES NFR BLD AUTO: 0.4 % (ref 0–0.5)
KETONES UR QL STRIP: NEGATIVE
LEUKOCYTE ESTERASE UR QL STRIP: NEGATIVE
LYMPHOCYTES # BLD AUTO: 1.4 K/UL (ref 1–4.8)
LYMPHOCYTES NFR BLD: 8.5 % (ref 18–48)
MCH RBC QN AUTO: 30.3 PG (ref 27–31)
MCHC RBC AUTO-ENTMCNC: 33.2 G/DL (ref 32–36)
MCV RBC AUTO: 91 FL (ref 82–98)
METHADONE UR QL SCN>300 NG/ML: NEGATIVE
MICROSCOPIC COMMENT: ABNORMAL
MONOCYTES # BLD AUTO: 0.8 K/UL (ref 0.3–1)
MONOCYTES NFR BLD: 5 % (ref 4–15)
NEUTROPHILS # BLD AUTO: 13.7 K/UL (ref 1.8–7.7)
NEUTROPHILS NFR BLD: 85.9 % (ref 38–73)
NITRITE UR QL STRIP: NEGATIVE
NRBC BLD-RTO: 0 /100 WBC
OPIATES UR QL SCN: NEGATIVE
PCP UR QL SCN>25 NG/ML: NEGATIVE
PH UR STRIP: 6 [PH] (ref 5–8)
PLATELET # BLD AUTO: 333 K/UL (ref 150–450)
PMV BLD AUTO: 9.3 FL (ref 9.2–12.9)
POTASSIUM SERPL-SCNC: 4 MMOL/L (ref 3.5–5.1)
PROT SERPL-MCNC: 6.8 G/DL (ref 6–8.4)
PROT UR QL STRIP: ABNORMAL
RBC # BLD AUTO: 4.88 M/UL (ref 4.6–6.2)
RBC #/AREA URNS HPF: 0 /HPF (ref 0–4)
SODIUM SERPL-SCNC: 140 MMOL/L (ref 136–145)
SP GR UR STRIP: >=1.03 (ref 1–1.03)
TOXICOLOGY INFORMATION: ABNORMAL
TROPONIN I SERPL DL<=0.01 NG/ML-MCNC: 0.02 NG/ML (ref 0–0.03)
URN SPEC COLLECT METH UR: ABNORMAL
UROBILINOGEN UR STRIP-ACNC: NEGATIVE EU/DL
WBC # BLD AUTO: 15.97 K/UL (ref 3.9–12.7)
WBC #/AREA URNS HPF: 1 /HPF (ref 0–5)

## 2022-08-27 PROCEDURE — 63600175 PHARM REV CODE 636 W HCPCS: Performed by: SURGERY

## 2022-08-27 PROCEDURE — 25000003 PHARM REV CODE 250: Performed by: SURGERY

## 2022-08-27 PROCEDURE — 12014 RPR F/E/E/N/L/M 5.1-7.5 CM: CPT

## 2022-08-27 PROCEDURE — 82550 ASSAY OF CK (CPK): CPT | Performed by: SURGERY

## 2022-08-27 PROCEDURE — 96360 HYDRATION IV INFUSION INIT: CPT

## 2022-08-27 PROCEDURE — 81000 URINALYSIS NONAUTO W/SCOPE: CPT | Mod: 59 | Performed by: SURGERY

## 2022-08-27 PROCEDURE — 80307 DRUG TEST PRSMV CHEM ANLYZR: CPT | Performed by: SURGERY

## 2022-08-27 PROCEDURE — 93010 ELECTROCARDIOGRAM REPORT: CPT | Mod: ,,, | Performed by: INTERNAL MEDICINE

## 2022-08-27 PROCEDURE — 99285 EMERGENCY DEPT VISIT HI MDM: CPT | Mod: 25

## 2022-08-27 PROCEDURE — 85025 COMPLETE CBC W/AUTO DIFF WBC: CPT | Performed by: SURGERY

## 2022-08-27 PROCEDURE — 80053 COMPREHEN METABOLIC PANEL: CPT | Performed by: SURGERY

## 2022-08-27 PROCEDURE — 84484 ASSAY OF TROPONIN QUANT: CPT | Performed by: SURGERY

## 2022-08-27 PROCEDURE — 93010 EKG 12-LEAD: ICD-10-PCS | Mod: ,,, | Performed by: INTERNAL MEDICINE

## 2022-08-27 PROCEDURE — 96361 HYDRATE IV INFUSION ADD-ON: CPT | Mod: 59

## 2022-08-27 PROCEDURE — 93005 ELECTROCARDIOGRAM TRACING: CPT

## 2022-08-27 PROCEDURE — 82077 ASSAY SPEC XCP UR&BREATH IA: CPT | Performed by: SURGERY

## 2022-08-27 PROCEDURE — 83880 ASSAY OF NATRIURETIC PEPTIDE: CPT | Performed by: SURGERY

## 2022-08-27 PROCEDURE — 90715 TDAP VACCINE 7 YRS/> IM: CPT | Performed by: SURGERY

## 2022-08-27 PROCEDURE — 36415 COLL VENOUS BLD VENIPUNCTURE: CPT | Performed by: SURGERY

## 2022-08-27 PROCEDURE — 90471 IMMUNIZATION ADMIN: CPT | Performed by: SURGERY

## 2022-08-27 RX ORDER — ERYTHROMYCIN 5 MG/G
OINTMENT OPHTHALMIC EVERY 6 HOURS
Qty: 7 G | Refills: 0 | Status: SHIPPED | OUTPATIENT
Start: 2022-08-27 | End: 2022-09-27

## 2022-08-27 RX ORDER — TRAMADOL HYDROCHLORIDE 50 MG/1
50 TABLET ORAL
Status: COMPLETED | OUTPATIENT
Start: 2022-08-27 | End: 2022-08-27

## 2022-08-27 RX ORDER — ERYTHROMYCIN 5 MG/G
0.5 OINTMENT OPHTHALMIC
Status: COMPLETED | OUTPATIENT
Start: 2022-08-27 | End: 2022-08-27

## 2022-08-27 RX ORDER — MUPIROCIN 20 MG/G
1 OINTMENT TOPICAL
Status: COMPLETED | OUTPATIENT
Start: 2022-08-27 | End: 2022-08-27

## 2022-08-27 RX ORDER — MUPIROCIN 20 MG/G
OINTMENT TOPICAL 3 TIMES DAILY
Qty: 15 G | Refills: 0 | Status: SHIPPED | OUTPATIENT
Start: 2022-08-27 | End: 2022-09-06

## 2022-08-27 RX ORDER — LIDOCAINE HYDROCHLORIDE 10 MG/ML
5 INJECTION, SOLUTION EPIDURAL; INFILTRATION; INTRACAUDAL; PERINEURAL
Status: COMPLETED | OUTPATIENT
Start: 2022-08-27 | End: 2022-08-27

## 2022-08-27 RX ADMIN — ERYTHROMYCIN 0.5 INCH: 5 OINTMENT OPHTHALMIC at 01:08

## 2022-08-27 RX ADMIN — LIDOCAINE HYDROCHLORIDE 50 MG: 10 INJECTION, SOLUTION EPIDURAL; INFILTRATION; INTRACAUDAL; PERINEURAL at 08:08

## 2022-08-27 RX ADMIN — TETANUS TOXOID, REDUCED DIPHTHERIA TOXOID AND ACELLULAR PERTUSSIS VACCINE, ADSORBED 0.5 ML: 5; 2.5; 8; 8; 2.5 SUSPENSION INTRAMUSCULAR at 08:08

## 2022-08-27 RX ADMIN — TRAMADOL HYDROCHLORIDE 50 MG: 50 TABLET, COATED ORAL at 11:08

## 2022-08-27 RX ADMIN — MUPIROCIN 22 G: 20 OINTMENT TOPICAL at 09:08

## 2022-08-27 RX ADMIN — SODIUM CHLORIDE 1000 ML: 0.9 INJECTION, SOLUTION INTRAVENOUS at 08:08

## 2022-08-27 NOTE — ED PROVIDER NOTES
"Encounter Date: 8/27/2022       History     Chief Complaint   Patient presents with    Head Injury     57-year-old male presents with right eye trauma in the ER today  Patient was intoxicated last night, hitting his right eye area per HX  Patient however is a poor historian due to heavy intoxication/use  Patient on exam has a right 6 centimeter eyebrow laceration today  Additionally, the patient has mild bruising to the tip of the nose  The patient also has an obvious hyphema the right eye on exam  Patient's vision is blurred in the right eye, history of "lazy eye"    Review of patient's allergies indicates:   Allergen Reactions    Lipitor [atorvastatin] Other (See Comments)     Other reaction(s): Severe Myalgias     Past Medical History:   Diagnosis Date    Anticoagulant long-term use     Arthritis     Blood transfusion     mid 40's age    CHF (congestive heart failure)     COPD (chronic obstructive pulmonary disease)     COPD (chronic obstructive pulmonary disease)     Coronary artery disease     Encounter for blood transfusion     GERD (gastroesophageal reflux disease)     Hyperlipidemia 7/3/2004    Hypertension     Myocardial infarction     PAD (peripheral artery disease)     Sleep apnea     Thoracic or lumbosacral neuritis or radiculitis, unspecified 5/13/2013    Thyroid disorder 9/8/2015     Past Surgical History:   Procedure Laterality Date    ABDOMINAL AORTOGRAPHY N/A 3/18/2022    Procedure: AORTOGRAM, ABDOMINAL;  Surgeon: Jose Head MD;  Location: [facility] CATH LAB;  Service: Cardiology;  Laterality: N/A;    ABDOMINAL SURGERY      ANGIOPLASTY      aortogram  2013    AORTOGRAPHY WITH SERIALOGRAPHY N/A 2/20/2020    Procedure: AORTOGRAM, WITH SERIALOGRAPHY;  Surgeon: Rohan Neal MD;  Location: [facility] CATH LAB;  Service: Cardiology;  Laterality: N/A;    CARDIAC CATHETERIZATION      COLONOSCOPY N/A 10/29/2015    Procedure: COLONOSCOPY;  Surgeon: Mino Wells MD;  Location: [facility];  Service: Endoscopy;  " Laterality: N/A;  ALLERGY - LIPITOR    FRACTURE SURGERY      TONSILLECTOMY       Family History   Problem Relation Age of Onset    Clotting disorder Mother         ?    Heart failure Mother     Hypertension Mother     Heart disease Mother     Clotting disorder Father         ?    Heart attack Father     Hypertension Father     Heart disease Father     Heart disease Brother     Anesthesia problems Neg Hx      Social History     Tobacco Use    Smoking status: Former     Packs/day: 1.00     Years: 20.00     Pack years: 20.00     Types: Cigarettes     Quit date: 12/1/2011     Years since quitting: 10.7    Smokeless tobacco: Never    Tobacco comments:     5-7 cigarettes in 3 months   Substance Use Topics    Alcohol use: Not Currently     Alcohol/week: 6.0 standard drinks     Types: 2 - 3 Cans of beer, 6 Shots of liquor per week     Comment: wine- occasions    Drug use: Yes     Types: Marijuana     Comment: daily     Review of Systems   Constitutional:  Negative for activity change, appetite change, fatigue, fever and unexpected weight change.   HENT:  Negative for congestion, ear pain, mouth sores, nosebleeds, rhinorrhea, sinus pressure, sneezing and sore throat.    Eyes:  Positive for pain. Negative for discharge, redness and itching.   Respiratory:  Negative for apnea, cough, chest tightness and shortness of breath.    Cardiovascular:  Negative for chest pain, palpitations and leg swelling.   Gastrointestinal:  Negative for abdominal distention, abdominal pain, anal bleeding, constipation, diarrhea, nausea and vomiting.   Endocrine: Negative.    Genitourinary:  Negative for dysuria, enuresis, flank pain and frequency.   Musculoskeletal:  Negative for arthralgias, back pain, neck pain and neck stiffness.   Skin:  Negative for color change and wound.   Allergic/Immunologic: Negative.    Neurological:  Negative for dizziness, tremors, syncope, facial asymmetry, speech difficulty, weakness, light-headedness, numbness and  headaches.   Hematological:  Negative for adenopathy. Does not bruise/bleed easily.   Psychiatric/Behavioral:  Negative for agitation, behavioral problems, hallucinations, self-injury and suicidal ideas. The patient is not nervous/anxious.      Physical Exam     Initial Vitals [08/27/22 0809]   BP Pulse Resp Temp SpO2   (!) 104/59 97 (!) 22 97.6 °F (36.4 °C) (!) 94 %      MAP       --         Physical Exam    Constitutional: Vital signs are normal. He appears well-developed and well-nourished. He is cooperative.   HENT:   Right Ear: Hearing, tympanic membrane, external ear and ear canal normal.   Left Ear: Hearing, tympanic membrane, external ear and ear canal normal.   Nose: Nose normal.   Mouth/Throat: Uvula is midline, oropharynx is clear and moist and mucous membranes are normal.   Right periorbital hematoma and contusion to the nasal bridge   Eyes: EOM and lids are normal. Pupils are equal, round, and reactive to light.   Neck: Neck supple. No JVD present.   Normal range of motion.   Full passive range of motion without pain.     Cardiovascular:  Normal rate, regular rhythm, S1 normal, S2 normal, normal heart sounds, intact distal pulses and normal pulses.           Pulmonary/Chest: Effort normal and breath sounds normal.   Abdominal: Abdomen is soft and flat. Bowel sounds are normal.   Musculoskeletal:         General: Normal range of motion.      Cervical back: Full passive range of motion without pain, normal range of motion and neck supple.     Neurological: He is alert and oriented to person, place, and time. He has normal strength.   Skin: Skin is warm, dry and intact. Capillary refill takes less than 2 seconds.       ED Course   Procedures  Laceration Repair  -- Performed by: AMOL FOSTER  -- Consent Done: Emergent Situation  -- Body area: Right eyebrow laceration  -- Laceration length: 6 centimeters  -- Tendon involvement: none  -- Nerve involvement: none  -- Vascular damage: no  -- Anesthesia:  digital block  -- Local anesthetic: lidocaine 1%   -- Anesthetic total: 0 ml  -- Irrigation solution: saline  -- Amount of cleaning: standard  -- Skin closure: 4-0 nylon  -- Number of sutures: 7  -- Approximation difficulty: simple  -- Dressing: antibiotic ointment       Labs Reviewed   ALCOHOL,MEDICAL (ETHANOL) - Abnormal; Notable for the following components:       Result Value    Alcohol, Serum 167 (*)     All other components within normal limits   URINALYSIS, REFLEX TO URINE CULTURE - Abnormal; Notable for the following components:    Specific Gravity, UA >=1.030 (*)     Protein, UA 1+ (*)     All other components within normal limits    Narrative:     Specimen Source->Urine   DRUG SCREEN PANEL, URINE EMERGENCY - Abnormal; Notable for the following components:    Benzodiazepines Presumptive Positive (*)     THC Presumptive Positive (*)     All other components within normal limits    Narrative:     Specimen Source->Urine   COMPREHENSIVE METABOLIC PANEL - Abnormal; Notable for the following components:    Glucose 143 (*)     BUN 22 (*)     Creatinine 1.8 (*)     Anion Gap 17 (*)     eGFR 43 (*)     All other components within normal limits   CBC W/ AUTO DIFFERENTIAL - Abnormal; Notable for the following components:    WBC 15.97 (*)     Gran # (ANC) 13.7 (*)     Immature Grans (Abs) 0.06 (*)     Gran % 85.9 (*)     Lymph % 8.5 (*)     All other components within normal limits   B-TYPE NATRIURETIC PEPTIDE - Abnormal; Notable for the following components:     (*)     All other components within normal limits   URINALYSIS MICROSCOPIC - Abnormal; Notable for the following components:    Hyaline Casts, UA 22 (*)     Granular Casts, UA 1 (*)     All other components within normal limits    Narrative:     Specimen Source->Urine   TROPONIN I   CK          Imaging Results               CT Maxillofacial Without Contrast (Final result)  Result time 08/27/22 09:13:23      Final result by Ashwin Chan MD  (08/27/22 09:13:23)                   Impression:      1. Suspected subtle nondisplaced right nasal bone fracture.  2. Abnormally thickened right optic globe with suspected fracture of the right lens and associated intra orbital hemorrhage.  3. Right periorbital soft tissue swelling.  4. Mild maxillary and frontal sinus disease.  This report was flagged in Epic as abnormal.      Electronically signed by: Ashwin Chan  Date:    08/27/2022  Time:    09:13               Narrative:    EXAMINATION:  CT MAXILLOFACIAL WITHOUT CONTRAST    CLINICAL HISTORY:  Maxillofacial pain;    TECHNIQUE:  2.5mm contiguous low dose non-contrast axial images were acquired through thefacial bones.  Subsequently, coronal and sagittal reconstructed images were generated from the source data.    COMPARISON:  None    FINDINGS:  Facial bones: Suspected subtle nondisplaced right nasal bone fracture with mild overlying soft tissue swelling.  The zygomatic arches, pterygoid plates, hard palate, and orbital floors/rims are intact.The visualized bony calvarium is unremarkable.    Sinuses: There is mild circumferential mucoperiosteal thickening of the maxillary and frontal sinus.  Sphenoid sinus is well aerated.  Scattered mucoperiosteal thickening within the ethmoid air cells.  Nasal is midline.  Suspected small 8 mm polyp of the right maxillary sinus.    Orbits: There is thickening of the right optic globe with suspected fracture of the lens and associated intraorbital hemorrhage.  There is right periorbital soft tissue swelling and soft tissue emphysema.  The extra-axial muscles are normal in appearance and symmetric bilaterally.  The bilateral optic nerves appear intact.    Mastoid air cells and middle ears: The visualized mastoid air cells are clear without evidence of mastoiditis.  No petrous temporal bone fracture.The left and right middle ear are unremarkable.    Incidentally observed soft tissues of the neck: No significant soft tissue  abnormalities appreciated.                                       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      Electronically signed by: Ashwin Chan  Date:    08/27/2022  Time:    08:57               Narrative:    EXAMINATION:  XR PELVIS ROUTINE AP    CLINICAL HISTORY:  Pain headache (784.0);    TECHNIQUE:  AP view of the pelvis was performed.    COMPARISON:  03/14/2008.    FINDINGS:  No acute fracture or dislocation.  Mild bilateral femoroacetabular degenerative osteoarthrosis with mild joint space narrowing and small osteophyte formation.    Pubic symphysis is intact.  The SI joints are intact.  The pubic rami are intact.    Surrounding soft tissues are unremarkable.  Bilateral vascular stents in place.                                       X-Ray Chest 1 View (Final result)  Result time 08/27/22 08:56:30      Final result by Ashwin Chan MD (08/27/22 08:56:30)                   Impression:      No acute abnormality.      Electronically signed by: Ashwin Chan  Date:    08/27/2022  Time:    08:56               Narrative:    EXAMINATION:  XR CHEST 1 VIEW    CLINICAL HISTORY:  Fall;.    TECHNIQUE:  Single frontal portable view of the chest was performed.    COMPARISON:  04/08/2008.    FINDINGS:  Support devices: None    The lungs are clear, with normal appearance of pulmonary vasculature and no pleural effusion or pneumothorax.    The cardiac silhouette is normal in size. The hilar and mediastinal contours are unremarkable.    Bones are intact.                                       CT Head Without Contrast (Final result)  Result time 08/27/22 08:55:39      Final result by Ashwin Chan MD (08/27/22 08:55:39)                   Impression:      1. Cortical atrophy with periventricular deep white matter change consistent  with chronic small vessel ischemic disease.  2. Right periorbital soft tissue swelling with suspected right intra orbital hemorrhage.  Please refer to CT face same day.      Electronically signed by: Ashwin Chan  Date:    08/27/2022  Time:    08:55               Narrative:    EXAMINATION:  CT HEAD WITHOUT CONTRAST    CLINICAL HISTORY:  Facial trauma, blunt;    TECHNIQUE:  Low dose axial images were obtained through the head.  Coronal and sagittal reformations were also performed. Contrast was not administered.    COMPARISON:  None.    FINDINGS:  There is no acute hemorrhage or infarction.  There is cortical atrophy.  There are periventricular deep white matter changes consistent with chronic small vessel ischemic disease.    No extra-axial fluid collections.  Ventricles are normal in size, shape and configuration.  The basal cisterns are patent.    The imaged paranasal sinuses and ethmoid air cells are well aerated.  The mastoid air cells and middle ears are normally pneumatized.    There is mild right periorbital soft tissue swelling.  Suspected right intra orbital hemorrhage.                                       Medications   sodium chloride 0.9% bolus 1,000 mL (0 mLs Intravenous Stopped 8/27/22 1038)   LIDOcaine (PF) 10 mg/ml (1%) injection 50 mg (50 mg Infiltration Given 8/27/22 0819)   mupirocin 2 % ointment 22 g (22 g Topical (Top) Given by Other 8/27/22 0915)   Tdap (BOOSTRIX) vaccine injection 0.5 mL (0.5 mLs Intramuscular Given 8/27/22 0821)   traMADoL tablet 50 mg (50 mg Oral Given 8/27/22 1157)   erythromycin 5 mg/gram (0.5 %) ophthalmic ointment 0.5 inch (0.5 inches Right Eye Given 8/27/22 1302)     Medical Decision Making:   Initial Assessment:   Right eye trauma after unknown mechanism while intoxicated  Patient on exam has an obvious hyphema with decreased vision  Patient also has eyebrow laceration, alert appropriate today    Differential Diagnosis:   Eyebrow laceration, eye trauma, open globe,  hyphema, intra-ocular hemorrhage, lens trauma    Clinical Tests:   Lab Tests: Ordered and Reviewed  Radiological Study: Ordered and Reviewed    ED Management:  Patient with CT scan showing a nasal fracture, not grossly displaced  CT scan shows intra-ocular hemorrhage with lens fracture per report  Patient also with alcohol intoxication, eyebrow laceration closed today  Hyphema noted on clinical exam, discussed with [facility] ophthalmology  Dr Cleaning reviewed the case, no urgent intervention suggested at this time    Patient carefully counseled that he needs timely ophthalmology follow-up  Patient will follow-up in [place] Monday morning as an outpatient  Patient prescribed erythromycin eye ointment prevent any infection  Carefully counseled on plan of care and agrees to doctor Black's plan  Carefully counseled return to ER with any concerning symptoms                      Clinical Impression:   Final diagnoses:  [W19.XXXA] Fall  [S09.90XA] Injury of head, initial encounter (Primary)  [S02.2XXA] Closed fracture of nasal bone, initial encounter  [S01.111A] Laceration of right eyebrow, initial encounter  [H44.811] Intraocular hemorrhage of right eye        ED Disposition Condition    Discharge Stable          ED Prescriptions       Medication Sig Dispense Start Date End Date Auth. Provider    mupirocin (BACTROBAN) 2 % ointment Apply topically 3 (three) times daily. for 10 days 15 g 8/27/2022 9/6/2022 Atilio Baez MD    erythromycin (ROMYCIN) ophthalmic ointment Place into the right eye every 6 (six) hours. 7 g 8/27/2022 -- Atilio Baez MD          Follow-up Information       Follow up With Specialties Details Why Contact Info    Latrice Zafar NP Family Medicine Go in 2 days  [address]  [place] [address]  [phone]      Ashwin Cox MD Ophthalmology Go in 2 days  [address]  [address]  [facility]  [place] [id number] [phone]      Ivan Vasques,  MD Ophthalmology Go in 2 days  [address] [facility]  [place] [address]  [phone]               Atilio Baez MD  08/27/22 4734

## 2022-08-27 NOTE — ED TRIAGE NOTES
C/o right eye hematoma, abrasion/hematoma to his nose, and right eyebrow laceration. Patients spouse reports patient was drinking and went home. Patient unable to recall how the event happened.

## 2022-08-29 ENCOUNTER — HOSPITAL ENCOUNTER (EMERGENCY)
Facility: HOSPITAL | Age: 57
Discharge: HOME | End: 2022-08-29
Attending: STUDENT IN AN ORGANIZED HEALTH CARE EDUCATION/TRAINING PROGRAM
Payer: COMMERCIAL

## 2022-08-29 ENCOUNTER — TELEPHONE (OUTPATIENT)
Dept: OPHTHALMOLOGY | Facility: CLINIC | Age: 57
End: 2022-08-29
Payer: COMMERCIAL

## 2022-08-29 VITALS
SYSTOLIC BLOOD PRESSURE: 163 MMHG | HEART RATE: 104 BPM | HEIGHT: 64 IN | OXYGEN SATURATION: 99 % | DIASTOLIC BLOOD PRESSURE: 107 MMHG | RESPIRATION RATE: 16 BRPM | TEMPERATURE: 99 F | BODY MASS INDEX: 34.28 KG/M2 | WEIGHT: 200.81 LBS

## 2022-08-29 DIAGNOSIS — S05.11XA TRAUMATIC HYPHEMA OF RIGHT EYE, INITIAL ENCOUNTER: ICD-10-CM

## 2022-08-29 DIAGNOSIS — H11.31 SUBCONJUNCTIVAL HEMORRHAGE OF RIGHT EYE: ICD-10-CM

## 2022-08-29 DIAGNOSIS — H43.11 VITREOUS HEMORRHAGE OF RIGHT EYE: Primary | ICD-10-CM

## 2022-08-29 PROCEDURE — 99284 EMERGENCY DEPT VISIT MOD MDM: CPT | Mod: ,,, | Performed by: STUDENT IN AN ORGANIZED HEALTH CARE EDUCATION/TRAINING PROGRAM

## 2022-08-29 PROCEDURE — 99284 PR EMERGENCY DEPT VISIT,LEVEL IV: ICD-10-PCS | Mod: ,,, | Performed by: STUDENT IN AN ORGANIZED HEALTH CARE EDUCATION/TRAINING PROGRAM

## 2022-08-29 PROCEDURE — 25000003 PHARM REV CODE 250: Performed by: PHYSICIAN ASSISTANT

## 2022-08-29 PROCEDURE — 99284 EMERGENCY DEPT VISIT MOD MDM: CPT

## 2022-08-29 RX ORDER — PREDNISOLONE ACETATE 10 MG/ML
1 SUSPENSION/ DROPS OPHTHALMIC 4 TIMES DAILY
Qty: 5 ML | Refills: 0 | Status: SHIPPED | OUTPATIENT
Start: 2022-08-29 | End: 2022-09-12 | Stop reason: SDUPTHER

## 2022-08-29 RX ORDER — MOXIFLOXACIN 5 MG/ML
1 SOLUTION/ DROPS OPHTHALMIC 4 TIMES DAILY
Qty: 3 ML | Refills: 0 | Status: SHIPPED | OUTPATIENT
Start: 2022-08-29 | End: 2023-02-24 | Stop reason: ALTCHOICE

## 2022-08-29 RX ORDER — ATROPINE SULFATE 10 MG/ML
1 SOLUTION/ DROPS OPHTHALMIC 2 TIMES DAILY
Qty: 5 ML | Refills: 0 | Status: SHIPPED | OUTPATIENT
Start: 2022-08-29 | End: 2022-09-12 | Stop reason: SDUPTHER

## 2022-08-29 RX ORDER — PROPARACAINE HYDROCHLORIDE 5 MG/ML
1 SOLUTION/ DROPS OPHTHALMIC
Status: COMPLETED | OUTPATIENT
Start: 2022-08-29 | End: 2022-08-29

## 2022-08-29 RX ADMIN — PROPARACAINE HYDROCHLORIDE 1 DROP: 5 SOLUTION/ DROPS OPHTHALMIC at 02:08

## 2022-08-29 RX ADMIN — FLUORESCEIN SODIUM 1 EACH: 1 STRIP OPHTHALMIC at 02:08
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
## 2022-08-29 NOTE — TELEPHONE ENCOUNTER
I spoke with Dr Holly about this pt. He wants the pt to come to ER main campus ER today. Pt. Informed.
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
## 2022-08-29 NOTE — TELEPHONE ENCOUNTER
----- Message from River Michel sent at 8/29/2022  6:07 AM CDT -----  Type:  Same Day Appointment Request    Caller is requesting a same day appointment.  Caller declined first available appointment listed below.    Name of Caller: Alaina, pt wife  When is the first available appointment?  Symptoms:intraocular hemorrhage of right eye   Best Call Back Number:[phone]  Additional Information: Patient in the emergency department on Saturday 8- and was told to follow-up with ophthalmology for intraocular hemorrhage of right eye

## 2022-08-30 NOTE — DISCHARGE INSTRUCTIONS
Pred Forte 4 times a day in the right eye. Shake well before use.   Vigamox 4 times a day in the right eye. Always wait 5 minutes between drops.  Atropine 2 times a day in the right eye. Always wait 5 minutes between drops.  Keep the head of the bed elevated. Avoid heavy lifting or strenuous activity.   Return precautions discussed (flashes of light, curtain-veil covering vision, increased pain, increased blurry vision).   Follow-up in clinic daily starting tomorrow for IOP check in [facility].    Follow-up with retina fellow Dr. Begum on Friday in [facility].
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
## 2022-08-30 NOTE — ED NOTES
Pt family asking for d/c paperwork and prescriptions. Pt and family informed that we are waiting on optho and ED MD to discuss plan of care and prepare paperwork

## 2022-09-02 PROBLEM — S05.11XA TRAUMATIC HYPHEMA OF RIGHT EYE: Status: ACTIVE | Noted: 2022-09-02

## 2022-09-02 PROBLEM — S05.32XA: Status: ACTIVE | Noted: 2022-09-02

## 2022-09-08 PROBLEM — Z98.890 POST-OPERATIVE STATE: Status: ACTIVE | Noted: 2022-09-08

## 2022-09-10 ENCOUNTER — NURSE TRIAGE (OUTPATIENT)
Dept: ADMINISTRATIVE | Facility: CLINIC | Age: 57
End: 2022-09-10
Payer: COMMERCIAL

## 2022-09-10 NOTE — TELEPHONE ENCOUNTER
Reason for Disposition   Cut on eyelid or eyeball (Exception: superficial scratch on eyelid)    Additional Information   Negative: Wound looks infected   Negative: Foreign body in the eye   Negative: Head injury is the main concern   Negative: Neck injury is the main concern   Negative: [1] Major bleeding (e.g., actively dripping or spurting) AND [2] can't be stopped   Negative: Knocked out (unconscious) > 1 minute   Negative: Difficult to awaken or acting confused (e.g., disoriented, slurred speech)   Negative: Severe neck pain   Negative: Sounds like a life-threatening emergency to the triager    Protocols used: Eye Injury-A-    Michael's wife Alaina called to say he had right eye surgery 09/02/2022, Dr Adis Calvin for injury with laceration of eyeball.  He went in to be seen at ED of [facility] for sever pain / pressure 09/08. Was given pain medication at that time due to increased pressure in his eye, and was started on drops and oral diamox for pressure. He saw Dr Calvin in clinic on 09/08 and was feeling well.  Today he is having a bad headache and his eye is hurting a lot , from the front of his right eye through to the back of his head.  Said it is throbbing.  Rates pain at 8-9 of 10.  He said it feels like increased pressure in his eye again. His wife said she is concerned that the pressure is back and she wants to know if he can re-start the diamox, which Dr Calvin advised Michael stop at the 09/08 office visit.  She is aware that MD recommended he be seen for worsening symptoms, and that is what she is calling about.  She requests call to  Called Dr Calvin, on call for optho at [facility], briefed him with above information, and he wants patient to return to ED now for eval.  MD states that if any intervention is required, he will need to be transferred to [facility], but Michael is at home in [place], near [facility], and patient wants to go to [facility] now for evaluation.  Per Dr Calvin, Dr Monterroso Friend is on call at  [facility].  I will message Dr Jose to let him know Michael is coming to [facility] ED via family vehicle. Please contact caller directly with any additional care advice.

## 2022-09-14 ENCOUNTER — NURSE TRIAGE (OUTPATIENT)
Dept: ADMINISTRATIVE | Facility: CLINIC | Age: 57
End: 2022-09-14
Payer: COMMERCIAL

## 2022-09-14 NOTE — TELEPHONE ENCOUNTER
Patient's wife called for refill of Diamox 250 mg tablets. Wife states that during patient's Opthamology follow-up he was instructed to continue Diamox 250 mg tablets TID. Epic documents follow-up encounter and order for Diamox 250 mg TID, 3 refills, 90 dispensed.    Verbal order provided to [id number] Pharmacist/Mauro for Diamox 250 mg tablets, oral, 3 times/day, dispense 90, 3 refills.     Patient's wife advised of submission of prescription for Diamox 250 mg to [facility]. Wife cites no additional concerns at this time.     Reason for Disposition   [1] Prescription prescribed recently is not at pharmacy AND [2] triager has access to patient's EMR AND [3] prescription is recorded in the EMR    Additional Information   Negative: New-onset or worsening symptoms, see that guideline  (e.g., diarrhea, runny nose, sore throat)   Negative: Medicine question not related to refill or renewal   Negative: Caller requesting information unrelated to medicine   Negative: [1] Prescription refill request for ESSENTIAL medicine (i.e., likelihood of harm to patient if not taken) AND [2] triager unable to refill per department policy   Negative: [1] Prescription not at pharmacy AND [2] was prescribed by PCP recently (Exception: triager has access to EMR and prescription is recorded there. Go to Home Care and confirm for pharmacy.)   Negative: [1] Pharmacy calling with prescription questions AND [2] triager unable to answer question   Negative: Prescription request for new medicine (not a refill)   Negative: Caller requesting a CONTROLLED substance prescription refill (e.g., narcotics, ADHD medicines)   Negative: [1] Prescription refill request for NON-ESSENTIAL medicine (i.e., no harm to patient if med not taken) AND [2] triager unable to refill per department policy   Negative: [1] Caller has NON-URGENT medicine question about med that PCP prescribed AND [2] triager unable to answer question    Protocols used: Medication Refill  and Renewal Call-A-AH

## 2022-09-27 ENCOUNTER — OFFICE VISIT (OUTPATIENT)
Dept: INTERNAL MEDICINE | Facility: CLINIC | Age: 57
End: 2022-09-27
Payer: COMMERCIAL

## 2022-09-27 VITALS
SYSTOLIC BLOOD PRESSURE: 116 MMHG | HEIGHT: 64 IN | RESPIRATION RATE: 20 BRPM | DIASTOLIC BLOOD PRESSURE: 82 MMHG | BODY MASS INDEX: 32.95 KG/M2 | WEIGHT: 193 LBS | OXYGEN SATURATION: 98 % | HEART RATE: 97 BPM

## 2022-09-27 DIAGNOSIS — E78.5 HYPERLIPIDEMIA LDL GOAL <70: ICD-10-CM

## 2022-09-27 DIAGNOSIS — F41.9 ANXIETY: ICD-10-CM

## 2022-09-27 DIAGNOSIS — I10 PRIMARY HYPERTENSION: Primary | ICD-10-CM

## 2022-09-27 DIAGNOSIS — E66.9 OBESITY (BMI 30-39.9): ICD-10-CM

## 2022-09-27 DIAGNOSIS — I50.9 CONGESTIVE HEART FAILURE, UNSPECIFIED HF CHRONICITY, UNSPECIFIED HEART FAILURE TYPE: ICD-10-CM

## 2022-09-27 DIAGNOSIS — Z12.5 PROSTATE CANCER SCREENING: ICD-10-CM

## 2022-09-27 DIAGNOSIS — Z11.59 NEED FOR HEPATITIS C SCREENING TEST: ICD-10-CM

## 2022-09-27 DIAGNOSIS — N18.31 CHRONIC KIDNEY DISEASE, STAGE 3A: ICD-10-CM

## 2022-09-27 LAB
ALBUMIN SERPL BCP-MCNC: 3.9 G/DL (ref 3.5–5.2)
ALBUMIN/CREAT UR: NORMAL UG/MG (ref 0–30)
ALP SERPL-CCNC: 70 U/L (ref 55–135)
ALT SERPL W/O P-5'-P-CCNC: 10 U/L (ref 10–44)
ANION GAP SERPL CALC-SCNC: 10 MMOL/L (ref 8–16)
AST SERPL-CCNC: 17 U/L (ref 10–40)
BASOPHILS # BLD AUTO: 0.05 K/UL (ref 0–0.2)
BASOPHILS NFR BLD: 0.8 % (ref 0–1.9)
BILIRUB SERPL-MCNC: 0.3 MG/DL (ref 0.1–1)
BUN SERPL-MCNC: 20 MG/DL (ref 6–20)
CALCIUM SERPL-MCNC: 9.3 MG/DL (ref 8.7–10.5)
CHLORIDE SERPL-SCNC: 111 MMOL/L (ref 95–110)
CHOLEST SERPL-MCNC: 168 MG/DL (ref 120–199)
CHOLEST/HDLC SERPL: 5.6 {RATIO} (ref 2–5)
CO2 SERPL-SCNC: 20 MMOL/L (ref 23–29)
COMPLEXED PSA SERPL-MCNC: 1 NG/ML (ref 0–4)
CREAT SERPL-MCNC: 1.3 MG/DL (ref 0.5–1.4)
CREAT UR-MCNC: 23 MG/DL (ref 23–375)
DIFFERENTIAL METHOD: ABNORMAL
EOSINOPHIL # BLD AUTO: 0.1 K/UL (ref 0–0.5)
EOSINOPHIL NFR BLD: 1.8 % (ref 0–8)
ERYTHROCYTE [DISTWIDTH] IN BLOOD BY AUTOMATED COUNT: 14.3 % (ref 11.5–14.5)
EST. GFR  (NO RACE VARIABLE): >60 ML/MIN/1.73 M^2
ESTIMATED AVG GLUCOSE: 100 MG/DL (ref 68–131)
GLUCOSE SERPL-MCNC: 122 MG/DL (ref 70–110)
HBA1C MFR BLD: 5.1 % (ref 4–5.6)
HCT VFR BLD AUTO: 42 % (ref 40–54)
HCV AB SERPL QL IA: NORMAL
HDLC SERPL-MCNC: 30 MG/DL (ref 40–75)
HDLC SERPL: 17.9 % (ref 20–50)
HGB BLD-MCNC: 12.8 G/DL (ref 14–18)
IMM GRANULOCYTES # BLD AUTO: 0.02 K/UL (ref 0–0.04)
IMM GRANULOCYTES NFR BLD AUTO: 0.3 % (ref 0–0.5)
LDLC SERPL CALC-MCNC: 102.8 MG/DL (ref 63–159)
LYMPHOCYTES # BLD AUTO: 1.1 K/UL (ref 1–4.8)
LYMPHOCYTES NFR BLD: 18.4 % (ref 18–48)
MCH RBC QN AUTO: 29.3 PG (ref 27–31)
MCHC RBC AUTO-ENTMCNC: 30.5 G/DL (ref 32–36)
MCV RBC AUTO: 96 FL (ref 82–98)
MICROALBUMIN UR DL<=1MG/L-MCNC: <5 UG/ML
MONOCYTES # BLD AUTO: 0.5 K/UL (ref 0.3–1)
MONOCYTES NFR BLD: 8.3 % (ref 4–15)
NEUTROPHILS # BLD AUTO: 4.3 K/UL (ref 1.8–7.7)
NEUTROPHILS NFR BLD: 70.4 % (ref 38–73)
NONHDLC SERPL-MCNC: 138 MG/DL
NRBC BLD-RTO: 0 /100 WBC
PLATELET # BLD AUTO: 396 K/UL (ref 150–450)
PMV BLD AUTO: 10 FL (ref 9.2–12.9)
POTASSIUM SERPL-SCNC: 3.7 MMOL/L (ref 3.5–5.1)
PROT SERPL-MCNC: 6.9 G/DL (ref 6–8.4)
RBC # BLD AUTO: 4.37 M/UL (ref 4.6–6.2)
SODIUM SERPL-SCNC: 141 MMOL/L (ref 136–145)
TRIGL SERPL-MCNC: 176 MG/DL (ref 30–150)
TSH SERPL DL<=0.005 MIU/L-ACNC: 0.88 UIU/ML (ref 0.4–4)
WBC # BLD AUTO: 6.04 K/UL (ref 3.9–12.7)

## 2022-09-27 PROCEDURE — 1159F MED LIST DOCD IN RCRD: CPT | Mod: CPTII,S$GLB,, | Performed by: NURSE PRACTITIONER

## 2022-09-27 PROCEDURE — 80053 COMPREHEN METABOLIC PANEL: CPT | Performed by: NURSE PRACTITIONER

## 2022-09-27 PROCEDURE — 86803 HEPATITIS C AB TEST: CPT | Performed by: NURSE PRACTITIONER

## 2022-09-27 PROCEDURE — 3008F PR BODY MASS INDEX (BMI) DOCUMENTED: ICD-10-PCS | Mod: CPTII,S$GLB,, | Performed by: NURSE PRACTITIONER

## 2022-09-27 PROCEDURE — 82043 UR ALBUMIN QUANTITATIVE: CPT | Performed by: NURSE PRACTITIONER

## 2022-09-27 PROCEDURE — 4010F ACE/ARB THERAPY RXD/TAKEN: CPT | Mod: CPTII,S$GLB,, | Performed by: NURSE PRACTITIONER

## 2022-09-27 PROCEDURE — 3008F BODY MASS INDEX DOCD: CPT | Mod: CPTII,S$GLB,, | Performed by: NURSE PRACTITIONER

## 2022-09-27 PROCEDURE — 99999 PR PBB SHADOW E&M-EST. PATIENT-LVL V: CPT | Mod: PBBFAC,,, | Performed by: NURSE PRACTITIONER

## 2022-09-27 PROCEDURE — 3074F PR MOST RECENT SYSTOLIC BLOOD PRESSURE < 130 MM HG: ICD-10-PCS | Mod: CPTII,S$GLB,, | Performed by: NURSE PRACTITIONER

## 2022-09-27 PROCEDURE — 83036 HEMOGLOBIN GLYCOSYLATED A1C: CPT | Performed by: NURSE PRACTITIONER

## 2022-09-27 PROCEDURE — 85025 COMPLETE CBC W/AUTO DIFF WBC: CPT | Performed by: NURSE PRACTITIONER

## 2022-09-27 PROCEDURE — 1159F PR MEDICATION LIST DOCUMENTED IN MEDICAL RECORD: ICD-10-PCS | Mod: CPTII,S$GLB,, | Performed by: NURSE PRACTITIONER

## 2022-09-27 PROCEDURE — 84443 ASSAY THYROID STIM HORMONE: CPT | Performed by: NURSE PRACTITIONER

## 2022-09-27 PROCEDURE — 4010F PR ACE/ARB THEARPY RXD/TAKEN: ICD-10-PCS | Mod: CPTII,S$GLB,, | Performed by: NURSE PRACTITIONER

## 2022-09-27 PROCEDURE — 99214 OFFICE O/P EST MOD 30 MIN: CPT | Mod: S$GLB,,, | Performed by: NURSE PRACTITIONER

## 2022-09-27 PROCEDURE — 3079F DIAST BP 80-89 MM HG: CPT | Mod: CPTII,S$GLB,, | Performed by: NURSE PRACTITIONER

## 2022-09-27 PROCEDURE — 84153 ASSAY OF PSA TOTAL: CPT | Performed by: NURSE PRACTITIONER

## 2022-09-27 PROCEDURE — 3074F SYST BP LT 130 MM HG: CPT | Mod: CPTII,S$GLB,, | Performed by: NURSE PRACTITIONER

## 2022-09-27 PROCEDURE — 99999 PR PBB SHADOW E&M-EST. PATIENT-LVL V: ICD-10-PCS | Mod: PBBFAC,,, | Performed by: NURSE PRACTITIONER

## 2022-09-27 PROCEDURE — 82570 ASSAY OF URINE CREATININE: CPT | Performed by: NURSE PRACTITIONER

## 2022-09-27 PROCEDURE — 80061 LIPID PANEL: CPT | Performed by: NURSE PRACTITIONER

## 2022-09-27 PROCEDURE — 99214 PR OFFICE/OUTPT VISIT, EST, LEVL IV, 30-39 MIN: ICD-10-PCS | Mod: S$GLB,,, | Performed by: NURSE PRACTITIONER

## 2022-09-27 PROCEDURE — 3079F PR MOST RECENT DIASTOLIC BLOOD PRESSURE 80-89 MM HG: ICD-10-PCS | Mod: CPTII,S$GLB,, | Performed by: NURSE PRACTITIONER

## 2022-09-27 RX ORDER — ROSUVASTATIN CALCIUM 40 MG/1
40 TABLET, COATED ORAL DAILY
Qty: 90 TABLET | Refills: 3
Start: 2022-09-27 | End: 2022-10-13 | Stop reason: SDUPTHER

## 2022-09-27 NOTE — PROGRESS NOTES
Subjective:       Patient ID: Michael Guillaume Sr. is a 57 y.o. male.    Chief Complaint: Follow-up (6 month check up )    Patient is known, to me and presents with   Chief Complaint   Patient presents with    Follow-up     6 month check up    .  Denies chest pain and shortness of breath.  Patient presents with check up and labs. He will need to find a cardiologist close to home. Also recently had eye trauma and is seeing eye specialists at [facility]. Unable to see in right eye. Laceration to right eyelid healed well.    Follow-up  Pertinent negatives include no abdominal pain, arthralgias, chest pain, chills, congestion, coughing, diaphoresis, fatigue, fever, headaches, joint swelling, myalgias, nausea, neck pain, numbness, rash, sore throat, vomiting or weakness.   Review of Systems   Constitutional: Negative.  Negative for activity change, appetite change, chills, diaphoresis, fatigue, fever and unexpected weight change.   HENT: Negative.  Negative for congestion, ear discharge, ear pain, facial swelling, hearing loss, nosebleeds, postnasal drip, rhinorrhea, sinus pressure, sneezing, sore throat, tinnitus, trouble swallowing and voice change.    Eyes:  Positive for visual disturbance. Negative for photophobia, pain, discharge, redness and itching.   Respiratory: Negative.  Negative for apnea, cough, choking, chest tightness, shortness of breath, wheezing and stridor.    Cardiovascular: Negative.  Negative for chest pain, palpitations and leg swelling.   Gastrointestinal:  Negative for abdominal distention, abdominal pain, anal bleeding, blood in stool, constipation, diarrhea, nausea and vomiting.   Genitourinary: Negative.  Negative for difficulty urinating, dysuria, enuresis, flank pain, frequency, hematuria, penile discharge, penile pain, penile swelling, scrotal swelling, testicular pain and urgency.   Musculoskeletal: Negative.  Negative for arthralgias, back pain, gait problem, joint swelling, myalgias, neck  pain and neck stiffness.   Skin: Negative.  Negative for color change, pallor, rash and wound.   Neurological:  Negative for dizziness, tremors, seizures, syncope, facial asymmetry, speech difficulty, weakness, light-headedness, numbness and headaches.   Hematological:  Negative for adenopathy. Does not bruise/bleed easily.   Psychiatric/Behavioral: Negative.  Negative for agitation, dysphoric mood, sleep disturbance and suicidal ideas. The patient is not nervous/anxious.      Objective:      Physical Exam  Vitals and nursing note reviewed.   Constitutional:       General: He is not in acute distress.     Appearance: He is well-developed. He is not diaphoretic.   HENT:      Head: Normocephalic and atraumatic.      Right Ear: External ear normal.      Left Ear: External ear normal.      Nose: Nose normal.      Mouth/Throat:      Pharynx: No oropharyngeal exudate.   Eyes:      General: No scleral icterus.        Right eye: Discharge present.         Left eye: No discharge.      Comments: Clear tearing to right eye   Neck:      Thyroid: No thyromegaly.      Vascular: No JVD.      Trachea: No tracheal deviation.   Cardiovascular:      Rate and Rhythm: Normal rate. Rhythm irregular.      Heart sounds: Normal heart sounds. No murmur heard.    No friction rub. No gallop.   Pulmonary:      Effort: Pulmonary effort is normal. No respiratory distress.      Breath sounds: Normal breath sounds. No stridor. No wheezing or rales.   Chest:      Chest wall: No tenderness.   Abdominal:      General: Bowel sounds are normal. There is no distension.      Palpations: Abdomen is soft. There is no mass.      Tenderness: There is no abdominal tenderness. There is no right CVA tenderness, left CVA tenderness, guarding or rebound.      Hernia: No hernia is present.   Musculoskeletal:         General: No tenderness. Normal range of motion.      Cervical back: Normal range of motion and neck supple.   Lymphadenopathy:      Cervical: No  cervical adenopathy.   Skin:     General: Skin is warm and dry.      Capillary Refill: Capillary refill takes less than 2 seconds.      Coloration: Skin is not jaundiced or pale.      Findings: No bruising, erythema, lesion or rash.   Neurological:      General: No focal deficit present.      Mental Status: He is alert and oriented to person, place, and time.      Cranial Nerves: No cranial nerve deficit.      Motor: No abnormal muscle tone.      Coordination: Coordination normal.      Deep Tendon Reflexes: Reflexes are normal and symmetric. Reflexes normal.   Psychiatric:         Mood and Affect: Mood normal.         Behavior: Behavior normal.         Thought Content: Thought content normal.         Judgment: Judgment normal.      Comments: Negative sihi noted       Assessment:       1. Primary hypertension    2. Hyperlipidemia LDL goal <70    3. Chronic kidney disease, stage 3a    4. Congestive heart failure, unspecified HF chronicity, unspecified heart failure type    5. Anxiety    6. Obesity (BMI 30-39.9)    7. Prostate cancer screening    8. Need for hepatitis C screening test          Plan:   Michael was seen today for follow-up.    Diagnoses and all orders for this visit:    Primary hypertension  -     CBC Auto Differential; Future  -     Comprehensive Metabolic Panel; Future  -     Microalbumin/Creatinine Ratio, Urine; Future  -     Ambulatory referral/consult to Cardiology; Future    Hyperlipidemia LDL goal <70  -     CBC Auto Differential; Future  -     Comprehensive Metabolic Panel; Future  -     TSH; Future  -     Lipid Panel; Future  -     Ambulatory referral/consult to Cardiology; Future  -     rosuvastatin (CRESTOR) 40 MG Tab; Take 1 tablet (40 mg total) by mouth once daily.    Chronic kidney disease, stage 3a  -     CBC Auto Differential; Future  -     Comprehensive Metabolic Panel; Future    Congestive heart failure, unspecified HF chronicity, unspecified heart failure type  -     CBC Auto  "Differential; Future  -     Comprehensive Metabolic Panel; Future  -     EKG 12-lead; Future  -     Ambulatory referral/consult to Cardiology; Future    Anxiety  -     CBC Auto Differential; Future  -     Comprehensive Metabolic Panel; Future    Obesity (BMI 30-39.9)  -     CBC Auto Differential; Future  -     Comprehensive Metabolic Panel; Future  -     Hemoglobin A1C; Future    Prostate cancer screening  -     PSA, Screening; Future    Need for hepatitis C screening test  -     HEPATITIS C ANTIBODY; Future  "This note will not be shared with the patient."  EKG NSR no changes from previous EKG  Continue with plan of care   Will set up with Dr. Akira Elaine on meds.  Medication compliance was discussed with the patient.   Medication side effects were discussed.  The patient was instructed on using exercise frequently to reduce blood pressure.  Thirty to forty-five minutes of brisk walking three to four times a week is often helpful to lower your blood pressure.  Monitor blood pressures at home and to record the values in a log.  The patient was instructed to monitor weight closely and to try to keep it as close to ideal body weight as possible.  Reduce salt intake to less than 2 grams per day.  Do not add salt to food at the table.  Reduce or get rid of salt used in cooking.  Limit processed and fast foods.  Read package labels for amount of salt (soduim) in foods.  Losing weight, even just 10 pounds, of can decrease blood pressure.   Rtc as scheduled    "

## 2022-09-30 ENCOUNTER — OFFICE VISIT (OUTPATIENT)
Dept: CARDIOLOGY | Facility: CLINIC | Age: 57
End: 2022-09-30
Payer: COMMERCIAL

## 2022-09-30 VITALS
WEIGHT: 194.44 LBS | OXYGEN SATURATION: 99 % | BODY MASS INDEX: 33.2 KG/M2 | HEIGHT: 64 IN | SYSTOLIC BLOOD PRESSURE: 105 MMHG | HEART RATE: 99 BPM | DIASTOLIC BLOOD PRESSURE: 61 MMHG

## 2022-09-30 DIAGNOSIS — I25.110 ATHEROSCLEROSIS OF NATIVE CORONARY ARTERY OF NATIVE HEART WITH UNSTABLE ANGINA PECTORIS: Primary | ICD-10-CM

## 2022-09-30 DIAGNOSIS — I20.0 ANGINA PECTORIS, CRESCENDO: ICD-10-CM

## 2022-09-30 DIAGNOSIS — I73.9 CLAUDICATION OF CALF MUSCLES: ICD-10-CM

## 2022-09-30 DIAGNOSIS — R06.09 DYSPNEA ON EXERTION: ICD-10-CM

## 2022-09-30 DIAGNOSIS — N18.31 CHRONIC KIDNEY DISEASE, STAGE 3A: ICD-10-CM

## 2022-09-30 DIAGNOSIS — I10 PRIMARY HYPERTENSION: ICD-10-CM

## 2022-09-30 DIAGNOSIS — E78.5 HYPERLIPIDEMIA LDL GOAL <70: ICD-10-CM

## 2022-09-30 DIAGNOSIS — I50.9 CONGESTIVE HEART FAILURE, UNSPECIFIED HF CHRONICITY, UNSPECIFIED HEART FAILURE TYPE: ICD-10-CM

## 2022-09-30 DIAGNOSIS — I10 BENIGN ESSENTIAL HTN: ICD-10-CM

## 2022-09-30 DIAGNOSIS — E66.9 OBESITY (BMI 30-39.9): ICD-10-CM

## 2022-09-30 PROCEDURE — 99999 PR PBB SHADOW E&M-EST. PATIENT-LVL V: CPT | Mod: PBBFAC,,, | Performed by: INTERNAL MEDICINE

## 2022-09-30 PROCEDURE — 3044F PR MOST RECENT HEMOGLOBIN A1C LEVEL <7.0%: ICD-10-PCS | Mod: CPTII,S$GLB,, | Performed by: INTERNAL MEDICINE

## 2022-09-30 PROCEDURE — 4010F PR ACE/ARB THEARPY RXD/TAKEN: ICD-10-PCS | Mod: CPTII,S$GLB,, | Performed by: INTERNAL MEDICINE

## 2022-09-30 PROCEDURE — 3008F BODY MASS INDEX DOCD: CPT | Mod: CPTII,S$GLB,, | Performed by: INTERNAL MEDICINE

## 2022-09-30 PROCEDURE — 3066F PR DOCUMENTATION OF TREATMENT FOR NEPHROPATHY: ICD-10-PCS | Mod: CPTII,S$GLB,, | Performed by: INTERNAL MEDICINE

## 2022-09-30 PROCEDURE — 1160F RVW MEDS BY RX/DR IN RCRD: CPT | Mod: CPTII,S$GLB,, | Performed by: INTERNAL MEDICINE

## 2022-09-30 PROCEDURE — 3074F PR MOST RECENT SYSTOLIC BLOOD PRESSURE < 130 MM HG: ICD-10-PCS | Mod: CPTII,S$GLB,, | Performed by: INTERNAL MEDICINE

## 2022-09-30 PROCEDURE — 3074F SYST BP LT 130 MM HG: CPT | Mod: CPTII,S$GLB,, | Performed by: INTERNAL MEDICINE

## 2022-09-30 PROCEDURE — 3044F HG A1C LEVEL LT 7.0%: CPT | Mod: CPTII,S$GLB,, | Performed by: INTERNAL MEDICINE

## 2022-09-30 PROCEDURE — 4010F ACE/ARB THERAPY RXD/TAKEN: CPT | Mod: CPTII,S$GLB,, | Performed by: INTERNAL MEDICINE

## 2022-09-30 PROCEDURE — 99214 OFFICE O/P EST MOD 30 MIN: CPT | Mod: S$GLB,,, | Performed by: INTERNAL MEDICINE

## 2022-09-30 PROCEDURE — 3061F PR NEG MICROALBUMINURIA RESULT DOCUMENTED/REVIEW: ICD-10-PCS | Mod: CPTII,S$GLB,, | Performed by: INTERNAL MEDICINE

## 2022-09-30 PROCEDURE — 1160F PR REVIEW ALL MEDS BY PRESCRIBER/CLIN PHARMACIST DOCUMENTED: ICD-10-PCS | Mod: CPTII,S$GLB,, | Performed by: INTERNAL MEDICINE

## 2022-09-30 PROCEDURE — 3061F NEG MICROALBUMINURIA REV: CPT | Mod: CPTII,S$GLB,, | Performed by: INTERNAL MEDICINE

## 2022-09-30 PROCEDURE — 3078F DIAST BP <80 MM HG: CPT | Mod: CPTII,S$GLB,, | Performed by: INTERNAL MEDICINE

## 2022-09-30 PROCEDURE — 99999 PR PBB SHADOW E&M-EST. PATIENT-LVL V: ICD-10-PCS | Mod: PBBFAC,,, | Performed by: INTERNAL MEDICINE

## 2022-09-30 PROCEDURE — 3078F PR MOST RECENT DIASTOLIC BLOOD PRESSURE < 80 MM HG: ICD-10-PCS | Mod: CPTII,S$GLB,, | Performed by: INTERNAL MEDICINE

## 2022-09-30 PROCEDURE — 1159F MED LIST DOCD IN RCRD: CPT | Mod: CPTII,S$GLB,, | Performed by: INTERNAL MEDICINE

## 2022-09-30 PROCEDURE — 3008F PR BODY MASS INDEX (BMI) DOCUMENTED: ICD-10-PCS | Mod: CPTII,S$GLB,, | Performed by: INTERNAL MEDICINE

## 2022-09-30 PROCEDURE — 1159F PR MEDICATION LIST DOCUMENTED IN MEDICAL RECORD: ICD-10-PCS | Mod: CPTII,S$GLB,, | Performed by: INTERNAL MEDICINE

## 2022-09-30 PROCEDURE — 3066F NEPHROPATHY DOC TX: CPT | Mod: CPTII,S$GLB,, | Performed by: INTERNAL MEDICINE

## 2022-09-30 PROCEDURE — 99214 PR OFFICE/OUTPT VISIT, EST, LEVL IV, 30-39 MIN: ICD-10-PCS | Mod: S$GLB,,, | Performed by: INTERNAL MEDICINE

## 2022-09-30 RX ORDER — LOSARTAN POTASSIUM 100 MG/1
50 TABLET ORAL DAILY
Qty: 90 TABLET | Refills: 3
Start: 2022-09-30 | End: 2023-04-05 | Stop reason: SDUPTHER

## 2022-09-30 RX ORDER — AMLODIPINE BESYLATE 5 MG/1
5 TABLET ORAL DAILY
Qty: 90 TABLET | Refills: 4 | Status: SHIPPED | OUTPATIENT
Start: 2022-09-30 | End: 2023-04-05 | Stop reason: SDUPTHER

## 2022-09-30 RX ORDER — SODIUM CHLORIDE 9 MG/ML
INJECTION, SOLUTION INTRAVENOUS CONTINUOUS
Status: CANCELLED | OUTPATIENT
Start: 2022-09-30 | End: 2022-09-30

## 2022-09-30 RX ORDER — NITROGLYCERIN 0.4 MG/1
0.4 TABLET SUBLINGUAL EVERY 5 MIN PRN
Qty: 30 TABLET | Refills: 3 | Status: SHIPPED | OUTPATIENT
Start: 2022-09-30

## 2022-09-30 NOTE — ASSESSMENT & PLAN NOTE
Calcium channel blockers and nitrates added today for angina control.  Cardiac catheterization scheduled for next week.

## 2022-09-30 NOTE — H&P (VIEW-ONLY)
[facility] Cardiology     Subjective:    Patient ID:  Michael Guillaume Sr. is a 57 y.o. male who presents for evaluation of Hyperlipidemia, Peripheral Arterial Disease, Coronary Artery Disease, and Hypertension    Review of patient's allergies indicates:   Allergen Reactions    Lipitor [atorvastatin] Other (See Comments)     Other reaction(s): Severe Myalgias      Patient is here for worsening chest and jaw tightness with activity.  It started about 3 months ago and is worsening.  He has curtailed his activities because of episodes of chest discomfort.  He only has to walk less than a city block before he gets chest tightness and jaw discomfort simultaneously.  His most recent electrocardiogram August 27th showed diffuse resting ischemic ST changes inferolateral leads.      He has bilateral lower extremity claudication.  He has patent stents in the iliac system bilaterally.  There is disease below the knee.  He states he was taking Pletal but it did not help his leg pains.  He is able to walk a mile and a half but has to stop for claudication.  He has never had a diagnosis of coronary artery disease.    He states he has difficulty taking statin therapy.  He blames muscle loss and leg weakness on statins-specifically Lipitor.  He is now on rosuvastatin.  He also discontinued rosuvastatin recently.  His LDL was 78 and now is 105 mg%.  He is on aspirin Plavix.  He takes losartan for hypertension.      Review of Systems   Constitutional: Negative for chills, decreased appetite, diaphoresis, fever, malaise/fatigue, night sweats, weight gain and weight loss.   HENT:  Negative for congestion, ear discharge, ear pain, hearing loss, hoarse voice, nosebleeds, odynophagia, sore throat, stridor and tinnitus.    Eyes:  Positive for visual disturbance. Negative for blurred vision, discharge, double vision, pain, photophobia, redness, vision loss in left eye,  vision loss in right eye and visual halos.   Cardiovascular:  Positive for chest pain, claudication, dyspnea on exertion and leg swelling. Negative for cyanosis, irregular heartbeat, near-syncope, orthopnea, palpitations, paroxysmal nocturnal dyspnea and syncope.   Respiratory:  Negative for cough, hemoptysis, shortness of breath, sleep disturbances due to breathing, snoring, sputum production and wheezing.    Endocrine: Negative for cold intolerance, heat intolerance, polydipsia, polyphagia and polyuria.   Hematologic/Lymphatic: Negative for adenopathy and bleeding problem. Does not bruise/bleed easily.   Skin:  Negative for color change, dry skin, flushing, itching, nail changes, poor wound healing, rash, skin cancer, suspicious lesions and unusual hair distribution.   Musculoskeletal:  Positive for muscle weakness. Negative for arthritis, back pain, falls, gout, joint pain, joint swelling, muscle cramps, myalgias, neck pain and stiffness.   Gastrointestinal:  Negative for bloating, abdominal pain, anorexia, change in bowel habit, bowel incontinence, constipation, diarrhea, dysphagia, excessive appetite, flatus, heartburn, hematemesis, hematochezia, hemorrhoids, jaundice, melena, nausea and vomiting.   Genitourinary:  Negative for bladder incontinence, decreased libido, dysuria, flank pain, frequency, genital sores, hematuria, hesitancy, incomplete emptying, nocturia and urgency.   Neurological:  Negative for aphonia, brief paralysis, difficulty with concentration, disturbances in coordination, excessive daytime sleepiness, dizziness, focal weakness, headaches, light-headedness, loss of balance, numbness, paresthesias, seizures, sensory change, tremors, vertigo and weakness.   Psychiatric/Behavioral:  Negative for altered mental status, depression, hallucinations, memory loss, substance abuse, suicidal ideas and thoughts of violence. The patient does not have insomnia and is not nervous/anxious.   "  Allergic/Immunologic: Negative for hives and persistent infections.      Objective:       Vitals:    09/30/22 1459   BP: 105/61   Pulse: 99   SpO2: 99%   Weight: 88.2 kg (194 lb 7.1 oz)   Height: 5' 4" (1.626 m)    Physical Exam  Constitutional:       General: He is not in acute distress.     Appearance: He is well-developed. He is not diaphoretic.   HENT:      Head: Normocephalic and atraumatic.      Nose: Nose normal.   Eyes:      General: No scleral icterus.        Right eye: No discharge.      Conjunctiva/sclera: Conjunctivae normal.      Pupils: Pupils are equal, round, and reactive to light.   Neck:      Thyroid: No thyromegaly.      Vascular: No JVD.      Trachea: No tracheal deviation.   Cardiovascular:      Rate and Rhythm: Normal rate and regular rhythm.      Pulses:           Carotid pulses are 2+ on the right side and 2+ on the left side.       Radial pulses are 0 on the right side and 0 on the left side.        Dorsalis pedis pulses are 0 on the right side and 0 on the left side.        Posterior tibial pulses are 0 on the right side and 0 on the left side.      Heart sounds: Normal heart sounds. No murmur heard.    No friction rub. No gallop.      Comments: Faint monophasic Doppler signals noted at pedal position bilaterally  Pulmonary:      Effort: Pulmonary effort is normal. No respiratory distress.      Breath sounds: Normal breath sounds. No stridor. No wheezing or rales.   Chest:      Chest wall: No tenderness.   Abdominal:      General: Bowel sounds are normal. There is no distension.      Palpations: Abdomen is soft. There is no mass.      Tenderness: There is no abdominal tenderness. There is no guarding or rebound.   Musculoskeletal:         General: No tenderness. Normal range of motion.      Cervical back: Normal range of motion and neck supple.   Lymphadenopathy:      Cervical: No cervical adenopathy.   Skin:     General: Skin is warm and dry.      Coloration: Skin is not pale.      " Findings: No erythema or rash.   Neurological:      Mental Status: He is alert and oriented to person, place, and time.      Cranial Nerves: No cranial nerve deficit.      Coordination: Coordination normal.   Psychiatric:         Behavior: Behavior normal.         Thought Content: Thought content normal.         Judgment: Judgment normal.         Assessment:       1. Atherosclerosis of native coronary artery of native heart with unstable angina pectoris    2. Primary hypertension    3. Hyperlipidemia LDL goal <70    4. Congestive heart failure, unspecified HF chronicity, unspecified heart failure type    5. Benign essential HTN      Results for orders placed or performed in visit on 09/27/22   HEPATITIS C ANTIBODY   Result Value Ref Range    Hepatitis C Ab Non-reactive Non-reactive   PSA, Screening   Result Value Ref Range    PSA, Screen 1.0 0.00 - 4.00 ng/mL   Hemoglobin A1C   Result Value Ref Range    Hemoglobin A1C 5.1 4.0 - 5.6 %    Estimated Avg Glucose 100 68 - 131 mg/dL   Microalbumin/Creatinine Ratio, Urine   Result Value Ref Range    Microalbumin, Urine <5.0 ug/mL    Creatinine, Urine 23.0 23.0 - 375.0 mg/dL    Microalb/Creat Ratio Unable to calculate 0.0 - 30.0 ug/mg   Lipid Panel   Result Value Ref Range    Cholesterol 168 120 - 199 mg/dL    Triglycerides 176 (H) 30 - 150 mg/dL    HDL 30 (L) 40 - 75 mg/dL    LDL Cholesterol 102.8 63.0 - 159.0 mg/dL    HDL/Cholesterol Ratio 17.9 (L) 20.0 - 50.0 %    Total Cholesterol/HDL Ratio 5.6 (H) 2.0 - 5.0    Non-HDL Cholesterol 138 mg/dL   TSH   Result Value Ref Range    TSH 0.879 0.400 - 4.000 uIU/mL   Comprehensive Metabolic Panel   Result Value Ref Range    Sodium 141 136 - 145 mmol/L    Potassium 3.7 3.5 - 5.1 mmol/L    Chloride 111 (H) 95 - 110 mmol/L    CO2 20 (L) 23 - 29 mmol/L    Glucose 122 (H) 70 - 110 mg/dL    BUN 20 6 - 20 mg/dL    Creatinine 1.3 0.5 - 1.4 mg/dL    Calcium 9.3 8.7 - 10.5 mg/dL    Total Protein 6.9 6.0 - 8.4 g/dL    Albumin 3.9 3.5 - 5.2  g/dL    Total Bilirubin 0.3 0.1 - 1.0 mg/dL    Alkaline Phosphatase 70 55 - 135 U/L    AST 17 10 - 40 U/L    ALT 10 10 - 44 U/L    Anion Gap 10 8 - 16 mmol/L    eGFR >60.0 >60 mL/min/1.73 m^2   CBC Auto Differential   Result Value Ref Range    WBC 6.04 3.90 - 12.70 K/uL    RBC 4.37 (L) 4.60 - 6.20 M/uL    Hemoglobin 12.8 (L) 14.0 - 18.0 g/dL    Hematocrit 42.0 40.0 - 54.0 %    MCV 96 82 - 98 fL    MCH 29.3 27.0 - 31.0 pg    MCHC 30.5 (L) 32.0 - 36.0 g/dL    RDW 14.3 11.5 - 14.5 %    Platelets 396 150 - 450 K/uL    MPV 10.0 9.2 - 12.9 fL    Immature Granulocytes 0.3 0.0 - 0.5 %    Gran # (ANC) 4.3 1.8 - 7.7 K/uL    Immature Grans (Abs) 0.02 0.00 - 0.04 K/uL    Lymph # 1.1 1.0 - 4.8 K/uL    Mono # 0.5 0.3 - 1.0 K/uL    Eos # 0.1 0.0 - 0.5 K/uL    Baso # 0.05 0.00 - 0.20 K/uL    nRBC 0 0 /100 WBC    Gran % 70.4 38.0 - 73.0 %    Lymph % 18.4 18.0 - 48.0 %    Mono % 8.3 4.0 - 15.0 %    Eosinophil % 1.8 0.0 - 8.0 %    Basophil % 0.8 0.0 - 1.9 %    Differential Method Automated          Current Outpatient Medications:     acetaZOLAMIDE (DIAMOX) 250 MG tablet, Take 1 tablet (250 mg total) by mouth 3 (three) times daily., Disp: 90 tablet, Rfl: 3    albuterol (ACCUNEB) 1.25 mg/3 mL Nebu, Take 3 mLs (1.25 mg total) by nebulization every 6 (six) hours as needed. Rescue, Disp: 75 mL, Rfl: 2    ALPRAZolam (XANAX) 1 MG tablet, Take 1 tablet (1 mg total) by mouth 3 (three) times daily as needed., Disp: 90 tablet, Rfl: 5    amLODIPine (NORVASC) 5 MG tablet, Take 1 tablet (5 mg total) by mouth once daily., Disp: 90 tablet, Rfl: 4    aspirin 81 mg Tab, Take 1 tablet by mouth once daily. , Disp: , Rfl:     atropine 1% (ISOPTO ATROPINE) 1 % Drop, Place 1 drop into the right eye 2 (two) times a day., Disp: 5 mL, Rfl: 0    brimonidine 0.15 % OPTH DROP (ALPHAGAN) 0.15 % ophthalmic solution, Place 1 drop into the right eye 3 (three) times daily., Disp: 10 mL, Rfl: 1    carvediloL (COREG) 12.5 MG tablet, Take 1 tablet (12.5 mg total) by  mouth 2 (two) times daily with meals., Disp: 180 tablet, Rfl: 3    clopidogreL (PLAVIX) 75 mg tablet, Take 1 tablet (75 mg total) by mouth once daily., Disp: 90 tablet, Rfl: 3    colchicine (COLCRYS) 0.6 mg tablet, TAKE 1 TABLET BY MOUTH EVERY DAY, Disp: 90 tablet, Rfl: 1    cyclobenzaprine (FLEXERIL) 10 MG tablet, TAKE 1 TABLET BY MOUTH EVERY DAY IN THE EVENING, Disp: 30 tablet, Rfl: 5    dorzolamide (TRUSOPT) 2 % ophthalmic solution, Place 1 drop into the right eye 3 (three) times daily., Disp: 10 mL, Rfl: 1    erythromycin (ROMYCIN) ophthalmic ointment, Place into the right eye 6 (six) times daily., Disp: 3.5 g, Rfl: 5    esomeprazole (NEXIUM) 20 MG capsule, Take 20 mg by mouth before breakfast., Disp: , Rfl:     furosemide (LASIX) 20 MG tablet, TAKE 1 TABLET BY MOUTH EVERY DAY AS NEEDED, Disp: 90 tablet, Rfl: 0    losartan (COZAAR) 100 MG tablet, Take 0.5 tablets (50 mg total) by mouth once daily., Disp: 90 tablet, Rfl: 3    moxifloxacin (VIGAMOX) 0.5 % ophthalmic solution, Place 1 drop into the right eye 4 (four) times daily., Disp: 3 mL, Rfl: 0    MULTI-VITAMIN HI-PO ORAL, Take by mouth once daily. , Disp: , Rfl:     neomycin-polymyxin-dexamethasone (MAXITROL) 3.5 mg/g-10,000 unit/g-0.1 % Oint, Place into the left eye 2 (two) times a day., Disp: 3.5 g, Rfl: 1    nitroGLYCERIN (NITROSTAT) 0.4 MG SL tablet, Place 1 tablet (0.4 mg total) under the tongue every 5 (five) minutes as needed for Chest pain., Disp: 30 tablet, Rfl: 3    prednisoLONE acetate (PRED FORTE) 1 % DrpS, Place 1 drop into the right eye 3 (three) times daily., Disp: 10 mL, Rfl: 1    rosuvastatin (CRESTOR) 40 MG Tab, Take 1 tablet (40 mg total) by mouth once daily., Disp: 90 tablet, Rfl: 3    SYMBICORT 80-4.5 mcg/actuation HFAA, INHALE 2 PUFFS TWICE A DAY AS DIRECTED, Disp: 10.2 g, Rfl: 5    vitamin E 400 UNIT capsule, Take 400 Units by mouth once daily., Disp: , Rfl:     Current Facility-Administered Medications:     sodium chloride 0.9% flush  10 mL, 10 mL, Intravenous, PRN, Loc Garcia MD     Lab Results   Component Value Date    WBC 6.04 09/27/2022    RBC 4.37 (L) 09/27/2022    HGB 12.8 (L) 09/27/2022    HCT 42.0 09/27/2022    MCV 96 09/27/2022    MCH 29.3 09/27/2022    MCHC 30.5 (L) 09/27/2022    RDW 14.3 09/27/2022     09/27/2022    MPV 10.0 09/27/2022    GRAN 4.3 09/27/2022    GRAN 70.4 09/27/2022    LYMPH 1.1 09/27/2022    LYMPH 18.4 09/27/2022    MONO 0.5 09/27/2022    MONO 8.3 09/27/2022    EOS 0.1 09/27/2022    BASO 0.05 09/27/2022    EOSINOPHIL 1.8 09/27/2022    BASOPHIL 0.8 09/27/2022    MG 2.1 08/19/2020        CMP  Lab Results   Component Value Date     09/27/2022    K 3.7 09/27/2022     (H) 09/27/2022    CO2 20 (L) 09/27/2022     (H) 09/27/2022    BUN 20 09/27/2022    CREATININE 1.3 09/27/2022    CALCIUM 9.3 09/27/2022    PROT 6.9 09/27/2022    ALBUMIN 3.9 09/27/2022    BILITOT 0.3 09/27/2022    ALKPHOS 70 09/27/2022    AST 17 09/27/2022    ALT 10 09/27/2022    ANIONGAP 10 09/27/2022    ESTGFRAFRICA >60.0 03/09/2022    EGFRNONAA >60.0 03/09/2022        No results found for: LABBLOO, LABURIN, RESPIRATORYC, GSRESP         Results for orders placed or performed during the hospital encounter of 08/27/22   EKG 12-lead    Collection Time: 08/27/22  9:06 AM    Narrative    Test Reason :     Vent. Rate : 096 BPM     Atrial Rate : 096 BPM     P-R Int : 112 ms          QRS Dur : 084 ms      QT Int : 374 ms       P-R-T Axes : 001 058 150 degrees     QTc Int : 472 ms    Normal sinus rhythm  Diffuse Nonspecific ST and/or T wave abnormalities  Prolonged QT  Abnormal ECG  When compared with ECG of 20-FEB-2020 17:36,  Nonspecific ST and T wave abnormality Now present  Confirmed by Tyrone Laguerre MD ([id number]) on 8/28/2022 9:13:40 AM    Referred By: [email or web address]   SELF           Confirmed By:Eusebio Laguerre MD        CT Head Without Contrast 09/04/2022 [id number] Final   CT Maxillofacial Without Contrast 08/27/2022 [id number] Final   X-Ray  Pelvis Routine AP 08/27/2022 [id number] Final            Plan:       Problem List Items Addressed This Visit          Cardiac/Vascular    HTN (hypertension)    Hyperlipidemia LDL goal <70    Congestive heart failure     Other Visit Diagnoses       Atherosclerosis of native coronary artery of native heart with unstable angina pectoris    -  Primary    Relevant Medications    nitroGLYCERIN (NITROSTAT) 0.4 MG SL tablet    amLODIPine (NORVASC) 5 MG tablet    Other Relevant Orders    Case Request-Cath Lab: Left heart cath (Completed)    Benign essential HTN        Relevant Medications    losartan (COZAAR) 100 MG tablet    amLODIPine (NORVASC) 5 MG tablet             We discussed options to workup his coronary artery disease.  Today I added amlodipine and p.r.n. sublingual nitroglycerin for angina.  I reduced his losartan dosing to 50 mg daily.  His blood pressure today is 105 systolic.    He is requesting a cardiac catheterization, I concur.  He has a high risk profile for coronary artery disease given extent of PAD he has had treated previously.    Thank you for allowing me to participate in your patient's care.             Woo Byrne MD  09/30/2022   4:08 PM

## 2022-09-30 NOTE — ASSESSMENT & PLAN NOTE
I told him we would discuss additional management options once I can review his recent angiogram.  Based on his JAMISON and Doppler studies it seems like his claudication is probably not going to resolve completely without additional interventional peripheral percutaneous therapy.

## 2022-09-30 NOTE — ASSESSMENT & PLAN NOTE
I need to work with him closer regarding tolerance of statins.  I told him I do not think his muscle mass loss in his legs are due to statins.    Repatha may be his best option.

## 2022-09-30 NOTE — PROGRESS NOTES
[facility] Cardiology     Subjective:    Patient ID:  Michael Guillaume is a 58 y.o. male who presents for evaluation of Hyperlipidemia, Peripheral Arterial Disease, Coronary Artery Disease, and Hypertension    Review of patient's allergies indicates:   Allergen Reactions    Lipitor [atorvastatin] Other (See Comments)     Other reaction(s): Severe Myalgias      Patient is here for worsening chest and jaw tightness with activity.  It started about 3 months ago and is worsening.  He has curtailed his activities because of episodes of chest discomfort.  He only has to walk less than a city block before he gets chest tightness and jaw discomfort simultaneously.  His most recent electrocardiogram August 27th showed diffuse resting ischemic ST changes inferolateral leads.      He has bilateral lower extremity claudication.  He has patent stents in the iliac system bilaterally.  There is disease below the knee.  He states he was taking Pletal but it did not help his leg pains.  He is able to walk a mile and a half but has to stop for claudication.  He has never had a diagnosis of coronary artery disease.    He states he has difficulty taking statin therapy.  He blames muscle loss and leg weakness on statins-specifically Lipitor.  He is now on rosuvastatin.  He also discontinued rosuvastatin recently.  His LDL was 78 and now is 105 mg%.  He is on aspirin Plavix.  He takes losartan for hypertension.        Review of Systems   Constitutional: Negative for chills, decreased appetite, diaphoresis, fever, malaise/fatigue, night sweats, weight gain and weight loss.   HENT:  Negative for congestion, ear discharge, ear pain, hearing loss, hoarse voice, nosebleeds, odynophagia, sore throat, stridor and tinnitus.    Eyes:  Positive for visual disturbance. Negative for blurred vision, discharge, double vision, pain, photophobia, redness, vision loss in left eye,  vision loss in right eye and visual halos.   Cardiovascular:  Positive for chest pain, claudication, dyspnea on exertion and leg swelling. Negative for cyanosis, irregular heartbeat, near-syncope, orthopnea, palpitations, paroxysmal nocturnal dyspnea and syncope.   Respiratory:  Negative for cough, hemoptysis, shortness of breath, sleep disturbances due to breathing, snoring, sputum production and wheezing.    Endocrine: Negative for cold intolerance, heat intolerance, polydipsia, polyphagia and polyuria.   Hematologic/Lymphatic: Negative for adenopathy and bleeding problem. Does not bruise/bleed easily.   Skin:  Negative for color change, dry skin, flushing, itching, nail changes, poor wound healing, rash, skin cancer, suspicious lesions and unusual hair distribution.   Musculoskeletal:  Positive for muscle weakness. Negative for arthritis, back pain, falls, gout, joint pain, joint swelling, muscle cramps, myalgias, neck pain and stiffness.   Gastrointestinal:  Negative for bloating, abdominal pain, anorexia, change in bowel habit, bowel incontinence, constipation, diarrhea, dysphagia, excessive appetite, flatus, heartburn, hematemesis, hematochezia, hemorrhoids, jaundice, melena, nausea and vomiting.   Genitourinary:  Negative for bladder incontinence, decreased libido, dysuria, flank pain, frequency, genital sores, hematuria, hesitancy, incomplete emptying, nocturia and urgency.   Neurological:  Negative for aphonia, brief paralysis, difficulty with concentration, disturbances in coordination, excessive daytime sleepiness, dizziness, focal weakness, headaches, light-headedness, loss of balance, numbness, paresthesias, seizures, sensory change, tremors, vertigo and weakness.   Psychiatric/Behavioral:  Negative for altered mental status, depression, hallucinations, memory loss, substance abuse, suicidal ideas and thoughts of violence. The patient does not have insomnia and is not nervous/anxious.   "  Allergic/Immunologic: Negative for hives and persistent infections.        Objective:       Vitals:    09/30/22 1459   BP: 105/61   Pulse: 99   SpO2: 99%   Weight: 88.2 kg (194 lb 7.1 oz)   Height: 5' 4" (1.626 m)    Physical Exam  Constitutional:       General: He is not in acute distress.     Appearance: He is well-developed. He is not diaphoretic.   HENT:      Head: Normocephalic and atraumatic.      Nose: Nose normal.   Eyes:      General: No scleral icterus.        Right eye: No discharge.      Conjunctiva/sclera: Conjunctivae normal.      Pupils: Pupils are equal, round, and reactive to light.   Neck:      Thyroid: No thyromegaly.      Vascular: No JVD.      Trachea: No tracheal deviation.   Cardiovascular:      Rate and Rhythm: Normal rate and regular rhythm.      Pulses:           Carotid pulses are 2+ on the right side and 2+ on the left side.       Radial pulses are 0 on the right side and 0 on the left side.        Dorsalis pedis pulses are 0 on the right side and 0 on the left side.        Posterior tibial pulses are 0 on the right side and 0 on the left side.      Heart sounds: Normal heart sounds. No murmur heard.     No friction rub. No gallop.      Comments: Faint monophasic Doppler signals noted at pedal position bilaterally  Pulmonary:      Effort: Pulmonary effort is normal. No respiratory distress.      Breath sounds: Normal breath sounds. No stridor. No wheezing or rales.   Chest:      Chest wall: No tenderness.   Abdominal:      General: Bowel sounds are normal. There is no distension.      Palpations: Abdomen is soft. There is no mass.      Tenderness: There is no abdominal tenderness. There is no guarding or rebound.   Musculoskeletal:         General: No tenderness. Normal range of motion.      Cervical back: Normal range of motion and neck supple.   Lymphadenopathy:      Cervical: No cervical adenopathy.   Skin:     General: Skin is warm and dry.      Coloration: Skin is not pale.      " Findings: No erythema or rash.   Neurological:      Mental Status: He is alert and oriented to person, place, and time.      Cranial Nerves: No cranial nerve deficit.      Coordination: Coordination normal.   Psychiatric:         Behavior: Behavior normal.         Thought Content: Thought content normal.         Judgment: Judgment normal.           Assessment:       1. Atherosclerosis of native coronary artery of native heart with unstable angina pectoris    2. Primary hypertension    3. Hyperlipidemia LDL goal <70    4. Congestive heart failure, unspecified HF chronicity, unspecified heart failure type    5. Benign essential HTN    6. Angina pectoris, crescendo    7. Claudication of calf muscles    8. Dyspnea on exertion    9. Obesity (BMI 30-39.9)    10. Chronic kidney disease, stage 3a      Results for orders placed or performed in visit on 09/27/22   HEPATITIS C ANTIBODY   Result Value Ref Range    Hepatitis C Ab Non-reactive Non-reactive   PSA, Screening   Result Value Ref Range    PSA, Screen 1.0 0.00 - 4.00 ng/mL   Hemoglobin A1C   Result Value Ref Range    Hemoglobin A1C 5.1 4.0 - 5.6 %    Estimated Avg Glucose 100 68 - 131 mg/dL   Microalbumin/Creatinine Ratio, Urine   Result Value Ref Range    Microalbumin, Urine <5.0 ug/mL    Creatinine, Urine 23.0 23.0 - 375.0 mg/dL    Microalb/Creat Ratio Unable to calculate 0.0 - 30.0 ug/mg   Lipid Panel   Result Value Ref Range    Cholesterol 168 120 - 199 mg/dL    Triglycerides 176 (H) 30 - 150 mg/dL    HDL 30 (L) 40 - 75 mg/dL    LDL Cholesterol 102.8 63.0 - 159.0 mg/dL    HDL/Cholesterol Ratio 17.9 (L) 20.0 - 50.0 %    Total Cholesterol/HDL Ratio 5.6 (H) 2.0 - 5.0    Non-HDL Cholesterol 138 mg/dL   TSH   Result Value Ref Range    TSH 0.879 0.400 - 4.000 uIU/mL   Comprehensive Metabolic Panel   Result Value Ref Range    Sodium 141 136 - 145 mmol/L    Potassium 3.7 3.5 - 5.1 mmol/L    Chloride 111 (H) 95 - 110 mmol/L    CO2 20 (L) 23 - 29 mmol/L    Glucose 122 (H) 70  - 110 mg/dL    BUN 20 6 - 20 mg/dL    Creatinine 1.3 0.5 - 1.4 mg/dL    Calcium 9.3 8.7 - 10.5 mg/dL    Total Protein 6.9 6.0 - 8.4 g/dL    Albumin 3.9 3.5 - 5.2 g/dL    Total Bilirubin 0.3 0.1 - 1.0 mg/dL    Alkaline Phosphatase 70 55 - 135 U/L    AST 17 10 - 40 U/L    ALT 10 10 - 44 U/L    Anion Gap 10 8 - 16 mmol/L    eGFR >60.0 >60 mL/min/1.73 m^2   CBC Auto Differential   Result Value Ref Range    WBC 6.04 3.90 - 12.70 K/uL    RBC 4.37 (L) 4.60 - 6.20 M/uL    Hemoglobin 12.8 (L) 14.0 - 18.0 g/dL    Hematocrit 42.0 40.0 - 54.0 %    MCV 96 82 - 98 fL    MCH 29.3 27.0 - 31.0 pg    MCHC 30.5 (L) 32.0 - 36.0 g/dL    RDW 14.3 11.5 - 14.5 %    Platelets 396 150 - 450 K/uL    MPV 10.0 9.2 - 12.9 fL    Immature Granulocytes 0.3 0.0 - 0.5 %    Gran # (ANC) 4.3 1.8 - 7.7 K/uL    Immature Grans (Abs) 0.02 0.00 - 0.04 K/uL    Lymph # 1.1 1.0 - 4.8 K/uL    Mono # 0.5 0.3 - 1.0 K/uL    Eos # 0.1 0.0 - 0.5 K/uL    Baso # 0.05 0.00 - 0.20 K/uL    nRBC 0 0 /100 WBC    Gran % 70.4 38.0 - 73.0 %    Lymph % 18.4 18.0 - 48.0 %    Mono % 8.3 4.0 - 15.0 %    Eosinophil % 1.8 0.0 - 8.0 %    Basophil % 0.8 0.0 - 1.9 %    Differential Method Automated          Current Outpatient Medications:     albuterol (ACCUNEB) 1.25 mg/3 mL Nebu, Take 3 mLs (1.25 mg total) by nebulization every 6 (six) hours as needed (shortness of breath). Rescue, Disp: 120 mL, Rfl: 5    ALPRAZolam (XANAX) 1 MG tablet, TAKE 1 TABLET BY MOUTH 3 TIMES DAILY AS NEEDED., Disp: 90 tablet, Rfl: 5    amLODIPine (NORVASC) 5 MG tablet, Take 1 tablet (5 mg total) by mouth once daily., Disp: 90 tablet, Rfl: 4    aspirin 81 mg Tab, Take 1 tablet by mouth once daily. , Disp: , Rfl:     atropine 1% (ISOPTO ATROPINE) 1 % Drop, Place 1 drop into the right eye as needed (discomfort)., Disp: 5 mL, Rfl: 6    budesonide-formoterol 80-4.5 mcg (SYMBICORT) 80-4.5 mcg/actuation HFAA, Controller, Disp: 10.2 g, Rfl: 5    carvediloL (COREG) 12.5 MG tablet, Take 1 tablet (12.5 mg total) by  mouth 2 (two) times daily with meals., Disp: 180 tablet, Rfl: 3    clopidogreL (PLAVIX) 75 mg tablet, Take 1 tablet (75 mg total) by mouth once daily., Disp: 90 tablet, Rfl: 3    colchicine (COLCRYS) 0.6 mg tablet, Take 0.6 mg by mouth as needed., Disp: , Rfl:     cyclobenzaprine (FLEXERIL) 10 MG tablet, TAKE 1 TABLET BY MOUTH EVERY DAY IN THE EVENING, Disp: 30 tablet, Rfl: 5    esomeprazole (NEXIUM) 20 MG capsule, Take 20 mg by mouth before breakfast., Disp: , Rfl:     furosemide (LASIX) 20 MG tablet, TAKE 1 TABLET BY MOUTH EVERY DAY AS NEEDED, Disp: 90 tablet, Rfl: 0    losartan (COZAAR) 100 MG tablet, TAKE 1 TABLET BY MOUTH EVERY DAY, Disp: 90 tablet, Rfl: 3    nitroGLYCERIN (NITROSTAT) 0.4 MG SL tablet, Place 1 tablet (0.4 mg total) under the tongue every 5 (five) minutes as needed for Chest pain., Disp: 30 tablet, Rfl: 3    prednisoLONE acetate (PRED FORTE) 1 % DrpS, Place 1 drop into the right eye 2 (two) times daily., Disp: 10 mL, Rfl: 11    rosuvastatin (CRESTOR) 40 MG Tab, Take 1 tablet (40 mg total) by mouth once daily., Disp: 90 tablet, Rfl: 3    vitamin E 400 UNIT capsule, Take 400 Units by mouth once daily., Disp: , Rfl:     Current Facility-Administered Medications:     sodium chloride 0.9% flush 10 mL, 10 mL, Intravenous, PRN, Loc Garcia MD    sodium chloride 0.9% flush 10 mL, 10 mL, Intravenous, PRN, Feng Bond MD     Lab Results   Component Value Date    WBC 9.15 09/28/2023    RBC 5.76 09/28/2023    HGB 16.7 09/28/2023    HCT 50.5 09/28/2023    MCV 88 09/28/2023    MCH 29.0 09/28/2023    MCHC 33.1 09/28/2023    RDW 14.6 (H) 09/28/2023     09/28/2023    MPV 10.0 09/28/2023    GRAN 6.8 09/28/2023    GRAN 74.6 (H) 09/28/2023    LYMPH 1.4 09/28/2023    LYMPH 15.5 (L) 09/28/2023    MONO 0.8 09/28/2023    MONO 8.3 09/28/2023    EOS 0.1 09/28/2023    BASO 0.07 09/28/2023    EOSINOPHIL 0.5 09/28/2023    BASOPHIL 0.8 09/28/2023    MG 2.1 08/19/2020        CMP  Lab Results   Component Value Date  "    09/28/2023    K 4.6 09/28/2023     09/28/2023    CO2 29 09/28/2023     09/28/2023    BUN 16 09/28/2023    CREATININE 1.0 09/28/2023    CALCIUM 9.5 09/28/2023    PROT 7.2 09/28/2023    ALBUMIN 4.0 09/28/2023    BILITOT 0.5 09/28/2023    ALKPHOS 76 09/28/2023    AST 19 09/28/2023    ALT 14 09/28/2023    ANIONGAP 7 (L) 09/28/2023    ESTGFRAFRICA >60.0 03/09/2022    EGFRNONAA >60.0 03/09/2022        No results found for: "LABBLOO", "LABURIN", "RESPIRATORYC", "GSRESP"         Results for orders placed or performed during the hospital encounter of 10/06/22   EKG 12-LEAD on arrival to floor    Collection Time: 10/06/22 10:41 AM    Narrative    Test Reason : I25.110,    Vent. Rate : 084 BPM     Atrial Rate : 084 BPM     P-R Int : 144 ms          QRS Dur : 090 ms      QT Int : 384 ms       P-R-T Axes : 053 057 068 degrees     QTc Int : 453 ms    Sinus rhythm with occasional Premature ventricular complexes  Otherwise normal ECG  When compared with ECG of 27-AUG-2022 09:06,  Premature ventricular complexes are now Present  Nonspecific T wave abnormality, improved in Inferior leads  Nonspecific T wave abnormality, improved in Anterior-lateral leads  Confirmed by Etelvina Sargent MD ([id number]) on 10/7/2022 3:19:21 PM    Referred By: KIM THOMPSON           Confirmed By:Etelvina Sargent MD        CT Head Without Contrast 09/04/2022 [id number] Final   OCT, Retina - OU - Both Eyes 02/17/2023 [id number] Final   CT Maxillofacial Without Contrast 08/27/2022 [id number] Final            Plan:       Problem List Items Addressed This Visit          Cardiac/Vascular    Claudication of calf muscles     I told him we would discuss additional management options once I can review his recent angiogram.  Based on his JAMISON and Doppler studies it seems like his claudication is probably not going to resolve completely without additional interventional peripheral percutaneous therapy.         HTN (hypertension)     Adjustments made in his " medications today.  Blood pressure today 105/61 mm Hg.         Dyspnea on exertion     Likely related to coronary artery disease.         Hyperlipidemia LDL goal <70     I need to work with him closer regarding tolerance of statins.  I told him I do not think his muscle mass loss in his legs are due to statins.    Repatha may be his best option.         Chronic diastolic congestive heart failure    Angina pectoris, crescendo     Calcium channel blockers and nitrates added today for angina control.  Cardiac catheterization scheduled for next week.            Renal/    Chronic kidney disease, stage 3a     Condition unchanged.            Endocrine    Class 1 obesity due to excess calories with serious comorbidity and body mass index (BMI) of 34.0 to 34.9 in adult     Weight loss encouraged.          Other Visit Diagnoses       Atherosclerosis of native coronary artery of native heart with unstable angina pectoris    -  Primary    Relevant Medications    nitroGLYCERIN (NITROSTAT) 0.4 MG SL tablet    Other Relevant Orders    Case Request-Cath Lab: Left heart cath (Completed)    Benign essential HTN                 We discussed options to workup his coronary artery disease.  Today I added amlodipine and p.r.n. sublingual nitroglycerin for angina.  I reduced his losartan dosing to 50 mg daily.  His blood pressure today is 105 systolic.    He is requesting a cardiac catheterization, I concur.  He has a high risk profile for coronary artery disease given extent of PAD he has had treated previously.    Thank you for allowing me to participate in your patient's care.             Woo Byrne MD  10/09/2023   4:08 PM

## 2022-10-06 ENCOUNTER — HOSPITAL ENCOUNTER (OUTPATIENT)
Facility: HOSPITAL | Age: 57
Discharge: HOME | End: 2022-10-06
Attending: INTERNAL MEDICINE | Admitting: INTERNAL MEDICINE
Payer: COMMERCIAL

## 2022-10-06 VITALS
HEIGHT: 64 IN | RESPIRATION RATE: 20 BRPM | TEMPERATURE: 97 F | DIASTOLIC BLOOD PRESSURE: 88 MMHG | HEART RATE: 84 BPM | OXYGEN SATURATION: 99 % | WEIGHT: 195 LBS | SYSTOLIC BLOOD PRESSURE: 120 MMHG | BODY MASS INDEX: 33.29 KG/M2

## 2022-10-06 DIAGNOSIS — I25.110 ATHEROSCLEROSIS OF NATIVE CORONARY ARTERY OF NATIVE HEART WITH UNSTABLE ANGINA PECTORIS: ICD-10-CM

## 2022-10-06 DIAGNOSIS — I20.0 ANGINA PECTORIS, CRESCENDO: ICD-10-CM

## 2022-10-06 PROBLEM — I25.10 CORONARY ARTERY DISEASE INVOLVING NATIVE CORONARY ARTERY: Status: ACTIVE | Noted: 2022-10-06

## 2022-10-06 LAB
POC ACTIVATED CLOTTING TIME K: 190 SEC (ref 74–137)
POC ACTIVATED CLOTTING TIME K: 219 SEC (ref 74–137)
POC ACTIVATED CLOTTING TIME K: 254 SEC (ref 74–137)
POC ACTIVATED CLOTTING TIME K: 271 SEC (ref 74–137)
SAMPLE: ABNORMAL

## 2022-10-06 PROCEDURE — C9600 PERC DRUG-EL COR STENT SING: HCPCS | Mod: LC | Performed by: INTERNAL MEDICINE

## 2022-10-06 PROCEDURE — 92978 ENDOLUMINL IVUS OCT C 1ST: CPT | Mod: 26,LC,, | Performed by: INTERNAL MEDICINE

## 2022-10-06 PROCEDURE — 99152 MOD SED SAME PHYS/QHP 5/>YRS: CPT | Mod: ,,, | Performed by: INTERNAL MEDICINE

## 2022-10-06 PROCEDURE — 93458 L HRT ARTERY/VENTRICLE ANGIO: CPT | Mod: 26,59,51, | Performed by: INTERNAL MEDICINE

## 2022-10-06 PROCEDURE — 92979 ENDOLUMINL IVUS OCT C EA: CPT | Mod: 26,RC,, | Performed by: INTERNAL MEDICINE

## 2022-10-06 PROCEDURE — C1894 INTRO/SHEATH, NON-LASER: HCPCS | Performed by: INTERNAL MEDICINE

## 2022-10-06 PROCEDURE — 63600175 PHARM REV CODE 636 W HCPCS: Performed by: INTERNAL MEDICINE

## 2022-10-06 PROCEDURE — 92979 PR INTRAVASC CORONARY SO2,ADDN VESSEL: ICD-10-PCS | Mod: 26,RC,, | Performed by: INTERNAL MEDICINE

## 2022-10-06 PROCEDURE — 99153 MOD SED SAME PHYS/QHP EA: CPT | Performed by: INTERNAL MEDICINE

## 2022-10-06 PROCEDURE — C1874 STENT, COATED/COV W/DEL SYS: HCPCS | Performed by: INTERNAL MEDICINE

## 2022-10-06 PROCEDURE — 93458 L HRT ARTERY/VENTRICLE ANGIO: CPT | Mod: 59 | Performed by: INTERNAL MEDICINE

## 2022-10-06 PROCEDURE — 92928 PRQ TCAT PLMT NTRAC ST 1 LES: CPT | Mod: LC,,, | Performed by: INTERNAL MEDICINE

## 2022-10-06 PROCEDURE — 92979 ENDOLUMINL IVUS OCT C EA: CPT | Mod: RC | Performed by: INTERNAL MEDICINE

## 2022-10-06 PROCEDURE — 92978 PR IVUS, CORONARY, 1ST VESSEL: ICD-10-PCS | Mod: 26,LC,, | Performed by: INTERNAL MEDICINE

## 2022-10-06 PROCEDURE — 85347 COAGULATION TIME ACTIVATED: CPT | Performed by: INTERNAL MEDICINE

## 2022-10-06 PROCEDURE — C1725 CATH, TRANSLUMIN NON-LASER: HCPCS | Performed by: INTERNAL MEDICINE

## 2022-10-06 PROCEDURE — C1887 CATHETER, GUIDING: HCPCS | Performed by: INTERNAL MEDICINE

## 2022-10-06 PROCEDURE — 93458 PR CATH PLACE/CORON ANGIO, IMG SUPER/INTERP,W LEFT HEART VENTRICULOGRAPHY: ICD-10-PCS | Mod: 26,59,51, | Performed by: INTERNAL MEDICINE

## 2022-10-06 PROCEDURE — 99152 MOD SED SAME PHYS/QHP 5/>YRS: CPT | Performed by: INTERNAL MEDICINE

## 2022-10-06 PROCEDURE — 92978 ENDOLUMINL IVUS OCT C 1ST: CPT | Mod: LC | Performed by: INTERNAL MEDICINE

## 2022-10-06 PROCEDURE — C1769 GUIDE WIRE: HCPCS | Performed by: INTERNAL MEDICINE

## 2022-10-06 PROCEDURE — 93010 ELECTROCARDIOGRAM REPORT: CPT | Mod: ,,, | Performed by: INTERNAL MEDICINE

## 2022-10-06 PROCEDURE — C1753 CATH, INTRAVAS ULTRASOUND: HCPCS | Performed by: INTERNAL MEDICINE

## 2022-10-06 PROCEDURE — 25000003 PHARM REV CODE 250: Performed by: INTERNAL MEDICINE

## 2022-10-06 PROCEDURE — 93010 EKG 12-LEAD: ICD-10-PCS | Mod: ,,, | Performed by: INTERNAL MEDICINE

## 2022-10-06 PROCEDURE — 93005 ELECTROCARDIOGRAM TRACING: CPT | Mod: 59

## 2022-10-06 PROCEDURE — 92928 PR STENT: ICD-10-PCS | Mod: RC,,, | Performed by: INTERNAL MEDICINE

## 2022-10-06 PROCEDURE — 27201423 OPTIME MED/SURG SUP & DEVICES STERILE SUPPLY: Performed by: INTERNAL MEDICINE

## 2022-10-06 PROCEDURE — 25500020 PHARM REV CODE 255: Performed by: INTERNAL MEDICINE

## 2022-10-06 PROCEDURE — 99152 PR MOD CONSCIOUS SEDATION, SAME PHYS, 5+ YRS, FIRST 15 MIN: ICD-10-PCS | Mod: ,,, | Performed by: INTERNAL MEDICINE

## 2022-10-06 DEVICE — STENT RONYX30030UX RESOLUTE ONYX 3.00X30
Type: IMPLANTABLE DEVICE | Site: CORONARY | Status: FUNCTIONAL
Brand: RESOLUTE ONYX™

## 2022-10-06 DEVICE — STENT RONYX25012UX RESOLUTE ONYX 2.50X12
Type: IMPLANTABLE DEVICE | Site: CORONARY | Status: FUNCTIONAL
Brand: RESOLUTE ONYX™

## 2022-10-06 RX ORDER — SODIUM CHLORIDE 9 MG/ML
INJECTION, SOLUTION INTRAVENOUS CONTINUOUS
Status: ACTIVE | OUTPATIENT
Start: 2022-10-06 | End: 2022-10-06

## 2022-10-06 RX ORDER — HEPARIN SODIUM 200 [USP'U]/100ML
INJECTION, SOLUTION INTRAVENOUS
Status: DISCONTINUED | OUTPATIENT
Start: 2022-10-06 | End: 2022-10-06 | Stop reason: HOSPADM

## 2022-10-06 RX ORDER — FENTANYL CITRATE 50 UG/ML
INJECTION, SOLUTION INTRAMUSCULAR; INTRAVENOUS
Status: DISCONTINUED | OUTPATIENT
Start: 2022-10-06 | End: 2022-10-06 | Stop reason: HOSPADM

## 2022-10-06 RX ORDER — ACETAMINOPHEN 325 MG/1
650 TABLET ORAL EVERY 4 HOURS PRN
Status: DISCONTINUED | OUTPATIENT
Start: 2022-10-06 | End: 2022-10-06 | Stop reason: HOSPADM

## 2022-10-06 RX ORDER — ONDANSETRON 4 MG/1
8 TABLET, ORALLY DISINTEGRATING ORAL EVERY 8 HOURS PRN
Status: DISCONTINUED | OUTPATIENT
Start: 2022-10-06 | End: 2022-10-06 | Stop reason: HOSPADM

## 2022-10-06 RX ORDER — HEPARIN SODIUM 1000 [USP'U]/ML
INJECTION, SOLUTION INTRAVENOUS; SUBCUTANEOUS
Status: DISCONTINUED | OUTPATIENT
Start: 2022-10-06 | End: 2022-10-06 | Stop reason: HOSPADM

## 2022-10-06 RX ORDER — MIDAZOLAM HYDROCHLORIDE 1 MG/ML
INJECTION, SOLUTION INTRAMUSCULAR; INTRAVENOUS
Status: DISCONTINUED | OUTPATIENT
Start: 2022-10-06 | End: 2022-10-06 | Stop reason: HOSPADM

## 2022-10-06 RX ORDER — IODIXANOL 320 MG/ML
INJECTION, SOLUTION INTRAVASCULAR
Status: DISCONTINUED | OUTPATIENT
Start: 2022-10-06 | End: 2022-10-06 | Stop reason: HOSPADM

## 2022-10-06 RX ORDER — VERAPAMIL HYDROCHLORIDE 2.5 MG/ML
INJECTION, SOLUTION INTRAVENOUS
Status: DISCONTINUED | OUTPATIENT
Start: 2022-10-06 | End: 2022-10-06 | Stop reason: HOSPADM

## 2022-10-06 RX ORDER — LIDOCAINE HYDROCHLORIDE 10 MG/ML
INJECTION, SOLUTION EPIDURAL; INFILTRATION; INTRACAUDAL; PERINEURAL
Status: DISCONTINUED | OUTPATIENT
Start: 2022-10-06 | End: 2022-10-06 | Stop reason: HOSPADM

## 2022-10-06 RX ADMIN — SODIUM CHLORIDE: 0.9 INJECTION, SOLUTION INTRAVENOUS at 10:10

## 2022-10-06 RX ADMIN — SODIUM CHLORIDE: 0.9 INJECTION, SOLUTION INTRAVENOUS at 07:10

## 2022-10-06 NOTE — PLAN OF CARE
Patient transfered to cath lab recovery [place] # 2 via stretcher with side rails up x2. Pt drowsy but arouses with verbal stimuli and able to follow commands. Pt is stable when connecting to cardiac monitors. VSS.  Left groin site with sheath in place is CDI. No redness, bruising, or hematoma noted around site. Dopplered  mindi pedal pulses. 2+ mindi radial pulses. Skin is normal in color, warm to touch, < 3 sec cap refill.  Fall risk precautions given and patient acknowledges. AIDET completed to pt. Updated pt's wife in sx waiting room. Will continue to monitor patient for safety and needs.

## 2022-10-06 NOTE — PLAN OF CARE
Pt arrived independently with family from home, ambulates without assistance . Patient lying in bed with no complaints of pain or distress noted. Monitors applied. VSS, AAOx4.  Yellow non-skid socks placed on patient. Fall risk reviewed with patient. Procedure and recovery process explained to patient and all questions answered.  Patient verbalized understanding, denies questions. Will continue to monitor for safety and needs.

## 2022-10-06 NOTE — Clinical Note
LIFTING RESTICTIONS:     No lifting over 10 pounds for 1 week (approximately equal to a gallon of milk)    ACTIVITY:  - No heavy lifting or strenuous activity for one month  - No lifting of arm above the level of the shoulder or behing the head for one month  - Wear the sling for 24 hours then reapply nightly for one week  - We do not recommend wearing the sling continuously since this may lead to long-term shoulder mobility issues    W OUND CARE:  - Leave surgical dressing intact  - Do not apply any ointments, salves, or lotions to the surgical site.  - Ok to shower with surgical dressing in place and then once dressing is removed   - Do not submerse wound in water until completely healed (hot tub, bathing, swimming)    NOTIFY PHYSICIAN IF:  - temperature greater than 100.5  - increased redness to site, swelling, drainage or increased pain    FOLLOW UP:   - See Dr. Gema rhodes in the clinic for a wound check in one week     - Subsequently you will be enrolled in a special device clinic for ongoing follow-up to check and monitor the device.  The initial follow-up check should be in 6-12 weeks.

## 2022-10-06 NOTE — Clinical Note
The catheter was inserted into the left ventricle. An angiography was performed of the LV. Hemodynamics recorded

## 2022-10-06 NOTE — PLAN OF CARE
Discharge instructions given and explained.  Teach back completed. IV D/C with cath tip intact. No swelling or bleeding noted to Left groin.Transport here for pt.  To car per WC, wife to transport pt. Home.

## 2022-10-06 NOTE — BRIEF OP NOTE
"    Post Cath Note  Referring Physician: Woo Byrne MD  Procedure: Left heart cath (Left), ANGIOPLASTY, CORONARY ARTERY, WITH STENT INSERTION (N/A), IVUS, Coronary, Stent, Drug Eluting, Multi Vessel, Coronary      : Woo Byrne MD     Referring Physician: Latrice Zafar     All Operators: Surgeon(s):  Woo Byrne MD     Preoperative Diagnosis: Angina pectoris, crescendo [I20.0]     Postop Diagnosis: Angina pectoris, crescendo [I20.0]    Treatments/Procedures: Procedure(s) (LRB):  Left heart cath (Left)  ANGIOPLASTY, CORONARY ARTERY, WITH STENT INSERTION (N/A)  IVUS, Coronary  Stent, Drug Eluting, Multi Vessel, Coronary    Estimated Blood loss: <50 cc         Access: Left CFA    LVEDP  22  mmHg    See full report for further details    Intervention:   Successful coronary angiogram and LHC. A subtotal lesion noted in prox-LCx and a 70% lesion of the mid-RCA. Post ELICIA to prox-Lcx and mid-RCA with post stent dilation. Please see full report for details.    Closure device: Manual pressure    Post Cath Exam:   /83   Pulse 85   Temp 96.9 °F (36.1 °C) (Temporal)   Resp 18   Ht 5' 4" (1.626 m)   Wt 88.5 kg (195 lb)   SpO2 100%   BMI 33.47 kg/m²   No unusual pain, hematoma, thrill or bruit at vascular access site.  Distal pulse present without signs of ischemia.    Recommendations:   - Routine post-cath care  - IVF at  75 ml/hr  for 4 hrs  - Cardiac rehab referral, Continue medical management, Risk factor reduction, Plavix for at least 1 year and ASA 81 mg indefinitely, Follow-up with outpatient cardiologist    Michael Zamudio    "
26 y/o M patient with a significant PMHx of Esophagitis, Gastritis, Hiatal hernia, and no significant PSHx presents to the with x2 weeks of sore throat. Patient says he was seen in the ED x1 week ago for the same Sx. Patient underwent a throat culture which was negative. Patient describes his throat pain as continuous and worsening. Patient endorses pain with swallowing. Patient notes a cold sore on the mouth along with a blister on the penis. Patient says he developed a fever and cough yesterday. Patient also notes he would like to be tested for STDs due to a high risk encounter x2 months ago. Patient denies any other complaints. NKDA.

## 2022-10-06 NOTE — Clinical Note
The catheter was reinserted into the mid   right coronary artery. IVUS performed Writer spoke with Rosa Oakes to evaluate.

## 2022-10-06 NOTE — Clinical Note
250 ml of contrast were injected throughout the case. 50 mL of contrast was the total wasted during the case. 300 mL was the total amount used during the case.

## 2022-10-06 NOTE — DISCHARGE INSTRUCTIONS
Discharge Instructions:     Do not drive a car, operate heavy equipment, care for a young child, etc for the next 12-24 hours.   Avoid drinking alcohol for 24 hours.  Do not make any important decisions for 24 hours.     Drink fluids to keep hydrated. Resume your usual diet as tolerated.      Rest for today then activity as tolerated.   Do not lift anything over 5 pounds for the first 3 days after procedure.     Remove dressing tomorrow after 12 noon, then may shower with warm soapy water. Do not scrub site. Pat dry.   May apply bandaid for 2 days.  No tub baths.  Do not submerge wound in water for 3 days.      Call MD for any unrelieved pain, excessive nausea or vomiting, redness around site, bleeding, or pus or foul smelling drainage, or any other questions or concerns.     Go to the ER for any difficulty breathing or chest pain.        If site swells or bleeds, hold direct pressure to area for 10 full minutes.   If site continues to bleed, continue to hold pressure to site and have someone bring you to the ER.       Follow any additional instructions given to you by MD.      Call MD to schedule a follow up appointment, or follow up as instructed.         Last Modified by Alayna Lo RN at 6/1/22 [id number]

## 2022-10-06 NOTE — PLAN OF CARE
L groin 6 fr sheath removed and pressure applied for 20 minutes using sterile technique by Trini FULTON, cath lab  at bedside. Gauze and tegaderm applied. Site is CDI. No bleeding, no hematoma noted. Site and surrounding areas soft. Skin is normal in color and temperature. VSS. Dopplered bilateral pedal pulses. Will continue to monitor patient for safety and needs.

## 2022-10-06 NOTE — DISCHARGE SUMMARY
[place] [facility] ([facility])  Discharge Summary      Admit Date: 10/6/2022    Discharge Date and Time:  10/06/2022 10:27 AM    Attending Physician: Woo Byrne MD     Reason for Admission: Coronary angiogram and left heart cath    Procedures Performed: Procedure(s) (LRB):  Left heart cath (Left)  ANGIOPLASTY, CORONARY ARTERY, WITH STENT INSERTION (N/A)  IVUS, Coronary  Stent, Drug Eluting, Multi Vessel, Coronary    Hospital Course (synopsis of major diagnoses, care, treatment, and services provided during the course of the hospital stay):     Successful LHC and coronary angiogram. There was a subtotal lesion noted in prox-LCx and a 70% lesion of the mid-RCA. Post ELICIA to prox-Lcx and mid-RCA with post stent dilation. Please see full report for details.     He will continue with Coreg and Losartan. He is intolerant of Statins and not wanting to trial a re-challenge at the time. He would benefit from a PCSK9 and will be considered as outpatient once we follow up in clinic and talk to him about pros and cons.     Was congratulated for smoking cessation and encouraged continue with healthy habits, diet and exercise. Cardiac rehabilitation was ordered for him.     Consults: Cardiac rehab    Final Diagnoses:    Principal Problem: Coronary artery disease involving native coronary artery   Secondary Diagnoses:   Active Hospital Problems    Diagnosis  POA    *Coronary artery disease involving native coronary artery [I25.10]  Yes    Angina pectoris, crescendo [I20.0]  Yes    Chronic kidney disease, stage 3a [N18.31]  Yes    Obesity (BMI 30-39.9) [E66.9]  Yes    HTN (hypertension) [I10]  Yes      Resolved Hospital Problems   No resolved problems to display.       Discharged Condition: good    Disposition: Home or Self Care    Follow Up/Patient Instructions: Cardiology follow up    Medications:  Reconciled Home Medications:      Medication List        CONTINUE taking these medications      acetaZOLAMIDE 250 MG tablet  Commonly  known as: DIAMOX  Take 1 tablet (250 mg total) by mouth 3 (three) times daily.     albuterol 1.25 mg/3 mL Nebu  Commonly known as: ACCUNEB  Take 3 mLs (1.25 mg total) by nebulization every 6 (six) hours as needed. Rescue     ALPRAZolam 1 MG tablet  Commonly known as: XANAX  Take 1 tablet (1 mg total) by mouth 3 (three) times daily as needed.     amLODIPine 5 MG tablet  Commonly known as: NORVASC  Take 1 tablet (5 mg total) by mouth once daily.     aspirin 81 mg Tab  Take 1 tablet by mouth once daily.     atropine 1% 1 % Drop  Commonly known as: ISOPTO ATROPINE  Place 1 drop into the right eye 2 (two) times a day.     brimonidine 0.15 % OPTH DROP 0.15 % ophthalmic solution  Commonly known as: ALPHAGAN  Place 1 drop into the right eye 3 (three) times daily.     carvediloL 12.5 MG tablet  Commonly known as: COREG  Take 1 tablet (12.5 mg total) by mouth 2 (two) times daily with meals.     clopidogreL 75 mg tablet  Commonly known as: PLAVIX  Take 1 tablet (75 mg total) by mouth once daily.     colchicine 0.6 mg tablet  Commonly known as: COLCRYS  TAKE 1 TABLET BY MOUTH EVERY DAY     cyclobenzaprine 10 MG tablet  Commonly known as: FLEXERIL  TAKE 1 TABLET BY MOUTH EVERY DAY IN THE EVENING     dorzolamide 2 % ophthalmic solution  Commonly known as: TRUSOPT  Place 1 drop into the right eye 3 (three) times daily.     erythromycin ophthalmic ointment  Commonly known as: ROMYCIN  Place into the right eye 6 (six) times daily.     esomeprazole 20 MG capsule  Commonly known as: NEXIUM  Take 20 mg by mouth before breakfast.     furosemide 20 MG tablet  Commonly known as: LASIX  TAKE 1 TABLET BY MOUTH EVERY DAY AS NEEDED     losartan 100 MG tablet  Commonly known as: COZAAR  Take 0.5 tablets (50 mg total) by mouth once daily.     moxifloxacin 0.5 % ophthalmic solution  Commonly known as: VIGAMOX  Place 1 drop into the right eye 4 (four) times daily.     MULTI-VITAMIN HI-PO ORAL  Take by mouth once daily.      neomycin-polymyxin-dexamethasone 3.5 mg/g-10,000 unit/g-0.1 % Oint  Commonly known as: MAXITROL  Place into the left eye 2 (two) times a day.     nitroGLYCERIN 0.4 MG SL tablet  Commonly known as: NITROSTAT  Place 1 tablet (0.4 mg total) under the tongue every 5 (five) minutes as needed for Chest pain.     prednisoLONE acetate 1 % Drps  Commonly known as: PRED FORTE  Place 1 drop into the right eye 3 (three) times daily.     rosuvastatin 40 MG Tab  Commonly known as: CRESTOR  Take 1 tablet (40 mg total) by mouth once daily.     SYMBICORT 80-4.5 mcg/actuation Hfaa  Generic drug: budesonide-formoterol 80-4.5 mcg  INHALE 2 PUFFS TWICE A DAY AS DIRECTED     vitamin E 400 UNIT capsule  Take 400 Units by mouth once daily.            No discharge procedures on file.

## 2022-10-06 NOTE — Clinical Note
The left ventricle was injected. The injected rate was 10 mL/sec. The total injected volume was 20 mL. Hemodynamics recorded

## 2022-10-06 NOTE — Clinical Note
The catheter was inserted into the ostial  left coronary artery. An angiography was performed of the left coronary arteries. Multiple views were taken. The angiography was performed via hand injection with .

## 2022-10-07 LAB — CATH EF QUANTITATIVE: 40 %

## 2022-10-13 DIAGNOSIS — E78.5 HYPERLIPIDEMIA LDL GOAL <70: ICD-10-CM

## 2022-10-13 RX ORDER — ROSUVASTATIN CALCIUM 40 MG/1
40 TABLET, COATED ORAL DAILY
Qty: 90 TABLET | Refills: 3 | Status: SHIPPED | OUTPATIENT
Start: 2022-10-13 | End: 2023-04-05 | Stop reason: SDUPTHER

## 2022-10-13 NOTE — TELEPHONE ENCOUNTER
----- Message from Brittany Monroe sent at 10/13/2022  8:54 AM CDT -----  Contact: Alaina/spouse  Michael RAY Guillaume Sr.  MRN: [id number]  : 1965  PCP: Latrice Zafar  Home Phone      [phone]  Work Phone      Not on file.  [employer]          [phone]      MESSAGE: Refill on [facility]  Pharmacy [facility]       Phone [phone]

## 2022-10-17 ENCOUNTER — PATIENT MESSAGE (OUTPATIENT)
Dept: INTERNAL MEDICINE | Facility: CLINIC | Age: 57
End: 2022-10-17
Payer: COMMERCIAL

## 2022-12-12 ENCOUNTER — OFFICE VISIT (OUTPATIENT)
Dept: INTERNAL MEDICINE | Facility: CLINIC | Age: 57
End: 2022-12-12
Payer: COMMERCIAL

## 2022-12-12 VITALS
SYSTOLIC BLOOD PRESSURE: 110 MMHG | WEIGHT: 202.69 LBS | RESPIRATION RATE: 18 BRPM | BODY MASS INDEX: 34.6 KG/M2 | HEIGHT: 64 IN | DIASTOLIC BLOOD PRESSURE: 72 MMHG | OXYGEN SATURATION: 94 % | HEART RATE: 86 BPM

## 2022-12-12 DIAGNOSIS — F41.9 ANXIETY: ICD-10-CM

## 2022-12-12 DIAGNOSIS — Z01.818 PRE-OP EXAM: Primary | ICD-10-CM

## 2022-12-12 PROCEDURE — 3044F PR MOST RECENT HEMOGLOBIN A1C LEVEL <7.0%: ICD-10-PCS | Mod: CPTII,S$GLB,, | Performed by: NURSE PRACTITIONER

## 2022-12-12 PROCEDURE — 3066F PR DOCUMENTATION OF TREATMENT FOR NEPHROPATHY: ICD-10-PCS | Mod: CPTII,S$GLB,, | Performed by: NURSE PRACTITIONER

## 2022-12-12 PROCEDURE — 1159F MED LIST DOCD IN RCRD: CPT | Mod: CPTII,S$GLB,, | Performed by: NURSE PRACTITIONER

## 2022-12-12 PROCEDURE — 4010F PR ACE/ARB THEARPY RXD/TAKEN: ICD-10-PCS | Mod: CPTII,S$GLB,, | Performed by: NURSE PRACTITIONER

## 2022-12-12 PROCEDURE — 3008F PR BODY MASS INDEX (BMI) DOCUMENTED: ICD-10-PCS | Mod: CPTII,S$GLB,, | Performed by: NURSE PRACTITIONER

## 2022-12-12 PROCEDURE — 3066F NEPHROPATHY DOC TX: CPT | Mod: CPTII,S$GLB,, | Performed by: NURSE PRACTITIONER

## 2022-12-12 PROCEDURE — 3008F BODY MASS INDEX DOCD: CPT | Mod: CPTII,S$GLB,, | Performed by: NURSE PRACTITIONER

## 2022-12-12 PROCEDURE — 99999 PR PBB SHADOW E&M-EST. PATIENT-LVL V: ICD-10-PCS | Mod: PBBFAC,,, | Performed by: NURSE PRACTITIONER

## 2022-12-12 PROCEDURE — 3074F SYST BP LT 130 MM HG: CPT | Mod: CPTII,S$GLB,, | Performed by: NURSE PRACTITIONER

## 2022-12-12 PROCEDURE — 3061F NEG MICROALBUMINURIA REV: CPT | Mod: CPTII,S$GLB,, | Performed by: NURSE PRACTITIONER

## 2022-12-12 PROCEDURE — 99214 PR OFFICE/OUTPT VISIT, EST, LEVL IV, 30-39 MIN: ICD-10-PCS | Mod: S$GLB,,, | Performed by: NURSE PRACTITIONER

## 2022-12-12 PROCEDURE — 1159F PR MEDICATION LIST DOCUMENTED IN MEDICAL RECORD: ICD-10-PCS | Mod: CPTII,S$GLB,, | Performed by: NURSE PRACTITIONER

## 2022-12-12 PROCEDURE — 3061F PR NEG MICROALBUMINURIA RESULT DOCUMENTED/REVIEW: ICD-10-PCS | Mod: CPTII,S$GLB,, | Performed by: NURSE PRACTITIONER

## 2022-12-12 PROCEDURE — 99214 OFFICE O/P EST MOD 30 MIN: CPT | Mod: S$GLB,,, | Performed by: NURSE PRACTITIONER

## 2022-12-12 PROCEDURE — 3078F DIAST BP <80 MM HG: CPT | Mod: CPTII,S$GLB,, | Performed by: NURSE PRACTITIONER

## 2022-12-12 PROCEDURE — 99999 PR PBB SHADOW E&M-EST. PATIENT-LVL V: CPT | Mod: PBBFAC,,, | Performed by: NURSE PRACTITIONER

## 2022-12-12 PROCEDURE — 4010F ACE/ARB THERAPY RXD/TAKEN: CPT | Mod: CPTII,S$GLB,, | Performed by: NURSE PRACTITIONER

## 2022-12-12 PROCEDURE — 3078F PR MOST RECENT DIASTOLIC BLOOD PRESSURE < 80 MM HG: ICD-10-PCS | Mod: CPTII,S$GLB,, | Performed by: NURSE PRACTITIONER

## 2022-12-12 PROCEDURE — 3074F PR MOST RECENT SYSTOLIC BLOOD PRESSURE < 130 MM HG: ICD-10-PCS | Mod: CPTII,S$GLB,, | Performed by: NURSE PRACTITIONER

## 2022-12-12 PROCEDURE — 3044F HG A1C LEVEL LT 7.0%: CPT | Mod: CPTII,S$GLB,, | Performed by: NURSE PRACTITIONER

## 2022-12-12 RX ORDER — ALPRAZOLAM 1 MG/1
TABLET ORAL
Qty: 90 TABLET | Refills: 2 | Status: SHIPPED | OUTPATIENT
Start: 2022-12-12 | End: 2023-03-27 | Stop reason: SDUPTHER

## 2022-12-12 NOTE — PROGRESS NOTES
Subjective:       Patient ID: Michael Guillaume Sr. is a 57 y.o. male.    Chief Complaint: Pre-op Exam (Eye surgery)    Patient is known, to me and presents with   Chief Complaint   Patient presents with    Pre-op Exam     Eye surgery   .  Denies chest pain and shortness of breath.  Patient presents with pre op exam for corneal implant to right eye secondary to injury.   HPI  Review of Systems   Constitutional: Negative.  Negative for activity change, appetite change, chills, diaphoresis, fatigue, fever and unexpected weight change.   HENT: Negative.  Negative for congestion, ear discharge, ear pain, facial swelling, hearing loss, nosebleeds, postnasal drip, rhinorrhea, sinus pressure, sneezing, sore throat, tinnitus, trouble swallowing and voice change.    Eyes:  Positive for photophobia, redness and visual disturbance. Negative for pain, discharge and itching.   Respiratory: Negative.  Negative for apnea, cough, choking, chest tightness, shortness of breath, wheezing and stridor.    Cardiovascular: Negative.  Negative for chest pain, palpitations and leg swelling.   Gastrointestinal:  Negative for abdominal distention, abdominal pain, anal bleeding, blood in stool, constipation, diarrhea, nausea and vomiting.   Genitourinary: Negative.  Negative for difficulty urinating, dysuria, enuresis, flank pain, frequency, hematuria, penile discharge, penile pain, penile swelling, scrotal swelling, testicular pain and urgency.   Musculoskeletal: Negative.  Negative for arthralgias, back pain, gait problem, joint swelling, myalgias, neck pain and neck stiffness.   Skin: Negative.  Negative for color change, pallor, rash and wound.   Neurological:  Negative for dizziness, tremors, seizures, syncope, facial asymmetry, speech difficulty, weakness, light-headedness, numbness and headaches.   Hematological:  Negative for adenopathy. Does not bruise/bleed easily.   Psychiatric/Behavioral: Negative.  Negative for agitation, dysphoric  mood, sleep disturbance and suicidal ideas. The patient is not nervous/anxious.      Objective:      Physical Exam  Vitals and nursing note reviewed.   Constitutional:       General: He is not in acute distress.     Appearance: He is well-developed. He is not diaphoretic.   HENT:      Head: Normocephalic and atraumatic.      Right Ear: External ear normal.      Left Ear: External ear normal.      Nose: Nose normal.      Mouth/Throat:      Pharynx: No oropharyngeal exudate.   Eyes:      General: Visual field deficit present.         Right eye: Discharge present.      Conjunctiva/sclera:      Right eye: Right conjunctiva is injected.      Comments: Clear discharge noted from right eye   Neck:      Thyroid: No thyromegaly.      Vascular: No JVD.      Trachea: No tracheal deviation.   Cardiovascular:      Rate and Rhythm: Normal rate and regular rhythm.      Heart sounds: Normal heart sounds. No murmur heard.    No friction rub. No gallop.   Pulmonary:      Effort: Pulmonary effort is normal. No respiratory distress.      Breath sounds: Normal breath sounds. No stridor. No wheezing or rales.   Chest:      Chest wall: No tenderness.   Abdominal:      General: Bowel sounds are normal. There is no distension.      Palpations: Abdomen is soft.      Tenderness: There is no abdominal tenderness. There is no guarding or rebound.   Musculoskeletal:         General: No tenderness. Normal range of motion.      Cervical back: Normal range of motion and neck supple.   Lymphadenopathy:      Cervical: No cervical adenopathy.   Skin:     General: Skin is warm and dry.      Coloration: Skin is not pale.      Findings: No erythema or rash.   Neurological:      Mental Status: He is alert and oriented to person, place, and time.      Cranial Nerves: No cranial nerve deficit.      Motor: No abnormal muscle tone.      Coordination: Coordination normal.      Deep Tendon Reflexes: Reflexes are normal and symmetric. Reflexes normal.  "  Psychiatric:         Behavior: Behavior normal.         Thought Content: Thought content normal.         Judgment: Judgment normal.       Assessment:       1. Pre-op exam    2. Anxiety          Plan:   1. Pre-op exam    2. Anxiety  -     ALPRAZolam (XANAX) 1 MG tablet; TAKE 1 TABLET BY MOUTH 3 TIMES DAILY AS NEEDED.  Dispense: 90 tablet; Refill: 2       "This note will not be shared with the patient."  Medically cleared for eye surgery   Refills on xanax  Rtc as scheduled  "

## 2022-12-19 ENCOUNTER — OFFICE VISIT (OUTPATIENT)
Dept: CARDIOLOGY | Facility: CLINIC | Age: 57
End: 2022-12-19
Payer: COMMERCIAL

## 2022-12-19 VITALS
HEIGHT: 64 IN | WEIGHT: 202 LBS | HEART RATE: 90 BPM | DIASTOLIC BLOOD PRESSURE: 82 MMHG | SYSTOLIC BLOOD PRESSURE: 120 MMHG | OXYGEN SATURATION: 99 % | BODY MASS INDEX: 34.49 KG/M2

## 2022-12-19 DIAGNOSIS — J44.9 CHRONIC OBSTRUCTIVE PULMONARY DISEASE, UNSPECIFIED COPD TYPE: ICD-10-CM

## 2022-12-19 DIAGNOSIS — I10 PRIMARY HYPERTENSION: ICD-10-CM

## 2022-12-19 DIAGNOSIS — E66.09 CLASS 1 OBESITY DUE TO EXCESS CALORIES WITH SERIOUS COMORBIDITY AND BODY MASS INDEX (BMI) OF 34.0 TO 34.9 IN ADULT: ICD-10-CM

## 2022-12-19 DIAGNOSIS — E78.5 HYPERLIPIDEMIA LDL GOAL <70: ICD-10-CM

## 2022-12-19 DIAGNOSIS — K21.9 GASTROESOPHAGEAL REFLUX DISEASE WITHOUT ESOPHAGITIS: ICD-10-CM

## 2022-12-19 DIAGNOSIS — Z01.810 PRE-OPERATIVE CARDIOVASCULAR EXAMINATION: ICD-10-CM

## 2022-12-19 DIAGNOSIS — I73.9 PAD (PERIPHERAL ARTERY DISEASE): ICD-10-CM

## 2022-12-19 DIAGNOSIS — I25.10 CORONARY ARTERY DISEASE INVOLVING NATIVE CORONARY ARTERY OF NATIVE HEART WITHOUT ANGINA PECTORIS: ICD-10-CM

## 2022-12-19 DIAGNOSIS — N18.31 CHRONIC KIDNEY DISEASE, STAGE 3A: ICD-10-CM

## 2022-12-19 DIAGNOSIS — I50.32 CHRONIC DIASTOLIC CONGESTIVE HEART FAILURE: ICD-10-CM

## 2022-12-19 DIAGNOSIS — I25.5 ISCHEMIC CARDIOMYOPATHY: ICD-10-CM

## 2022-12-19 PROCEDURE — 3044F HG A1C LEVEL LT 7.0%: CPT | Mod: CPTII,S$GLB,,

## 2022-12-19 PROCEDURE — 3074F SYST BP LT 130 MM HG: CPT | Mod: CPTII,S$GLB,,

## 2022-12-19 PROCEDURE — 99214 PR OFFICE/OUTPT VISIT, EST, LEVL IV, 30-39 MIN: ICD-10-PCS | Mod: S$GLB,,,

## 2022-12-19 PROCEDURE — 3066F NEPHROPATHY DOC TX: CPT | Mod: CPTII,S$GLB,,

## 2022-12-19 PROCEDURE — 3061F NEG MICROALBUMINURIA REV: CPT | Mod: CPTII,S$GLB,,

## 2022-12-19 PROCEDURE — 3008F BODY MASS INDEX DOCD: CPT | Mod: CPTII,S$GLB,,

## 2022-12-19 PROCEDURE — 99999 PR PBB SHADOW E&M-EST. PATIENT-LVL IV: ICD-10-PCS | Mod: PBBFAC,,,

## 2022-12-19 PROCEDURE — 4010F ACE/ARB THERAPY RXD/TAKEN: CPT | Mod: CPTII,S$GLB,,

## 2022-12-19 PROCEDURE — 1159F MED LIST DOCD IN RCRD: CPT | Mod: CPTII,S$GLB,,

## 2022-12-19 PROCEDURE — 1160F RVW MEDS BY RX/DR IN RCRD: CPT | Mod: CPTII,S$GLB,,

## 2022-12-19 PROCEDURE — 3066F PR DOCUMENTATION OF TREATMENT FOR NEPHROPATHY: ICD-10-PCS | Mod: CPTII,S$GLB,,

## 2022-12-19 PROCEDURE — 3008F PR BODY MASS INDEX (BMI) DOCUMENTED: ICD-10-PCS | Mod: CPTII,S$GLB,,

## 2022-12-19 PROCEDURE — 99214 OFFICE O/P EST MOD 30 MIN: CPT | Mod: S$GLB,,,

## 2022-12-19 PROCEDURE — 99999 PR PBB SHADOW E&M-EST. PATIENT-LVL IV: CPT | Mod: PBBFAC,,,

## 2022-12-19 PROCEDURE — 3074F PR MOST RECENT SYSTOLIC BLOOD PRESSURE < 130 MM HG: ICD-10-PCS | Mod: CPTII,S$GLB,,

## 2022-12-19 PROCEDURE — 1159F PR MEDICATION LIST DOCUMENTED IN MEDICAL RECORD: ICD-10-PCS | Mod: CPTII,S$GLB,,

## 2022-12-19 PROCEDURE — 4010F PR ACE/ARB THEARPY RXD/TAKEN: ICD-10-PCS | Mod: CPTII,S$GLB,,

## 2022-12-19 PROCEDURE — 3061F PR NEG MICROALBUMINURIA RESULT DOCUMENTED/REVIEW: ICD-10-PCS | Mod: CPTII,S$GLB,,

## 2022-12-19 PROCEDURE — 3079F PR MOST RECENT DIASTOLIC BLOOD PRESSURE 80-89 MM HG: ICD-10-PCS | Mod: CPTII,S$GLB,,

## 2022-12-19 PROCEDURE — 1160F PR REVIEW ALL MEDS BY PRESCRIBER/CLIN PHARMACIST DOCUMENTED: ICD-10-PCS | Mod: CPTII,S$GLB,,

## 2022-12-19 PROCEDURE — 3079F DIAST BP 80-89 MM HG: CPT | Mod: CPTII,S$GLB,,

## 2022-12-19 PROCEDURE — 3044F PR MOST RECENT HEMOGLOBIN A1C LEVEL <7.0%: ICD-10-PCS | Mod: CPTII,S$GLB,,

## 2022-12-19 RX ORDER — COLCHICINE 0.6 MG/1
0.6 TABLET ORAL
COMMUNITY

## 2022-12-19 NOTE — ASSESSMENT & PLAN NOTE
-RCRI score-2  -Patient can perform > 4 METS of activity without limitations  -Recent angiogram with ELICIA (stent) placement X 3 on 10/6/22-continue ASA and clopidogrel (Plavix) X 6 months without pausing  -Recommend delaying procedure if antiplatelet meds need to be placed on hold  -Patient is intermediate to  high risk for low to intermediate risk surgical procedure  -cardiac risk include: CAD, HTN, HLD, obesity

## 2022-12-19 NOTE — ASSESSMENT & PLAN NOTE
-S/P angiogram w  PCI 10/6/22- ELICIA X 3: Prox CX, Prox RCA, Prox-Mid RCA  -Continue-DAPT w ASA 81 mg and clopidogrel 75 mg   -continue statin therapy: rosuvastatin 40 mg   -discussed lifestyle modifications- diet, exercise, weight loss

## 2022-12-19 NOTE — ASSESSMENT & PLAN NOTE
-Goal BP < 130/80  -in office 120/82  -continue medication regimen: amlodipine 5 mg, carvedilol 12.5 mg, losartan 100 mg  -discussed lifestyle modifications: diet, exercise, weight loss

## 2022-12-19 NOTE — PROGRESS NOTES
Subjective:    Patient ID:  Michael Guillaume Sr. is a 57 y.o. male who presents for evaluation of No chief complaint on file.      PCP: Latrice Zafar NP     Referring Provider: Aquilino Herbert NP     HPI: Patient is a 58 yo m w/ PMH of HLD, obesity,  PAD, CAD, S/P PCI 10/6/22- ELICIA X 3: Prox CX, Prox RCA, Prox-Mid RCA, HTN, claudication , S/P Bilateral Iliac stents, traumatic right eye injury who present today for pre-op clearance for Eye surgery. Prior records: He noted worsening CP with activity ( walking less than a block) w EKG 8/27/22 showing diffuse resting ischemic ST changes inferolateral leads and received cardiac angiogram w PCI X 3 on 10/6/22. He has bilateral lower extremity claudication.  He has patent stents in the iliac system bilaterally.  There is disease below the knee.  He states he was taking Pletal but it did not help his leg pains.  He is able to walk a mile and a half but has to stop for claudication.He states he has difficulty taking statin therapy.  He blames muscle loss and leg weakness on statins-specifically Lipitor.  He is now on rosuvastatin. His LDL was 78 and now is 105 mg%. He denies CP, SOB, palpitations, orthopnea, PND, pre-syncope, LOC, or swelling.   Patient denies CP or SOB with activity post stent placement . He notes back walking 1 mile twice daily. He notes dietary non-compliance with low salt and low cholesterol diet. He does not monitor his blood pressure .     Past Medical History:   Diagnosis Date    Anticoagulant long-term use     Arthritis     Blood transfusion     mid 40's age    CHF (congestive heart failure)     COPD (chronic obstructive pulmonary disease)     COPD (chronic obstructive pulmonary disease)     Coronary artery disease     Encounter for blood transfusion     GERD (gastroesophageal reflux disease)     Hyperlipidemia 7/3/2004    Hypertension     Myocardial infarction     PAD (peripheral artery disease)     Sleep apnea     Thoracic or lumbosacral  neuritis or radiculitis, unspecified 2013    Thyroid disorder 2015     Past Surgical History:   Procedure Laterality Date    ABDOMINAL AORTOGRAPHY N/A 3/18/2022    Procedure: AORTOGRAM, ABDOMINAL;  Surgeon: Jose Head MD;  Location: [facility] CATH LAB;  Service: Cardiology;  Laterality: N/A;    ABDOMINAL SURGERY      ANGIOPLASTY      aortogram      AORTOGRAPHY WITH SERIALOGRAPHY N/A 2020    Procedure: AORTOGRAM, WITH SERIALOGRAPHY;  Surgeon: Rohan Neal MD;  Location: [facility] CATH LAB;  Service: Cardiology;  Laterality: N/A;    CARDIAC CATHETERIZATION      COLONOSCOPY N/A 10/29/2015    Procedure: COLONOSCOPY;  Surgeon: Mino Wells MD;  Location: [facility] ENDO;  Service: Endoscopy;  Laterality: N/A;  ALLERGY - LIPITOR    CORONARY ANGIOPLASTY WITH STENT PLACEMENT N/A 10/6/2022    Procedure: ANGIOPLASTY, CORONARY ARTERY, WITH STENT INSERTION;  Surgeon: Woo Byrne MD;  Location: [facility] CATH LAB/EP;  Service: Cardiology;  Laterality: N/A;    EXAMINATION UNDER ANESTHESIA Right 2022    Procedure: EXAM UNDER ANESTHESIA;  Surgeon: Davian Abacra MD;  Location: [facility];  Service: Ophthalmology;  Laterality: Right;    FRACTURE SURGERY      LEFT HEART CATHETERIZATION Left 10/6/2022    Procedure: Left heart cath;  Surgeon: Woo Byrne MD;  Location: [facility] CATH LAB/EP;  Service: Cardiology;  Laterality: Left;    REPAIR OF OPEN GLOBE Right 2022    Procedure: REPAIR, OPEN GLOBE;  Surgeon: Davian Abarca MD;  Location: [facility];  Service: Ophthalmology;  Laterality: Right;    TONSILLECTOMY       Social History     Socioeconomic History    Marital status:    Tobacco Use    Smoking status: Former     Packs/day: 1.00     Years: 20.00     Pack years: 20.00     Types: Cigarettes     Quit date: 2011     Years since quittin.0    Smokeless tobacco: Never    Tobacco comments:     5-7 cigarettes in 3 months   Substance and Sexual Activity    Alcohol use: Not Currently     Alcohol/week: 6.0  standard drinks     Types: 2 - 3 Cans of beer, 6 Shots of liquor per week     Comment: wine- occasions    Drug use: Yes     Types: Marijuana     Comment: daily    Sexual activity: Yes     Partners: Female     Birth control/protection: None     Family History   Problem Relation Age of Onset    Clotting disorder Mother         ?    Heart failure Mother     Hypertension Mother     Heart disease Mother     Clotting disorder Father         ?    Heart attack Father     Hypertension Father     Heart disease Father     Heart disease Brother     Anesthesia problems Neg Hx        Review of patient's allergies indicates:   Allergen Reactions    Lipitor [atorvastatin] Other (See Comments)     Other reaction(s): Severe Myalgias       Medication List with Changes/Refills   Current Medications    ALBUTEROL (ACCUNEB) 1.25 MG/3 ML NEBU    Take 3 mLs (1.25 mg total) by nebulization every 6 (six) hours as needed. Rescue    ALPRAZOLAM (XANAX) 1 MG TABLET    TAKE 1 TABLET BY MOUTH 3 TIMES DAILY AS NEEDED.    AMLODIPINE (NORVASC) 5 MG TABLET    Take 1 tablet (5 mg total) by mouth once daily.    ASPIRIN 81 MG TAB    Take 1 tablet by mouth once daily.     ATROPINE 1% (ISOPTO ATROPINE) 1 % DROP    Place 1 drop into the right eye 2 (two) times a day.    BRIMONIDINE 0.15 % OPTH DROP (ALPHAGAN) 0.15 % OPHTHALMIC SOLUTION    Place 1 drop into the right eye 3 (three) times daily.    CARVEDILOL (COREG) 12.5 MG TABLET    TAKE 1 TABLET BY MOUTH TWICE A DAY WITH MEALS    CLOPIDOGREL (PLAVIX) 75 MG TABLET    TAKE 1 TABLET BY MOUTH EVERY DAY    COLCHICINE (COLCRYS) 0.6 MG TABLET    Take 0.6 mg by mouth as needed.    CYCLOBENZAPRINE (FLEXERIL) 10 MG TABLET    TAKE 1 TABLET BY MOUTH EVERY DAY IN THE EVENING    ESOMEPRAZOLE (NEXIUM) 20 MG CAPSULE    Take 20 mg by mouth before breakfast.    FUROSEMIDE (LASIX) 20 MG TABLET    TAKE 1 TABLET BY MOUTH EVERY DAY AS NEEDED    LOSARTAN (COZAAR) 100 MG TABLET    Take 0.5 tablets (50 mg total) by mouth once daily.  "   MOXIFLOXACIN (VIGAMOX) 0.5 % OPHTHALMIC SOLUTION    Place 1 drop into the right eye 4 (four) times daily.    NEOMYCIN-POLYMYXIN-DEXAMETHASONE (MAXITROL) 3.5 MG/G-10,000 UNIT/G-0.1 % OINT    Place into the left eye 2 (two) times a day.    NITROGLYCERIN (NITROSTAT) 0.4 MG SL TABLET    Place 1 tablet (0.4 mg total) under the tongue every 5 (five) minutes as needed for Chest pain.    PREDNISOLONE ACETATE (PRED FORTE) 1 % DRPS    Place 1 drop into the right eye 3 (three) times daily.    ROSUVASTATIN (CRESTOR) 40 MG TAB    Take 1 tablet (40 mg total) by mouth once daily.    SYMBICORT 80-4.5 MCG/ACTUATION HFAA    INHALE 2 PUFFS TWICE A DAY AS DIRECTED    VITAMIN E 400 UNIT CAPSULE    Take 400 Units by mouth once daily.       Review of Systems   Constitutional: Negative for diaphoresis and fever.   HENT:  Negative for congestion and hearing loss.    Eyes:  Positive for visual disturbance. Negative for blurred vision and pain.   Cardiovascular:  Positive for leg swelling. Negative for chest pain, dyspnea on exertion, near-syncope, palpitations and syncope.   Respiratory:  Negative for shortness of breath and sleep disturbances due to breathing.    Hematologic/Lymphatic: Negative for bleeding problem. Does not bruise/bleed easily.   Skin:  Negative for color change and poor wound healing.   Gastrointestinal:  Negative for abdominal pain and nausea.   Genitourinary:  Negative for bladder incontinence and flank pain.   Neurological:  Negative for focal weakness and light-headedness.      Objective:   /82 (BP Location: Left arm, Patient Position: Sitting, BP Method: Medium (Manual))   Pulse 90   Ht 5' 4" (1.626 m)   Wt 91.6 kg (202 lb)   SpO2 99%   BMI 34.67 kg/m²    Physical Exam  Constitutional:       Appearance: He is well-developed. He is not diaphoretic.   HENT:      Head: Normocephalic and atraumatic.   Eyes:      General: No scleral icterus.        Right eye: Discharge present.      Conjunctiva/sclera:      " Right eye: Right conjunctiva is injected.      Pupils: Pupils are equal, round, and reactive to light.   Neck:      Vascular: No JVD.   Cardiovascular:      Rate and Rhythm: Normal rate and regular rhythm.      Pulses: Intact distal pulses.           Radial pulses are 2+ on the right side and 2+ on the left side.        Dorsalis pedis pulses are 1+ on the right side and 1+ on the left side.        Posterior tibial pulses are 1+ on the right side and 1+ on the left side.      Heart sounds: S1 normal and S2 normal. No murmur heard.    No friction rub. No gallop.   Pulmonary:      Effort: Pulmonary effort is normal. No respiratory distress.      Breath sounds: Normal breath sounds. No wheezing or rales.   Chest:      Chest wall: No tenderness.   Abdominal:      General: Bowel sounds are normal. There is no distension.      Palpations: Abdomen is soft. There is no mass.      Tenderness: There is no abdominal tenderness. There is no rebound.   Musculoskeletal:         General: No tenderness. Normal range of motion.      Cervical back: Normal range of motion and neck supple.   Skin:     General: Skin is warm and dry.      Coloration: Skin is not pale.   Neurological:      Mental Status: He is alert and oriented to person, place, and time.      Coordination: Coordination normal.      Deep Tendon Reflexes: Reflexes normal.   Psychiatric:         Behavior: Behavior normal.         Judgment: Judgment normal.           [facility]-angioplasty w stents 10/6/22  Summary         The Ost Cx to Prox Cx lesion was 90% stenosed with 0% stenosis post-intervention.    The Ost LAD to Prox LAD lesion was 50% stenosed.    The Prox RCA lesion was 80% stenosed with 0% stenosis post-intervention.    The Prox RCA to Mid RCA lesion was 70% stenosed with 0% stenosis post-intervention.    The ejection fraction was calculated to be 40%.    The pre-procedure left ventricular end diastolic pressure was 22.    A stent was successfully placed.    A stent was  successfully placed.    The estimated blood loss was <50 mL.    Cardiac echo 8/27/2019  Summary    Normal left ventricular systolic function. The estimated ejection fraction is 55%  Normal LV diastolic function.  No wall motion abnormalities.  Normal right ventricular systolic function.  Normal central venous pressure (3 mm Hg).  Assessment:       1. Primary hypertension    2. Coronary artery disease involving native coronary artery of native heart without angina pectoris    3. Hyperlipidemia LDL goal <70    4. Pre-operative cardiovascular examination    5. Chronic obstructive pulmonary disease, unspecified COPD type    6. Ischemic cardiomyopathy    7. Chronic diastolic congestive heart failure    8. PAD    9. Chronic kidney disease, stage 3a    10. Class 1 obesity due to excess calories with serious comorbidity and body mass index (BMI) of 34.0 to 34.9 in adult    11. Gastroesophageal reflux disease without esophagitis         Plan:         HTN (hypertension)  -Goal BP < 130/80  -in office 120/82  -continue medication regimen: amlodipine 5 mg, carvedilol 12.5 mg, losartan 100 mg  -discussed lifestyle modifications: diet, exercise, weight loss     Coronary artery disease involving native coronary artery  -S/P angiogram w  PCI 10/6/22- ELICIA X 3: Prox CX, Prox RCA, Prox-Mid RCA  -Continue-DAPT w ASA 81 mg and clopidogrel 75 mg   -continue statin therapy: rosuvastatin 40 mg   -discussed lifestyle modifications- diet, exercise, weight loss      Hyperlipidemia LDL goal <70  -Goal LDL < 70  -.8 9/27/22  -continue statin therapy: rosuvastatin 40 mg   -discussed lifestyle modifications - diet, exercise, weight loss     Pre-operative cardiovascular examination  -RCRI score-2  -Patient can perform > 4 METS of activity without limitations  -Recent angiogram with ELICIA (stent) placement X 3 on 10/6/22-continue ASA and clopidogrel (Plavix) X 6 months without pausing  -Recommend delaying procedure if antiplatelet meds need to  be placed on hold  -Patient is intermediate to  high risk for low to intermediate risk surgical procedure  -cardiac risk include: CAD, HTN, HLD, obesity      COPD (chronic obstructive pulmonary disease)  -Followed by PCO  -continue medication regimen     Cardiomyopathy  -Compensated LVEF 55% 8/27/2019   -continue medication regimen     Chronic diastolic congestive heart failure  Patient is identified as having Diastolic (HFpEF) heart failure that is Chronic. CHF is currently controlled. Latest ECHO performed and demonstrates- Results for orders placed during the hospital encounter of 08/27/19    Transthoracic echo (TTE) 2D with Color Flow    Interpretation Summary  · Normal left ventricular systolic function. The estimated ejection fraction is 55%  · Normal LV diastolic function.  · No wall motion abnormalities.  · Normal right ventricular systolic function.  · Normal central venous pressure (3 mm Hg).  . Continue Beta Blocker, ACE/ARB and Furosemide and monitor clinical status closely. Monitor on telemetry. Patient is on CHF pathway.  Monitor strict Is&Os and daily weights.  Place on fluid restriction of 2 L. Continue to stress to patient importance of self efficacy and  on diet for CHF. Last BNP reviewed- and noted below @LABRCNTIP(BNP,BNPTRIAGEBLO)@.    -continue medication regimen- carvedilol 12.5 mg, furosemide 20 mg, losartan 100 mg       PAD  -continue statin therapy     Chronic kidney disease, stage 3a  -managed by PCP    Class 1 obesity due to excess calories with serious comorbidity and body mass index (BMI) of 34.0 to 34.9 in adult  -discussed lifestyle modifications- diet, exercise, weight loss and benefits     Gastroesophageal reflux disease  -followed by PCP  -continue esomeprazole       Total duration of face to face visit time 30 minutes.  Total time spent counseling greater than fifty percent of total visit time.  Counseling included discussion regarding imaging findings, diagnosis,  possibilities, treatment options, risks and benefits.  The patient had many questions regarding the options and long-term effects      Alonso Villa NP  Cardiology

## 2022-12-19 NOTE — ASSESSMENT & PLAN NOTE
-Goal LDL < 70  -.8 9/27/22  -continue statin therapy: rosuvastatin 40 mg   -discussed lifestyle modifications - diet, exercise, weight loss

## 2022-12-19 NOTE — ASSESSMENT & PLAN NOTE
Patient is identified as having Diastolic (HFpEF) heart failure that is Chronic. CHF is currently controlled. Latest ECHO performed and demonstrates- Results for orders placed during the hospital encounter of 08/27/19    Transthoracic echo (TTE) 2D with Color Flow    Interpretation Summary  · Normal left ventricular systolic function. The estimated ejection fraction is 55%  · Normal LV diastolic function.  · No wall motion abnormalities.  · Normal right ventricular systolic function.  · Normal central venous pressure (3 mm Hg).  . Continue Beta Blocker, ACE/ARB and Furosemide and monitor clinical status closely. Monitor on telemetry. Patient is on CHF pathway.  Monitor strict Is&Os and daily weights.  Place on fluid restriction of 2 L. Continue to stress to patient importance of self efficacy and  on diet for CHF. Last BNP reviewed- and noted below @LABRCNTIP(BNP,BNPTRIAGEBLO)@.    -continue medication regimen- carvedilol 12.5 mg, furosemide 20 mg, losartan 100 mg

## 2023-03-07 ENCOUNTER — PATIENT MESSAGE (OUTPATIENT)
Dept: INTERNAL MEDICINE | Facility: CLINIC | Age: 58
End: 2023-03-07
Payer: COMMERCIAL

## 2023-03-27 ENCOUNTER — OFFICE VISIT (OUTPATIENT)
Dept: INTERNAL MEDICINE | Facility: CLINIC | Age: 58
End: 2023-03-27
Payer: COMMERCIAL

## 2023-03-27 VITALS
SYSTOLIC BLOOD PRESSURE: 122 MMHG | DIASTOLIC BLOOD PRESSURE: 84 MMHG | WEIGHT: 199.31 LBS | HEIGHT: 64 IN | HEART RATE: 80 BPM | RESPIRATION RATE: 12 BRPM | BODY MASS INDEX: 34.03 KG/M2

## 2023-03-27 DIAGNOSIS — N18.31 CHRONIC KIDNEY DISEASE, STAGE 3A: ICD-10-CM

## 2023-03-27 DIAGNOSIS — F41.9 ANXIETY: ICD-10-CM

## 2023-03-27 DIAGNOSIS — J44.9 CHRONIC OBSTRUCTIVE PULMONARY DISEASE, UNSPECIFIED COPD TYPE: ICD-10-CM

## 2023-03-27 DIAGNOSIS — I73.9 PAD (PERIPHERAL ARTERY DISEASE): ICD-10-CM

## 2023-03-27 DIAGNOSIS — I50.32 CHRONIC DIASTOLIC CONGESTIVE HEART FAILURE: ICD-10-CM

## 2023-03-27 DIAGNOSIS — E66.09 CLASS 1 OBESITY DUE TO EXCESS CALORIES WITH SERIOUS COMORBIDITY AND BODY MASS INDEX (BMI) OF 34.0 TO 34.9 IN ADULT: ICD-10-CM

## 2023-03-27 DIAGNOSIS — E78.5 HYPERLIPIDEMIA LDL GOAL <70: ICD-10-CM

## 2023-03-27 DIAGNOSIS — I10 PRIMARY HYPERTENSION: Primary | ICD-10-CM

## 2023-03-27 LAB
ALBUMIN SERPL BCP-MCNC: 4.1 G/DL (ref 3.5–5.2)
ALBUMIN/CREAT UR: 16.7 UG/MG (ref 0–30)
ALP SERPL-CCNC: 70 U/L (ref 55–135)
ALT SERPL W/O P-5'-P-CCNC: 17 U/L (ref 10–44)
ANION GAP SERPL CALC-SCNC: 15 MMOL/L (ref 8–16)
AST SERPL-CCNC: 32 U/L (ref 10–40)
BASOPHILS # BLD AUTO: 0.06 K/UL (ref 0–0.2)
BASOPHILS NFR BLD: 0.7 % (ref 0–1.9)
BILIRUB SERPL-MCNC: 0.4 MG/DL (ref 0.1–1)
BUN SERPL-MCNC: 25 MG/DL (ref 6–20)
CALCIUM SERPL-MCNC: 9.8 MG/DL (ref 8.7–10.5)
CHLORIDE SERPL-SCNC: 103 MMOL/L (ref 95–110)
CHOLEST SERPL-MCNC: 132 MG/DL (ref 120–199)
CHOLEST/HDLC SERPL: 3.7 {RATIO} (ref 2–5)
CO2 SERPL-SCNC: 23 MMOL/L (ref 23–29)
CREAT SERPL-MCNC: 1.4 MG/DL (ref 0.5–1.4)
CREAT UR-MCNC: 42 MG/DL (ref 23–375)
DIFFERENTIAL METHOD: ABNORMAL
EOSINOPHIL # BLD AUTO: 0 K/UL (ref 0–0.5)
EOSINOPHIL NFR BLD: 0.3 % (ref 0–8)
ERYTHROCYTE [DISTWIDTH] IN BLOOD BY AUTOMATED COUNT: 18.7 % (ref 11.5–14.5)
EST. GFR  (NO RACE VARIABLE): 58.3 ML/MIN/1.73 M^2
ESTIMATED AVG GLUCOSE: 117 MG/DL (ref 68–131)
GLUCOSE SERPL-MCNC: 101 MG/DL (ref 70–110)
HBA1C MFR BLD: 5.7 % (ref 4–5.6)
HCT VFR BLD AUTO: 52.8 % (ref 40–54)
HDLC SERPL-MCNC: 36 MG/DL (ref 40–75)
HDLC SERPL: 27.3 % (ref 20–50)
HGB BLD-MCNC: 16.5 G/DL (ref 14–18)
IMM GRANULOCYTES # BLD AUTO: 0.05 K/UL (ref 0–0.04)
IMM GRANULOCYTES NFR BLD AUTO: 0.6 % (ref 0–0.5)
LDLC SERPL CALC-MCNC: 66.4 MG/DL (ref 63–159)
LYMPHOCYTES # BLD AUTO: 1.8 K/UL (ref 1–4.8)
LYMPHOCYTES NFR BLD: 19.8 % (ref 18–48)
MCH RBC QN AUTO: 27.5 PG (ref 27–31)
MCHC RBC AUTO-ENTMCNC: 31.3 G/DL (ref 32–36)
MCV RBC AUTO: 88 FL (ref 82–98)
MICROALBUMIN UR DL<=1MG/L-MCNC: 7 UG/ML
MONOCYTES # BLD AUTO: 0.8 K/UL (ref 0.3–1)
MONOCYTES NFR BLD: 9.3 % (ref 4–15)
NEUTROPHILS # BLD AUTO: 6.2 K/UL (ref 1.8–7.7)
NEUTROPHILS NFR BLD: 69.3 % (ref 38–73)
NONHDLC SERPL-MCNC: 96 MG/DL
NRBC BLD-RTO: 0 /100 WBC
PLATELET # BLD AUTO: 366 K/UL (ref 150–450)
PMV BLD AUTO: 9.6 FL (ref 9.2–12.9)
POTASSIUM SERPL-SCNC: 4.5 MMOL/L (ref 3.5–5.1)
PROT SERPL-MCNC: 7.4 G/DL (ref 6–8.4)
RBC # BLD AUTO: 6 M/UL (ref 4.6–6.2)
SODIUM SERPL-SCNC: 141 MMOL/L (ref 136–145)
T4 FREE SERPL-MCNC: 0.98 NG/DL (ref 0.71–1.51)
TRIGL SERPL-MCNC: 148 MG/DL (ref 30–150)
TSH SERPL DL<=0.005 MIU/L-ACNC: <0.01 UIU/ML (ref 0.4–4)
WBC # BLD AUTO: 8.97 K/UL (ref 3.9–12.7)

## 2023-03-27 PROCEDURE — 99214 OFFICE O/P EST MOD 30 MIN: CPT | Mod: S$GLB,,, | Performed by: NURSE PRACTITIONER

## 2023-03-27 PROCEDURE — 3008F PR BODY MASS INDEX (BMI) DOCUMENTED: ICD-10-PCS | Mod: CPTII,S$GLB,, | Performed by: NURSE PRACTITIONER

## 2023-03-27 PROCEDURE — 3074F SYST BP LT 130 MM HG: CPT | Mod: CPTII,S$GLB,, | Performed by: NURSE PRACTITIONER

## 2023-03-27 PROCEDURE — 3079F PR MOST RECENT DIASTOLIC BLOOD PRESSURE 80-89 MM HG: ICD-10-PCS | Mod: CPTII,S$GLB,, | Performed by: NURSE PRACTITIONER

## 2023-03-27 PROCEDURE — 1159F PR MEDICATION LIST DOCUMENTED IN MEDICAL RECORD: ICD-10-PCS | Mod: CPTII,S$GLB,, | Performed by: NURSE PRACTITIONER

## 2023-03-27 PROCEDURE — 80053 COMPREHEN METABOLIC PANEL: CPT | Performed by: NURSE PRACTITIONER

## 2023-03-27 PROCEDURE — 3074F PR MOST RECENT SYSTOLIC BLOOD PRESSURE < 130 MM HG: ICD-10-PCS | Mod: CPTII,S$GLB,, | Performed by: NURSE PRACTITIONER

## 2023-03-27 PROCEDURE — 84439 ASSAY OF FREE THYROXINE: CPT | Performed by: NURSE PRACTITIONER

## 2023-03-27 PROCEDURE — 3079F DIAST BP 80-89 MM HG: CPT | Mod: CPTII,S$GLB,, | Performed by: NURSE PRACTITIONER

## 2023-03-27 PROCEDURE — 1159F MED LIST DOCD IN RCRD: CPT | Mod: CPTII,S$GLB,, | Performed by: NURSE PRACTITIONER

## 2023-03-27 PROCEDURE — 99999 PR PBB SHADOW E&M-EST. PATIENT-LVL IV: ICD-10-PCS | Mod: PBBFAC,,, | Performed by: NURSE PRACTITIONER

## 2023-03-27 PROCEDURE — 99999 PR PBB SHADOW E&M-EST. PATIENT-LVL IV: CPT | Mod: PBBFAC,,, | Performed by: NURSE PRACTITIONER

## 2023-03-27 PROCEDURE — 85025 COMPLETE CBC W/AUTO DIFF WBC: CPT | Performed by: NURSE PRACTITIONER

## 2023-03-27 PROCEDURE — 99214 PR OFFICE/OUTPT VISIT, EST, LEVL IV, 30-39 MIN: ICD-10-PCS | Mod: S$GLB,,, | Performed by: NURSE PRACTITIONER

## 2023-03-27 PROCEDURE — 82570 ASSAY OF URINE CREATININE: CPT | Performed by: NURSE PRACTITIONER

## 2023-03-27 PROCEDURE — 83036 HEMOGLOBIN GLYCOSYLATED A1C: CPT | Performed by: NURSE PRACTITIONER

## 2023-03-27 PROCEDURE — 4010F PR ACE/ARB THEARPY RXD/TAKEN: ICD-10-PCS | Mod: CPTII,S$GLB,, | Performed by: NURSE PRACTITIONER

## 2023-03-27 PROCEDURE — 80061 LIPID PANEL: CPT | Performed by: NURSE PRACTITIONER

## 2023-03-27 PROCEDURE — 3008F BODY MASS INDEX DOCD: CPT | Mod: CPTII,S$GLB,, | Performed by: NURSE PRACTITIONER

## 2023-03-27 PROCEDURE — 36415 COLL VENOUS BLD VENIPUNCTURE: CPT | Performed by: NURSE PRACTITIONER

## 2023-03-27 PROCEDURE — 84443 ASSAY THYROID STIM HORMONE: CPT | Performed by: NURSE PRACTITIONER

## 2023-03-27 PROCEDURE — 4010F ACE/ARB THERAPY RXD/TAKEN: CPT | Mod: CPTII,S$GLB,, | Performed by: NURSE PRACTITIONER

## 2023-03-27 RX ORDER — ALPRAZOLAM 1 MG/1
TABLET ORAL
Qty: 90 TABLET | Refills: 5 | Status: SHIPPED | OUTPATIENT
Start: 2023-03-27 | End: 2023-09-28 | Stop reason: SDUPTHER

## 2023-03-27 NOTE — PROGRESS NOTES
Subjective:       Patient ID: Michael Guillaume is a 58 y.o. male.    Chief Complaint: Follow-up (Pt here for checkup no current concerns. )    Patient is known, to me and presents with   Chief Complaint   Patient presents with    Follow-up     Pt here for checkup no current concerns.    .  Denies chest pain and shortness of breath.  Patient presents with check up and labs. he is doing well with no complaints today.   Follow-up  Pertinent negatives include no abdominal pain, arthralgias, chest pain, chills, congestion, coughing, diaphoresis, fatigue, fever, headaches, joint swelling, myalgias, nausea, neck pain, numbness, rash, sore throat, vomiting or weakness.   Review of Systems   Constitutional: Negative.  Negative for activity change, appetite change, chills, diaphoresis, fatigue, fever and unexpected weight change.   HENT: Negative.  Negative for congestion, ear discharge, ear pain, facial swelling, hearing loss, nosebleeds, postnasal drip, rhinorrhea, sinus pressure, sneezing, sore throat, tinnitus, trouble swallowing and voice change.    Eyes:  Positive for visual disturbance. Negative for photophobia, pain, discharge, redness and itching.   Respiratory: Negative.  Negative for apnea, cough, choking, chest tightness, shortness of breath, wheezing and stridor.    Cardiovascular: Negative.  Negative for chest pain, palpitations and leg swelling.   Gastrointestinal:  Negative for abdominal distention, abdominal pain, anal bleeding, blood in stool, constipation, diarrhea, nausea and vomiting.   Genitourinary: Negative.  Negative for difficulty urinating, dysuria, enuresis, flank pain, frequency, hematuria, penile discharge, penile pain, penile swelling, scrotal swelling, testicular pain and urgency.   Musculoskeletal: Negative.  Negative for arthralgias, back pain, gait problem, joint swelling, myalgias, neck pain and neck stiffness.   Skin: Negative.  Negative for color change, pallor, rash and wound.    Neurological:  Negative for dizziness, tremors, seizures, syncope, facial asymmetry, speech difficulty, weakness, light-headedness, numbness and headaches.   Hematological:  Negative for adenopathy. Does not bruise/bleed easily.   Psychiatric/Behavioral: Negative.  Negative for agitation, dysphoric mood, sleep disturbance and suicidal ideas. The patient is not nervous/anxious.      Objective:      Physical Exam  Vitals and nursing note reviewed.   Constitutional:       General: He is not in acute distress.     Appearance: He is well-developed. He is not diaphoretic.   HENT:      Head: Normocephalic and atraumatic.      Right Ear: External ear normal.      Left Ear: External ear normal.      Nose: Nose normal.      Mouth/Throat:      Pharynx: No oropharyngeal exudate.   Eyes:      General: No scleral icterus.        Right eye: No discharge.         Left eye: No discharge.      Extraocular Movements: Extraocular movements intact.      Conjunctiva/sclera: Conjunctivae normal.      Pupils: Pupils are equal, round, and reactive to light.      Comments: Right eye damaged due to injury. Left eye normal.   Neck:      Thyroid: No thyromegaly.      Vascular: No JVD.      Trachea: No tracheal deviation.   Cardiovascular:      Rate and Rhythm: Normal rate and regular rhythm.      Heart sounds: Normal heart sounds. No murmur heard.    No friction rub. No gallop.   Pulmonary:      Effort: Pulmonary effort is normal. No respiratory distress.      Breath sounds: Normal breath sounds. No stridor. No wheezing or rales.   Chest:      Chest wall: No tenderness.   Abdominal:      General: Bowel sounds are normal. There is no distension.      Palpations: Abdomen is soft. There is no mass.      Tenderness: There is no abdominal tenderness. There is no right CVA tenderness, left CVA tenderness, guarding or rebound.      Hernia: No hernia is present.   Musculoskeletal:         General: No tenderness. Normal range of motion.      Cervical  back: Normal range of motion and neck supple.   Lymphadenopathy:      Cervical: No cervical adenopathy.   Skin:     General: Skin is warm and dry.      Capillary Refill: Capillary refill takes less than 2 seconds.      Coloration: Skin is not pale.      Findings: No erythema or rash.   Neurological:      General: No focal deficit present.      Mental Status: He is alert and oriented to person, place, and time.      Cranial Nerves: No cranial nerve deficit.      Sensory: No sensory deficit.      Motor: No weakness or abnormal muscle tone.      Coordination: Coordination normal.      Gait: Gait normal.      Deep Tendon Reflexes: Reflexes are normal and symmetric. Reflexes normal.   Psychiatric:         Mood and Affect: Mood normal.         Behavior: Behavior normal.         Thought Content: Thought content normal.         Judgment: Judgment normal.      Comments: Negative sihi noted       Assessment:       1. Primary hypertension    2. Hyperlipidemia LDL goal <70    3. Chronic diastolic congestive heart failure    4. PAD (peripheral artery disease)    5. Chronic obstructive pulmonary disease, unspecified COPD type    6. Chronic kidney disease, stage 3a    7. Anxiety    8. Class 1 obesity due to excess calories with serious comorbidity and body mass index (BMI) of 34.0 to 34.9 in adult          Plan:   1. Primary hypertension  -     CBC Auto Differential; Future; Expected date: 03/27/2023  -     Comprehensive Metabolic Panel; Future; Expected date: 03/27/2023  -     Microalbumin/Creatinine Ratio, Urine; Future; Expected date: 03/27/2023    2. Hyperlipidemia LDL goal <70  -     CBC Auto Differential; Future; Expected date: 03/27/2023  -     Comprehensive Metabolic Panel; Future; Expected date: 03/27/2023  -     TSH; Future; Expected date: 03/27/2023  -     Lipid Panel; Future; Expected date: 03/27/2023    3. Chronic diastolic congestive heart failure  Assessment & Plan:  Currently on coreg and lasix  On an ARB  Followed  "by cards    Orders:  -     CBC Auto Differential; Future; Expected date: 03/27/2023  -     Comprehensive Metabolic Panel; Future; Expected date: 03/27/2023    4. PAD (peripheral artery disease)  Assessment & Plan:  Followed by cards  Takes plavix and asa daily     Orders:  -     CBC Auto Differential; Future; Expected date: 03/27/2023  -     Comprehensive Metabolic Panel; Future; Expected date: 03/27/2023    5. Chronic obstructive pulmonary disease, unspecified COPD type  Assessment & Plan:  Currently on inhalers and stable     Orders:  -     CBC Auto Differential; Future; Expected date: 03/27/2023  -     Comprehensive Metabolic Panel; Future; Expected date: 03/27/2023    6. Chronic kidney disease, stage 3a  Assessment & Plan:  On an arb    Orders:  -     CBC Auto Differential; Future; Expected date: 03/27/2023  -     Comprehensive Metabolic Panel; Future; Expected date: 03/27/2023  -     Microalbumin/Creatinine Ratio, Urine; Future; Expected date: 03/27/2023    7. Anxiety  -     CBC Auto Differential; Future; Expected date: 03/27/2023  -     Comprehensive Metabolic Panel; Future; Expected date: 03/27/2023  -     ALPRAZolam (XANAX) 1 MG tablet; TAKE 1 TABLET BY MOUTH 3 TIMES DAILY AS NEEDED.  Dispense: 90 tablet; Refill: 5    8. Class 1 obesity due to excess calories with serious comorbidity and body mass index (BMI) of 34.0 to 34.9 in adult  -     CBC Auto Differential; Future; Expected date: 03/27/2023  -     Comprehensive Metabolic Panel; Future; Expected date: 03/27/2023  -     Hemoglobin A1C; Future; Expected date: 03/27/2023     "This note will not be shared with the patient."  Refills on meds.  Medication compliance was discussed with the patient.   Medication side effects were discussed.  The patient was instructed on using exercise frequently to reduce blood pressure.  Thirty to forty-five minutes of brisk walking three to four times a week is often helpful to lower your blood pressure.  Monitor blood " pressures at home and to record the values in a log.  The patient was instructed to monitor weight closely and to try to keep it as close to ideal body weight as possible.  Reduce salt intake to less than 2 grams per day.  Do not add salt to food at the table.  Reduce or get rid of salt used in cooking.  Limit processed and fast foods.  Read package labels for amount of salt (soduim) in foods.  Losing weight, even just 10 pounds, of can decrease blood pressure.   Rtc as scheduled

## 2023-03-29 ENCOUNTER — TELEPHONE (OUTPATIENT)
Dept: CARDIOLOGY | Facility: CLINIC | Age: 58
End: 2023-03-29
Payer: COMMERCIAL

## 2023-03-29 NOTE — TELEPHONE ENCOUNTER
----- Message from Woo Byrne MD sent at 3/29/2023  8:40 AM CDT -----  Needs visit please to be scheduled

## 2023-04-05 ENCOUNTER — OFFICE VISIT (OUTPATIENT)
Dept: CARDIOLOGY | Facility: CLINIC | Age: 58
End: 2023-04-05
Payer: COMMERCIAL

## 2023-04-05 VITALS
WEIGHT: 200.31 LBS | RESPIRATION RATE: 18 BRPM | SYSTOLIC BLOOD PRESSURE: 146 MMHG | OXYGEN SATURATION: 98 % | BODY MASS INDEX: 34.2 KG/M2 | HEIGHT: 64 IN | HEART RATE: 64 BPM | DIASTOLIC BLOOD PRESSURE: 80 MMHG

## 2023-04-05 DIAGNOSIS — I73.9 PAD (PERIPHERAL ARTERY DISEASE): ICD-10-CM

## 2023-04-05 DIAGNOSIS — I25.10 CORONARY ARTERY DISEASE INVOLVING NATIVE CORONARY ARTERY OF NATIVE HEART WITHOUT ANGINA PECTORIS: Primary | ICD-10-CM

## 2023-04-05 DIAGNOSIS — I10 ESSENTIAL HYPERTENSION: Chronic | ICD-10-CM

## 2023-04-05 DIAGNOSIS — I25.110 ATHEROSCLEROSIS OF NATIVE CORONARY ARTERY OF NATIVE HEART WITH UNSTABLE ANGINA PECTORIS: ICD-10-CM

## 2023-04-05 DIAGNOSIS — R60.0 LOCALIZED EDEMA: ICD-10-CM

## 2023-04-05 DIAGNOSIS — I20.0 ANGINA PECTORIS, CRESCENDO: ICD-10-CM

## 2023-04-05 DIAGNOSIS — I10 BENIGN ESSENTIAL HTN: ICD-10-CM

## 2023-04-05 DIAGNOSIS — I50.32 CHRONIC DIASTOLIC CONGESTIVE HEART FAILURE: ICD-10-CM

## 2023-04-05 DIAGNOSIS — R06.09 DYSPNEA ON EXERTION: ICD-10-CM

## 2023-04-05 DIAGNOSIS — I25.5 ISCHEMIC CARDIOMYOPATHY: ICD-10-CM

## 2023-04-05 DIAGNOSIS — E78.5 HYPERLIPIDEMIA LDL GOAL <70: ICD-10-CM

## 2023-04-05 DIAGNOSIS — I73.9 CLAUDICATION OF CALF MUSCLES: ICD-10-CM

## 2023-04-05 DIAGNOSIS — I10 PRIMARY HYPERTENSION: ICD-10-CM

## 2023-04-05 DIAGNOSIS — Z01.810 PRE-OPERATIVE CARDIOVASCULAR EXAMINATION: ICD-10-CM

## 2023-04-05 PROCEDURE — 3079F PR MOST RECENT DIASTOLIC BLOOD PRESSURE 80-89 MM HG: ICD-10-PCS | Mod: CPTII,S$GLB,, | Performed by: NURSE PRACTITIONER

## 2023-04-05 PROCEDURE — 3008F PR BODY MASS INDEX (BMI) DOCUMENTED: ICD-10-PCS | Mod: CPTII,S$GLB,, | Performed by: NURSE PRACTITIONER

## 2023-04-05 PROCEDURE — 3061F NEG MICROALBUMINURIA REV: CPT | Mod: CPTII,S$GLB,, | Performed by: NURSE PRACTITIONER

## 2023-04-05 PROCEDURE — 3077F SYST BP >= 140 MM HG: CPT | Mod: CPTII,S$GLB,, | Performed by: NURSE PRACTITIONER

## 2023-04-05 PROCEDURE — 99214 PR OFFICE/OUTPT VISIT, EST, LEVL IV, 30-39 MIN: ICD-10-PCS | Mod: S$GLB,,, | Performed by: NURSE PRACTITIONER

## 2023-04-05 PROCEDURE — 99214 OFFICE O/P EST MOD 30 MIN: CPT | Mod: S$GLB,,, | Performed by: NURSE PRACTITIONER

## 2023-04-05 PROCEDURE — 4010F ACE/ARB THERAPY RXD/TAKEN: CPT | Mod: CPTII,S$GLB,, | Performed by: NURSE PRACTITIONER

## 2023-04-05 PROCEDURE — 3077F PR MOST RECENT SYSTOLIC BLOOD PRESSURE >= 140 MM HG: ICD-10-PCS | Mod: CPTII,S$GLB,, | Performed by: NURSE PRACTITIONER

## 2023-04-05 PROCEDURE — 1159F MED LIST DOCD IN RCRD: CPT | Mod: CPTII,S$GLB,, | Performed by: NURSE PRACTITIONER

## 2023-04-05 PROCEDURE — 3066F PR DOCUMENTATION OF TREATMENT FOR NEPHROPATHY: ICD-10-PCS | Mod: CPTII,S$GLB,, | Performed by: NURSE PRACTITIONER

## 2023-04-05 PROCEDURE — 99999 PR PBB SHADOW E&M-EST. PATIENT-LVL III: ICD-10-PCS | Mod: PBBFAC,,, | Performed by: NURSE PRACTITIONER

## 2023-04-05 PROCEDURE — 4010F PR ACE/ARB THEARPY RXD/TAKEN: ICD-10-PCS | Mod: CPTII,S$GLB,, | Performed by: NURSE PRACTITIONER

## 2023-04-05 PROCEDURE — 99999 PR PBB SHADOW E&M-EST. PATIENT-LVL III: CPT | Mod: PBBFAC,,, | Performed by: NURSE PRACTITIONER

## 2023-04-05 PROCEDURE — 3079F DIAST BP 80-89 MM HG: CPT | Mod: CPTII,S$GLB,, | Performed by: NURSE PRACTITIONER

## 2023-04-05 PROCEDURE — 3044F PR MOST RECENT HEMOGLOBIN A1C LEVEL <7.0%: ICD-10-PCS | Mod: CPTII,S$GLB,, | Performed by: NURSE PRACTITIONER

## 2023-04-05 PROCEDURE — 3066F NEPHROPATHY DOC TX: CPT | Mod: CPTII,S$GLB,, | Performed by: NURSE PRACTITIONER

## 2023-04-05 PROCEDURE — 1159F PR MEDICATION LIST DOCUMENTED IN MEDICAL RECORD: ICD-10-PCS | Mod: CPTII,S$GLB,, | Performed by: NURSE PRACTITIONER

## 2023-04-05 PROCEDURE — 3008F BODY MASS INDEX DOCD: CPT | Mod: CPTII,S$GLB,, | Performed by: NURSE PRACTITIONER

## 2023-04-05 PROCEDURE — 3061F PR NEG MICROALBUMINURIA RESULT DOCUMENTED/REVIEW: ICD-10-PCS | Mod: CPTII,S$GLB,, | Performed by: NURSE PRACTITIONER

## 2023-04-05 PROCEDURE — 3044F HG A1C LEVEL LT 7.0%: CPT | Mod: CPTII,S$GLB,, | Performed by: NURSE PRACTITIONER

## 2023-04-05 RX ORDER — CARVEDILOL 12.5 MG/1
12.5 TABLET ORAL 2 TIMES DAILY WITH MEALS
Qty: 180 TABLET | Refills: 3 | Status: SHIPPED | OUTPATIENT
Start: 2023-04-05

## 2023-04-05 RX ORDER — FUROSEMIDE 20 MG/1
20 TABLET ORAL DAILY PRN
Qty: 90 TABLET | Refills: 0 | Status: SHIPPED | OUTPATIENT
Start: 2023-04-05 | End: 2023-08-22

## 2023-04-05 RX ORDER — ROSUVASTATIN CALCIUM 40 MG/1
40 TABLET, COATED ORAL DAILY
Qty: 90 TABLET | Refills: 3 | Status: SHIPPED | OUTPATIENT
Start: 2023-04-05

## 2023-04-05 RX ORDER — LOSARTAN POTASSIUM 100 MG/1
50 TABLET ORAL DAILY
Qty: 90 TABLET | Refills: 3
Start: 2023-04-05 | End: 2023-04-24

## 2023-04-05 RX ORDER — AMLODIPINE BESYLATE 5 MG/1
5 TABLET ORAL DAILY
Qty: 90 TABLET | Refills: 4 | Status: SHIPPED | OUTPATIENT
Start: 2023-04-05 | End: 2024-04-03

## 2023-04-05 RX ORDER — CLOPIDOGREL BISULFATE 75 MG/1
75 TABLET ORAL DAILY
Qty: 90 TABLET | Refills: 3 | Status: SHIPPED | OUTPATIENT
Start: 2023-04-05

## 2023-04-05 NOTE — PROGRESS NOTES
Cardiology Clinic note    Subjective:   Patient ID:  Michael Guillaume is a 58 y.o. male who presents for follow up Hyperlipidemia, Peripheral Arterial Disease, Coronary Artery Disease, and Hypertension    HPI:   Michael Guillaume  has a past medical history of Anticoagulant long-term use, Arthritis, Blood transfusion, CHF (congestive heart failure), COPD (chronic obstructive pulmonary disease), COPD (chronic obstructive pulmonary disease), Coronary artery disease, Encounter for blood transfusion, GERD (gastroesophageal reflux disease), Hyperlipidemia (7/3/2004), Hypertension, Myocardial infarction, PAD (peripheral artery disease), Sleep apnea, Thoracic or lumbosacral neuritis or radiculitis, unspecified (5/13/2013), and Thyroid disorder (9/8/2015).    Had LHC 10/2022 for jaw tightness   Successful coronary angiogram and LHC. A subtotal lesion noted in prox-LCx and a 70% lesion of the mid-RCA. Post ELICIA to prox-Lcx and mid-RCA with post stent dilation.  Last EF 40%  He has been doing well since intervention. Walks 1 mile daily. Denies chest pain, SOB/SCHWARTZ, palpitations, orthopnea, BLE swelling, PND    He reports medication compliance with all cardiac medications    He has PAD with patent stents in the iliac system bilaterally. HX claudication. There is disease below the knee.  He tried Pletal but it did not help his leg pains. He exercises daily/ walks.       Patient Active Problem List    Diagnosis Date Noted    Pre-operative cardiovascular examination 12/19/2022    Coronary artery disease involving native coronary artery 10/06/2022    Angina pectoris, crescendo 09/30/2022    Chronic kidney disease, stage 3a 09/27/2022    Post-operative state 09/08/2022    Traumatic hyphema of right eye 09/02/2022    Laceration of globe of eye, left, initial encounter 09/02/2022    Chronic diastolic congestive heart failure 09/27/2021    Leg hematoma, right, initial encounter 03/05/2020    Left sided sciatica 06/24/2019    Marijuana  use 08/01/2017    COPD (chronic obstructive pulmonary disease) 08/01/2017    Tattoos 08/01/2017    History of sleep apnea 08/01/2017    Anal fissure 07/19/2017    Leg edema 06/20/2017    Gastroesophageal reflux disease 03/23/2017    Subclinical hyperthyroidism 05/23/2016    Hyperlipidemia LDL goal <70 03/15/2016    Anxiety 03/15/2016    Class 1 obesity due to excess calories with serious comorbidity and body mass index (BMI) of 34.0 to 34.9 in adult 03/15/2016    Screening for colon cancer 10/29/2015    Dyspnea on exertion 07/30/2015    Erectile dysfunction 03/04/2015    BPH with urinary obstruction 03/04/2015    Cardiomyopathy 03/19/2014     EF 45%        Spondylosis without myelopathy 05/13/2013    Degeneration of lumbar or lumbosacral intervertebral disc 05/13/2013    Spinal stenosis, lumbar region, without neurogenic claudication 05/13/2013    Thoracic or lumbosacral neuritis or radiculitis, unspecified 05/13/2013    Lumbago 05/13/2013    HTN (hypertension) 12/20/2012    Claudication of calf muscles 07/31/2012    PAD 07/03/2012       Patient's Medications   New Prescriptions    No medications on file   Previous Medications    ALPRAZOLAM (XANAX) 1 MG TABLET    TAKE 1 TABLET BY MOUTH 3 TIMES DAILY AS NEEDED.    AMLODIPINE (NORVASC) 5 MG TABLET    Take 1 tablet (5 mg total) by mouth once daily.    ASPIRIN 81 MG TAB    Take 1 tablet by mouth once daily.     ATROPINE 1% (ISOPTO ATROPINE) 1 % DROP    Place 1 drop into the right eye once daily.    CARVEDILOL (COREG) 12.5 MG TABLET    TAKE 1 TABLET BY MOUTH TWICE A DAY WITH MEALS    CLOPIDOGREL (PLAVIX) 75 MG TABLET    TAKE 1 TABLET BY MOUTH EVERY DAY    COLCHICINE (COLCRYS) 0.6 MG TABLET    Take 0.6 mg by mouth as needed.    CYCLOBENZAPRINE (FLEXERIL) 10 MG TABLET    TAKE 1 TABLET BY MOUTH EVERY DAY IN THE EVENING    ESOMEPRAZOLE (NEXIUM) 20 MG CAPSULE    Take 20 mg by mouth before breakfast.    FUROSEMIDE (LASIX) 20 MG TABLET    TAKE 1 TABLET BY MOUTH EVERY DAY AS  "NEEDED    LOSARTAN (COZAAR) 100 MG TABLET    Take 0.5 tablets (50 mg total) by mouth once daily.    NITROGLYCERIN (NITROSTAT) 0.4 MG SL TABLET    Place 1 tablet (0.4 mg total) under the tongue every 5 (five) minutes as needed for Chest pain.    PREDNISOLONE ACETATE (PRED FORTE) 1 % DRPS    Place 1 drop into the right eye 4 (four) times daily.    ROSUVASTATIN (CRESTOR) 40 MG TAB    Take 1 tablet (40 mg total) by mouth once daily.    SYMBICORT 80-4.5 MCG/ACTUATION HFAA    INHALE 2 PUFFS TWICE A DAY AS DIRECTED    VITAMIN E 400 UNIT CAPSULE    Take 400 Units by mouth once daily.   Modified Medications    No medications on file   Discontinued Medications    ALBUTEROL (ACCUNEB) 1.25 MG/3 ML NEBU    Take 3 mLs (1.25 mg total) by nebulization every 6 (six) hours as needed. Rescue    NEOMYCIN-POLYMYXIN-DEXAMETHASONE (MAXITROL) 3.5 MG/G-10,000 UNIT/G-0.1 % OINT    Place into the left eye 2 (two) times a day.        Review of Systems   Constitutional: Negative.   Cardiovascular: Negative.    Respiratory: Negative.     Skin: Negative.    Musculoskeletal: Negative.        Objective:   Vitals  Vitals:    04/05/23 0931   BP: (!) 146/80   Pulse: 64   Resp: 18   SpO2: 98%   Weight: 90.9 kg (200 lb 4.6 oz)   Height: 5' 4" (1.626 m)          Physical Exam  Constitutional:       Appearance: He is obese.   HENT:      Head: Normocephalic.   Cardiovascular:      Rate and Rhythm: Normal rate and regular rhythm.      Pulses: Normal pulses.      Heart sounds: Normal heart sounds.   Pulmonary:      Effort: Pulmonary effort is normal.      Breath sounds: Normal breath sounds.   Musculoskeletal:         General: Normal range of motion.   Skin:     General: Skin is warm and dry.   Neurological:      Mental Status: He is alert and oriented to person, place, and time.   Psychiatric:         Mood and Affect: Mood normal.         Lab Results    Lab Results   Component Value Date    WBC 8.97 03/27/2023    HGB 16.5 03/27/2023    HCT 52.8 03/27/2023 "    MCV 88 03/27/2023       Lab Results   Component Value Date     03/27/2023    INR 0.9 08/30/2016       Lab Results   Component Value Date    K 4.5 03/27/2023    MG 2.1 08/19/2020    BUN 25 (H) 03/27/2023    CREATININE 1.4 03/27/2023       Lab Results   Component Value Date     03/27/2023    HGBA1C 5.7 (H) 03/27/2023       Lab Results   Component Value Date    AST 32 03/27/2023    ALT 17 03/27/2023    ALBUMIN 4.1 03/27/2023    PROT 7.4 03/27/2023       Lab Results   Component Value Date    CHOL 132 03/27/2023    HDL 36 (L) 03/27/2023    LDLCALC 66.4 03/27/2023    TRIG 148 03/27/2023       Lab Results   Component Value Date     (H) 08/27/2022     Assessment:     Problem List Items Addressed This Visit          Cardiac/Vascular    PAD    Claudication of calf muscles    HTN (hypertension)    Cardiomyopathy    Overview     EF 45%           Dyspnea on exertion    Hyperlipidemia LDL goal <70    Chronic diastolic congestive heart failure    Angina pectoris, crescendo    Coronary artery disease involving native coronary artery - Primary    Pre-operative cardiovascular examination     Other Visit Diagnoses       Atherosclerosis of native coronary artery of native heart with unstable angina pectoris        Benign essential HTN        Essential hypertension  (Chronic)       Decrease carvedilol to 12.5mg BID, increase HCTZ to 25mg (the dose he's been taking for 3 weeks), and discussed low salt diet.     Localized edema                Plan:       Continue with current medical plan and lifestyle changes.      Orders Placed This Encounter   Procedures    Echo     Standing Status:   Future     Standing Expiration Date:   4/5/2024     Order Specific Question:   Release to patient     Answer:   Immediate       Patient doing well from cardiac standpoint and HF perspective.  On GDMT  Repeat echocardiogram to assess improvement in EF     Otherwise, continue with current recommendations    Follow up in 6 months     Return sooner for concerns or questions. If symptoms persist go to the ED    He expressed verbal understanding and agreed with the plan    Thank you for the opportunity to care for this patient. Will be available for questions if needed.     Total duration of face to face visit time 30 minutes.  Total time spent counseling greater than fifty percent of total visit time.  Counseling included discussion regarding imaging findings, diagnosis, possibilities, treatment options, risks and benefits.    Ofelia Andres, SAURABH-C  Cardiology Clinic  [facility]

## 2023-04-12 ENCOUNTER — HOSPITAL ENCOUNTER (OUTPATIENT)
Dept: PULMONOLOGY | Facility: HOSPITAL | Age: 58
Discharge: HOME | End: 2023-04-12
Attending: NURSE PRACTITIONER
Payer: COMMERCIAL

## 2023-04-12 DIAGNOSIS — I25.10 CORONARY ARTERY DISEASE INVOLVING NATIVE CORONARY ARTERY OF NATIVE HEART WITHOUT ANGINA PECTORIS: ICD-10-CM

## 2023-04-12 LAB
ASCENDING AORTA: 2.11 CM
AV INDEX (PROSTH): 0.66
AV MEAN GRADIENT: 4 MMHG
AV PEAK GRADIENT: 10 MMHG
AV VALVE AREA: 2.04 CM2
AV VELOCITY RATIO: 0.6
CV ECHO LV RWT: 0.33 CM
DOP CALC AO PEAK VEL: 1.55 M/S
DOP CALC AO VTI: 27.6 CM
DOP CALC LVOT AREA: 3.1 CM2
DOP CALC LVOT DIAMETER: 1.98 CM
DOP CALC LVOT PEAK VEL: 0.93 M/S
DOP CALC LVOT STROKE VOLUME: 56.32 CM3
DOP CALCLVOT PEAK VEL VTI: 18.3 CM
E WAVE DECELERATION TIME: 131.19 MSEC
E/A RATIO: 0.83
E/E' RATIO: 9.13 M/S
ECHO LV POSTERIOR WALL: 0.9 CM (ref 0.6–1.1)
EJECTION FRACTION: 55 %
FRACTIONAL SHORTENING: 33 % (ref 28–44)
INTERVENTRICULAR SEPTUM: 0.89 CM (ref 0.6–1.1)
IVC DIAMETER: 1.45 CM
IVRT: 49.48 MSEC
LA MAJOR: 5.32 CM
LEFT ATRIUM SIZE: 3.54 CM
LEFT ATRIUM VOLUME MOD: 24.02 CM3
LEFT INTERNAL DIMENSION IN SYSTOLE: 3.62 CM (ref 2.1–4)
LEFT VENTRICLE DIASTOLIC VOLUME: 142.61 ML
LEFT VENTRICLE SYSTOLIC VOLUME: 55.11 ML
LEFT VENTRICULAR INTERNAL DIMENSION IN DIASTOLE: 5.42 CM (ref 3.5–6)
LEFT VENTRICULAR MASS: 179.97 G
LV LATERAL E/E' RATIO: 9.13 M/S
LV SEPTAL E/E' RATIO: 9.13 M/S
LVOT MG: 1.44 MMHG
LVOT MV: 0.55 CM/S
MV PEAK A VEL: 0.88 M/S
MV PEAK E VEL: 0.73 M/S
MV STENOSIS PRESSURE HALF TIME: 32.17 MS
MV VALVE AREA P 1/2 METHOD: 6.84 CM2
PISA MRMAX VEL: 5.81 M/S
PISA TR MAX VEL: 1.24 M/S
PULM VEIN S/D RATIO: 1.1
PV MV: 0.53 M/S
PV PEAK D VEL: 0.41 M/S
PV PEAK S VEL: 0.45 M/S
PV PEAK VELOCITY: 0.83 CM/S
RA MAJOR: 4.72 CM
RA PRESSURE: 3 MMHG
RA WIDTH: 4.22 CM
RIGHT VENTRICULAR END-DIASTOLIC DIMENSION: 1.56 CM
RV TISSUE DOPPLER FREE WALL SYSTOLIC VELOCITY 1 (APICAL 4 CHAMBER VIEW): 0.01 CM/S
SINUS: 2.59 CM
STJ: 2.18 CM
TDI LATERAL: 0.08 M/S
TDI SEPTAL: 0.08 M/S
TDI: 0.08 M/S
TR MAX PG: 6 MMHG
TRICUSPID ANNULAR PLANE SYSTOLIC EXCURSION: 2.16 CM
TV REST PULMONARY ARTERY PRESSURE: 9 MMHG

## 2023-04-12 PROCEDURE — 93306 ECHO (CUPID ONLY): ICD-10-PCS | Mod: 26,,, | Performed by: INTERNAL MEDICINE

## 2023-04-12 PROCEDURE — 93306 TTE W/DOPPLER COMPLETE: CPT

## 2023-04-12 PROCEDURE — 93306 TTE W/DOPPLER COMPLETE: CPT | Mod: 26,,, | Performed by: INTERNAL MEDICINE

## 2023-04-13 ENCOUNTER — PATIENT MESSAGE (OUTPATIENT)
Dept: INTERNAL MEDICINE | Facility: CLINIC | Age: 58
End: 2023-04-13
Payer: COMMERCIAL

## 2023-04-13 DIAGNOSIS — R79.89 LOW TSH LEVEL: Primary | ICD-10-CM

## 2023-04-18 ENCOUNTER — TELEPHONE (OUTPATIENT)
Dept: CARDIOLOGY | Facility: CLINIC | Age: 58
End: 2023-04-18
Payer: COMMERCIAL

## 2023-04-18 NOTE — TELEPHONE ENCOUNTER
----- Message from Ofelia Andres NP sent at 4/18/2023  8:50 AM CDT -----  Your heart reveals normal size and function.

## 2023-04-18 NOTE — TELEPHONE ENCOUNTER
Patient and wife notified that per Ofelia Andres NP that his Echo  of his heart reveals normal size and function.  They expressed understanding.

## 2023-05-19 ENCOUNTER — CLINICAL SUPPORT (OUTPATIENT)
Dept: INTERNAL MEDICINE | Facility: CLINIC | Age: 58
End: 2023-05-19
Payer: COMMERCIAL

## 2023-05-19 DIAGNOSIS — E78.5 HYPERLIPIDEMIA LDL GOAL <70: Primary | ICD-10-CM

## 2023-05-19 LAB — TSH SERPL DL<=0.005 MIU/L-ACNC: 0.69 UIU/ML (ref 0.4–4)

## 2023-05-19 PROCEDURE — 84443 ASSAY THYROID STIM HORMONE: CPT | Performed by: NURSE PRACTITIONER

## 2023-05-30 ENCOUNTER — PATIENT MESSAGE (OUTPATIENT)
Dept: INTERNAL MEDICINE | Facility: CLINIC | Age: 58
End: 2023-05-30
Payer: COMMERCIAL

## 2023-08-11 ENCOUNTER — TELEPHONE (OUTPATIENT)
Dept: INTERNAL MEDICINE | Facility: CLINIC | Age: 58
End: 2023-08-11
Payer: COMMERCIAL

## 2023-08-11 NOTE — TELEPHONE ENCOUNTER
Pt calling for a refill on his Symbicort inhaler states he hasnt used it in awhile but finds he needs it now but no refills  Please advise  Thanks!  [facility]

## 2023-08-14 ENCOUNTER — OFFICE VISIT (OUTPATIENT)
Dept: INTERNAL MEDICINE | Facility: CLINIC | Age: 58
End: 2023-08-14
Payer: COMMERCIAL

## 2023-08-14 VITALS
SYSTOLIC BLOOD PRESSURE: 122 MMHG | BODY MASS INDEX: 34.08 KG/M2 | WEIGHT: 204.56 LBS | HEIGHT: 65 IN | DIASTOLIC BLOOD PRESSURE: 86 MMHG | OXYGEN SATURATION: 96 % | HEART RATE: 101 BPM | RESPIRATION RATE: 18 BRPM

## 2023-08-14 DIAGNOSIS — J44.1 COPD WITH ACUTE EXACERBATION: Primary | ICD-10-CM

## 2023-08-14 PROCEDURE — 96372 PR INJECTION,THERAP/PROPH/DIAG2ST, IM OR SUBCUT: ICD-10-PCS | Mod: S$GLB,,, | Performed by: NURSE PRACTITIONER

## 2023-08-14 PROCEDURE — 3066F NEPHROPATHY DOC TX: CPT | Mod: CPTII,S$GLB,, | Performed by: NURSE PRACTITIONER

## 2023-08-14 PROCEDURE — 99213 OFFICE O/P EST LOW 20 MIN: CPT | Mod: 25,S$GLB,, | Performed by: NURSE PRACTITIONER

## 2023-08-14 PROCEDURE — 99999 PR PBB SHADOW E&M-EST. PATIENT-LVL V: CPT | Mod: PBBFAC,,, | Performed by: NURSE PRACTITIONER

## 2023-08-14 PROCEDURE — 3008F PR BODY MASS INDEX (BMI) DOCUMENTED: ICD-10-PCS | Mod: CPTII,S$GLB,, | Performed by: NURSE PRACTITIONER

## 2023-08-14 PROCEDURE — 3066F PR DOCUMENTATION OF TREATMENT FOR NEPHROPATHY: ICD-10-PCS | Mod: CPTII,S$GLB,, | Performed by: NURSE PRACTITIONER

## 2023-08-14 PROCEDURE — 3074F SYST BP LT 130 MM HG: CPT | Mod: CPTII,S$GLB,, | Performed by: NURSE PRACTITIONER

## 2023-08-14 PROCEDURE — 4010F ACE/ARB THERAPY RXD/TAKEN: CPT | Mod: CPTII,S$GLB,, | Performed by: NURSE PRACTITIONER

## 2023-08-14 PROCEDURE — 4010F PR ACE/ARB THEARPY RXD/TAKEN: ICD-10-PCS | Mod: CPTII,S$GLB,, | Performed by: NURSE PRACTITIONER

## 2023-08-14 PROCEDURE — 99213 PR OFFICE/OUTPT VISIT, EST, LEVL III, 20-29 MIN: ICD-10-PCS | Mod: 25,S$GLB,, | Performed by: NURSE PRACTITIONER

## 2023-08-14 PROCEDURE — 1160F PR REVIEW ALL MEDS BY PRESCRIBER/CLIN PHARMACIST DOCUMENTED: ICD-10-PCS | Mod: CPTII,S$GLB,, | Performed by: NURSE PRACTITIONER

## 2023-08-14 PROCEDURE — 96372 THER/PROPH/DIAG INJ SC/IM: CPT | Mod: S$GLB,,, | Performed by: NURSE PRACTITIONER

## 2023-08-14 PROCEDURE — 3008F BODY MASS INDEX DOCD: CPT | Mod: CPTII,S$GLB,, | Performed by: NURSE PRACTITIONER

## 2023-08-14 PROCEDURE — 3079F DIAST BP 80-89 MM HG: CPT | Mod: CPTII,S$GLB,, | Performed by: NURSE PRACTITIONER

## 2023-08-14 PROCEDURE — 3044F HG A1C LEVEL LT 7.0%: CPT | Mod: CPTII,S$GLB,, | Performed by: NURSE PRACTITIONER

## 2023-08-14 PROCEDURE — 1159F MED LIST DOCD IN RCRD: CPT | Mod: CPTII,S$GLB,, | Performed by: NURSE PRACTITIONER

## 2023-08-14 PROCEDURE — 1160F RVW MEDS BY RX/DR IN RCRD: CPT | Mod: CPTII,S$GLB,, | Performed by: NURSE PRACTITIONER

## 2023-08-14 PROCEDURE — 3061F PR NEG MICROALBUMINURIA RESULT DOCUMENTED/REVIEW: ICD-10-PCS | Mod: CPTII,S$GLB,, | Performed by: NURSE PRACTITIONER

## 2023-08-14 PROCEDURE — 1159F PR MEDICATION LIST DOCUMENTED IN MEDICAL RECORD: ICD-10-PCS | Mod: CPTII,S$GLB,, | Performed by: NURSE PRACTITIONER

## 2023-08-14 PROCEDURE — 3074F PR MOST RECENT SYSTOLIC BLOOD PRESSURE < 130 MM HG: ICD-10-PCS | Mod: CPTII,S$GLB,, | Performed by: NURSE PRACTITIONER

## 2023-08-14 PROCEDURE — 3079F PR MOST RECENT DIASTOLIC BLOOD PRESSURE 80-89 MM HG: ICD-10-PCS | Mod: CPTII,S$GLB,, | Performed by: NURSE PRACTITIONER

## 2023-08-14 PROCEDURE — 3061F NEG MICROALBUMINURIA REV: CPT | Mod: CPTII,S$GLB,, | Performed by: NURSE PRACTITIONER

## 2023-08-14 PROCEDURE — 99999 PR PBB SHADOW E&M-EST. PATIENT-LVL V: ICD-10-PCS | Mod: PBBFAC,,, | Performed by: NURSE PRACTITIONER

## 2023-08-14 PROCEDURE — 3044F PR MOST RECENT HEMOGLOBIN A1C LEVEL <7.0%: ICD-10-PCS | Mod: CPTII,S$GLB,, | Performed by: NURSE PRACTITIONER

## 2023-08-14 RX ORDER — METHYLPREDNISOLONE 4 MG/1
TABLET ORAL
Qty: 21 TABLET | Refills: 0 | Status: SHIPPED | OUTPATIENT
Start: 2023-08-14 | End: 2023-09-04

## 2023-08-14 RX ORDER — TRIAMCINOLONE ACETONIDE 40 MG/ML
40 INJECTION, SUSPENSION INTRA-ARTICULAR; INTRAMUSCULAR
Status: COMPLETED | OUTPATIENT
Start: 2023-08-14 | End: 2023-08-14

## 2023-08-14 RX ORDER — BUDESONIDE AND FORMOTEROL FUMARATE DIHYDRATE 80; 4.5 UG/1; UG/1
AEROSOL RESPIRATORY (INHALATION)
Qty: 10.2 G | Refills: 5 | Status: SHIPPED | OUTPATIENT
Start: 2023-08-14 | End: 2023-08-14 | Stop reason: SDUPTHER

## 2023-08-14 RX ORDER — ALBUTEROL SULFATE 1.25 MG/3ML
1.25 SOLUTION RESPIRATORY (INHALATION) EVERY 6 HOURS PRN
Qty: 120 ML | Refills: 5 | Status: SHIPPED | OUTPATIENT
Start: 2023-08-14 | End: 2023-12-27 | Stop reason: SDUPTHER

## 2023-08-14 RX ORDER — BUDESONIDE AND FORMOTEROL FUMARATE DIHYDRATE 80; 4.5 UG/1; UG/1
AEROSOL RESPIRATORY (INHALATION)
Qty: 10.2 G | Refills: 5 | Status: SHIPPED | OUTPATIENT
Start: 2023-08-14 | End: 2024-03-12 | Stop reason: SDUPTHER

## 2023-08-14 RX ORDER — AZITHROMYCIN 250 MG/1
TABLET, FILM COATED ORAL
Qty: 6 TABLET | Refills: 0 | Status: SHIPPED | OUTPATIENT
Start: 2023-08-14 | End: 2023-08-19

## 2023-08-14 RX ADMIN — TRIAMCINOLONE ACETONIDE 40 MG: 40 INJECTION, SUSPENSION INTRA-ARTICULAR; INTRAMUSCULAR at 02:08

## 2023-08-14 NOTE — PROGRESS NOTES
Subjective:       Patient ID: Michael Guillaume is a 58 y.o. male.    Chief Complaint: Bronchitis (Cough- with SOB)    Patient is known, to me and presents with   Chief Complaint   Patient presents with    Bronchitis     Cough- with SOB   .  Denies chest pain and shortness of breath.  Presents with COPD exacerbation.   HPI  Review of Systems   Constitutional: Negative.  Negative for activity change, appetite change, chills, diaphoresis, fatigue, fever and unexpected weight change.   HENT:  Positive for congestion and postnasal drip.    Respiratory:  Positive for cough, shortness of breath and wheezing.    Cardiovascular: Negative.    Skin: Negative.    Hematological: Negative.        Objective:      Physical Exam  Constitutional:       General: He is not in acute distress.     Appearance: Normal appearance. He is obese. He is not ill-appearing, toxic-appearing or diaphoretic.   HENT:      Head: Normocephalic and atraumatic.      Right Ear: Tympanic membrane normal.      Left Ear: Tympanic membrane normal.      Nose: Nose normal.      Mouth/Throat:      Mouth: Mucous membranes are moist.      Pharynx: Oropharynx is clear. No posterior oropharyngeal erythema.   Eyes:      General: No scleral icterus.        Right eye: No discharge.         Left eye: No discharge.      Conjunctiva/sclera: Conjunctivae normal.   Neck:      Vascular: No carotid bruit.   Cardiovascular:      Rate and Rhythm: Regular rhythm.      Heart sounds: Normal heart sounds. No murmur heard.  Pulmonary:      Effort: Pulmonary effort is normal. No respiratory distress.      Breath sounds: No stridor. Wheezing present. No rhonchi or rales.   Chest:      Chest wall: No tenderness.   Musculoskeletal:      Cervical back: Normal range of motion and neck supple. No rigidity or tenderness.   Lymphadenopathy:      Cervical: No cervical adenopathy.   Skin:     General: Skin is warm and dry.      Capillary Refill: Capillary refill takes less than 2 seconds.       "Coloration: Skin is not jaundiced or pale.      Findings: No bruising, erythema, lesion or rash.   Neurological:      Mental Status: He is alert.         Assessment:       1. COPD with acute exacerbation        Plan:   1. COPD with acute exacerbation  -     azithromycin (Z-SINDI) 250 MG tablet; Take 2 tablets by mouth on day 1; Take 1 tablet by mouth on days 2-5  Dispense: 6 tablet; Refill: 0  -     methylPREDNISolone (MEDROL DOSEPACK) 4 mg tablet; use as directed  Dispense: 21 tablet; Refill: 0  -     triamcinolone acetonide injection 40 mg  -     albuterol (ACCUNEB) 1.25 mg/3 mL Nebu; Take 3 mLs (1.25 mg total) by nebulization every 6 (six) hours as needed (shortness of breath). Rescue  Dispense: 120 mL; Refill: 5  -     budesonide-formoterol 80-4.5 mcg (SYMBICORT) 80-4.5 mcg/actuation HFAA; Controller  Dispense: 10.2 g; Refill: 5     "This note will not be shared with the patient."  Start steroids tomorrow   If no improvement will let me know  Rtc as scheduled  "

## 2023-08-20 DIAGNOSIS — R60.0 LOCALIZED EDEMA: ICD-10-CM

## 2023-08-22 RX ORDER — FUROSEMIDE 20 MG/1
20 TABLET ORAL DAILY PRN
Qty: 90 TABLET | Refills: 0 | Status: SHIPPED | OUTPATIENT
Start: 2023-08-22 | End: 2024-01-26 | Stop reason: SDUPTHER

## 2023-09-28 ENCOUNTER — OFFICE VISIT (OUTPATIENT)
Dept: INTERNAL MEDICINE | Facility: CLINIC | Age: 58
End: 2023-09-28
Payer: COMMERCIAL

## 2023-09-28 VITALS
DIASTOLIC BLOOD PRESSURE: 88 MMHG | BODY MASS INDEX: 34.89 KG/M2 | SYSTOLIC BLOOD PRESSURE: 130 MMHG | HEART RATE: 99 BPM | OXYGEN SATURATION: 96 % | RESPIRATION RATE: 18 BRPM | WEIGHT: 204.38 LBS | HEIGHT: 64 IN

## 2023-09-28 DIAGNOSIS — F41.9 ANXIETY: ICD-10-CM

## 2023-09-28 DIAGNOSIS — N18.31 CHRONIC KIDNEY DISEASE, STAGE 3A: ICD-10-CM

## 2023-09-28 DIAGNOSIS — E66.01 SEVERE OBESITY (BMI 35.0-39.9) WITH COMORBIDITY: ICD-10-CM

## 2023-09-28 DIAGNOSIS — Z12.5 PROSTATE CANCER SCREENING: ICD-10-CM

## 2023-09-28 DIAGNOSIS — E78.5 HYPERLIPIDEMIA LDL GOAL <70: ICD-10-CM

## 2023-09-28 DIAGNOSIS — I10 PRIMARY HYPERTENSION: ICD-10-CM

## 2023-09-28 LAB
ALBUMIN SERPL BCP-MCNC: 4 G/DL (ref 3.5–5.2)
ALBUMIN/CREAT UR: 13.6 UG/MG (ref 0–30)
ALP SERPL-CCNC: 76 U/L (ref 55–135)
ALT SERPL W/O P-5'-P-CCNC: 14 U/L (ref 10–44)
ANION GAP SERPL CALC-SCNC: 7 MMOL/L (ref 8–16)
AST SERPL-CCNC: 19 U/L (ref 10–40)
BASOPHILS # BLD AUTO: 0.07 K/UL (ref 0–0.2)
BASOPHILS NFR BLD: 0.8 % (ref 0–1.9)
BILIRUB SERPL-MCNC: 0.5 MG/DL (ref 0.1–1)
BUN SERPL-MCNC: 16 MG/DL (ref 6–20)
CALCIUM SERPL-MCNC: 9.5 MG/DL (ref 8.7–10.5)
CHLORIDE SERPL-SCNC: 106 MMOL/L (ref 95–110)
CHOLEST SERPL-MCNC: 138 MG/DL (ref 120–199)
CHOLEST/HDLC SERPL: 3.5 {RATIO} (ref 2–5)
CO2 SERPL-SCNC: 29 MMOL/L (ref 23–29)
COMPLEXED PSA SERPL-MCNC: 1.4 NG/ML (ref 0–4)
CREAT SERPL-MCNC: 1 MG/DL (ref 0.5–1.4)
CREAT UR-MCNC: 81 MG/DL (ref 23–375)
DIFFERENTIAL METHOD: ABNORMAL
EOSINOPHIL # BLD AUTO: 0.1 K/UL (ref 0–0.5)
EOSINOPHIL NFR BLD: 0.5 % (ref 0–8)
ERYTHROCYTE [DISTWIDTH] IN BLOOD BY AUTOMATED COUNT: 14.6 % (ref 11.5–14.5)
EST. GFR  (NO RACE VARIABLE): >60 ML/MIN/1.73 M^2
GLUCOSE SERPL-MCNC: 102 MG/DL (ref 70–110)
HCT VFR BLD AUTO: 50.5 % (ref 40–54)
HDLC SERPL-MCNC: 40 MG/DL (ref 40–75)
HDLC SERPL: 29 % (ref 20–50)
HGB BLD-MCNC: 16.7 G/DL (ref 14–18)
IMM GRANULOCYTES # BLD AUTO: 0.03 K/UL (ref 0–0.04)
IMM GRANULOCYTES NFR BLD AUTO: 0.3 % (ref 0–0.5)
LDLC SERPL CALC-MCNC: 75 MG/DL (ref 63–159)
LYMPHOCYTES # BLD AUTO: 1.4 K/UL (ref 1–4.8)
LYMPHOCYTES NFR BLD: 15.5 % (ref 18–48)
MCH RBC QN AUTO: 29 PG (ref 27–31)
MCHC RBC AUTO-ENTMCNC: 33.1 G/DL (ref 32–36)
MCV RBC AUTO: 88 FL (ref 82–98)
MICROALBUMIN UR DL<=1MG/L-MCNC: 11 UG/ML
MONOCYTES # BLD AUTO: 0.8 K/UL (ref 0.3–1)
MONOCYTES NFR BLD: 8.3 % (ref 4–15)
NEUTROPHILS # BLD AUTO: 6.8 K/UL (ref 1.8–7.7)
NEUTROPHILS NFR BLD: 74.6 % (ref 38–73)
NONHDLC SERPL-MCNC: 98 MG/DL
NRBC BLD-RTO: 0 /100 WBC
PLATELET # BLD AUTO: 287 K/UL (ref 150–450)
PMV BLD AUTO: 10 FL (ref 9.2–12.9)
POTASSIUM SERPL-SCNC: 4.6 MMOL/L (ref 3.5–5.1)
PROT SERPL-MCNC: 7.2 G/DL (ref 6–8.4)
RBC # BLD AUTO: 5.76 M/UL (ref 4.6–6.2)
SODIUM SERPL-SCNC: 142 MMOL/L (ref 136–145)
TRIGL SERPL-MCNC: 115 MG/DL (ref 30–150)
TSH SERPL DL<=0.005 MIU/L-ACNC: 0.68 UIU/ML (ref 0.4–4)
WBC # BLD AUTO: 9.15 K/UL (ref 3.9–12.7)

## 2023-09-28 PROCEDURE — 3061F NEG MICROALBUMINURIA REV: CPT | Mod: CPTII,S$GLB,, | Performed by: NURSE PRACTITIONER

## 2023-09-28 PROCEDURE — 3044F HG A1C LEVEL LT 7.0%: CPT | Mod: CPTII,S$GLB,, | Performed by: NURSE PRACTITIONER

## 2023-09-28 PROCEDURE — 3066F PR DOCUMENTATION OF TREATMENT FOR NEPHROPATHY: ICD-10-PCS | Mod: CPTII,S$GLB,, | Performed by: NURSE PRACTITIONER

## 2023-09-28 PROCEDURE — 84443 ASSAY THYROID STIM HORMONE: CPT | Performed by: NURSE PRACTITIONER

## 2023-09-28 PROCEDURE — 3079F DIAST BP 80-89 MM HG: CPT | Mod: CPTII,S$GLB,, | Performed by: NURSE PRACTITIONER

## 2023-09-28 PROCEDURE — 85025 COMPLETE CBC W/AUTO DIFF WBC: CPT | Performed by: NURSE PRACTITIONER

## 2023-09-28 PROCEDURE — 4010F ACE/ARB THERAPY RXD/TAKEN: CPT | Mod: CPTII,S$GLB,, | Performed by: NURSE PRACTITIONER

## 2023-09-28 PROCEDURE — 3075F SYST BP GE 130 - 139MM HG: CPT | Mod: CPTII,S$GLB,, | Performed by: NURSE PRACTITIONER

## 2023-09-28 PROCEDURE — 3075F PR MOST RECENT SYSTOLIC BLOOD PRESS GE 130-139MM HG: ICD-10-PCS | Mod: CPTII,S$GLB,, | Performed by: NURSE PRACTITIONER

## 2023-09-28 PROCEDURE — 3079F PR MOST RECENT DIASTOLIC BLOOD PRESSURE 80-89 MM HG: ICD-10-PCS | Mod: CPTII,S$GLB,, | Performed by: NURSE PRACTITIONER

## 2023-09-28 PROCEDURE — 3044F PR MOST RECENT HEMOGLOBIN A1C LEVEL <7.0%: ICD-10-PCS | Mod: CPTII,S$GLB,, | Performed by: NURSE PRACTITIONER

## 2023-09-28 PROCEDURE — 84153 ASSAY OF PSA TOTAL: CPT | Performed by: NURSE PRACTITIONER

## 2023-09-28 PROCEDURE — 3061F PR NEG MICROALBUMINURIA RESULT DOCUMENTED/REVIEW: ICD-10-PCS | Mod: CPTII,S$GLB,, | Performed by: NURSE PRACTITIONER

## 2023-09-28 PROCEDURE — 1159F PR MEDICATION LIST DOCUMENTED IN MEDICAL RECORD: ICD-10-PCS | Mod: CPTII,S$GLB,, | Performed by: NURSE PRACTITIONER

## 2023-09-28 PROCEDURE — 82043 UR ALBUMIN QUANTITATIVE: CPT | Performed by: NURSE PRACTITIONER

## 2023-09-28 PROCEDURE — 3008F BODY MASS INDEX DOCD: CPT | Mod: CPTII,S$GLB,, | Performed by: NURSE PRACTITIONER

## 2023-09-28 PROCEDURE — 80053 COMPREHEN METABOLIC PANEL: CPT | Performed by: NURSE PRACTITIONER

## 2023-09-28 PROCEDURE — 99999 PR PBB SHADOW E&M-EST. PATIENT-LVL V: ICD-10-PCS | Mod: PBBFAC,,, | Performed by: NURSE PRACTITIONER

## 2023-09-28 PROCEDURE — 3066F NEPHROPATHY DOC TX: CPT | Mod: CPTII,S$GLB,, | Performed by: NURSE PRACTITIONER

## 2023-09-28 PROCEDURE — 80061 LIPID PANEL: CPT | Performed by: NURSE PRACTITIONER

## 2023-09-28 PROCEDURE — 36415 COLL VENOUS BLD VENIPUNCTURE: CPT | Performed by: NURSE PRACTITIONER

## 2023-09-28 PROCEDURE — 99999 PR PBB SHADOW E&M-EST. PATIENT-LVL V: CPT | Mod: PBBFAC,,, | Performed by: NURSE PRACTITIONER

## 2023-09-28 PROCEDURE — 99214 OFFICE O/P EST MOD 30 MIN: CPT | Mod: S$GLB,,, | Performed by: NURSE PRACTITIONER

## 2023-09-28 PROCEDURE — 4010F PR ACE/ARB THEARPY RXD/TAKEN: ICD-10-PCS | Mod: CPTII,S$GLB,, | Performed by: NURSE PRACTITIONER

## 2023-09-28 PROCEDURE — 1159F MED LIST DOCD IN RCRD: CPT | Mod: CPTII,S$GLB,, | Performed by: NURSE PRACTITIONER

## 2023-09-28 PROCEDURE — 99214 PR OFFICE/OUTPT VISIT, EST, LEVL IV, 30-39 MIN: ICD-10-PCS | Mod: S$GLB,,, | Performed by: NURSE PRACTITIONER

## 2023-09-28 PROCEDURE — 3008F PR BODY MASS INDEX (BMI) DOCUMENTED: ICD-10-PCS | Mod: CPTII,S$GLB,, | Performed by: NURSE PRACTITIONER

## 2023-09-28 RX ORDER — ALPRAZOLAM 1 MG/1
TABLET ORAL
Qty: 90 TABLET | Refills: 5 | Status: SHIPPED | OUTPATIENT
Start: 2023-09-28 | End: 2024-03-12 | Stop reason: SDUPTHER

## 2023-09-28 NOTE — PROGRESS NOTES
Subjective:       Patient ID: Michael Guillaume is a 58 y.o. male.    Chief Complaint: Follow-up (6 month check up)    Patient is known, to me and presents with   Chief Complaint   Patient presents with    Follow-up     6 month check up   .  Denies chest pain and shortness of breath.  Patient presents with check up and will need to have labs. He is doing ok. No issues other then he is recovering from previous covid infection.   Follow-up  Pertinent negatives include no abdominal pain, arthralgias, chest pain, chills, congestion, coughing, diaphoresis, fatigue, fever, headaches, joint swelling, myalgias, nausea, neck pain, numbness, rash, sore throat, vomiting or weakness.     Review of Systems   Constitutional: Negative.  Negative for activity change, appetite change, chills, diaphoresis, fatigue, fever and unexpected weight change.   HENT: Negative.  Negative for congestion, ear discharge, ear pain, facial swelling, hearing loss, nosebleeds, postnasal drip, rhinorrhea, sinus pressure, sneezing, sore throat, tinnitus, trouble swallowing and voice change.    Eyes: Negative.  Negative for photophobia, pain, discharge, redness, itching and visual disturbance.   Respiratory: Negative.  Negative for apnea, cough, choking, chest tightness, shortness of breath, wheezing and stridor.    Cardiovascular: Negative.  Negative for chest pain, palpitations and leg swelling.   Gastrointestinal:  Negative for abdominal distention, abdominal pain, anal bleeding, blood in stool, constipation, diarrhea, nausea and vomiting.   Genitourinary: Negative.  Negative for difficulty urinating, dysuria, enuresis, flank pain, frequency, hematuria, penile discharge, penile pain, penile swelling, scrotal swelling, testicular pain and urgency.   Musculoskeletal: Negative.  Negative for arthralgias, back pain, gait problem, joint swelling, myalgias, neck pain and neck stiffness.   Skin: Negative.  Negative for color change, pallor, rash and wound.    Neurological:  Negative for dizziness, tremors, seizures, syncope, facial asymmetry, speech difficulty, weakness, light-headedness, numbness and headaches.   Hematological:  Negative for adenopathy. Does not bruise/bleed easily.   Psychiatric/Behavioral: Negative.  Negative for agitation, dysphoric mood, sleep disturbance and suicidal ideas. The patient is not nervous/anxious.        Objective:      Physical Exam  Vitals and nursing note reviewed.   Constitutional:       General: He is not in acute distress.     Appearance: He is well-developed. He is not diaphoretic.   HENT:      Head: Normocephalic and atraumatic.      Right Ear: External ear normal.      Left Ear: External ear normal.      Nose: Nose normal.      Mouth/Throat:      Pharynx: No oropharyngeal exudate.   Eyes:      General:         Right eye: No discharge.         Left eye: No discharge.      Conjunctiva/sclera: Conjunctivae normal.      Pupils: Pupils are equal, round, and reactive to light.   Neck:      Thyroid: No thyromegaly.      Vascular: No JVD.      Trachea: No tracheal deviation.   Cardiovascular:      Rate and Rhythm: Normal rate and regular rhythm.      Heart sounds: Normal heart sounds. No murmur heard.     No friction rub. No gallop.   Pulmonary:      Effort: Pulmonary effort is normal. No respiratory distress.      Breath sounds: Normal breath sounds. No stridor. No wheezing or rales.   Chest:      Chest wall: No tenderness.   Abdominal:      General: Bowel sounds are normal. There is no distension.      Palpations: Abdomen is soft.      Tenderness: There is no abdominal tenderness. There is no guarding or rebound.   Musculoskeletal:         General: No tenderness. Normal range of motion.      Cervical back: Normal range of motion and neck supple.   Lymphadenopathy:      Cervical: No cervical adenopathy.   Skin:     General: Skin is warm and dry.      Capillary Refill: Capillary refill takes less than 2 seconds.      Coloration: Skin  is not pale.      Findings: No erythema or rash.   Neurological:      General: No focal deficit present.      Mental Status: He is alert and oriented to person, place, and time.      Cranial Nerves: No cranial nerve deficit.      Motor: No abnormal muscle tone.      Coordination: Coordination normal.      Deep Tendon Reflexes: Reflexes are normal and symmetric. Reflexes normal.   Psychiatric:         Mood and Affect: Mood and affect normal. Mood is not anxious or depressed. Affect is not tearful.         Behavior: Behavior normal.         Thought Content: Thought content normal. Thought content does not include homicidal or suicidal ideation. Thought content does not include homicidal or suicidal plan.         Judgment: Judgment normal.         Assessment:       1. Primary hypertension    2. Hyperlipidemia LDL goal <70    3. Chronic kidney disease, stage 3a    4. Anxiety    5. Severe obesity (BMI 35.0-39.9) with comorbidity    6. Prostate cancer screening        Plan:   1. Primary hypertension  -     CBC Auto Differential; Future; Expected date: 09/28/2023  -     Comprehensive Metabolic Panel; Future; Expected date: 09/28/2023  -     Microalbumin/Creatinine Ratio, Urine; Future; Expected date: 09/28/2023    2. Hyperlipidemia LDL goal <70  -     CBC Auto Differential; Future; Expected date: 09/28/2023  -     Comprehensive Metabolic Panel; Future; Expected date: 09/28/2023  -     TSH; Future; Expected date: 09/28/2023  -     Lipid Panel; Future; Expected date: 09/28/2023    3. Chronic kidney disease, stage 3a  -     CBC Auto Differential; Future; Expected date: 09/28/2023  -     Comprehensive Metabolic Panel; Future; Expected date: 09/28/2023    4. Anxiety  -     ALPRAZolam (XANAX) 1 MG tablet; TAKE 1 TABLET BY MOUTH 3 TIMES DAILY AS NEEDED.  Dispense: 90 tablet; Refill: 5    5. Severe obesity (BMI 35.0-39.9) with comorbidity  -     CBC Auto Differential; Future; Expected date: 09/28/2023  -     Comprehensive  "Metabolic Panel; Future; Expected date: 09/28/2023    6. Prostate cancer screening  -     PSA, Screening; Future; Expected date: 09/28/2023       "This note will not be shared with the patient."  Lab drawn today CBC, CMP, TSH, FLP  Limit the cholesterol in your diet to less than 300 mg per day.  Fats should contribute no more than 20 to 35% of your daily calories.  Less than 7 to 10% of your calories should come from saturated fat.  Avoid saturated fat products e.g., butter, some oils, meat, and poultry fat contain a lot of saturated fat.  Check food labels for fat and cholesterol content. Choose the foods with less fat per serving.  Limit the amount of butter and margarine you eat.  Use salad dressings and margarine made with polyunsaturated and monunsaturated fats.  Use egg whites or egg substitutes rather than whole eggs.  Replace whole-milk dairy products with nonfat or low-fat mild, cheese, spreads, and yogurt.  Eat skinless chicken, turkey, fish, and meatless entrees more often than red meat.  Choose lean cuts of meat and trim off all visible fat. Keep portion sizes moderate.  Avoid fatty desserts such as ice cream, cream-filled cakes, and cheesecakes. Choose fresh fruits, nonfat frozen yogurt, Popsicles, etc.  Reduce the amount of fried foods, vending machine food, and fast food you eat.  Eat fruits and vegetables (especially fresh fruits and leafy vegetables), beans, and whole grains daily. The fiber in these foods helps lower cholesterol.  Look for low-fat or nonfat varieties of the foods you like to eat or look for substitutes.  You may need to exercise 60 minutes a day to prevent weight gain and 90 minutes a day to lose weight.  RTC in six months.   "

## 2023-10-06 ENCOUNTER — PATIENT MESSAGE (OUTPATIENT)
Dept: INTERNAL MEDICINE | Facility: CLINIC | Age: 58
End: 2023-10-06
Payer: COMMERCIAL

## 2023-10-09 ENCOUNTER — OFFICE VISIT (OUTPATIENT)
Dept: CARDIOLOGY | Facility: CLINIC | Age: 58
End: 2023-10-09
Payer: COMMERCIAL

## 2023-10-09 VITALS
HEIGHT: 64 IN | RESPIRATION RATE: 18 BRPM | OXYGEN SATURATION: 94 % | DIASTOLIC BLOOD PRESSURE: 84 MMHG | WEIGHT: 206.38 LBS | HEART RATE: 85 BPM | BODY MASS INDEX: 35.23 KG/M2 | SYSTOLIC BLOOD PRESSURE: 130 MMHG

## 2023-10-09 DIAGNOSIS — I10 PRIMARY HYPERTENSION: ICD-10-CM

## 2023-10-09 DIAGNOSIS — I73.9 CLAUDICATION OF CALF MUSCLES: ICD-10-CM

## 2023-10-09 DIAGNOSIS — E66.09 CLASS 1 OBESITY DUE TO EXCESS CALORIES WITH SERIOUS COMORBIDITY AND BODY MASS INDEX (BMI) OF 34.0 TO 34.9 IN ADULT: ICD-10-CM

## 2023-10-09 DIAGNOSIS — I25.118 CORONARY ARTERY DISEASE OF NATIVE ARTERY OF NATIVE HEART WITH STABLE ANGINA PECTORIS: ICD-10-CM

## 2023-10-09 DIAGNOSIS — E78.5 HYPERLIPIDEMIA LDL GOAL <70: Primary | ICD-10-CM

## 2023-10-09 DIAGNOSIS — J44.9 CHRONIC OBSTRUCTIVE PULMONARY DISEASE, UNSPECIFIED COPD TYPE: ICD-10-CM

## 2023-10-09 DIAGNOSIS — G47.30 SLEEP APNEA, UNSPECIFIED TYPE: ICD-10-CM

## 2023-10-09 DIAGNOSIS — R06.09 DYSPNEA ON EXERTION: ICD-10-CM

## 2023-10-09 DIAGNOSIS — I73.9 PAD (PERIPHERAL ARTERY DISEASE): ICD-10-CM

## 2023-10-09 PROCEDURE — 3066F NEPHROPATHY DOC TX: CPT | Mod: CPTII,S$GLB,, | Performed by: INTERNAL MEDICINE

## 2023-10-09 PROCEDURE — 1159F MED LIST DOCD IN RCRD: CPT | Mod: CPTII,S$GLB,, | Performed by: INTERNAL MEDICINE

## 2023-10-09 PROCEDURE — 4010F PR ACE/ARB THEARPY RXD/TAKEN: ICD-10-PCS | Mod: CPTII,S$GLB,, | Performed by: INTERNAL MEDICINE

## 2023-10-09 PROCEDURE — 3044F HG A1C LEVEL LT 7.0%: CPT | Mod: CPTII,S$GLB,, | Performed by: INTERNAL MEDICINE

## 2023-10-09 PROCEDURE — 99999 PR PBB SHADOW E&M-EST. PATIENT-LVL IV: CPT | Mod: PBBFAC,,, | Performed by: INTERNAL MEDICINE

## 2023-10-09 PROCEDURE — 3008F BODY MASS INDEX DOCD: CPT | Mod: CPTII,S$GLB,, | Performed by: INTERNAL MEDICINE

## 2023-10-09 PROCEDURE — 4010F ACE/ARB THERAPY RXD/TAKEN: CPT | Mod: CPTII,S$GLB,, | Performed by: INTERNAL MEDICINE

## 2023-10-09 PROCEDURE — 1160F RVW MEDS BY RX/DR IN RCRD: CPT | Mod: CPTII,S$GLB,, | Performed by: INTERNAL MEDICINE

## 2023-10-09 PROCEDURE — 1159F PR MEDICATION LIST DOCUMENTED IN MEDICAL RECORD: ICD-10-PCS | Mod: CPTII,S$GLB,, | Performed by: INTERNAL MEDICINE

## 2023-10-09 PROCEDURE — 99214 OFFICE O/P EST MOD 30 MIN: CPT | Mod: S$GLB,,, | Performed by: INTERNAL MEDICINE

## 2023-10-09 PROCEDURE — 3061F NEG MICROALBUMINURIA REV: CPT | Mod: CPTII,S$GLB,, | Performed by: INTERNAL MEDICINE

## 2023-10-09 PROCEDURE — 3008F PR BODY MASS INDEX (BMI) DOCUMENTED: ICD-10-PCS | Mod: CPTII,S$GLB,, | Performed by: INTERNAL MEDICINE

## 2023-10-09 PROCEDURE — 99214 PR OFFICE/OUTPT VISIT, EST, LEVL IV, 30-39 MIN: ICD-10-PCS | Mod: S$GLB,,, | Performed by: INTERNAL MEDICINE

## 2023-10-09 PROCEDURE — 3044F PR MOST RECENT HEMOGLOBIN A1C LEVEL <7.0%: ICD-10-PCS | Mod: CPTII,S$GLB,, | Performed by: INTERNAL MEDICINE

## 2023-10-09 PROCEDURE — 3075F PR MOST RECENT SYSTOLIC BLOOD PRESS GE 130-139MM HG: ICD-10-PCS | Mod: CPTII,S$GLB,, | Performed by: INTERNAL MEDICINE

## 2023-10-09 PROCEDURE — 3066F PR DOCUMENTATION OF TREATMENT FOR NEPHROPATHY: ICD-10-PCS | Mod: CPTII,S$GLB,, | Performed by: INTERNAL MEDICINE

## 2023-10-09 PROCEDURE — 3079F DIAST BP 80-89 MM HG: CPT | Mod: CPTII,S$GLB,, | Performed by: INTERNAL MEDICINE

## 2023-10-09 PROCEDURE — 99999 PR PBB SHADOW E&M-EST. PATIENT-LVL IV: ICD-10-PCS | Mod: PBBFAC,,, | Performed by: INTERNAL MEDICINE

## 2023-10-09 PROCEDURE — 1160F PR REVIEW ALL MEDS BY PRESCRIBER/CLIN PHARMACIST DOCUMENTED: ICD-10-PCS | Mod: CPTII,S$GLB,, | Performed by: INTERNAL MEDICINE

## 2023-10-09 PROCEDURE — 3061F PR NEG MICROALBUMINURIA RESULT DOCUMENTED/REVIEW: ICD-10-PCS | Mod: CPTII,S$GLB,, | Performed by: INTERNAL MEDICINE

## 2023-10-09 PROCEDURE — 3075F SYST BP GE 130 - 139MM HG: CPT | Mod: CPTII,S$GLB,, | Performed by: INTERNAL MEDICINE

## 2023-10-09 PROCEDURE — 3079F PR MOST RECENT DIASTOLIC BLOOD PRESSURE 80-89 MM HG: ICD-10-PCS | Mod: CPTII,S$GLB,, | Performed by: INTERNAL MEDICINE

## 2023-10-09 RX ORDER — EZETIMIBE 10 MG/1
10 TABLET ORAL DAILY
Qty: 90 TABLET | Refills: 4 | Status: SHIPPED | OUTPATIENT
Start: 2023-10-09

## 2023-10-09 NOTE — PROGRESS NOTES
[facility]     Subjective:    Patient ID:  Michael Guillaume is a 58 y.o. male who presents for follow-up of Hyperlipidemia, Hypertension, Coronary Artery Disease, and Peripheral Arterial Disease    Review of patient's allergies indicates:   Allergen Reactions    Lipitor [atorvastatin] Other (See Comments)     Other reaction(s): Severe Myalgias      He walks 2 miles per day 4 days a week.  He has no angina.  In October 2022 he had stents to RCA and circumflex.  He has previous iliac stents and history of claudication.  He has not had any leg pains with walking.  Does not always check his blood pressures at home.  He thinks they are controlled.  I do note high readings on some of his doctor visits.  His most recent LDL was 75 mg% range.  He takes Crestor 40.  He is compliant with medications.  He is on aspirin and Plavix without bruising.    His wife states that he has sedentary activity.  After he gets home from his walk he usually takes a nap.  She states he can fall asleep in the sitting position regularly.  He had previous surgery for obstructive sleep apnea.  He states he does not sleep well at night, he gets up 5 times per night to urinate.  This could be triggering some of his daytime somnolence.          Review of Systems   Constitutional: Negative for chills, decreased appetite, diaphoresis, fever, malaise/fatigue, night sweats, weight gain and weight loss.   HENT:  Negative for congestion, ear discharge, ear pain, hearing loss, hoarse voice, nosebleeds, odynophagia, sore throat, stridor and tinnitus.    Eyes:  Negative for blurred vision, discharge, double vision, pain, photophobia, redness, vision loss in left eye, vision loss in right eye, visual disturbance and visual halos.   Cardiovascular:  Negative for chest pain, claudication, cyanosis, dyspnea on exertion, irregular heartbeat, leg swelling, near-syncope, orthopnea,  "palpitations, paroxysmal nocturnal dyspnea and syncope.   Respiratory:  Negative for cough, hemoptysis, shortness of breath, sleep disturbances due to breathing, snoring, sputum production and wheezing.    Endocrine: Negative for cold intolerance, heat intolerance, polydipsia, polyphagia and polyuria.   Hematologic/Lymphatic: Negative for adenopathy and bleeding problem. Does not bruise/bleed easily.   Skin:  Negative for color change, dry skin, flushing, itching, nail changes, poor wound healing, rash, skin cancer, suspicious lesions and unusual hair distribution.   Musculoskeletal:  Negative for arthritis, back pain, falls, gout, joint pain, joint swelling, muscle cramps, muscle weakness, myalgias, neck pain and stiffness.   Gastrointestinal:  Negative for bloating, abdominal pain, anorexia, change in bowel habit, bowel incontinence, constipation, diarrhea, dysphagia, excessive appetite, flatus, heartburn, hematemesis, hematochezia, hemorrhoids, jaundice, melena, nausea and vomiting.   Genitourinary:  Negative for bladder incontinence, decreased libido, dysuria, flank pain, frequency, genital sores, hematuria, hesitancy, incomplete emptying, nocturia and urgency.   Neurological:  Negative for aphonia, brief paralysis, difficulty with concentration, disturbances in coordination, excessive daytime sleepiness, dizziness, focal weakness, headaches, light-headedness, loss of balance, numbness, paresthesias, seizures, sensory change, tremors, vertigo and weakness.   Psychiatric/Behavioral:  Negative for altered mental status, depression, hallucinations, memory loss, substance abuse, suicidal ideas and thoughts of violence. The patient does not have insomnia and is not nervous/anxious.    Allergic/Immunologic: Negative for hives and persistent infections.        Objective:       Vitals:    10/09/23 0820   BP: 130/84   Pulse: 85   Resp: 18   SpO2: (!) 94%   Weight: 93.6 kg (206 lb 5.6 oz)   Height: 5' 4" (1.626 m)    " Physical Exam  Constitutional:       General: He is not in acute distress.     Appearance: He is well-developed. He is not diaphoretic.   HENT:      Head: Normocephalic and atraumatic.      Nose: Nose normal.   Eyes:      General: No scleral icterus.        Right eye: No discharge.      Conjunctiva/sclera: Conjunctivae normal.      Pupils: Pupils are equal, round, and reactive to light.   Neck:      Thyroid: No thyromegaly.      Vascular: No JVD.      Trachea: No tracheal deviation.   Cardiovascular:      Rate and Rhythm: Normal rate and regular rhythm.      Pulses:           Carotid pulses are 2+ on the right side and 2+ on the left side.       Radial pulses are 0 on the right side and 0 on the left side.        Dorsalis pedis pulses are 0 on the right side and 0 on the left side.        Posterior tibial pulses are 0 on the right side and 0 on the left side.      Heart sounds: Normal heart sounds. No murmur heard.     No friction rub. No gallop.      Comments: Faint monophasic Doppler signals noted at pedal position bilaterally  Pulmonary:      Effort: Pulmonary effort is normal. No respiratory distress.      Breath sounds: Normal breath sounds. No stridor. No wheezing or rales.   Chest:      Chest wall: No tenderness.   Abdominal:      General: Bowel sounds are normal. There is no distension.      Palpations: Abdomen is soft. There is no mass.      Tenderness: There is no abdominal tenderness. There is no guarding or rebound.   Musculoskeletal:         General: No tenderness. Normal range of motion.      Cervical back: Normal range of motion and neck supple.   Lymphadenopathy:      Cervical: No cervical adenopathy.   Skin:     General: Skin is warm and dry.      Coloration: Skin is not pale.      Findings: No erythema or rash.   Neurological:      Mental Status: He is alert and oriented to person, place, and time.      Cranial Nerves: No cranial nerve deficit.      Coordination: Coordination normal.   Psychiatric:          Behavior: Behavior normal.         Thought Content: Thought content normal.         Judgment: Judgment normal.           Assessment:       1. Hyperlipidemia LDL goal <70    2. Coronary artery disease of native artery of native heart with stable angina pectoris    3. Claudication of calf muscles    4. Primary hypertension    5. PAD    6. Dyspnea on exertion    7. Class 1 obesity due to excess calories with serious comorbidity and body mass index (BMI) of 34.0 to 34.9 in adult    8. Sleep apnea, unspecified type    9. Chronic obstructive pulmonary disease, unspecified COPD type      Results for orders placed or performed in visit on 09/28/23   TSH   Result Value Ref Range    TSH 0.684 0.400 - 4.000 uIU/mL   CBC Auto Differential   Result Value Ref Range    WBC 9.15 3.90 - 12.70 K/uL    RBC 5.76 4.60 - 6.20 M/uL    Hemoglobin 16.7 14.0 - 18.0 g/dL    Hematocrit 50.5 40.0 - 54.0 %    MCV 88 82 - 98 fL    MCH 29.0 27.0 - 31.0 pg    MCHC 33.1 32.0 - 36.0 g/dL    RDW 14.6 (H) 11.5 - 14.5 %    Platelets 287 150 - 450 K/uL    MPV 10.0 9.2 - 12.9 fL    Immature Granulocytes 0.3 0.0 - 0.5 %    Gran # (ANC) 6.8 1.8 - 7.7 K/uL    Immature Grans (Abs) 0.03 0.00 - 0.04 K/uL    Lymph # 1.4 1.0 - 4.8 K/uL    Mono # 0.8 0.3 - 1.0 K/uL    Eos # 0.1 0.0 - 0.5 K/uL    Baso # 0.07 0.00 - 0.20 K/uL    nRBC 0 0 /100 WBC    Gran % 74.6 (H) 38.0 - 73.0 %    Lymph % 15.5 (L) 18.0 - 48.0 %    Mono % 8.3 4.0 - 15.0 %    Eosinophil % 0.5 0.0 - 8.0 %    Basophil % 0.8 0.0 - 1.9 %    Differential Method Automated    Comprehensive Metabolic Panel   Result Value Ref Range    Sodium 142 136 - 145 mmol/L    Potassium 4.6 3.5 - 5.1 mmol/L    Chloride 106 95 - 110 mmol/L    CO2 29 23 - 29 mmol/L    Glucose 102 70 - 110 mg/dL    BUN 16 6 - 20 mg/dL    Creatinine 1.0 0.5 - 1.4 mg/dL    Calcium 9.5 8.7 - 10.5 mg/dL    Total Protein 7.2 6.0 - 8.4 g/dL    Albumin 4.0 3.5 - 5.2 g/dL    Total Bilirubin 0.5 0.1 - 1.0 mg/dL    Alkaline Phosphatase 76 55  - 135 U/L    AST 19 10 - 40 U/L    ALT 14 10 - 44 U/L    eGFR >60.0 >60 mL/min/1.73 m^2    Anion Gap 7 (L) 8 - 16 mmol/L   Lipid Panel   Result Value Ref Range    Cholesterol 138 120 - 199 mg/dL    Triglycerides 115 30 - 150 mg/dL    HDL 40 40 - 75 mg/dL    LDL Cholesterol 75.0 63.0 - 159.0 mg/dL    HDL/Cholesterol Ratio 29.0 20.0 - 50.0 %    Total Cholesterol/HDL Ratio 3.5 2.0 - 5.0    Non-HDL Cholesterol 98 mg/dL   Microalbumin/Creatinine Ratio, Urine   Result Value Ref Range    Microalbumin, Urine 11.0 ug/mL    Creatinine, Urine 81.0 23.0 - 375.0 mg/dL    Microalb/Creat Ratio 13.6 0.0 - 30.0 ug/mg   PSA, Screening   Result Value Ref Range    PSA, Screen 1.4 0.00 - 4.00 ng/mL         Current Outpatient Medications:     albuterol (ACCUNEB) 1.25 mg/3 mL Nebu, Take 3 mLs (1.25 mg total) by nebulization every 6 (six) hours as needed (shortness of breath). Rescue, Disp: 120 mL, Rfl: 5    ALPRAZolam (XANAX) 1 MG tablet, TAKE 1 TABLET BY MOUTH 3 TIMES DAILY AS NEEDED., Disp: 90 tablet, Rfl: 5    amLODIPine (NORVASC) 5 MG tablet, Take 1 tablet (5 mg total) by mouth once daily., Disp: 90 tablet, Rfl: 4    atropine 1% (ISOPTO ATROPINE) 1 % Drop, Place 1 drop into the right eye as needed (discomfort)., Disp: 5 mL, Rfl: 6    carvediloL (COREG) 12.5 MG tablet, Take 1 tablet (12.5 mg total) by mouth 2 (two) times daily with meals., Disp: 180 tablet, Rfl: 3    clopidogreL (PLAVIX) 75 mg tablet, Take 1 tablet (75 mg total) by mouth once daily., Disp: 90 tablet, Rfl: 3    colchicine (COLCRYS) 0.6 mg tablet, Take 0.6 mg by mouth as needed., Disp: , Rfl:     cyclobenzaprine (FLEXERIL) 10 MG tablet, TAKE 1 TABLET BY MOUTH EVERY DAY IN THE EVENING, Disp: 30 tablet, Rfl: 5    esomeprazole (NEXIUM) 20 MG capsule, Take 20 mg by mouth before breakfast., Disp: , Rfl:     furosemide (LASIX) 20 MG tablet, TAKE 1 TABLET BY MOUTH EVERY DAY AS NEEDED, Disp: 90 tablet, Rfl: 0    losartan (COZAAR) 100 MG tablet, TAKE 1 TABLET BY MOUTH EVERY DAY,  Disp: 90 tablet, Rfl: 3    nitroGLYCERIN (NITROSTAT) 0.4 MG SL tablet, Place 1 tablet (0.4 mg total) under the tongue every 5 (five) minutes as needed for Chest pain., Disp: 30 tablet, Rfl: 3    rosuvastatin (CRESTOR) 40 MG Tab, Take 1 tablet (40 mg total) by mouth once daily., Disp: 90 tablet, Rfl: 3    vitamin E 400 UNIT capsule, Take 400 Units by mouth once daily., Disp: , Rfl:     budesonide-formoterol 80-4.5 mcg (SYMBICORT) 80-4.5 mcg/actuation HFAA, Controller (Patient not taking: Reported on 10/9/2023), Disp: 10.2 g, Rfl: 5    ezetimibe (ZETIA) 10 mg tablet, Take 1 tablet (10 mg total) by mouth once daily., Disp: 90 tablet, Rfl: 4    prednisoLONE acetate (PRED FORTE) 1 % DrpS, Place 1 drop into the right eye 2 (two) times daily. (Patient not taking: Reported on 10/9/2023), Disp: 10 mL, Rfl: 11    Current Facility-Administered Medications:     sodium chloride 0.9% flush 10 mL, 10 mL, Intravenous, PRN, Loc Garcia MD    sodium chloride 0.9% flush 10 mL, 10 mL, Intravenous, PRN, Feng Bond MD     Lab Results   Component Value Date    WBC 9.15 09/28/2023    RBC 5.76 09/28/2023    HGB 16.7 09/28/2023    HCT 50.5 09/28/2023    MCV 88 09/28/2023    MCH 29.0 09/28/2023    MCHC 33.1 09/28/2023    RDW 14.6 (H) 09/28/2023     09/28/2023    MPV 10.0 09/28/2023    GRAN 6.8 09/28/2023    GRAN 74.6 (H) 09/28/2023    LYMPH 1.4 09/28/2023    LYMPH 15.5 (L) 09/28/2023    MONO 0.8 09/28/2023    MONO 8.3 09/28/2023    EOS 0.1 09/28/2023    BASO 0.07 09/28/2023    EOSINOPHIL 0.5 09/28/2023    BASOPHIL 0.8 09/28/2023    MG 2.1 08/19/2020        CMP  Lab Results   Component Value Date     09/28/2023    K 4.6 09/28/2023     09/28/2023    CO2 29 09/28/2023     09/28/2023    BUN 16 09/28/2023    CREATININE 1.0 09/28/2023    CALCIUM 9.5 09/28/2023    PROT 7.2 09/28/2023    ALBUMIN 4.0 09/28/2023    BILITOT 0.5 09/28/2023    ALKPHOS 76 09/28/2023    AST 19 09/28/2023    ALT 14 09/28/2023    ANIONGAP 7 (L)  "09/28/2023    ESTGFRAFRICA >60.0 03/09/2022    EGFRNONAA >60.0 03/09/2022        No results found for: "LABBLOO", "LABURIN", "RESPIRATORYC", "GSRESP"         Results for orders placed or performed during the hospital encounter of 10/06/22   EKG 12-LEAD on arrival to floor    Collection Time: 10/06/22 10:41 AM    Narrative    Test Reason : I25.110,    Vent. Rate : 084 BPM     Atrial Rate : 084 BPM     P-R Int : 144 ms          QRS Dur : 090 ms      QT Int : 384 ms       P-R-T Axes : 053 057 068 degrees     QTc Int : 453 ms    Sinus rhythm with occasional Premature ventricular complexes  Otherwise normal ECG  When compared with ECG of 27-AUG-2022 09:06,  Premature ventricular complexes are now Present  Nonspecific T wave abnormality, improved in Inferior leads  Nonspecific T wave abnormality, improved in Anterior-lateral leads  Confirmed by Etelvina Sargent MD ([id number]) on 10/7/2022 3:19:21 PM    Referred By: KIM THOMPSON           Confirmed By:Etelvina Sargent MD                  Plan:       Problem List Items Addressed This Visit          Pulmonary    COPD (chronic obstructive pulmonary disease)     No tobacco usage.  He uses inhalers.  Condition unchanged.            Cardiac/Vascular    PAD     There are previous iliac stents placed.  There is infrapopliteal disease.  He will continue ambulatory exercise.  Condition stable.         Claudication of calf muscles     No active claudication at this time.  He is walking on a regular basis.  Continued walking encouraged.  On exam, findings are unchanged.         Dyspnea on exertion     His symptoms have stabilized.         Hyperlipidemia LDL goal <70 - Primary     Zetia 10 mg added to rosuvastatin 40 mg for better LDL reduction.  Current LDL by labs September 2023 75 mg%.         Relevant Medications    ezetimibe (ZETIA) 10 mg tablet    Coronary artery disease of native artery of native heart with stable angina pectoris     2 vessel stenting completed October 2022 to " circumflex and dominant right coronary arteries.  Beta-blockers, calcium channel blockers and p.r.n. nitrates to continue.  He walks on a regular basis.  No recent angina reported.    He will remain on chronic Plavix, aspirin discontinued today.         Primary hypertension     Losartan dosing increased to 100 mg per day.  His systolic readings have been persistent Justina in the 130 mm Hg range.  He is encouraged to do more measuring at home.  He will continue carvedilol and amlodipine.  I told him he may need to increase the doses of those 2 agents as well if his readings remain elevated.            Endocrine    Class 1 obesity due to excess calories with serious comorbidity and body mass index (BMI) of 34.0 to 34.9 in adult     Exercise and weight loss encouraged.            Other    Sleep apnea     There is daytime somnolence.  He has had previous surgery for sleep apnea.  He does not use CPAP.  I encouraged a repeat sleep study to confirm absence of apnea.               I increased his losartan to 100 mg for better blood pressure control.  I added Zetia for better cholesterol management.  I advised him to stop aspirin.  He will continue clopidogrel.    I advised a four-month follow-up.  I suggested he might have another sleep study given his daytime somnolence.             Woo Byrne MD  10/10/2023   9:05 AM

## 2023-10-10 PROBLEM — I25.118 CORONARY ARTERY DISEASE OF NATIVE ARTERY OF NATIVE HEART WITH STABLE ANGINA PECTORIS: Status: ACTIVE | Noted: 2023-10-10

## 2023-10-10 PROBLEM — I10 PRIMARY HYPERTENSION: Status: ACTIVE | Noted: 2023-10-10

## 2023-10-10 NOTE — ASSESSMENT & PLAN NOTE
Zetia 10 mg added to rosuvastatin 40 mg for better LDL reduction.  Current LDL by labs September 2023 75 mg%.

## 2023-10-10 NOTE — ASSESSMENT & PLAN NOTE
Losartan dosing increased to 100 mg per day.  His systolic readings have been persistent Justina in the 130 mm Hg range.  He is encouraged to do more measuring at home.  He will continue carvedilol and amlodipine.  I told him he may need to increase the doses of those 2 agents as well if his readings remain elevated.

## 2023-10-10 NOTE — ASSESSMENT & PLAN NOTE
No active claudication at this time.  He is walking on a regular basis.  Continued walking encouraged.  On exam, findings are unchanged.

## 2023-10-10 NOTE — ASSESSMENT & PLAN NOTE
There are previous iliac stents placed.  There is infrapopliteal disease.  He will continue ambulatory exercise.  Condition stable.   PROCEDURES:  Arthroplasty, [facility] joint 10-Aug-2022 14:41:03  Ulices Grayson

## 2023-10-10 NOTE — ASSESSMENT & PLAN NOTE
2 vessel stenting completed October 2022 to circumflex and dominant right coronary arteries.  Beta-blockers, calcium channel blockers and p.r.n. nitrates to continue.  He walks on a regular basis.  No recent angina reported.    He will remain on chronic Plavix, aspirin discontinued today.

## 2023-10-10 NOTE — ASSESSMENT & PLAN NOTE
There is daytime somnolence.  He has had previous surgery for sleep apnea.  He does not use CPAP.  I encouraged a repeat sleep study to confirm absence of apnea.

## 2023-12-12 ENCOUNTER — TELEPHONE (OUTPATIENT)
Dept: INTERNAL MEDICINE | Facility: CLINIC | Age: 58
End: 2023-12-12
Payer: COMMERCIAL

## 2023-12-12 NOTE — TELEPHONE ENCOUNTER
Pt is on lasix 20 mg taking it as needed but finds fluid build up is more so seeing if it could be taken twice a day if needed   Please advise  Thanks!

## 2023-12-12 NOTE — TELEPHONE ENCOUNTER
----- Message from María Elena Carlisle sent at 2023  3:45 PM CST -----  Contact: pt  Michael Guillaume  MRN: [id number]  : 1965  PCP: Latrice Zafar  Home Phone      [phone]  Work Phone      Not on file.  [place]          [phone]      MESSAGE: Alaina has questions about the pt's fluid pills. Please advise        [phone]

## 2023-12-22 ENCOUNTER — TELEPHONE (OUTPATIENT)
Dept: INTERNAL MEDICINE | Facility: CLINIC | Age: 58
End: 2023-12-22
Payer: COMMERCIAL

## 2023-12-22 RX ORDER — PROMETHAZINE HYDROCHLORIDE AND DEXTROMETHORPHAN HYDROBROMIDE 6.25; 15 MG/5ML; MG/5ML
5 SYRUP ORAL EVERY 6 HOURS PRN
Qty: 120 ML | Refills: 0 | Status: SHIPPED | OUTPATIENT
Start: 2023-12-22 | End: 2023-12-27 | Stop reason: SDUPTHER

## 2023-12-27 ENCOUNTER — OFFICE VISIT (OUTPATIENT)
Dept: INTERNAL MEDICINE | Facility: CLINIC | Age: 58
End: 2023-12-27
Payer: COMMERCIAL

## 2023-12-27 VITALS
RESPIRATION RATE: 18 BRPM | OXYGEN SATURATION: 93 % | WEIGHT: 212.5 LBS | HEART RATE: 88 BPM | SYSTOLIC BLOOD PRESSURE: 124 MMHG | HEIGHT: 64 IN | TEMPERATURE: 98 F | BODY MASS INDEX: 36.28 KG/M2 | DIASTOLIC BLOOD PRESSURE: 86 MMHG

## 2023-12-27 DIAGNOSIS — J44.1 COPD WITH ACUTE EXACERBATION: ICD-10-CM

## 2023-12-27 LAB
CTP QC/QA: YES
POC MOLECULAR INFLUENZA A AGN: NEGATIVE
POC MOLECULAR INFLUENZA B AGN: NEGATIVE
RSV RAPID ANTIGEN: NEGATIVE
SARS-COV-2 RDRP RESP QL NAA+PROBE: NEGATIVE

## 2023-12-27 PROCEDURE — 3008F BODY MASS INDEX DOCD: CPT | Mod: CPTII,S$GLB,, | Performed by: NURSE PRACTITIONER

## 2023-12-27 PROCEDURE — 3061F NEG MICROALBUMINURIA REV: CPT | Mod: CPTII,S$GLB,, | Performed by: NURSE PRACTITIONER

## 2023-12-27 PROCEDURE — 1159F MED LIST DOCD IN RCRD: CPT | Mod: CPTII,S$GLB,, | Performed by: NURSE PRACTITIONER

## 2023-12-27 PROCEDURE — 99213 PR OFFICE/OUTPT VISIT, EST, LEVL III, 20-29 MIN: ICD-10-PCS | Mod: 25,S$GLB,, | Performed by: NURSE PRACTITIONER

## 2023-12-27 PROCEDURE — 3044F HG A1C LEVEL LT 7.0%: CPT | Mod: CPTII,S$GLB,, | Performed by: NURSE PRACTITIONER

## 2023-12-27 PROCEDURE — 3061F PR NEG MICROALBUMINURIA RESULT DOCUMENTED/REVIEW: ICD-10-PCS | Mod: CPTII,S$GLB,, | Performed by: NURSE PRACTITIONER

## 2023-12-27 PROCEDURE — 1159F PR MEDICATION LIST DOCUMENTED IN MEDICAL RECORD: ICD-10-PCS | Mod: CPTII,S$GLB,, | Performed by: NURSE PRACTITIONER

## 2023-12-27 PROCEDURE — 87502 POCT INFLUENZA A/B MOLECULAR: ICD-10-PCS | Mod: QW,S$GLB,, | Performed by: NURSE PRACTITIONER

## 2023-12-27 PROCEDURE — 99213 OFFICE O/P EST LOW 20 MIN: CPT | Mod: 25,S$GLB,, | Performed by: NURSE PRACTITIONER

## 2023-12-27 PROCEDURE — 96372 PR INJECTION,THERAP/PROPH/DIAG2ST, IM OR SUBCUT: ICD-10-PCS | Mod: S$GLB,,, | Performed by: NURSE PRACTITIONER

## 2023-12-27 PROCEDURE — 4010F ACE/ARB THERAPY RXD/TAKEN: CPT | Mod: CPTII,S$GLB,, | Performed by: NURSE PRACTITIONER

## 2023-12-27 PROCEDURE — 3044F PR MOST RECENT HEMOGLOBIN A1C LEVEL <7.0%: ICD-10-PCS | Mod: CPTII,S$GLB,, | Performed by: NURSE PRACTITIONER

## 2023-12-27 PROCEDURE — 3079F DIAST BP 80-89 MM HG: CPT | Mod: CPTII,S$GLB,, | Performed by: NURSE PRACTITIONER

## 2023-12-27 PROCEDURE — 3066F NEPHROPATHY DOC TX: CPT | Mod: CPTII,S$GLB,, | Performed by: NURSE PRACTITIONER

## 2023-12-27 PROCEDURE — 87807 RSV ASSAY W/OPTIC: CPT | Mod: QW,S$GLB,, | Performed by: NURSE PRACTITIONER

## 2023-12-27 PROCEDURE — 99999 PR PBB SHADOW E&M-EST. PATIENT-LVL V: ICD-10-PCS | Mod: PBBFAC,,, | Performed by: NURSE PRACTITIONER

## 2023-12-27 PROCEDURE — 87635 SARS-COV-2 COVID-19 AMP PRB: CPT | Mod: QW,S$GLB,, | Performed by: NURSE PRACTITIONER

## 2023-12-27 PROCEDURE — 87635: ICD-10-PCS | Mod: QW,S$GLB,, | Performed by: NURSE PRACTITIONER

## 2023-12-27 PROCEDURE — 3079F PR MOST RECENT DIASTOLIC BLOOD PRESSURE 80-89 MM HG: ICD-10-PCS | Mod: CPTII,S$GLB,, | Performed by: NURSE PRACTITIONER

## 2023-12-27 PROCEDURE — 87502 INFLUENZA DNA AMP PROBE: CPT | Mod: QW,S$GLB,, | Performed by: NURSE PRACTITIONER

## 2023-12-27 PROCEDURE — 3066F PR DOCUMENTATION OF TREATMENT FOR NEPHROPATHY: ICD-10-PCS | Mod: CPTII,S$GLB,, | Performed by: NURSE PRACTITIONER

## 2023-12-27 PROCEDURE — 87807 POCT RESPIRATORY SYNCYTIAL VIRUS: ICD-10-PCS | Mod: QW,S$GLB,, | Performed by: NURSE PRACTITIONER

## 2023-12-27 PROCEDURE — 4010F PR ACE/ARB THEARPY RXD/TAKEN: ICD-10-PCS | Mod: CPTII,S$GLB,, | Performed by: NURSE PRACTITIONER

## 2023-12-27 PROCEDURE — 3074F SYST BP LT 130 MM HG: CPT | Mod: CPTII,S$GLB,, | Performed by: NURSE PRACTITIONER

## 2023-12-27 PROCEDURE — 3074F PR MOST RECENT SYSTOLIC BLOOD PRESSURE < 130 MM HG: ICD-10-PCS | Mod: CPTII,S$GLB,, | Performed by: NURSE PRACTITIONER

## 2023-12-27 PROCEDURE — 3008F PR BODY MASS INDEX (BMI) DOCUMENTED: ICD-10-PCS | Mod: CPTII,S$GLB,, | Performed by: NURSE PRACTITIONER

## 2023-12-27 PROCEDURE — 99999 PR PBB SHADOW E&M-EST. PATIENT-LVL V: CPT | Mod: PBBFAC,,, | Performed by: NURSE PRACTITIONER

## 2023-12-27 PROCEDURE — 96372 THER/PROPH/DIAG INJ SC/IM: CPT | Mod: S$GLB,,, | Performed by: NURSE PRACTITIONER

## 2023-12-27 RX ORDER — DOXYCYCLINE HYCLATE 100 MG
100 TABLET ORAL EVERY 12 HOURS
Qty: 14 TABLET | Refills: 0 | Status: SHIPPED | OUTPATIENT
Start: 2023-12-27 | End: 2024-01-03

## 2023-12-27 RX ORDER — ALBUTEROL SULFATE 1.25 MG/3ML
1.25 SOLUTION RESPIRATORY (INHALATION) EVERY 6 HOURS PRN
Qty: 120 ML | Refills: 5 | Status: SHIPPED | OUTPATIENT
Start: 2023-12-27 | End: 2024-12-26

## 2023-12-27 RX ORDER — TRIAMCINOLONE ACETONIDE 40 MG/ML
40 INJECTION, SUSPENSION INTRA-ARTICULAR; INTRAMUSCULAR
Status: COMPLETED | OUTPATIENT
Start: 2023-12-27 | End: 2023-12-27

## 2023-12-27 RX ORDER — PROMETHAZINE HYDROCHLORIDE AND DEXTROMETHORPHAN HYDROBROMIDE 6.25; 15 MG/5ML; MG/5ML
5 SYRUP ORAL EVERY 6 HOURS PRN
Qty: 120 ML | Refills: 0 | Status: SHIPPED | OUTPATIENT
Start: 2023-12-27 | End: 2024-01-03

## 2023-12-27 RX ADMIN — TRIAMCINOLONE ACETONIDE 40 MG: 40 INJECTION, SUSPENSION INTRA-ARTICULAR; INTRAMUSCULAR at 04:12

## 2023-12-28 NOTE — PROGRESS NOTES
Subjective:       Patient ID: Michael Guillaume is a 58 y.o. male.    Chief Complaint: Nasal Congestion (With cough- x 5 days )    Patient is known, to me and presents with   Chief Complaint   Patient presents with    Nasal Congestion     With cough- x 5 days    .  Denies chest pain and shortness of breath.  Patient presents with nasal congestion and cough for past five days.   HPI  Review of Systems   Constitutional: Negative.  Negative for activity change, appetite change, chills, diaphoresis, fatigue, fever and unexpected weight change.   HENT:  Positive for congestion and postnasal drip.    Eyes: Negative.    Respiratory:  Positive for cough, shortness of breath and wheezing.    Cardiovascular: Negative.    Skin: Negative.  Negative for color change, pallor, rash and wound.   Hematological:  Negative for adenopathy.       Objective:      Physical Exam  Constitutional:       General: He is not in acute distress.     Appearance: Normal appearance. He is not ill-appearing, toxic-appearing or diaphoretic.   HENT:      Head: Normocephalic and atraumatic.      Right Ear: Tympanic membrane normal.      Left Ear: Tympanic membrane normal.      Nose: Congestion present.      Mouth/Throat:      Mouth: Mucous membranes are moist.      Pharynx: Oropharynx is clear. No posterior oropharyngeal erythema.   Eyes:      General:         Right eye: No discharge.         Left eye: No discharge.      Conjunctiva/sclera: Conjunctivae normal.   Neck:      Vascular: No carotid bruit.   Cardiovascular:      Rate and Rhythm: Regular rhythm.      Heart sounds: Normal heart sounds. No murmur heard.  Pulmonary:      Effort: Pulmonary effort is normal. No respiratory distress.      Breath sounds: No stridor. Examination of the right-upper field reveals wheezing. Examination of the left-upper field reveals wheezing. Examination of the right-middle field reveals wheezing. Examination of the left-middle field reveals wheezing. Examination of the  "right-lower field reveals wheezing. Examination of the left-lower field reveals wheezing. Wheezing present. No rhonchi or rales.   Chest:      Chest wall: No tenderness.   Musculoskeletal:      Cervical back: Normal range of motion and neck supple. No rigidity or tenderness.   Lymphadenopathy:      Cervical: No cervical adenopathy.   Skin:     General: Skin is warm and dry.      Capillary Refill: Capillary refill takes less than 2 seconds.      Coloration: Skin is not jaundiced or pale.      Findings: No bruising, erythema, lesion or rash.   Neurological:      Mental Status: He is alert.         Assessment:       1. COPD with acute exacerbation        Plan:   1. COPD with acute exacerbation  -     POCT COVID-19 Rapid Screening; Future; Expected date: 12/27/2023  -     POCT Influenza A/B Molecular  -     POCT Respiratory Syncytial virus  -     doxycycline (VIBRA-TABS) 100 MG tablet; Take 1 tablet (100 mg total) by mouth every 12 (twelve) hours. for 7 days  Dispense: 14 tablet; Refill: 0  -     triamcinolone acetonide injection 40 mg  -     promethazine-dextromethorphan (PROMETHAZINE-DM) 6.25-15 mg/5 mL Syrp; Take 5 mLs by mouth every 6 (six) hours as needed.  Dispense: 120 mL; Refill: 0  -     albuterol (ACCUNEB) 1.25 mg/3 mL Nebu; Take 3 mLs (1.25 mg total) by nebulization every 6 (six) hours as needed (shortness of breath). Rescue  Dispense: 120 mL; Refill: 5       "This note will not be shared with the patient."  See above treatment plan  Do neb tx qid for now  Any worsening return to clinic or go to ER  RTC as scheduled  "

## 2024-01-26 ENCOUNTER — TELEPHONE (OUTPATIENT)
Dept: INTERNAL MEDICINE | Facility: CLINIC | Age: 59
End: 2024-01-26
Payer: COMMERCIAL

## 2024-01-26 DIAGNOSIS — R60.0 LOCALIZED EDEMA: ICD-10-CM

## 2024-01-26 RX ORDER — FUROSEMIDE 20 MG/1
20 TABLET ORAL DAILY PRN
Qty: 90 TABLET | Refills: 0 | Status: SHIPPED | OUTPATIENT
Start: 2024-01-26 | End: 2024-04-02 | Stop reason: SDUPTHER

## 2024-01-26 NOTE — TELEPHONE ENCOUNTER
----- Message from Crys Muñoz MA sent at 2024  9:12 AM CST -----  Michael Guillaume  MRN: [id number]  : 1965  PCP: Latrice Zafar  Home Phone      [phone]  Work Phone      Not on file.  Mobile          [phone]      MESSAGE:     Patient is out of refill for Lasix  20mg    Send to [facility]

## 2024-02-12 ENCOUNTER — OFFICE VISIT (OUTPATIENT)
Dept: CARDIOLOGY | Facility: CLINIC | Age: 59
End: 2024-02-12
Payer: COMMERCIAL

## 2024-02-12 VITALS
BODY MASS INDEX: 35 KG/M2 | OXYGEN SATURATION: 95 % | DIASTOLIC BLOOD PRESSURE: 86 MMHG | WEIGHT: 205 LBS | HEIGHT: 64 IN | SYSTOLIC BLOOD PRESSURE: 134 MMHG

## 2024-02-12 DIAGNOSIS — N18.31 CHRONIC KIDNEY DISEASE, STAGE 3A: ICD-10-CM

## 2024-02-12 DIAGNOSIS — I73.9 PAD (PERIPHERAL ARTERY DISEASE): ICD-10-CM

## 2024-02-12 DIAGNOSIS — I73.9 CLAUDICATION OF CALF MUSCLES: ICD-10-CM

## 2024-02-12 DIAGNOSIS — I50.32 CHRONIC DIASTOLIC CONGESTIVE HEART FAILURE: ICD-10-CM

## 2024-02-12 DIAGNOSIS — I10 PRIMARY HYPERTENSION: ICD-10-CM

## 2024-02-12 DIAGNOSIS — J44.9 CHRONIC OBSTRUCTIVE PULMONARY DISEASE, UNSPECIFIED COPD TYPE: Primary | ICD-10-CM

## 2024-02-12 DIAGNOSIS — I25.118 CORONARY ARTERY DISEASE OF NATIVE ARTERY OF NATIVE HEART WITH STABLE ANGINA PECTORIS: ICD-10-CM

## 2024-02-12 DIAGNOSIS — E78.5 HYPERLIPIDEMIA LDL GOAL <70: ICD-10-CM

## 2024-02-12 DIAGNOSIS — E66.01 SEVERE OBESITY (BMI 35.0-39.9) WITH COMORBIDITY: ICD-10-CM

## 2024-02-12 DIAGNOSIS — I10 BENIGN ESSENTIAL HTN: ICD-10-CM

## 2024-02-12 PROCEDURE — 99999 PR PBB SHADOW E&M-EST. PATIENT-LVL IV: CPT | Mod: PBBFAC,,, | Performed by: INTERNAL MEDICINE

## 2024-02-12 PROCEDURE — 99214 OFFICE O/P EST MOD 30 MIN: CPT | Mod: S$GLB,,, | Performed by: INTERNAL MEDICINE

## 2024-02-12 PROCEDURE — 1159F MED LIST DOCD IN RCRD: CPT | Mod: CPTII,S$GLB,, | Performed by: INTERNAL MEDICINE

## 2024-02-12 PROCEDURE — 3008F BODY MASS INDEX DOCD: CPT | Mod: CPTII,S$GLB,, | Performed by: INTERNAL MEDICINE

## 2024-02-12 PROCEDURE — 4010F ACE/ARB THERAPY RXD/TAKEN: CPT | Mod: CPTII,S$GLB,, | Performed by: INTERNAL MEDICINE

## 2024-02-12 PROCEDURE — 1160F RVW MEDS BY RX/DR IN RCRD: CPT | Mod: CPTII,S$GLB,, | Performed by: INTERNAL MEDICINE

## 2024-02-12 PROCEDURE — 3075F SYST BP GE 130 - 139MM HG: CPT | Mod: CPTII,S$GLB,, | Performed by: INTERNAL MEDICINE

## 2024-02-12 PROCEDURE — 3079F DIAST BP 80-89 MM HG: CPT | Mod: CPTII,S$GLB,, | Performed by: INTERNAL MEDICINE

## 2024-02-12 RX ORDER — LOSARTAN POTASSIUM 50 MG/1
50 TABLET ORAL DAILY
Start: 2024-02-12

## 2024-02-12 NOTE — PROGRESS NOTES
[facility]     Subjective:    Patient ID:  Michael Guillaume is a 59 y.o. male who presents for follow-up of Peripheral Arterial Disease, Coronary Artery Disease, Hypertension, and Hyperlipidemia    Review of patient's allergies indicates:   Allergen Reactions    Lipitor [atorvastatin] Other (See Comments)     Other reaction(s): Severe Myalgias      The patient returns for reassessment.  Two-vessel stenting completed October 2022.  His angina resolved post stenting.  He is on calcium channel blocker therapy and beta-blocker therapy.  He has lower extremity peripheral arterial disease with previous external iliac stenting.  He has mild claudication with his walks.  His legs do not start hurting until half a mi of walking.  He had to reduce losartan dosing due to symptomatic dizziness related to blood pressure readings around 106 mm Hg.  He had to stop Zetia due to fatigue side effects.  Most recent LDL 75 mg %, he is on Crestor 40 mg.  He is on Plavix.    He is a former smoker.  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  He does use inhalers for chronic lung disease states that in the afternoons he gets fluid retention and bloating.  He does drink a lot of water he states..  He uses furosemide as needed.  He has normal GFR.        Review of Systems   Constitutional: Negative for chills, decreased appetite, diaphoresis, fever, malaise/fatigue, night sweats, weight gain and weight loss.   HENT:  Negative for congestion, ear discharge, ear pain, hearing loss, hoarse voice, nosebleeds, odynophagia, sore throat, stridor and tinnitus.    Eyes:  Negative for blurred vision, discharge, double vision, pain, photophobia, redness, vision loss in left eye, vision loss in right eye, visual disturbance and visual halos.   Cardiovascular:  Positive for claudication and dyspnea on exertion. Negative for chest pain, cyanosis, irregular heartbeat,  leg swelling, near-syncope, orthopnea, palpitations, paroxysmal nocturnal dyspnea and syncope.   Respiratory:  Negative for cough, hemoptysis, shortness of breath, sleep disturbances due to breathing, snoring, sputum production and wheezing.    Endocrine: Negative for cold intolerance, heat intolerance, polydipsia, polyphagia and polyuria.   Hematologic/Lymphatic: Negative for adenopathy and bleeding problem. Does not bruise/bleed easily.   Skin:  Negative for color change, dry skin, flushing, itching, nail changes, poor wound healing, rash, skin cancer, suspicious lesions and unusual hair distribution.   Musculoskeletal:  Negative for arthritis, back pain, falls, gout, joint pain, joint swelling, muscle cramps, muscle weakness, myalgias, neck pain and stiffness.   Gastrointestinal:  Positive for bloating. Negative for abdominal pain, anorexia, change in bowel habit, bowel incontinence, constipation, diarrhea, dysphagia, excessive appetite, flatus, heartburn, hematemesis, hematochezia, hemorrhoids, jaundice, melena, nausea and vomiting.   Genitourinary:  Negative for bladder incontinence, decreased libido, dysuria, flank pain, frequency, genital sores, hematuria, hesitancy, incomplete emptying, nocturia and urgency.   Neurological:  Negative for aphonia, brief paralysis, difficulty with concentration, disturbances in coordination, excessive daytime sleepiness, dizziness, focal weakness, headaches, light-headedness, loss of balance, numbness, paresthesias, seizures, sensory change, tremors, vertigo and weakness.   Psychiatric/Behavioral:  Negative for altered mental status, depression, hallucinations, memory loss, substance abuse, suicidal ideas and thoughts of violence. The patient does not have insomnia and is not nervous/anxious.    Allergic/Immunologic: Negative for hives and persistent infections.        Objective:       Vitals:    02/12/24 0858   BP: 134/86   BP Location: Right arm   Patient Position: Sitting  "  BP Method: Large (Automatic)   SpO2: 95%   Weight: 93 kg (205 lb)   Height: 5' 4" (1.626 m)    Physical Exam  Constitutional:       General: He is not in acute distress.     Appearance: He is well-developed. He is not diaphoretic.   HENT:      Head: Normocephalic and atraumatic.      Nose: Nose normal.   Eyes:      General: No scleral icterus.        Right eye: No discharge.      Conjunctiva/sclera: Conjunctivae normal.      Pupils: Pupils are equal, round, and reactive to light.   Neck:      Thyroid: No thyromegaly.      Vascular: No JVD.      Trachea: No tracheal deviation.   Cardiovascular:      Rate and Rhythm: Normal rate and regular rhythm.      Pulses:           Carotid pulses are 2+ on the right side and 2+ on the left side.       Radial pulses are 0 on the right side and 0 on the left side.        Dorsalis pedis pulses are 0 on the right side and 0 on the left side.        Posterior tibial pulses are 0 on the right side and 0 on the left side.      Heart sounds: Normal heart sounds. No murmur heard.     No friction rub. No gallop.      Comments: Faint monophasic Doppler signals noted at pedal position bilaterally  Pulmonary:      Effort: Pulmonary effort is normal. No respiratory distress.      Breath sounds: Normal breath sounds. No stridor. No wheezing or rales.   Chest:      Chest wall: No tenderness.   Abdominal:      General: Bowel sounds are normal. There is no distension.      Palpations: Abdomen is soft. There is no mass.      Tenderness: There is no abdominal tenderness. There is no guarding or rebound.   Musculoskeletal:         General: No tenderness. Normal range of motion.      Cervical back: Normal range of motion and neck supple.   Lymphadenopathy:      Cervical: No cervical adenopathy.   Skin:     General: Skin is warm and dry.      Coloration: Skin is not pale.      Findings: No erythema or rash.   Neurological:      Mental Status: He is alert and oriented to person, place, and time.      " Cranial Nerves: No cranial nerve deficit.      Coordination: Coordination normal.   Psychiatric:         Behavior: Behavior normal.         Thought Content: Thought content normal.         Judgment: Judgment normal.           Assessment:       1. Chronic obstructive pulmonary disease, unspecified COPD type    2. Benign essential HTN    3. Chronic diastolic congestive heart failure    4. Severe obesity (BMI 35.0-39.9) with comorbidity    5. Coronary artery disease of native artery of native heart with stable angina pectoris    6. PAD (peripheral artery disease)    7. Claudication of calf muscles    8. Hyperlipidemia LDL goal <70    9. Primary hypertension    10. Chronic kidney disease, stage 3a      Results for orders placed or performed in visit on 12/27/23   POCT Influenza A/B Molecular   Result Value Ref Range    POC Molecular Influenza A Ag Negative Negative, Not Reported    POC Molecular Influenza B Ag Negative Negative, Not Reported     Acceptable Yes    POCT Respiratory Syncytial virus   Result Value Ref Range    RSV Rapid Ag Negative Negative     Acceptable Yes    POCT COVID-19 Rapid Screening   Result Value Ref Range    POC Rapid COVID Negative Negative     Acceptable Yes          Current Outpatient Medications:     albuterol (ACCUNEB) 1.25 mg/3 mL Nebu, Take 3 mLs (1.25 mg total) by nebulization every 6 (six) hours as needed (shortness of breath). Rescue, Disp: 120 mL, Rfl: 5    ALPRAZolam (XANAX) 1 MG tablet, TAKE 1 TABLET BY MOUTH 3 TIMES DAILY AS NEEDED., Disp: 90 tablet, Rfl: 5    amLODIPine (NORVASC) 5 MG tablet, Take 1 tablet (5 mg total) by mouth once daily., Disp: 90 tablet, Rfl: 4    atropine 1% (ISOPTO ATROPINE) 1 % Drop, Place 1 drop into the right eye as needed (discomfort)., Disp: 5 mL, Rfl: 6    budesonide-formoterol 80-4.5 mcg (SYMBICORT) 80-4.5 mcg/actuation HFAA, Controller, Disp: 10.2 g, Rfl: 5    carvediloL (COREG) 12.5 MG tablet, Take 1 tablet  (12.5 mg total) by mouth 2 (two) times daily with meals., Disp: 180 tablet, Rfl: 3    clopidogreL (PLAVIX) 75 mg tablet, Take 1 tablet (75 mg total) by mouth once daily., Disp: 90 tablet, Rfl: 3    colchicine (COLCRYS) 0.6 mg tablet, Take 0.6 mg by mouth as needed., Disp: , Rfl:     cyclobenzaprine (FLEXERIL) 10 MG tablet, TAKE 1 TABLET BY MOUTH EVERY DAY IN THE EVENING, Disp: 30 tablet, Rfl: 5    esomeprazole (NEXIUM) 20 MG capsule, Take 20 mg by mouth before breakfast., Disp: , Rfl:     ezetimibe (ZETIA) 10 mg tablet, Take 1 tablet (10 mg total) by mouth once daily., Disp: 90 tablet, Rfl: 4    furosemide (LASIX) 20 MG tablet, Take 1 tablet (20 mg total) by mouth daily as needed., Disp: 90 tablet, Rfl: 0    losartan (COZAAR) 50 MG tablet, Take 1 tablet (50 mg total) by mouth once daily., Disp: , Rfl:     nitroGLYCERIN (NITROSTAT) 0.4 MG SL tablet, Place 1 tablet (0.4 mg total) under the tongue every 5 (five) minutes as needed for Chest pain., Disp: 30 tablet, Rfl: 3    prednisoLONE acetate (PRED FORTE) 1 % DrpS, Place 1 drop into the right eye 2 (two) times daily., Disp: 10 mL, Rfl: 11    rosuvastatin (CRESTOR) 40 MG Tab, Take 1 tablet (40 mg total) by mouth once daily., Disp: 90 tablet, Rfl: 3    vitamin E 400 UNIT capsule, Take 400 Units by mouth once daily., Disp: , Rfl:     Current Facility-Administered Medications:     sodium chloride 0.9% flush 10 mL, 10 mL, Intravenous, PRN, Loc Garcia MD    sodium chloride 0.9% flush 10 mL, 10 mL, Intravenous, PRN, Feng Bond MD     Lab Results   Component Value Date    WBC 9.15 09/28/2023    RBC 5.76 09/28/2023    HGB 16.7 09/28/2023    HCT 50.5 09/28/2023    MCV 88 09/28/2023    MCH 29.0 09/28/2023    MCHC 33.1 09/28/2023    RDW 14.6 (H) 09/28/2023     09/28/2023    MPV 10.0 09/28/2023    GRAN 6.8 09/28/2023    GRAN 74.6 (H) 09/28/2023    LYMPH 1.4 09/28/2023    LYMPH 15.5 (L) 09/28/2023    MONO 0.8 09/28/2023    MONO 8.3 09/28/2023    EOS 0.1 09/28/2023     "BASO 0.07 09/28/2023    EOSINOPHIL 0.5 09/28/2023    BASOPHIL 0.8 09/28/2023    MG 2.1 08/19/2020        CMP  Lab Results   Component Value Date     09/28/2023    K 4.6 09/28/2023     09/28/2023    CO2 29 09/28/2023     09/28/2023    BUN 16 09/28/2023    CREATININE 1.0 09/28/2023    CALCIUM 9.5 09/28/2023    PROT 7.2 09/28/2023    ALBUMIN 4.0 09/28/2023    BILITOT 0.5 09/28/2023    ALKPHOS 76 09/28/2023    AST 19 09/28/2023    ALT 14 09/28/2023    ANIONGAP 7 (L) 09/28/2023    ESTGFRAFRICA >60.0 03/09/2022    EGFRNONAA >60.0 03/09/2022        No results found for: "LABBLOO", "LABURIN", "RESPIRATORYC", "GSRESP"         Results for orders placed or performed during the hospital encounter of 10/06/22   EKG 12-LEAD on arrival to floor    Collection Time: 10/06/22 10:41 AM    Narrative    Test Reason : I25.110,    Vent. Rate : 084 BPM     Atrial Rate : 084 BPM     P-R Int : 144 ms          QRS Dur : 090 ms      QT Int : 384 ms       P-R-T Axes : 053 057 068 degrees     QTc Int : 453 ms    Sinus rhythm with occasional Premature ventricular complexes  Otherwise normal ECG  When compared with ECG of 27-AUG-2022 09:06,  Premature ventricular complexes are now Present  Nonspecific T wave abnormality, improved in Inferior leads  Nonspecific T wave abnormality, improved in Anterior-lateral leads  Confirmed by Etelvina Sargent MD ([id number]) on 10/7/2022 3:19:21 PM    Referred By: KIM THOMPSON           Confirmed By:Etelvina Sargent MD                  Plan:       Problem List Items Addressed This Visit          Pulmonary    COPD (chronic obstructive pulmonary disease) - Primary     A chronic condition.  He has quit smoking.  He uses inhalers.            Cardiac/Vascular    PAD    Claudication of calf muscles     He is having claudication, mild.  No changes in treatment advised.  Condition stable.         Hyperlipidemia LDL goal <70     Most recent LDL 75 mg% with rosuvastatin 40 mg.  He does not want to resume Zetia " due to side effects-weakness.  I told him that I do not think Zetia was the cause of his weakness.  He does not want to resume it.         Chronic diastolic congestive heart failure     He reports abdominal bloatedness and fluid excess responsive to furosemide.  His shortness of breath with activity is stable and is less this year than previous.    Condition controlled.           Coronary artery disease of native artery of native heart with stable angina pectoris     In 2022 stenting was completed.  No angina currently.  Condition stable.         Primary hypertension     Current blood pressures are stable.  Today's blood pressure 134/86.  He will continue amlodipine losartan carvedilol.  He uses Lasix as needed.            Renal/    Chronic kidney disease, stage 3a     Condition unchanged.            Endocrine    Severe obesity (BMI 35.0-39.9) with comorbidity     Weight loss encouraged.          Other Visit Diagnoses       Benign essential HTN        Relevant Medications    losartan (COZAAR) 50 MG tablet               A six-month follow-up was advised.  He is feeling good.  No medication changes made after today's visit.  Reducing free water intake may help his water retention problems.             Woo Byrne MD  02/12/2024   9:04 AM

## 2024-02-12 NOTE — ASSESSMENT & PLAN NOTE
Most recent LDL 75 mg% with rosuvastatin 40 mg.  He does not want to resume Zetia due to side effects-weakness.  I told him that I do not think Zetia was the cause of his weakness.  He does not want to resume it.

## 2024-02-12 NOTE — ASSESSMENT & PLAN NOTE
Current blood pressures are stable.  Today's blood pressure 134/86.  He will continue amlodipine losartan carvedilol.  He uses Lasix as needed.

## 2024-02-12 NOTE — ASSESSMENT & PLAN NOTE
He reports abdominal bloatedness and fluid excess responsive to furosemide.  His shortness of breath with activity is stable and is less this year than previous.    Condition controlled.

## 2024-03-12 ENCOUNTER — OFFICE VISIT (OUTPATIENT)
Dept: INTERNAL MEDICINE | Facility: CLINIC | Age: 59
End: 2024-03-12
Payer: COMMERCIAL

## 2024-03-12 VITALS
BODY MASS INDEX: 35.23 KG/M2 | OXYGEN SATURATION: 94 % | DIASTOLIC BLOOD PRESSURE: 80 MMHG | SYSTOLIC BLOOD PRESSURE: 126 MMHG | RESPIRATION RATE: 18 BRPM | HEIGHT: 64 IN | HEART RATE: 88 BPM | WEIGHT: 206.38 LBS

## 2024-03-12 DIAGNOSIS — H43.11 VITREOUS HEMORRHAGE, RIGHT: ICD-10-CM

## 2024-03-12 DIAGNOSIS — J44.1 COPD WITH ACUTE EXACERBATION: ICD-10-CM

## 2024-03-12 DIAGNOSIS — F41.9 ANXIETY: ICD-10-CM

## 2024-03-12 PROCEDURE — 4010F ACE/ARB THERAPY RXD/TAKEN: CPT | Mod: CPTII,S$GLB,, | Performed by: NURSE PRACTITIONER

## 2024-03-12 PROCEDURE — 3079F DIAST BP 80-89 MM HG: CPT | Mod: CPTII,S$GLB,, | Performed by: NURSE PRACTITIONER

## 2024-03-12 PROCEDURE — 1159F MED LIST DOCD IN RCRD: CPT | Mod: CPTII,S$GLB,, | Performed by: NURSE PRACTITIONER

## 2024-03-12 PROCEDURE — 96372 THER/PROPH/DIAG INJ SC/IM: CPT | Mod: S$GLB,,, | Performed by: NURSE PRACTITIONER

## 2024-03-12 PROCEDURE — 3008F BODY MASS INDEX DOCD: CPT | Mod: CPTII,S$GLB,, | Performed by: NURSE PRACTITIONER

## 2024-03-12 PROCEDURE — 99999 PR PBB SHADOW E&M-EST. PATIENT-LVL IV: CPT | Mod: PBBFAC,,, | Performed by: NURSE PRACTITIONER

## 2024-03-12 PROCEDURE — 3074F SYST BP LT 130 MM HG: CPT | Mod: CPTII,S$GLB,, | Performed by: NURSE PRACTITIONER

## 2024-03-12 PROCEDURE — 99213 OFFICE O/P EST LOW 20 MIN: CPT | Mod: 25,S$GLB,, | Performed by: NURSE PRACTITIONER

## 2024-03-12 RX ORDER — BUDESONIDE AND FORMOTEROL FUMARATE DIHYDRATE 80; 4.5 UG/1; UG/1
AEROSOL RESPIRATORY (INHALATION)
Qty: 10.2 G | Refills: 5 | Status: SHIPPED | OUTPATIENT
Start: 2024-03-12 | End: 2024-03-12

## 2024-03-12 RX ORDER — ALPRAZOLAM 1 MG/1
TABLET ORAL
Qty: 90 TABLET | Refills: 5 | Status: SHIPPED | OUTPATIENT
Start: 2024-03-12

## 2024-03-12 RX ORDER — TRIAMCINOLONE ACETONIDE 40 MG/ML
40 INJECTION, SUSPENSION INTRA-ARTICULAR; INTRAMUSCULAR
Status: COMPLETED | OUTPATIENT
Start: 2024-03-12 | End: 2024-03-12

## 2024-03-12 RX ORDER — BUDESONIDE AND FORMOTEROL FUMARATE DIHYDRATE 80; 4.5 UG/1; UG/1
AEROSOL RESPIRATORY (INHALATION)
Qty: 10.2 G | Refills: 5 | Status: SHIPPED | OUTPATIENT
Start: 2024-03-12 | End: 2024-03-13 | Stop reason: SDUPTHER

## 2024-03-12 RX ORDER — DOXYCYCLINE HYCLATE 100 MG
100 TABLET ORAL EVERY 12 HOURS
Qty: 14 TABLET | Refills: 0 | Status: SHIPPED | OUTPATIENT
Start: 2024-03-12 | End: 2024-03-19

## 2024-03-12 RX ORDER — PROMETHAZINE HYDROCHLORIDE AND DEXTROMETHORPHAN HYDROBROMIDE 6.25; 15 MG/5ML; MG/5ML
5 SYRUP ORAL EVERY 6 HOURS PRN
Qty: 120 ML | Refills: 0 | Status: SHIPPED | OUTPATIENT
Start: 2024-03-12 | End: 2024-03-19

## 2024-03-12 RX ADMIN — TRIAMCINOLONE ACETONIDE 40 MG: 40 INJECTION, SUSPENSION INTRA-ARTICULAR; INTRAMUSCULAR at 11:03

## 2024-03-12 NOTE — PROGRESS NOTES
Subjective:       Patient ID: Michael Guillaume is a 59 y.o. male.    Chief Complaint: Wheezing and Cough (Pt is here today for wheezing, cough and chest congestion. Pt was seen on 12/27/2023 for the same symptoms. Pt is having difficulty sleeping with the cough being so aggressive. When asked what medication pt is taking to manage his symptoms he said he is taking a cough syrup but it unsure of the name.)    Patient is known, to me and presents with   Chief Complaint   Patient presents with    Wheezing    Cough     Pt is here today for wheezing, cough and chest congestion. Pt was seen on 12/27/2023 for the same symptoms. Pt is having difficulty sleeping with the cough being so aggressive. When asked what medication pt is taking to manage his symptoms he said he is taking a cough syrup but it unsure of the name.   .  Denies chest pain and shortness of breath.  Patient presents with copd exacerbation. Needs refills on xanax.   Wheezing   Associated symptoms include coughing and shortness of breath. Pertinent negatives include no abdominal pain, chest pain, chills, diarrhea, ear pain, fever, headaches, neck pain, rash, rhinorrhea, sore throat or vomiting.   Cough  Associated symptoms include shortness of breath and wheezing. Pertinent negatives include no chest pain, chills, ear pain, eye redness, fever, headaches, myalgias, postnasal drip, rash, rhinorrhea or sore throat.     Review of Systems   Constitutional: Negative.  Negative for activity change, appetite change, chills, diaphoresis, fatigue, fever and unexpected weight change.   HENT: Negative.  Negative for congestion, ear discharge, ear pain, facial swelling, hearing loss, nosebleeds, postnasal drip, rhinorrhea, sinus pressure, sneezing, sore throat, tinnitus, trouble swallowing and voice change.    Eyes: Negative.  Negative for photophobia, pain, discharge, redness, itching and visual disturbance.   Respiratory:  Positive for cough, shortness of breath and  wheezing. Negative for apnea, choking, chest tightness and stridor.    Cardiovascular: Negative.  Negative for chest pain, palpitations and leg swelling.   Gastrointestinal:  Negative for abdominal distention, abdominal pain, anal bleeding, blood in stool, constipation, diarrhea, nausea and vomiting.   Genitourinary: Negative.  Negative for difficulty urinating, dysuria, enuresis, flank pain, frequency, hematuria, penile discharge, penile pain, penile swelling, scrotal swelling, testicular pain and urgency.   Musculoskeletal: Negative.  Negative for arthralgias, back pain, gait problem, joint swelling, myalgias, neck pain and neck stiffness.   Skin: Negative.  Negative for color change, pallor, rash and wound.   Neurological:  Negative for dizziness, tremors, seizures, syncope, facial asymmetry, speech difficulty, weakness, light-headedness, numbness and headaches.   Hematological:  Negative for adenopathy. Does not bruise/bleed easily.   Psychiatric/Behavioral: Negative.  Negative for agitation, dysphoric mood, sleep disturbance and suicidal ideas. The patient is not nervous/anxious.        Objective:      Physical Exam  Vitals and nursing note reviewed.   Constitutional:       General: He is not in acute distress.     Appearance: Normal appearance. He is well-developed. He is obese. He is not ill-appearing, toxic-appearing or diaphoretic.   HENT:      Head: Normocephalic and atraumatic.      Right Ear: External ear normal.      Left Ear: External ear normal.      Nose: Nose normal.      Mouth/Throat:      Pharynx: No oropharyngeal exudate.   Eyes:      General:         Right eye: No discharge.         Left eye: No discharge.      Conjunctiva/sclera: Conjunctivae normal.      Pupils: Pupils are equal, round, and reactive to light.   Neck:      Thyroid: No thyromegaly.      Vascular: No JVD.      Trachea: No tracheal deviation.   Cardiovascular:      Rate and Rhythm: Normal rate and regular rhythm.      Heart  sounds: Normal heart sounds. No murmur heard.     No friction rub. No gallop.   Pulmonary:      Effort: Pulmonary effort is normal. No respiratory distress.      Breath sounds: No stridor. Examination of the right-upper field reveals wheezing. Examination of the left-upper field reveals wheezing. Examination of the right-middle field reveals wheezing. Examination of the left-middle field reveals wheezing. Examination of the right-lower field reveals wheezing. Examination of the left-lower field reveals wheezing. Wheezing present. No rhonchi or rales.   Chest:      Chest wall: No tenderness.   Abdominal:      General: Bowel sounds are normal. There is no distension.      Palpations: Abdomen is soft.      Tenderness: There is no abdominal tenderness. There is no guarding or rebound.   Musculoskeletal:         General: No tenderness. Normal range of motion.      Cervical back: Normal range of motion and neck supple.   Lymphadenopathy:      Cervical: No cervical adenopathy.   Skin:     General: Skin is warm and dry.      Capillary Refill: Capillary refill takes less than 2 seconds.      Coloration: Skin is not pale.      Findings: No erythema or rash.   Neurological:      General: No focal deficit present.      Mental Status: He is alert and oriented to person, place, and time.      Cranial Nerves: No cranial nerve deficit.      Sensory: No sensory deficit.      Motor: No weakness or abnormal muscle tone.      Coordination: Coordination normal.      Gait: Gait normal.      Deep Tendon Reflexes: Reflexes are normal and symmetric. Reflexes normal.   Psychiatric:         Attention and Perception: He does not perceive auditory or visual hallucinations.         Mood and Affect: Mood and affect normal. Mood is not anxious or depressed. Affect is not tearful.         Behavior: Behavior normal.         Thought Content: Thought content normal. Thought content does not include homicidal or suicidal ideation. Thought content does  "not include homicidal or suicidal plan.         Judgment: Judgment normal.         Assessment:       1. COPD with acute exacerbation    2. Vitreous hemorrhage, right        Plan:   1. COPD with acute exacerbation  -     budesonide-formoterol 80-4.5 mcg (SYMBICORT) 80-4.5 mcg/actuation HFAA; ControllerController  Dispense: 10.2 g; Refill: 5  -     doxycycline (VIBRA-TABS) 100 MG tablet; Take 1 tablet (100 mg total) by mouth every 12 (twelve) hours. for 7 days  Dispense: 14 tablet; Refill: 0  -     promethazine-dextromethorphan (PROMETHAZINE-DM) 6.25-15 mg/5 mL Syrp; Take 5 mLs by mouth every 6 (six) hours as needed.  Dispense: 120 mL; Refill: 0  -     triamcinolone acetonide injection 40 mg    2. Vitreous hemorrhage, right  Assessment & Plan:  Stable and sees eye doctor          "This note will not be shared with the patient."  Hasn't been using symbicort so will refill that.   If no improvement will let me know  Rtc as scheduled  "

## 2024-03-13 DIAGNOSIS — J44.1 COPD WITH ACUTE EXACERBATION: ICD-10-CM

## 2024-03-13 RX ORDER — BUDESONIDE AND FORMOTEROL FUMARATE DIHYDRATE 80; 4.5 UG/1; UG/1
AEROSOL RESPIRATORY (INHALATION)
Qty: 10.2 G | Refills: 5 | Status: SHIPPED | OUTPATIENT
Start: 2024-03-13 | End: 2024-03-19

## 2024-03-18 DIAGNOSIS — J44.1 COPD WITH ACUTE EXACERBATION: ICD-10-CM

## 2024-03-19 ENCOUNTER — TELEPHONE (OUTPATIENT)
Dept: INTERNAL MEDICINE | Facility: CLINIC | Age: 59
End: 2024-03-19
Payer: COMMERCIAL

## 2024-03-19 DIAGNOSIS — J44.1 COPD WITH ACUTE EXACERBATION: ICD-10-CM

## 2024-03-19 RX ORDER — BUDESONIDE AND FORMOTEROL FUMARATE DIHYDRATE 80; 4.5 UG/1; UG/1
AEROSOL RESPIRATORY (INHALATION)
Qty: 10.2 G | Refills: 5 | Status: SHIPPED | OUTPATIENT
Start: 2024-03-19 | End: 2024-03-19 | Stop reason: SDUPTHER

## 2024-03-19 RX ORDER — BUDESONIDE AND FORMOTEROL FUMARATE DIHYDRATE 80; 4.5 UG/1; UG/1
2 AEROSOL RESPIRATORY (INHALATION) 2 TIMES DAILY
Qty: 10.2 G | Refills: 5 | Status: SHIPPED | OUTPATIENT
Start: 2024-03-19

## 2024-03-28 ENCOUNTER — OFFICE VISIT (OUTPATIENT)
Dept: INTERNAL MEDICINE | Facility: CLINIC | Age: 59
End: 2024-03-28
Payer: COMMERCIAL

## 2024-03-28 VITALS
BODY MASS INDEX: 34.03 KG/M2 | RESPIRATION RATE: 18 BRPM | HEART RATE: 80 BPM | WEIGHT: 199.31 LBS | SYSTOLIC BLOOD PRESSURE: 130 MMHG | DIASTOLIC BLOOD PRESSURE: 84 MMHG | HEIGHT: 64 IN | OXYGEN SATURATION: 99 %

## 2024-03-28 DIAGNOSIS — J44.9 CHRONIC OBSTRUCTIVE PULMONARY DISEASE, UNSPECIFIED COPD TYPE: ICD-10-CM

## 2024-03-28 DIAGNOSIS — I10 PRIMARY HYPERTENSION: Primary | ICD-10-CM

## 2024-03-28 DIAGNOSIS — N18.31 CHRONIC KIDNEY DISEASE, STAGE 3A: ICD-10-CM

## 2024-03-28 DIAGNOSIS — E78.5 HYPERLIPIDEMIA LDL GOAL <70: ICD-10-CM

## 2024-03-28 DIAGNOSIS — E66.09 CLASS 1 OBESITY DUE TO EXCESS CALORIES WITH SERIOUS COMORBIDITY AND BODY MASS INDEX (BMI) OF 34.0 TO 34.9 IN ADULT: ICD-10-CM

## 2024-03-28 DIAGNOSIS — F41.9 ANXIETY: ICD-10-CM

## 2024-03-28 LAB
ALBUMIN SERPL BCP-MCNC: 4.2 G/DL (ref 3.5–5.2)
ALBUMIN/CREAT UR: 5.4 UG/MG (ref 0–30)
ALP SERPL-CCNC: 66 U/L (ref 55–135)
ALT SERPL W/O P-5'-P-CCNC: 15 U/L (ref 10–44)
ANION GAP SERPL CALC-SCNC: 11 MMOL/L (ref 8–16)
AST SERPL-CCNC: 24 U/L (ref 10–40)
BASOPHILS # BLD AUTO: 0.07 K/UL (ref 0–0.2)
BASOPHILS NFR BLD: 0.8 % (ref 0–1.9)
BILIRUB SERPL-MCNC: 0.6 MG/DL (ref 0.1–1)
BUN SERPL-MCNC: 31 MG/DL (ref 6–20)
CALCIUM SERPL-MCNC: 10.2 MG/DL (ref 8.7–10.5)
CHLORIDE SERPL-SCNC: 101 MMOL/L (ref 95–110)
CHOLEST SERPL-MCNC: 134 MG/DL (ref 120–199)
CHOLEST/HDLC SERPL: 3.5 {RATIO} (ref 2–5)
CO2 SERPL-SCNC: 30 MMOL/L (ref 23–29)
CREAT SERPL-MCNC: 1.3 MG/DL (ref 0.5–1.4)
CREAT UR-MCNC: 92 MG/DL (ref 23–375)
DIFFERENTIAL METHOD BLD: ABNORMAL
EOSINOPHIL # BLD AUTO: 0.1 K/UL (ref 0–0.5)
EOSINOPHIL NFR BLD: 0.7 % (ref 0–8)
ERYTHROCYTE [DISTWIDTH] IN BLOOD BY AUTOMATED COUNT: 13.7 % (ref 11.5–14.5)
EST. GFR  (NO RACE VARIABLE): >60 ML/MIN/1.73 M^2
GLUCOSE SERPL-MCNC: 111 MG/DL (ref 70–110)
HCT VFR BLD AUTO: 56.9 % (ref 40–54)
HDLC SERPL-MCNC: 38 MG/DL (ref 40–75)
HDLC SERPL: 28.4 % (ref 20–50)
HGB BLD-MCNC: 18 G/DL (ref 14–18)
IMM GRANULOCYTES # BLD AUTO: 0.02 K/UL (ref 0–0.04)
IMM GRANULOCYTES NFR BLD AUTO: 0.2 % (ref 0–0.5)
LDLC SERPL CALC-MCNC: 68 MG/DL (ref 63–159)
LYMPHOCYTES # BLD AUTO: 1.3 K/UL (ref 1–4.8)
LYMPHOCYTES NFR BLD: 14.3 % (ref 18–48)
MCH RBC QN AUTO: 29.5 PG (ref 27–31)
MCHC RBC AUTO-ENTMCNC: 31.6 G/DL (ref 32–36)
MCV RBC AUTO: 93 FL (ref 82–98)
MICROALBUMIN UR DL<=1MG/L-MCNC: 5 UG/ML
MONOCYTES # BLD AUTO: 0.7 K/UL (ref 0.3–1)
MONOCYTES NFR BLD: 7.8 % (ref 4–15)
NEUTROPHILS # BLD AUTO: 6.9 K/UL (ref 1.8–7.7)
NEUTROPHILS NFR BLD: 76.2 % (ref 38–73)
NONHDLC SERPL-MCNC: 96 MG/DL
NRBC BLD-RTO: 0 /100 WBC
PLATELET # BLD AUTO: 311 K/UL (ref 150–450)
PMV BLD AUTO: 9.4 FL (ref 9.2–12.9)
POTASSIUM SERPL-SCNC: 4.7 MMOL/L (ref 3.5–5.1)
PROT SERPL-MCNC: 7.4 G/DL (ref 6–8.4)
RBC # BLD AUTO: 6.1 M/UL (ref 4.6–6.2)
SODIUM SERPL-SCNC: 142 MMOL/L (ref 136–145)
TRIGL SERPL-MCNC: 140 MG/DL (ref 30–150)
TSH SERPL DL<=0.005 MIU/L-ACNC: 0.77 UIU/ML (ref 0.4–4)
WBC # BLD AUTO: 9.09 K/UL (ref 3.9–12.7)

## 2024-03-28 PROCEDURE — 84443 ASSAY THYROID STIM HORMONE: CPT | Performed by: NURSE PRACTITIONER

## 2024-03-28 PROCEDURE — 99214 OFFICE O/P EST MOD 30 MIN: CPT | Mod: S$GLB,,, | Performed by: NURSE PRACTITIONER

## 2024-03-28 PROCEDURE — 3079F DIAST BP 80-89 MM HG: CPT | Mod: CPTII,S$GLB,, | Performed by: NURSE PRACTITIONER

## 2024-03-28 PROCEDURE — 85025 COMPLETE CBC W/AUTO DIFF WBC: CPT | Performed by: NURSE PRACTITIONER

## 2024-03-28 PROCEDURE — 36415 COLL VENOUS BLD VENIPUNCTURE: CPT | Performed by: NURSE PRACTITIONER

## 2024-03-28 PROCEDURE — 99999 PR PBB SHADOW E&M-EST. PATIENT-LVL V: CPT | Mod: PBBFAC,,, | Performed by: NURSE PRACTITIONER

## 2024-03-28 PROCEDURE — 82043 UR ALBUMIN QUANTITATIVE: CPT | Performed by: NURSE PRACTITIONER

## 2024-03-28 PROCEDURE — 1159F MED LIST DOCD IN RCRD: CPT | Mod: CPTII,S$GLB,, | Performed by: NURSE PRACTITIONER

## 2024-03-28 PROCEDURE — 3075F SYST BP GE 130 - 139MM HG: CPT | Mod: CPTII,S$GLB,, | Performed by: NURSE PRACTITIONER

## 2024-03-28 PROCEDURE — 80053 COMPREHEN METABOLIC PANEL: CPT | Performed by: NURSE PRACTITIONER

## 2024-03-28 PROCEDURE — 3008F BODY MASS INDEX DOCD: CPT | Mod: CPTII,S$GLB,, | Performed by: NURSE PRACTITIONER

## 2024-03-28 PROCEDURE — 4010F ACE/ARB THERAPY RXD/TAKEN: CPT | Mod: CPTII,S$GLB,, | Performed by: NURSE PRACTITIONER

## 2024-03-28 PROCEDURE — 80061 LIPID PANEL: CPT | Performed by: NURSE PRACTITIONER

## 2024-03-28 NOTE — PROGRESS NOTES
Subjective:       Patient ID: Michael Guillaume is a 59 y.o. male.    Chief Complaint: Follow-up (6 months)    Patient is known, to me and presents with   Chief Complaint   Patient presents with    Follow-up     6 months   .  Denies chest pain and shortness of breath.  Patient presents with checkup and labs. He is doing well with no complaints. Mood stable and no sihi noted    Follow-up  Pertinent negatives include no abdominal pain, arthralgias, chest pain, chills, congestion, coughing, diaphoresis, fatigue, fever, headaches, joint swelling, myalgias, nausea, neck pain, numbness, rash, sore throat, vomiting or weakness.     Review of Systems   Constitutional: Negative.  Negative for activity change, appetite change, chills, diaphoresis, fatigue, fever and unexpected weight change.   HENT: Negative.  Negative for congestion, ear discharge, ear pain, facial swelling, hearing loss, nosebleeds, postnasal drip, rhinorrhea, sinus pressure, sneezing, sore throat, tinnitus, trouble swallowing and voice change.    Eyes: Negative.  Negative for photophobia, pain, discharge, redness, itching and visual disturbance.   Respiratory: Negative.  Negative for apnea, cough, choking, chest tightness, shortness of breath, wheezing and stridor.    Cardiovascular: Negative.  Negative for chest pain, palpitations and leg swelling.   Gastrointestinal:  Negative for abdominal distention, abdominal pain, anal bleeding, blood in stool, constipation, diarrhea, nausea and vomiting.   Genitourinary: Negative.  Negative for difficulty urinating, dysuria, enuresis, flank pain, frequency, hematuria, penile discharge, penile pain, penile swelling, scrotal swelling, testicular pain and urgency.   Musculoskeletal: Negative.  Negative for arthralgias, back pain, gait problem, joint swelling, myalgias, neck pain and neck stiffness.   Skin: Negative.  Negative for color change, pallor, rash and wound.   Neurological:  Negative for dizziness, tremors,  seizures, syncope, facial asymmetry, speech difficulty, weakness, light-headedness, numbness and headaches.   Hematological:  Negative for adenopathy. Does not bruise/bleed easily.   Psychiatric/Behavioral: Negative.  Negative for agitation, dysphoric mood, sleep disturbance and suicidal ideas. The patient is not nervous/anxious.        Objective:      Physical Exam  Vitals and nursing note reviewed.   Constitutional:       General: He is not in acute distress.     Appearance: He is well-developed. He is not diaphoretic.   HENT:      Head: Normocephalic and atraumatic.      Right Ear: External ear normal.      Left Ear: External ear normal.      Nose: Nose normal.      Mouth/Throat:      Pharynx: No oropharyngeal exudate.   Eyes:      General:         Right eye: No discharge.         Left eye: No discharge.      Conjunctiva/sclera: Conjunctivae normal.      Pupils: Pupils are equal, round, and reactive to light.   Neck:      Thyroid: No thyromegaly.      Vascular: No JVD.      Trachea: No tracheal deviation.   Cardiovascular:      Rate and Rhythm: Normal rate and regular rhythm.      Heart sounds: Normal heart sounds. No murmur heard.     No friction rub. No gallop.   Pulmonary:      Effort: Pulmonary effort is normal. No respiratory distress.      Breath sounds: Normal breath sounds. No stridor. No wheezing or rales.   Chest:      Chest wall: No tenderness.   Abdominal:      General: Bowel sounds are normal. There is no distension.      Palpations: Abdomen is soft.      Tenderness: There is no abdominal tenderness. There is no guarding or rebound.   Musculoskeletal:         General: No tenderness. Normal range of motion.      Cervical back: Normal range of motion and neck supple.   Lymphadenopathy:      Cervical: No cervical adenopathy.   Skin:     General: Skin is warm and dry.      Capillary Refill: Capillary refill takes less than 2 seconds.      Coloration: Skin is not pale.      Findings: No erythema or rash.    Neurological:      General: No focal deficit present.      Mental Status: He is alert and oriented to person, place, and time.      Cranial Nerves: No cranial nerve deficit.      Motor: No abnormal muscle tone.      Coordination: Coordination normal.      Deep Tendon Reflexes: Reflexes are normal and symmetric. Reflexes normal.   Psychiatric:         Attention and Perception: He does not perceive auditory or visual hallucinations.         Mood and Affect: Mood and affect normal. Mood is not anxious or depressed. Affect is not tearful.         Behavior: Behavior normal.         Thought Content: Thought content normal. Thought content does not include homicidal or suicidal ideation. Thought content does not include homicidal or suicidal plan.         Judgment: Judgment normal.         Assessment:       1. Primary hypertension    2. Hyperlipidemia LDL goal <70    3. Chronic obstructive pulmonary disease, unspecified COPD type    4. Chronic kidney disease, stage 3a    5. Anxiety    6. Class 1 obesity due to excess calories with serious comorbidity and body mass index (BMI) of 34.0 to 34.9 in adult        Plan:   1. Primary hypertension  -     CBC Auto Differential; Future; Expected date: 03/28/2024  -     Comprehensive Metabolic Panel; Future; Expected date: 03/28/2024  -     Microalbumin/Creatinine Ratio, Urine; Future; Expected date: 03/28/2024    2. Hyperlipidemia LDL goal <70  -     CBC Auto Differential; Future; Expected date: 03/28/2024  -     Comprehensive Metabolic Panel; Future; Expected date: 03/28/2024  -     TSH; Future; Expected date: 03/28/2024  -     Lipid Panel; Future; Expected date: 03/28/2024    3. Chronic obstructive pulmonary disease, unspecified COPD type  -     CBC Auto Differential; Future; Expected date: 03/28/2024  -     Comprehensive Metabolic Panel; Future; Expected date: 03/28/2024    4. Chronic kidney disease, stage 3a  -     CBC Auto Differential; Future; Expected date: 03/28/2024  -      "Comprehensive Metabolic Panel; Future; Expected date: 03/28/2024    5. Anxiety  -     CBC Auto Differential; Future; Expected date: 03/28/2024  -     Comprehensive Metabolic Panel; Future; Expected date: 03/28/2024    6. Class 1 obesity due to excess calories with serious comorbidity and body mass index (BMI) of 34.0 to 34.9 in adult  -     CBC Auto Differential; Future; Expected date: 03/28/2024  -     Comprehensive Metabolic Panel; Future; Expected date: 03/28/2024     "This note will not be shared with the patient."  Refills on meds.  Medication compliance was discussed with the patient.   Medication side effects were discussed.  The patient was instructed on using exercise frequently to reduce blood pressure.  Thirty to forty-five minutes of brisk walking three to four times a week is often helpful to lower your blood pressure.  Monitor blood pressures at home and to record the values in a log.  The patient was instructed to monitor weight closely and to try to keep it as close to ideal body weight as possible.  Reduce salt intake to less than 2 grams per day.  Do not add salt to food at the table.  Reduce or get rid of salt used in cooking.  Limit processed and fast foods.  Read package labels for amount of salt (soduim) in foods.  Losing weight, even just 10 pounds, of can decrease blood pressure.   Rtc as scheduled  "

## 2024-04-01 ENCOUNTER — PATIENT MESSAGE (OUTPATIENT)
Dept: INTERNAL MEDICINE | Facility: CLINIC | Age: 59
End: 2024-04-01
Payer: COMMERCIAL

## 2024-04-02 ENCOUNTER — TELEPHONE (OUTPATIENT)
Dept: INTERNAL MEDICINE | Facility: CLINIC | Age: 59
End: 2024-04-02
Payer: COMMERCIAL

## 2024-04-02 DIAGNOSIS — R60.0 LOCALIZED EDEMA: ICD-10-CM

## 2024-04-02 RX ORDER — FUROSEMIDE 20 MG/1
20 TABLET ORAL 2 TIMES DAILY PRN
Qty: 60 TABLET | Refills: 2 | Status: SHIPPED | OUTPATIENT
Start: 2024-04-02 | End: 2024-04-03

## 2024-04-02 NOTE — TELEPHONE ENCOUNTER
----- Message from Crys Muñoz MA sent at 2024 10:25 AM CDT -----  Michael Guillaume  MRN: [id number]  : 1965  PCP: Latrice Zafar  Home Phone      [phone]  Work Phone      Not on file.  Mobile          [phone]      MESSAGE:     Patient needs a new script for Furosemide 20mg.,  He needs a corrected prescription,    He takes 1 in the am and 1 in the pm  as needed.     Please send corrected prescription to [facility] ([place])

## 2024-04-03 DIAGNOSIS — I10 BENIGN ESSENTIAL HTN: ICD-10-CM

## 2024-04-03 DIAGNOSIS — I25.110 ATHEROSCLEROSIS OF NATIVE CORONARY ARTERY OF NATIVE HEART WITH UNSTABLE ANGINA PECTORIS: ICD-10-CM

## 2024-04-03 DIAGNOSIS — R60.0 LOCALIZED EDEMA: ICD-10-CM

## 2024-04-03 RX ORDER — AMLODIPINE BESYLATE 5 MG/1
5 TABLET ORAL
Qty: 90 TABLET | Refills: 4 | Status: SHIPPED | OUTPATIENT
Start: 2024-04-03

## 2024-04-03 RX ORDER — FUROSEMIDE 20 MG/1
20 TABLET ORAL 2 TIMES DAILY PRN
Qty: 180 TABLET | Refills: 3 | Status: SHIPPED | OUTPATIENT
Start: 2024-04-03

## 2024-05-07 DIAGNOSIS — E78.5 HYPERLIPIDEMIA LDL GOAL <70: ICD-10-CM

## 2024-05-07 RX ORDER — ROSUVASTATIN CALCIUM 40 MG/1
40 TABLET, COATED ORAL DAILY
Qty: 90 TABLET | Refills: 3 | Status: SHIPPED | OUTPATIENT
Start: 2024-05-07

## 2024-06-05 ENCOUNTER — OFFICE VISIT (OUTPATIENT)
Dept: INTERNAL MEDICINE | Facility: CLINIC | Age: 59
End: 2024-06-05
Payer: COMMERCIAL

## 2024-06-05 VITALS
WEIGHT: 199.06 LBS | RESPIRATION RATE: 18 BRPM | OXYGEN SATURATION: 95 % | SYSTOLIC BLOOD PRESSURE: 112 MMHG | BODY MASS INDEX: 33.98 KG/M2 | DIASTOLIC BLOOD PRESSURE: 72 MMHG | HEART RATE: 91 BPM | HEIGHT: 64 IN

## 2024-06-05 DIAGNOSIS — G89.29 CHRONIC BILATERAL BACK PAIN, UNSPECIFIED BACK LOCATION: ICD-10-CM

## 2024-06-05 DIAGNOSIS — M54.9 CHRONIC BILATERAL BACK PAIN, UNSPECIFIED BACK LOCATION: ICD-10-CM

## 2024-06-05 PROCEDURE — 1159F MED LIST DOCD IN RCRD: CPT | Mod: CPTII,S$GLB,, | Performed by: NURSE PRACTITIONER

## 2024-06-05 PROCEDURE — 3074F SYST BP LT 130 MM HG: CPT | Mod: CPTII,S$GLB,, | Performed by: NURSE PRACTITIONER

## 2024-06-05 PROCEDURE — 96372 THER/PROPH/DIAG INJ SC/IM: CPT | Mod: S$GLB,,, | Performed by: NURSE PRACTITIONER

## 2024-06-05 PROCEDURE — 3066F NEPHROPATHY DOC TX: CPT | Mod: CPTII,S$GLB,, | Performed by: NURSE PRACTITIONER

## 2024-06-05 PROCEDURE — 3078F DIAST BP <80 MM HG: CPT | Mod: CPTII,S$GLB,, | Performed by: NURSE PRACTITIONER

## 2024-06-05 PROCEDURE — 3008F BODY MASS INDEX DOCD: CPT | Mod: CPTII,S$GLB,, | Performed by: NURSE PRACTITIONER

## 2024-06-05 PROCEDURE — 99999 PR PBB SHADOW E&M-EST. PATIENT-LVL IV: CPT | Mod: PBBFAC,,, | Performed by: NURSE PRACTITIONER

## 2024-06-05 PROCEDURE — 3061F NEG MICROALBUMINURIA REV: CPT | Mod: CPTII,S$GLB,, | Performed by: NURSE PRACTITIONER

## 2024-06-05 PROCEDURE — 4010F ACE/ARB THERAPY RXD/TAKEN: CPT | Mod: CPTII,S$GLB,, | Performed by: NURSE PRACTITIONER

## 2024-06-05 PROCEDURE — 99213 OFFICE O/P EST LOW 20 MIN: CPT | Mod: 25,S$GLB,, | Performed by: NURSE PRACTITIONER

## 2024-06-05 RX ORDER — TRIAMCINOLONE ACETONIDE 40 MG/ML
40 INJECTION, SUSPENSION INTRA-ARTICULAR; INTRAMUSCULAR
Status: COMPLETED | OUTPATIENT
Start: 2024-06-05 | End: 2024-06-05

## 2024-06-05 RX ORDER — KETOROLAC TROMETHAMINE 30 MG/ML
60 INJECTION, SOLUTION INTRAMUSCULAR; INTRAVENOUS
Status: COMPLETED | OUTPATIENT
Start: 2024-06-05 | End: 2024-06-05

## 2024-06-05 RX ORDER — CYCLOBENZAPRINE HCL 10 MG
10 TABLET ORAL NIGHTLY
Qty: 30 TABLET | Refills: 0 | Status: SHIPPED | OUTPATIENT
Start: 2024-06-05

## 2024-06-05 RX ADMIN — TRIAMCINOLONE ACETONIDE 40 MG: 40 INJECTION, SUSPENSION INTRA-ARTICULAR; INTRAMUSCULAR at 03:06

## 2024-06-05 RX ADMIN — KETOROLAC TROMETHAMINE 60 MG: 30 INJECTION, SOLUTION INTRAMUSCULAR; INTRAVENOUS at 03:06

## 2024-06-10 NOTE — PROGRESS NOTES
Subjective:       Patient ID: Michael Guillaume is a 59 y.o. male.    Chief Complaint: Back Pain (Lower mid area of back- x 2 weeks/PS:8)    Patient is known, to me and presents with   Chief Complaint   Patient presents with    Back Pain     Lower mid area of back- x 2 weeks  PS:8   .  Denies chest pain and shortness of breath.  Patient states that he twisted his lower back and now with pain. He does this from time to time. Would like a shot and muscle relaxer's. No saddle anesthesia. Not experiencing any numbness or weakness to LE    Back Pain  Pertinent negatives include no numbness or weakness.     Review of Systems   Constitutional: Negative.    Respiratory: Negative.     Cardiovascular: Negative.    Musculoskeletal:  Positive for arthralgias and back pain. Negative for gait problem, joint swelling, myalgias, neck pain and neck stiffness.   Skin: Negative.  Negative for rash.   Neurological:  Negative for weakness and numbness.       Objective:      Physical Exam  Constitutional:       General: He is not in acute distress.     Appearance: Normal appearance. He is obese. He is not ill-appearing, toxic-appearing or diaphoretic.   Neck:      Vascular: No carotid bruit.   Cardiovascular:      Rate and Rhythm: Normal rate and regular rhythm.      Heart sounds: Normal heart sounds. No murmur heard.  Pulmonary:      Effort: Pulmonary effort is normal. No respiratory distress.      Breath sounds: Normal breath sounds. No stridor. No wheezing, rhonchi or rales.   Chest:      Chest wall: No tenderness.   Musculoskeletal:         General: Tenderness present. No swelling, deformity or signs of injury.      Cervical back: Normal range of motion and neck supple. No rigidity or tenderness.      Lumbar back: Tenderness present. Decreased range of motion.        Back:       Right lower leg: No edema.      Left lower leg: No edema.   Lymphadenopathy:      Cervical: No cervical adenopathy.   Skin:     Capillary Refill: Capillary  "refill takes less than 2 seconds.   Neurological:      Mental Status: He is alert.         Assessment:       1. Chronic bilateral back pain, unspecified back location        Plan:   1. Chronic bilateral back pain, unspecified back location  -     cyclobenzaprine (FLEXERIL) 10 MG tablet; Take 1 tablet (10 mg total) by mouth every evening.  Dispense: 30 tablet; Refill: 0  -     ketorolac injection 60 mg  -     triamcinolone acetonide injection 40 mg       "This note will not be shared with the patient."  If no improvement let me know   Rtc as scheduled  "

## 2024-06-26 DIAGNOSIS — I73.9 PAD (PERIPHERAL ARTERY DISEASE): ICD-10-CM

## 2024-06-26 RX ORDER — CLOPIDOGREL BISULFATE 75 MG/1
75 TABLET ORAL
Qty: 90 TABLET | Refills: 3 | Status: SHIPPED | OUTPATIENT
Start: 2024-06-26

## 2024-07-06 DIAGNOSIS — M54.9 CHRONIC BILATERAL BACK PAIN, UNSPECIFIED BACK LOCATION: ICD-10-CM

## 2024-07-06 DIAGNOSIS — G89.29 CHRONIC BILATERAL BACK PAIN, UNSPECIFIED BACK LOCATION: ICD-10-CM

## 2024-07-08 RX ORDER — CYCLOBENZAPRINE HCL 10 MG
10 TABLET ORAL NIGHTLY
Qty: 30 TABLET | Refills: 0 | Status: SHIPPED | OUTPATIENT
Start: 2024-07-08

## 2024-08-08 DIAGNOSIS — I10 ESSENTIAL HYPERTENSION: Chronic | ICD-10-CM

## 2024-08-08 RX ORDER — CARVEDILOL 12.5 MG/1
12.5 TABLET ORAL 2 TIMES DAILY WITH MEALS
Qty: 180 TABLET | Refills: 3 | Status: SHIPPED | OUTPATIENT
Start: 2024-08-08

## 2024-08-13 ENCOUNTER — OFFICE VISIT (OUTPATIENT)
Dept: INTERNAL MEDICINE | Facility: CLINIC | Age: 59
End: 2024-08-13
Payer: COMMERCIAL

## 2024-08-13 VITALS
BODY MASS INDEX: 32.37 KG/M2 | HEIGHT: 64 IN | RESPIRATION RATE: 18 BRPM | TEMPERATURE: 97 F | WEIGHT: 189.63 LBS | HEART RATE: 88 BPM | SYSTOLIC BLOOD PRESSURE: 122 MMHG | OXYGEN SATURATION: 95 % | DIASTOLIC BLOOD PRESSURE: 84 MMHG

## 2024-08-13 DIAGNOSIS — J44.1 COPD WITH ACUTE EXACERBATION: Primary | ICD-10-CM

## 2024-08-13 PROBLEM — R60.0 LEG EDEMA: Status: RESOLVED | Noted: 2017-06-20 | Resolved: 2024-08-13

## 2024-08-13 PROBLEM — E66.01 SEVERE OBESITY (BMI 35.0-39.9) WITH COMORBIDITY: Status: RESOLVED | Noted: 2023-09-28 | Resolved: 2024-08-13

## 2024-08-13 PROBLEM — M54.32 LEFT SIDED SCIATICA: Status: RESOLVED | Noted: 2019-06-24 | Resolved: 2024-08-13

## 2024-08-13 PROBLEM — S80.11XA LEG HEMATOMA, RIGHT, INITIAL ENCOUNTER: Status: RESOLVED | Noted: 2020-03-05 | Resolved: 2024-08-13

## 2024-08-13 PROBLEM — K60.2 ANAL FISSURE: Status: RESOLVED | Noted: 2017-07-19 | Resolved: 2024-08-13

## 2024-08-13 PROCEDURE — 3066F NEPHROPATHY DOC TX: CPT | Mod: CPTII,S$GLB,, | Performed by: NURSE PRACTITIONER

## 2024-08-13 PROCEDURE — 96372 THER/PROPH/DIAG INJ SC/IM: CPT | Mod: S$GLB,,, | Performed by: NURSE PRACTITIONER

## 2024-08-13 PROCEDURE — 3008F BODY MASS INDEX DOCD: CPT | Mod: CPTII,S$GLB,, | Performed by: NURSE PRACTITIONER

## 2024-08-13 PROCEDURE — 3079F DIAST BP 80-89 MM HG: CPT | Mod: CPTII,S$GLB,, | Performed by: NURSE PRACTITIONER

## 2024-08-13 PROCEDURE — 99213 OFFICE O/P EST LOW 20 MIN: CPT | Mod: 25,S$GLB,, | Performed by: NURSE PRACTITIONER

## 2024-08-13 PROCEDURE — 1159F MED LIST DOCD IN RCRD: CPT | Mod: CPTII,S$GLB,, | Performed by: NURSE PRACTITIONER

## 2024-08-13 PROCEDURE — 4010F ACE/ARB THERAPY RXD/TAKEN: CPT | Mod: CPTII,S$GLB,, | Performed by: NURSE PRACTITIONER

## 2024-08-13 PROCEDURE — 3074F SYST BP LT 130 MM HG: CPT | Mod: CPTII,S$GLB,, | Performed by: NURSE PRACTITIONER

## 2024-08-13 PROCEDURE — 3061F NEG MICROALBUMINURIA REV: CPT | Mod: CPTII,S$GLB,, | Performed by: NURSE PRACTITIONER

## 2024-08-13 PROCEDURE — 99999 PR PBB SHADOW E&M-EST. PATIENT-LVL V: CPT | Mod: PBBFAC,,, | Performed by: NURSE PRACTITIONER

## 2024-08-13 RX ORDER — TRIAMCINOLONE ACETONIDE 40 MG/ML
40 INJECTION, SUSPENSION INTRA-ARTICULAR; INTRAMUSCULAR
Status: COMPLETED | OUTPATIENT
Start: 2024-08-13 | End: 2024-08-13

## 2024-08-13 RX ORDER — AMOXICILLIN 500 MG/1
500 CAPSULE ORAL EVERY 12 HOURS
Qty: 14 CAPSULE | Refills: 0 | Status: SHIPPED | OUTPATIENT
Start: 2024-08-13 | End: 2024-08-20

## 2024-08-13 RX ADMIN — TRIAMCINOLONE ACETONIDE 40 MG: 40 INJECTION, SUSPENSION INTRA-ARTICULAR; INTRAMUSCULAR at 09:08

## 2024-08-13 NOTE — PROGRESS NOTES
Subjective:       Patient ID: Michael Guillaume is a 59 y.o. male.    Chief Complaint: Nasal Congestion (With cough- x 6 days )    Patient is known, to me and presents with   Chief Complaint   Patient presents with    Nasal Congestion     With cough- x 6 days    .  Denies chest pain and shortness of breath.  Patient presents with above complaints. He is a copd patient. Has been suffering with symptoms for about six days. No fever or chills and no body aches.    HPI  Review of Systems   Constitutional: Negative.  Negative for chills, fatigue and fever.   HENT:  Positive for congestion and postnasal drip.    Eyes: Negative.    Respiratory:  Positive for cough and wheezing. Negative for apnea, choking, chest tightness, shortness of breath and stridor.    Cardiovascular: Negative.    Skin: Negative.    Hematological: Negative.  Negative for adenopathy.       Objective:      Physical Exam  Constitutional:       General: He is not in acute distress.     Appearance: Normal appearance. He is obese. He is not ill-appearing, toxic-appearing or diaphoretic.   HENT:      Head: Normocephalic and atraumatic.      Right Ear: Tympanic membrane has normal mobility.      Left Ear: Tympanic membrane has normal mobility.      Nose: Congestion present.      Mouth/Throat:      Pharynx: Oropharynx is clear. No pharyngeal swelling or posterior oropharyngeal erythema.   Eyes:      General:         Right eye: No discharge.         Left eye: No discharge.      Conjunctiva/sclera: Conjunctivae normal.   Neck:      Vascular: No carotid bruit.   Cardiovascular:      Rate and Rhythm: Regular rhythm.      Heart sounds: No murmur heard.  Pulmonary:      Effort: Pulmonary effort is normal. No respiratory distress.      Breath sounds: No stridor. Examination of the right-upper field reveals wheezing and rhonchi. Examination of the left-upper field reveals wheezing and rhonchi. Examination of the right-middle field reveals wheezing and rhonchi.  "Examination of the left-middle field reveals wheezing and rhonchi. Examination of the right-lower field reveals wheezing and rhonchi. Examination of the left-lower field reveals wheezing and rhonchi. Wheezing and rhonchi present. No rales.   Chest:      Chest wall: No tenderness.   Musculoskeletal:      Cervical back: Normal range of motion and neck supple. No rigidity or tenderness.   Lymphadenopathy:      Cervical: No cervical adenopathy.   Skin:     General: Skin is warm and dry.      Capillary Refill: Capillary refill takes less than 2 seconds.      Coloration: Skin is not jaundiced or pale.      Findings: No bruising, erythema, lesion or rash.   Neurological:      Mental Status: He is alert.         Assessment:       1. COPD with acute exacerbation        Plan:   1. COPD with acute exacerbation  -     amoxicillin (AMOXIL) 500 MG capsule; Take 1 capsule (500 mg total) by mouth every 12 (twelve) hours. for 7 days  Dispense: 14 capsule; Refill: 0  -     triamcinolone acetonide injection 40 mg       "This note will not be shared with the patient."    Make sure to do treatments   Mucinex bid   Any worsening will return to clinic   Rtc as scheduled   "

## 2024-09-07 DIAGNOSIS — J44.1 COPD WITH ACUTE EXACERBATION: ICD-10-CM

## 2024-09-07 DIAGNOSIS — I10 BENIGN ESSENTIAL HTN: ICD-10-CM

## 2024-09-09 DIAGNOSIS — F41.9 ANXIETY: ICD-10-CM

## 2024-09-09 RX ORDER — ALPRAZOLAM 1 MG/1
TABLET ORAL
Qty: 90 TABLET | Refills: 2 | Status: SHIPPED | OUTPATIENT
Start: 2024-09-09

## 2024-09-09 RX ORDER — LOSARTAN POTASSIUM 100 MG/1
TABLET ORAL
Qty: 90 TABLET | Refills: 3 | Status: SHIPPED | OUTPATIENT
Start: 2024-09-09

## 2024-09-09 RX ORDER — ALPRAZOLAM 1 MG/1
TABLET ORAL
Qty: 90 TABLET | Refills: 5 | Status: SHIPPED | OUTPATIENT
Start: 2024-09-09

## 2024-09-09 RX ORDER — BUDESONIDE AND FORMOTEROL FUMARATE 80; 4.5 UG/1; UG/1
AEROSOL, METERED RESPIRATORY (INHALATION)
Qty: 10.2 G | Refills: 1 | Status: SHIPPED | OUTPATIENT
Start: 2024-09-09

## 2024-09-09 RX ORDER — ALBUTEROL SULFATE 1.25 MG/3ML
1.25 SOLUTION RESPIRATORY (INHALATION) EVERY 6 HOURS PRN
Qty: 75 ML | Refills: 5 | Status: SHIPPED | OUTPATIENT
Start: 2024-09-09 | End: 2025-09-09

## 2024-09-27 ENCOUNTER — OFFICE VISIT (OUTPATIENT)
Dept: INTERNAL MEDICINE | Facility: CLINIC | Age: 59
End: 2024-09-27
Payer: COMMERCIAL

## 2024-09-27 VITALS
OXYGEN SATURATION: 98 % | BODY MASS INDEX: 31.88 KG/M2 | HEART RATE: 95 BPM | RESPIRATION RATE: 18 BRPM | HEIGHT: 64 IN | WEIGHT: 186.75 LBS

## 2024-09-27 DIAGNOSIS — E78.5 HYPERLIPIDEMIA LDL GOAL <70: ICD-10-CM

## 2024-09-27 DIAGNOSIS — Z12.5 PROSTATE CANCER SCREENING: ICD-10-CM

## 2024-09-27 DIAGNOSIS — E66.9 OBESITY (BMI 30.0-34.9): ICD-10-CM

## 2024-09-27 DIAGNOSIS — J44.9 CHRONIC OBSTRUCTIVE PULMONARY DISEASE, UNSPECIFIED COPD TYPE: ICD-10-CM

## 2024-09-27 DIAGNOSIS — F41.9 ANXIETY: ICD-10-CM

## 2024-09-27 DIAGNOSIS — Z87.891 HISTORY OF TOBACCO USE: ICD-10-CM

## 2024-09-27 DIAGNOSIS — N18.31 CHRONIC KIDNEY DISEASE, STAGE 3A: ICD-10-CM

## 2024-09-27 DIAGNOSIS — I10 PRIMARY HYPERTENSION: Primary | ICD-10-CM

## 2024-09-27 PROBLEM — E66.811 OBESITY (BMI 30.0-34.9): Status: ACTIVE | Noted: 2024-09-27

## 2024-09-27 LAB
ALBUMIN SERPL BCP-MCNC: 4.1 G/DL (ref 3.5–5.2)
ALBUMIN/CREAT UR: 5.6 UG/MG (ref 0–30)
ALP SERPL-CCNC: 69 U/L (ref 55–135)
ALT SERPL W/O P-5'-P-CCNC: 12 U/L (ref 10–44)
ANION GAP SERPL CALC-SCNC: 11 MMOL/L (ref 8–16)
AST SERPL-CCNC: 21 U/L (ref 10–40)
BASOPHILS # BLD AUTO: 0.05 K/UL (ref 0–0.2)
BASOPHILS NFR BLD: 0.6 % (ref 0–1.9)
BILIRUB SERPL-MCNC: 0.5 MG/DL (ref 0.1–1)
BUN SERPL-MCNC: 22 MG/DL (ref 6–20)
CALCIUM SERPL-MCNC: 9.8 MG/DL (ref 8.7–10.5)
CHLORIDE SERPL-SCNC: 103 MMOL/L (ref 95–110)
CHOLEST SERPL-MCNC: 180 MG/DL (ref 120–199)
CHOLEST/HDLC SERPL: 4.7 {RATIO} (ref 2–5)
CO2 SERPL-SCNC: 29 MMOL/L (ref 23–29)
COMPLEXED PSA SERPL-MCNC: 1.4 NG/ML (ref 0–4)
CREAT SERPL-MCNC: 1.4 MG/DL (ref 0.5–1.4)
CREAT UR-MCNC: 179 MG/DL (ref 23–375)
DIFFERENTIAL METHOD BLD: ABNORMAL
EOSINOPHIL # BLD AUTO: 0.1 K/UL (ref 0–0.5)
EOSINOPHIL NFR BLD: 0.6 % (ref 0–8)
ERYTHROCYTE [DISTWIDTH] IN BLOOD BY AUTOMATED COUNT: 13.3 % (ref 11.5–14.5)
EST. GFR  (NO RACE VARIABLE): 57.9 ML/MIN/1.73 M^2
GLUCOSE SERPL-MCNC: 100 MG/DL (ref 70–110)
HCT VFR BLD AUTO: 54.3 % (ref 40–54)
HDLC SERPL-MCNC: 38 MG/DL (ref 40–75)
HDLC SERPL: 21.1 % (ref 20–50)
HGB BLD-MCNC: 17.7 G/DL (ref 14–18)
IMM GRANULOCYTES # BLD AUTO: 0.02 K/UL (ref 0–0.04)
IMM GRANULOCYTES NFR BLD AUTO: 0.2 % (ref 0–0.5)
LDLC SERPL CALC-MCNC: 104.6 MG/DL (ref 63–159)
LYMPHOCYTES # BLD AUTO: 1.1 K/UL (ref 1–4.8)
LYMPHOCYTES NFR BLD: 13.5 % (ref 18–48)
MCH RBC QN AUTO: 30.8 PG (ref 27–31)
MCHC RBC AUTO-ENTMCNC: 32.6 G/DL (ref 32–36)
MCV RBC AUTO: 94 FL (ref 82–98)
MICROALBUMIN UR DL<=1MG/L-MCNC: 10 UG/ML
MONOCYTES # BLD AUTO: 0.7 K/UL (ref 0.3–1)
MONOCYTES NFR BLD: 8.4 % (ref 4–15)
NEUTROPHILS # BLD AUTO: 6.3 K/UL (ref 1.8–7.7)
NEUTROPHILS NFR BLD: 76.7 % (ref 38–73)
NONHDLC SERPL-MCNC: 142 MG/DL
NRBC BLD-RTO: 0 /100 WBC
PLATELET # BLD AUTO: 283 K/UL (ref 150–450)
PMV BLD AUTO: 9.7 FL (ref 9.2–12.9)
POTASSIUM SERPL-SCNC: 4.3 MMOL/L (ref 3.5–5.1)
PROT SERPL-MCNC: 7.1 G/DL (ref 6–8.4)
RBC # BLD AUTO: 5.75 M/UL (ref 4.6–6.2)
SODIUM SERPL-SCNC: 143 MMOL/L (ref 136–145)
TRIGL SERPL-MCNC: 187 MG/DL (ref 30–150)
TSH SERPL DL<=0.005 MIU/L-ACNC: 1.14 UIU/ML (ref 0.4–4)
WBC # BLD AUTO: 8.2 K/UL (ref 3.9–12.7)

## 2024-09-27 PROCEDURE — 82043 UR ALBUMIN QUANTITATIVE: CPT | Performed by: NURSE PRACTITIONER

## 2024-09-27 PROCEDURE — 80061 LIPID PANEL: CPT | Performed by: NURSE PRACTITIONER

## 2024-09-27 PROCEDURE — 4010F ACE/ARB THERAPY RXD/TAKEN: CPT | Mod: CPTII,S$GLB,, | Performed by: NURSE PRACTITIONER

## 2024-09-27 PROCEDURE — 3061F NEG MICROALBUMINURIA REV: CPT | Mod: CPTII,S$GLB,, | Performed by: NURSE PRACTITIONER

## 2024-09-27 PROCEDURE — 80053 COMPREHEN METABOLIC PANEL: CPT | Performed by: NURSE PRACTITIONER

## 2024-09-27 PROCEDURE — 84153 ASSAY OF PSA TOTAL: CPT | Performed by: NURSE PRACTITIONER

## 2024-09-27 PROCEDURE — 1159F MED LIST DOCD IN RCRD: CPT | Mod: CPTII,S$GLB,, | Performed by: NURSE PRACTITIONER

## 2024-09-27 PROCEDURE — 84443 ASSAY THYROID STIM HORMONE: CPT | Performed by: NURSE PRACTITIONER

## 2024-09-27 PROCEDURE — 3008F BODY MASS INDEX DOCD: CPT | Mod: CPTII,S$GLB,, | Performed by: NURSE PRACTITIONER

## 2024-09-27 PROCEDURE — 3066F NEPHROPATHY DOC TX: CPT | Mod: CPTII,S$GLB,, | Performed by: NURSE PRACTITIONER

## 2024-09-27 PROCEDURE — 99999 PR PBB SHADOW E&M-EST. PATIENT-LVL V: CPT | Mod: PBBFAC,,, | Performed by: NURSE PRACTITIONER

## 2024-09-27 PROCEDURE — 85025 COMPLETE CBC W/AUTO DIFF WBC: CPT | Performed by: NURSE PRACTITIONER

## 2024-09-27 PROCEDURE — 82570 ASSAY OF URINE CREATININE: CPT | Performed by: NURSE PRACTITIONER

## 2024-09-27 PROCEDURE — 99214 OFFICE O/P EST MOD 30 MIN: CPT | Mod: S$GLB,,, | Performed by: NURSE PRACTITIONER

## 2024-09-27 NOTE — PROGRESS NOTES
Subjective:       Patient ID: Michael Guillaume is a 59 y.o. male.    Chief Complaint: Follow-up (6 mo f/u)    Patient is known, to me and presents with   Chief Complaint   Patient presents with    Follow-up     6 mo f/u   .  Denies chest pain and shortness of breath.  Patient presents with check up and labs. He feels good and still walking up to 2-3 miles a day. Mood is stable with no sihi noted   Follow-up  Pertinent negatives include no abdominal pain, arthralgias, chest pain, chills, congestion, coughing, diaphoresis, fatigue, fever, headaches, joint swelling, myalgias, nausea, neck pain, numbness, rash, sore throat, vomiting or weakness.     Review of Systems   Constitutional: Negative.  Negative for activity change, appetite change, chills, diaphoresis, fatigue, fever and unexpected weight change.   HENT: Negative.  Negative for congestion, ear discharge, ear pain, facial swelling, hearing loss, nosebleeds, postnasal drip, rhinorrhea, sinus pressure, sneezing, sore throat, tinnitus, trouble swallowing and voice change.    Eyes: Negative.  Negative for photophobia, pain, discharge, redness, itching and visual disturbance.   Respiratory: Negative.  Negative for apnea, cough, choking, chest tightness, shortness of breath, wheezing and stridor.    Cardiovascular: Negative.  Negative for chest pain, palpitations and leg swelling.   Gastrointestinal:  Negative for abdominal distention, abdominal pain, anal bleeding, blood in stool, constipation, diarrhea, nausea and vomiting.   Genitourinary: Negative.  Negative for difficulty urinating, dysuria, enuresis, flank pain, frequency, hematuria, penile discharge, penile pain, penile swelling, scrotal swelling, testicular pain and urgency.   Musculoskeletal: Negative.  Negative for arthralgias, back pain, gait problem, joint swelling, myalgias, neck pain and neck stiffness.   Skin: Negative.  Negative for color change, pallor, rash and wound.   Neurological:  Negative for  dizziness, tremors, seizures, syncope, facial asymmetry, speech difficulty, weakness, light-headedness, numbness and headaches.   Hematological:  Negative for adenopathy. Does not bruise/bleed easily.   Psychiatric/Behavioral: Negative.  Negative for agitation, dysphoric mood, sleep disturbance and suicidal ideas. The patient is not nervous/anxious.        Objective:      Physical Exam  Vitals and nursing note reviewed.   Constitutional:       General: He is not in acute distress.     Appearance: He is well-developed. He is not diaphoretic.   HENT:      Head: Normocephalic and atraumatic.      Right Ear: External ear normal.      Left Ear: External ear normal.      Nose: Nose normal.      Mouth/Throat:      Pharynx: No oropharyngeal exudate.   Eyes:      General:         Right eye: No discharge.         Left eye: No discharge.      Conjunctiva/sclera: Conjunctivae normal.      Pupils: Pupils are equal, round, and reactive to light.   Neck:      Thyroid: No thyromegaly.      Vascular: No JVD.      Trachea: No tracheal deviation.   Cardiovascular:      Rate and Rhythm: Normal rate and regular rhythm.      Heart sounds: Normal heart sounds. No murmur heard.     No friction rub. No gallop.   Pulmonary:      Effort: Pulmonary effort is normal. No respiratory distress.      Breath sounds: Normal breath sounds. No stridor. No wheezing or rales.   Chest:      Chest wall: No tenderness.   Abdominal:      General: Bowel sounds are normal. There is no distension.      Palpations: Abdomen is soft.      Tenderness: There is no abdominal tenderness. There is no guarding or rebound.   Musculoskeletal:         General: No tenderness. Normal range of motion.      Cervical back: Normal range of motion and neck supple.   Lymphadenopathy:      Cervical: No cervical adenopathy.   Skin:     General: Skin is warm and dry.      Capillary Refill: Capillary refill takes less than 2 seconds.      Coloration: Skin is not pale.      Findings:  No erythema or rash.   Neurological:      General: No focal deficit present.      Mental Status: He is alert and oriented to person, place, and time.      Cranial Nerves: No cranial nerve deficit.      Motor: No abnormal muscle tone.      Coordination: Coordination normal.      Deep Tendon Reflexes: Reflexes are normal and symmetric. Reflexes normal.   Psychiatric:         Attention and Perception: He does not perceive auditory or visual hallucinations.         Mood and Affect: Mood and affect normal. Mood is not anxious or depressed. Affect is not tearful.         Behavior: Behavior normal.         Thought Content: Thought content normal. Thought content does not include homicidal or suicidal ideation. Thought content does not include homicidal or suicidal plan.         Judgment: Judgment normal.         Assessment:       1. Primary hypertension    2. Hyperlipidemia LDL goal <70    3. Chronic kidney disease, stage 3a    4. Chronic obstructive pulmonary disease, unspecified COPD type    5. Anxiety    6. Obesity (BMI 30.0-34.9)    7. Prostate cancer screening    8. History of tobacco use        Plan:   1. Primary hypertension  -     CBC Auto Differential; Future; Expected date: 09/27/2024  -     Comprehensive Metabolic Panel; Future; Expected date: 09/27/2024  -     Microalbumin/Creatinine Ratio, Urine; Future; Expected date: 09/27/2024    2. Hyperlipidemia LDL goal <70  -     CBC Auto Differential; Future; Expected date: 09/27/2024  -     Comprehensive Metabolic Panel; Future; Expected date: 09/27/2024  -     TSH; Future; Expected date: 09/27/2024  -     Lipid Panel; Future; Expected date: 09/27/2024    3. Chronic kidney disease, stage 3a  -     CBC Auto Differential; Future; Expected date: 09/27/2024  -     Comprehensive Metabolic Panel; Future; Expected date: 09/27/2024  -     Microalbumin/Creatinine Ratio, Urine; Future; Expected date: 09/27/2024    4. Chronic obstructive pulmonary disease, unspecified COPD  "type  -     CBC Auto Differential; Future; Expected date: 09/27/2024  -     Comprehensive Metabolic Panel; Future; Expected date: 09/27/2024    5. Anxiety  -     CBC Auto Differential; Future; Expected date: 09/27/2024  -     Comprehensive Metabolic Panel; Future; Expected date: 09/27/2024    6. Obesity (BMI 30.0-34.9)  -     CBC Auto Differential; Future; Expected date: 09/27/2024  -     Comprehensive Metabolic Panel; Future; Expected date: 09/27/2024    7. Prostate cancer screening  -     PSA, Screening; Future; Expected date: 09/27/2024    8. History of tobacco use  -     CT Chest Lung Screening Low Dose; Future; Expected date: 09/27/2024       "This note will not be shared with the patient."    Lab drawn today CBC, CMP, TSH, FLP  Limit the cholesterol in your diet to less than 300 mg per day.  Fats should contribute no more than 20 to 35% of your daily calories.  Less than 7 to 10% of your calories should come from saturated fat.  Avoid saturated fat products e.g., butter, some oils, meat, and poultry fat contain a lot of saturated fat.  Check food labels for fat and cholesterol content. Choose the foods with less fat per serving.  Limit the amount of butter and margarine you eat.  Use salad dressings and margarine made with polyunsaturated and monunsaturated fats.  Use egg whites or egg substitutes rather than whole eggs.  Replace whole-milk dairy products with nonfat or low-fat mild, cheese, spreads, and yogurt.  Eat skinless chicken, turkey, fish, and meatless entrees more often than red meat.  Choose lean cuts of meat and trim off all visible fat. Keep portion sizes moderate.  Avoid fatty desserts such as ice cream, cream-filled cakes, and cheesecakes. Choose fresh fruits, nonfat frozen yogurt, Popsicles, etc.  Reduce the amount of fried foods, vending machine food, and fast food you eat.  Eat fruits and vegetables (especially fresh fruits and leafy vegetables), beans, and whole grains daily. The fiber in " these foods helps lower cholesterol.  Look for low-fat or nonfat varieties of the foods you like to eat or look for substitutes.  You may need to exercise 60 minutes a day to prevent weight gain and 90 minutes a day to lose weight.  RTC in six months.

## 2024-10-07 ENCOUNTER — HOSPITAL ENCOUNTER (OUTPATIENT)
Dept: RADIOLOGY | Facility: HOSPITAL | Age: 59
Discharge: HOME | End: 2024-10-07
Attending: NURSE PRACTITIONER
Payer: COMMERCIAL

## 2024-10-07 DIAGNOSIS — Z87.891 HISTORY OF TOBACCO USE: ICD-10-CM

## 2024-10-07 PROCEDURE — 71271 CT THORAX LUNG CANCER SCR C-: CPT | Mod: TC

## 2024-10-18 ENCOUNTER — PATIENT MESSAGE (OUTPATIENT)
Dept: INTERNAL MEDICINE | Facility: CLINIC | Age: 59
End: 2024-10-18
Payer: COMMERCIAL

## 2024-10-24 ENCOUNTER — HOSPITAL ENCOUNTER (OUTPATIENT)
Dept: RADIOLOGY | Facility: HOSPITAL | Age: 59
Discharge: HOME | End: 2024-10-24
Attending: NURSE PRACTITIONER
Payer: COMMERCIAL

## 2024-10-24 ENCOUNTER — TELEPHONE (OUTPATIENT)
Dept: INTERNAL MEDICINE | Facility: CLINIC | Age: 59
End: 2024-10-24

## 2024-10-24 ENCOUNTER — OFFICE VISIT (OUTPATIENT)
Dept: INTERNAL MEDICINE | Facility: CLINIC | Age: 59
End: 2024-10-24
Payer: COMMERCIAL

## 2024-10-24 VITALS
SYSTOLIC BLOOD PRESSURE: 126 MMHG | HEIGHT: 64 IN | WEIGHT: 186.5 LBS | OXYGEN SATURATION: 98 % | BODY MASS INDEX: 31.84 KG/M2 | HEART RATE: 97 BPM | DIASTOLIC BLOOD PRESSURE: 86 MMHG | RESPIRATION RATE: 20 BRPM

## 2024-10-24 DIAGNOSIS — K57.32 DIVERTICULITIS OF LARGE INTESTINE WITHOUT PERFORATION OR ABSCESS WITHOUT BLEEDING: Primary | ICD-10-CM

## 2024-10-24 DIAGNOSIS — R10.32 LEFT INGUINAL PAIN: ICD-10-CM

## 2024-10-24 DIAGNOSIS — R10.32 LEFT LOWER QUADRANT ABDOMINAL PAIN: ICD-10-CM

## 2024-10-24 DIAGNOSIS — R10.32 LEFT LOWER QUADRANT ABDOMINAL PAIN: Primary | ICD-10-CM

## 2024-10-24 PROCEDURE — 1159F MED LIST DOCD IN RCRD: CPT | Mod: CPTII,S$GLB,, | Performed by: NURSE PRACTITIONER

## 2024-10-24 PROCEDURE — 3079F DIAST BP 80-89 MM HG: CPT | Mod: CPTII,S$GLB,, | Performed by: NURSE PRACTITIONER

## 2024-10-24 PROCEDURE — 25500020 PHARM REV CODE 255: Performed by: NURSE PRACTITIONER

## 2024-10-24 PROCEDURE — 99213 OFFICE O/P EST LOW 20 MIN: CPT | Mod: S$GLB,,, | Performed by: NURSE PRACTITIONER

## 2024-10-24 PROCEDURE — 3008F BODY MASS INDEX DOCD: CPT | Mod: CPTII,S$GLB,, | Performed by: NURSE PRACTITIONER

## 2024-10-24 PROCEDURE — 99999 PR PBB SHADOW E&M-EST. PATIENT-LVL V: CPT | Mod: PBBFAC,,, | Performed by: NURSE PRACTITIONER

## 2024-10-24 PROCEDURE — 74177 CT ABD & PELVIS W/CONTRAST: CPT | Mod: 26,,, | Performed by: RADIOLOGY

## 2024-10-24 PROCEDURE — 74177 CT ABD & PELVIS W/CONTRAST: CPT | Mod: TC

## 2024-10-24 PROCEDURE — 3074F SYST BP LT 130 MM HG: CPT | Mod: CPTII,S$GLB,, | Performed by: NURSE PRACTITIONER

## 2024-10-24 PROCEDURE — 3061F NEG MICROALBUMINURIA REV: CPT | Mod: CPTII,S$GLB,, | Performed by: NURSE PRACTITIONER

## 2024-10-24 PROCEDURE — 3066F NEPHROPATHY DOC TX: CPT | Mod: CPTII,S$GLB,, | Performed by: NURSE PRACTITIONER

## 2024-10-24 PROCEDURE — 4010F ACE/ARB THERAPY RXD/TAKEN: CPT | Mod: CPTII,S$GLB,, | Performed by: NURSE PRACTITIONER

## 2024-10-24 RX ORDER — AMOXICILLIN AND CLAVULANATE POTASSIUM 875; 125 MG/1; MG/1
1 TABLET, FILM COATED ORAL EVERY 12 HOURS
Qty: 14 TABLET | Refills: 0 | Status: SHIPPED | OUTPATIENT
Start: 2024-10-24 | End: 2024-10-31

## 2024-10-24 RX ADMIN — IOHEXOL 75 ML: 350 INJECTION, SOLUTION INTRAVENOUS at 11:10

## 2024-10-24 RX ADMIN — IOHEXOL 30 ML: 350 INJECTION, SOLUTION INTRAVENOUS at 11:10

## 2024-10-24 NOTE — TELEPHONE ENCOUNTER
He has acute diverticulitis which is causing his pain. I sent in antibx and he needs to start it asap. I want him to follow up with me end of next week.

## 2024-10-24 NOTE — PROGRESS NOTES
Subjective:       Patient ID: Michael Guillaume is a 59 y.o. male.    Chief Complaint: Abdominal Pain (Lower L. Side of abdomen down into testicles- x 2 days PS10)    Patient is known, to me and presents with   Chief Complaint   Patient presents with    Abdominal Pain     Lower L. Side of abdomen down into testicles- x 2 days PS10   .  Denies chest pain and shortness of breath.  Patient presents with worsening of left lower quadrant pain that extends to his left inguinal region. He developed an URI about two weeks ago and had coughing spells. Since then worsening pain to area of concern. States that he was up all night due to pain and trying to get comfortable. Pain is up to a 10. States that pain improves when he sits on toilet. No blood in stool. Urinating ok as well. No blood in urine. No nausea or vomiting noted. Normally walks about three miles a day but cannot due to pain.       Abdominal Pain  Pertinent negatives include no constipation, diarrhea, dysuria, fever, frequency, hematuria, nausea or vomiting.     Review of Systems   Constitutional:  Positive for activity change and appetite change. Negative for chills, diaphoresis, fatigue, fever and unexpected weight change.   Respiratory: Negative.     Cardiovascular: Negative.    Gastrointestinal:  Positive for abdominal pain. Negative for abdominal distention, anal bleeding, blood in stool, constipation, diarrhea, nausea, rectal pain and vomiting.   Genitourinary:  Positive for testicular pain. Negative for dysuria, frequency, hematuria and urgency.   Skin: Negative.        Objective:      Physical Exam  Constitutional:       General: He is not in acute distress.     Appearance: Normal appearance. He is obese. He is not ill-appearing, toxic-appearing or diaphoretic.   Cardiovascular:      Rate and Rhythm: Normal rate and regular rhythm.      Heart sounds: Normal heart sounds. No murmur heard.  Pulmonary:      Effort: Pulmonary effort is normal. No respiratory  "distress.      Breath sounds: Normal breath sounds. No stridor. No wheezing, rhonchi or rales.   Chest:      Chest wall: No tenderness.   Abdominal:      General: Bowel sounds are normal. There is no distension.      Palpations: Abdomen is soft. There is no mass.      Tenderness: There is abdominal tenderness in the left lower quadrant. There is guarding. There is no right CVA tenderness, left CVA tenderness or rebound.      Hernia: No hernia is present.       Neurological:      Mental Status: He is alert.         Assessment:       1. Left lower quadrant abdominal pain    2. Left inguinal pain        Plan:   1. Left lower quadrant abdominal pain  -     CT Abdomen Pelvis W Wo Contrast; Future; Expected date: 10/24/2024    2. Left inguinal pain  -     CT Abdomen Pelvis W Wo Contrast; Future; Expected date: 10/24/2024       "This note will not be shared with the patient."    Ct ordered today stat    Will call with results and make necessary recommendations     Rtc as scheduled  "

## 2024-10-31 ENCOUNTER — OFFICE VISIT (OUTPATIENT)
Dept: INTERNAL MEDICINE | Facility: CLINIC | Age: 59
End: 2024-10-31
Payer: COMMERCIAL

## 2024-10-31 VITALS
RESPIRATION RATE: 20 BRPM | HEART RATE: 86 BPM | DIASTOLIC BLOOD PRESSURE: 84 MMHG | SYSTOLIC BLOOD PRESSURE: 122 MMHG | HEIGHT: 64 IN | WEIGHT: 186.94 LBS | OXYGEN SATURATION: 96 % | BODY MASS INDEX: 31.91 KG/M2

## 2024-10-31 DIAGNOSIS — K57.32 DIVERTICULITIS OF LARGE INTESTINE WITHOUT PERFORATION OR ABSCESS WITHOUT BLEEDING: Primary | ICD-10-CM

## 2024-10-31 PROCEDURE — 1159F MED LIST DOCD IN RCRD: CPT | Mod: CPTII,S$GLB,, | Performed by: NURSE PRACTITIONER

## 2024-10-31 PROCEDURE — 3079F DIAST BP 80-89 MM HG: CPT | Mod: CPTII,S$GLB,, | Performed by: NURSE PRACTITIONER

## 2024-10-31 PROCEDURE — 3008F BODY MASS INDEX DOCD: CPT | Mod: CPTII,S$GLB,, | Performed by: NURSE PRACTITIONER

## 2024-10-31 PROCEDURE — 3066F NEPHROPATHY DOC TX: CPT | Mod: CPTII,S$GLB,, | Performed by: NURSE PRACTITIONER

## 2024-10-31 PROCEDURE — 99999 PR PBB SHADOW E&M-EST. PATIENT-LVL IV: CPT | Mod: PBBFAC,,, | Performed by: NURSE PRACTITIONER

## 2024-10-31 PROCEDURE — 99213 OFFICE O/P EST LOW 20 MIN: CPT | Mod: S$GLB,,, | Performed by: NURSE PRACTITIONER

## 2024-10-31 PROCEDURE — 4010F ACE/ARB THERAPY RXD/TAKEN: CPT | Mod: CPTII,S$GLB,, | Performed by: NURSE PRACTITIONER

## 2024-10-31 PROCEDURE — 3074F SYST BP LT 130 MM HG: CPT | Mod: CPTII,S$GLB,, | Performed by: NURSE PRACTITIONER

## 2024-10-31 PROCEDURE — 3061F NEG MICROALBUMINURIA REV: CPT | Mod: CPTII,S$GLB,, | Performed by: NURSE PRACTITIONER

## 2024-12-19 DIAGNOSIS — R60.0 LOCALIZED EDEMA: ICD-10-CM

## 2024-12-19 RX ORDER — FUROSEMIDE 20 MG/1
20 TABLET ORAL DAILY PRN
Qty: 90 TABLET | Refills: 1 | Status: SHIPPED | OUTPATIENT
Start: 2024-12-19

## 2025-01-07 DIAGNOSIS — J44.1 COPD WITH ACUTE EXACERBATION: ICD-10-CM

## 2025-01-10 RX ORDER — BUDESONIDE AND FORMOTEROL FUMARATE 80; 4.5 UG/1; UG/1
AEROSOL, METERED RESPIRATORY (INHALATION)
Qty: 10.2 G | Refills: 2 | Status: SHIPPED | OUTPATIENT
Start: 2025-01-10

## 2025-01-24 DIAGNOSIS — J44.1 COPD WITH ACUTE EXACERBATION: ICD-10-CM

## 2025-01-24 RX ORDER — BUDESONIDE AND FORMOTEROL FUMARATE 80; 4.5 UG/1; UG/1
AEROSOL, METERED RESPIRATORY (INHALATION)
Qty: 10.2 G | Refills: 5 | Status: SHIPPED | OUTPATIENT
Start: 2025-01-24

## 2025-02-21 DIAGNOSIS — Z00.00 ENCOUNTER FOR MEDICARE ANNUAL WELLNESS EXAM: ICD-10-CM

## 2025-03-11 ENCOUNTER — OFFICE VISIT (OUTPATIENT)
Dept: CARDIOLOGY | Facility: CLINIC | Age: 60
End: 2025-03-11
Payer: COMMERCIAL

## 2025-03-11 VITALS
WEIGHT: 194 LBS | DIASTOLIC BLOOD PRESSURE: 84 MMHG | BODY MASS INDEX: 33.12 KG/M2 | OXYGEN SATURATION: 98 % | RESPIRATION RATE: 18 BRPM | HEART RATE: 89 BPM | HEIGHT: 64 IN | SYSTOLIC BLOOD PRESSURE: 127 MMHG

## 2025-03-11 DIAGNOSIS — E78.5 HYPERLIPIDEMIA LDL GOAL <70: ICD-10-CM

## 2025-03-11 DIAGNOSIS — I25.118 CORONARY ARTERY DISEASE OF NATIVE ARTERY OF NATIVE HEART WITH STABLE ANGINA PECTORIS: ICD-10-CM

## 2025-03-11 DIAGNOSIS — I10 BENIGN ESSENTIAL HTN: ICD-10-CM

## 2025-03-11 DIAGNOSIS — I73.9 PAD (PERIPHERAL ARTERY DISEASE): ICD-10-CM

## 2025-03-11 DIAGNOSIS — I50.32 CHRONIC DIASTOLIC CONGESTIVE HEART FAILURE: ICD-10-CM

## 2025-03-11 DIAGNOSIS — I10 PRIMARY HYPERTENSION: Primary | ICD-10-CM

## 2025-03-11 PROCEDURE — 99999 PR PBB SHADOW E&M-EST. PATIENT-LVL IV: CPT | Mod: PBBFAC,,, | Performed by: INTERNAL MEDICINE

## 2025-03-11 PROCEDURE — 1160F RVW MEDS BY RX/DR IN RCRD: CPT | Mod: CPTII,S$GLB,, | Performed by: INTERNAL MEDICINE

## 2025-03-11 PROCEDURE — 99214 OFFICE O/P EST MOD 30 MIN: CPT | Mod: S$GLB,,, | Performed by: INTERNAL MEDICINE

## 2025-03-11 PROCEDURE — 3079F DIAST BP 80-89 MM HG: CPT | Mod: CPTII,S$GLB,, | Performed by: INTERNAL MEDICINE

## 2025-03-11 PROCEDURE — 1159F MED LIST DOCD IN RCRD: CPT | Mod: CPTII,S$GLB,, | Performed by: INTERNAL MEDICINE

## 2025-03-11 PROCEDURE — 3008F BODY MASS INDEX DOCD: CPT | Mod: CPTII,S$GLB,, | Performed by: INTERNAL MEDICINE

## 2025-03-11 PROCEDURE — 4010F ACE/ARB THERAPY RXD/TAKEN: CPT | Mod: CPTII,S$GLB,, | Performed by: INTERNAL MEDICINE

## 2025-03-11 PROCEDURE — 3074F SYST BP LT 130 MM HG: CPT | Mod: CPTII,S$GLB,, | Performed by: INTERNAL MEDICINE

## 2025-03-11 RX ORDER — LOSARTAN POTASSIUM 25 MG/1
25 TABLET ORAL DAILY
Qty: 90 TABLET | Refills: 3 | Status: SHIPPED | OUTPATIENT
Start: 2025-03-11

## 2025-03-11 NOTE — PROGRESS NOTES
[facility]     Subjective:    Patient ID:  Michael Guillaume is a 60 y.o. male who presents for follow-up of Peripheral Arterial Disease, Coronary Artery Disease, Hypertension, and Hyperlipidemia    Review of patient's allergies indicates:   Allergen Reactions    Lipitor [atorvastatin] Other (See Comments)     Other reaction(s): Severe Myalgias     He has been walking 2 miles a day without claudication or angina.  He monitors his blood pressures and reports that it has been low.  He takes his pills after his walk.  He states he is lethargic.  He has not measured his blood pressure after the pills but thinks it is low.    Yesterday he held his amlodipine and losartan and only to carvedilol.  He felt a lot better.  In 2022 he had two-vessel stenting right coronary arteries and circumflex vessels.  He has had stenting to the iliac vessels, most recent angiogram 2022-no intervention required.    He is a smoker.  He had a high LDL on last visit and admitted that he had not been taking it every day.  Follow-up labs are ordered.  His previous LDLs were in range on his current therapy.    He had leg swelling with amlodipine.  He has been taking Lasix 20 mg b.i.d. because of swelling.  His Lasix is listed as to be used on an as needed basis.  He states he hydrates after he walks.         Review of Systems   Constitutional: Negative for chills, decreased appetite, diaphoresis, fever, malaise/fatigue, night sweats, weight gain and weight loss.   HENT:  Negative for congestion, ear discharge, ear pain, hearing loss, hoarse voice, nosebleeds, odynophagia, sore throat, stridor and tinnitus.    Eyes:  Negative for blurred vision, discharge, double vision, pain, photophobia, redness, vision loss in left eye, vision loss in right eye, visual disturbance and visual halos.   Cardiovascular:  Negative for chest pain, claudication, cyanosis, dyspnea on  exertion, irregular heartbeat, leg swelling, near-syncope, orthopnea, palpitations, paroxysmal nocturnal dyspnea and syncope.   Respiratory:  Negative for cough, hemoptysis, shortness of breath, sleep disturbances due to breathing, snoring, sputum production and wheezing.    Endocrine: Negative for cold intolerance, heat intolerance, polydipsia, polyphagia and polyuria.   Hematologic/Lymphatic: Negative for adenopathy and bleeding problem. Does not bruise/bleed easily.   Skin:  Negative for color change, dry skin, flushing, itching, nail changes, poor wound healing, rash, skin cancer, suspicious lesions and unusual hair distribution.   Musculoskeletal:  Negative for arthritis, back pain, falls, gout, joint pain, joint swelling, muscle cramps, muscle weakness, myalgias, neck pain and stiffness.   Gastrointestinal:  Negative for bloating, abdominal pain, anorexia, change in bowel habit, bowel incontinence, constipation, diarrhea, dysphagia, excessive appetite, flatus, heartburn, hematemesis, hematochezia, hemorrhoids, jaundice, melena, nausea and vomiting.   Genitourinary:  Negative for bladder incontinence, decreased libido, dysuria, flank pain, frequency, genital sores, hematuria, hesitancy, incomplete emptying, nocturia and urgency.   Neurological:  Positive for dizziness and light-headedness. Negative for aphonia, brief paralysis, difficulty with concentration, disturbances in coordination, excessive daytime sleepiness, focal weakness, headaches, loss of balance, numbness, paresthesias, seizures, sensory change, tremors, vertigo and weakness.   Psychiatric/Behavioral:  Negative for altered mental status, depression, hallucinations, memory loss, substance abuse, suicidal ideas and thoughts of violence. The patient does not have insomnia and is not nervous/anxious.    Allergic/Immunologic: Negative for hives and persistent infections.        Objective:       Vitals:    03/11/25 0847   BP: 127/84   Patient Position:  "Sitting   Pulse: 89   Resp: 18   SpO2: 98%   Weight: 88 kg (194 lb)   Height: 5' 4" (1.626 m)    Physical Exam  Constitutional:       General: He is not in acute distress.     Appearance: He is well-developed. He is not diaphoretic.   HENT:      Head: Normocephalic and atraumatic.      Nose: Nose normal.   Eyes:      General: No scleral icterus.        Right eye: No discharge.      Conjunctiva/sclera: Conjunctivae normal.      Pupils: Pupils are equal, round, and reactive to light.   Neck:      Thyroid: No thyromegaly.      Vascular: No JVD.      Trachea: No tracheal deviation.   Cardiovascular:      Rate and Rhythm: Normal rate and regular rhythm.      Pulses:           Carotid pulses are 2+ on the right side and 2+ on the left side.       Radial pulses are 0 on the right side and 0 on the left side.        Dorsalis pedis pulses are 0 on the right side and 0 on the left side.        Posterior tibial pulses are 0 on the right side and 0 on the left side.      Heart sounds: Normal heart sounds. No murmur heard.     No friction rub. No gallop.      Comments: Faint monophasic Doppler signals noted at pedal position bilaterally  Pulmonary:      Effort: Pulmonary effort is normal. No respiratory distress.      Breath sounds: Normal breath sounds. No stridor. No wheezing or rales.   Chest:      Chest wall: No tenderness.   Abdominal:      General: Bowel sounds are normal. There is no distension.      Palpations: Abdomen is soft. There is no mass.      Tenderness: There is no abdominal tenderness. There is no guarding or rebound.   Musculoskeletal:         General: No tenderness. Normal range of motion.      Cervical back: Normal range of motion and neck supple.   Lymphadenopathy:      Cervical: No cervical adenopathy.   Skin:     General: Skin is warm and dry.      Coloration: Skin is not pale.      Findings: No erythema or rash.   Neurological:      Mental Status: He is alert and oriented to person, place, and time.      " Cranial Nerves: No cranial nerve deficit.      Coordination: Coordination normal.   Psychiatric:         Behavior: Behavior normal.         Thought Content: Thought content normal.         Judgment: Judgment normal.           Assessment:       1. Primary hypertension    2. Chronic diastolic congestive heart failure    3. Coronary artery disease of native artery of native heart with stable angina pectoris    4. PAD (peripheral artery disease)    5. Benign essential HTN    6. Hyperlipidemia LDL goal <70      Results for orders placed or performed in visit on 09/27/24   PSA, Screening    Collection Time: 09/27/24  9:38 AM   Result Value Ref Range    PSA, Screen 1.4 0.00 - 4.00 ng/mL   Microalbumin/Creatinine Ratio, Urine    Collection Time: 09/27/24  9:38 AM   Result Value Ref Range    Microalbumin, Urine 10.0 ug/mL    Creatinine, Urine 179.0 23.0 - 375.0 mg/dL    Microalb/Creat Ratio 5.6 0.0 - 30.0 ug/mg   Lipid Panel    Collection Time: 09/27/24  9:38 AM   Result Value Ref Range    Cholesterol 180 120 - 199 mg/dL    Triglycerides 187 (H) 30 - 150 mg/dL    HDL 38 (L) 40 - 75 mg/dL    LDL Cholesterol 104.6 63.0 - 159.0 mg/dL    HDL/Cholesterol Ratio 21.1 20.0 - 50.0 %    Total Cholesterol/HDL Ratio 4.7 2.0 - 5.0    Non-HDL Cholesterol 142 mg/dL   TSH    Collection Time: 09/27/24  9:38 AM   Result Value Ref Range    TSH 1.142 0.400 - 4.000 uIU/mL   Comprehensive Metabolic Panel    Collection Time: 09/27/24  9:38 AM   Result Value Ref Range    Sodium 143 136 - 145 mmol/L    Potassium 4.3 3.5 - 5.1 mmol/L    Chloride 103 95 - 110 mmol/L    CO2 29 23 - 29 mmol/L    Glucose 100 70 - 110 mg/dL    BUN 22 (H) 6 - 20 mg/dL    Creatinine 1.4 0.5 - 1.4 mg/dL    Calcium 9.8 8.7 - 10.5 mg/dL    Total Protein 7.1 6.0 - 8.4 g/dL    Albumin 4.1 3.5 - 5.2 g/dL    Total Bilirubin 0.5 0.1 - 1.0 mg/dL    Alkaline Phosphatase 69 55 - 135 U/L    AST 21 10 - 40 U/L    ALT 12 10 - 44 U/L    eGFR 57.9 (A) >60 mL/min/1.73 m^2    Anion Gap 11 8  "- 16 mmol/L   CBC Auto Differential    Collection Time: 09/27/24  9:38 AM   Result Value Ref Range    WBC 8.20 3.90 - 12.70 K/uL    RBC 5.75 4.60 - 6.20 M/uL    Hemoglobin 17.7 14.0 - 18.0 g/dL    Hematocrit 54.3 (H) 40.0 - 54.0 %    MCV 94 82 - 98 fL    MCH 30.8 27.0 - 31.0 pg    MCHC 32.6 32.0 - 36.0 g/dL    RDW 13.3 11.5 - 14.5 %    Platelets 283 150 - 450 K/uL    MPV 9.7 9.2 - 12.9 fL    Immature Granulocytes 0.2 0.0 - 0.5 %    Gran # (ANC) 6.3 1.8 - 7.7 K/uL    Immature Grans (Abs) 0.02 0.00 - 0.04 K/uL    Lymph # 1.1 1.0 - 4.8 K/uL    Mono # 0.7 0.3 - 1.0 K/uL    Eos # 0.1 0.0 - 0.5 K/uL    Baso # 0.05 0.00 - 0.20 K/uL    nRBC 0 0 /100 WBC    Gran % 76.7 (H) 38.0 - 73.0 %    Lymph % 13.5 (L) 18.0 - 48.0 %    Mono % 8.4 4.0 - 15.0 %    Eosinophil % 0.6 0.0 - 8.0 %    Basophil % 0.6 0.0 - 1.9 %    Differential Method Automated        Current Medications[1]     Lab Results   Component Value Date    WBC 8.20 09/27/2024    RBC 5.75 09/27/2024    HGB 17.7 09/27/2024    HCT 54.3 (H) 09/27/2024    MCV 94 09/27/2024    MCH 30.8 09/27/2024    MCHC 32.6 09/27/2024    RDW 13.3 09/27/2024     09/27/2024    MPV 9.7 09/27/2024    GRAN 6.3 09/27/2024    GRAN 76.7 (H) 09/27/2024    LYMPH 1.1 09/27/2024    LYMPH 13.5 (L) 09/27/2024    MONO 0.7 09/27/2024    MONO 8.4 09/27/2024    EOS 0.1 09/27/2024    BASO 0.05 09/27/2024    EOSINOPHIL 0.6 09/27/2024    BASOPHIL 0.6 09/27/2024    MG 2.1 08/19/2020        CMP  Lab Results   Component Value Date     09/27/2024    K 4.3 09/27/2024     09/27/2024    CO2 29 09/27/2024     09/27/2024    BUN 22 (H) 09/27/2024    CREATININE 1.4 09/27/2024    CALCIUM 9.8 09/27/2024    PROT 7.1 09/27/2024    ALBUMIN 4.1 09/27/2024    BILITOT 0.5 09/27/2024    ALKPHOS 69 09/27/2024    AST 21 09/27/2024    ALT 12 09/27/2024    ANIONGAP 11 09/27/2024    ESTGFRAFRICA >60.0 03/09/2022    EGFRNONAA >60.0 03/09/2022        No results found for: "LABBLOO", "LABURIN", "RESPIRATORYC", " ""GSRESP"         Results for orders placed or performed during the hospital encounter of 10/06/22   EKG 12-LEAD on arrival to floor    Collection Time: 10/06/22 10:41 AM    Narrative    Test Reason : I25.110,    Vent. Rate : 084 BPM     Atrial Rate : 084 BPM     P-R Int : 144 ms          QRS Dur : 090 ms      QT Int : 384 ms       P-R-T Axes : 053 057 068 degrees     QTc Int : 453 ms    Sinus rhythm with occasional Premature ventricular complexes  Otherwise normal ECG  When compared with ECG of 27-AUG-2022 09:06,  Premature ventricular complexes are now Present  Nonspecific T wave abnormality, improved in Inferior leads  Nonspecific T wave abnormality, improved in Anterior-lateral leads  Confirmed by Etelvina Sargent MD ([id number]) on 10/7/2022 3:19:21 PM    Referred By: KIM THOMPSON           Confirmed By:Etelvina Sargent MD                   Plan:       Problem List Items Addressed This Visit          Cardiac/Vascular    PAD    No claudication symptoms.  He is walking 1-2 miles a day.  Last angiogram 2022.  Patent extra iliac stents.  He does not require Pletal.  He has not had claudication for more than a year.    Walking program to continue.         Hyperlipidemia LDL goal <70    Last blood work was elevated related to noncompliance.  He is trying to do better.  Previously his levels were good on current therapy.  Rosuvastatin 40 mg to continue.    He states Zetia cause generalized weakness and he does not want to take it.         Chronic diastolic congestive heart failure    Condition stable.  No shortness of breath reported with ambulation.  He has furosemide to use but has not needed it.    Current therapy to continue.           Coronary artery disease of native artery of native heart with stable angina pectoris    No angina.  Two-vessel stenting completed in 2022 to right coronary artery and circumflex vessels.         Primary hypertension - Primary    Dosing lowering carried out today related to complaints of " symptomatic hypotension at home.  He reports somnolence and weakness.    He has not measured how low his blood pressure goes but based on his complaint significant reduction in blood pressure therapy carried out today.    He monitors his blood pressures on a daily basis.          Other Visit Diagnoses         Benign essential HTN        Relevant Medications    losartan (COZAAR) 25 MG tablet               I told him to discontinue amlodipine.  We reduced losartan to 25 mg.  He will to continue carvedilol 12.5 mg b.i.d..    Six-month follow-up advised.]    He is encouraged can to continue his walking program which is benefitting his overall cardiovascular status.             Woo Byrne MD  03/12/2025   9:28 AM               [1]   Current Outpatient Medications:     albuterol (ACCUNEB) 1.25 mg/3 mL Nebu, TAKE 3 MLS (1.25 MG TOTAL) BY NEBULIZATION EVERY 6 (SIX) HOURS AS NEEDED (SHORTNESS OF BREATH). RESCUE, Disp: 75 mL, Rfl: 5    ALPRAZolam (XANAX) 1 MG tablet, TAKE 1 TABLET BY MOUTH THREE TIMES A DAY AS NEEDED, Disp: 90 tablet, Rfl: 2    atropine 1% (ISOPTO ATROPINE) 1 % Drop, Place 1 drop into the right eye as needed (discomfort)., Disp: 5 mL, Rfl: 6    budesonide-formoterol 80-4.5 mcg (BREYNA) 80-4.5 mcg/actuation HFAA, INHALE 2 PUFFS INTO THE LUNGS TWICE A DAY, Disp: 10.2 g, Rfl: 5    carvediloL (COREG) 12.5 MG tablet, TAKE 1 TABLET BY MOUTH TWICE A DAY WITH MEALS, Disp: 180 tablet, Rfl: 3    clopidogreL (PLAVIX) 75 mg tablet, TAKE 1 TABLET BY MOUTH EVERY DAY, Disp: 90 tablet, Rfl: 3    colchicine (COLCRYS) 0.6 mg tablet, Take 0.6 mg by mouth as needed., Disp: , Rfl:     cyclobenzaprine (FLEXERIL) 10 MG tablet, TAKE 1 TABLET BY MOUTH EVERY DAY IN THE EVENING, Disp: 30 tablet, Rfl: 0    esomeprazole (NEXIUM) 20 MG capsule, Take 20 mg by mouth before breakfast., Disp: , Rfl:     ezetimibe (ZETIA) 10 mg tablet, Take 1 tablet (10 mg total) by mouth once daily., Disp: 90 tablet, Rfl: 4    furosemide (LASIX) 20 MG  tablet, TAKE 1 TABLET BY MOUTH EVERY DAY AS NEEDED, Disp: 90 tablet, Rfl: 1    nitroGLYCERIN (NITROSTAT) 0.4 MG SL tablet, Place 1 tablet (0.4 mg total) under the tongue every 5 (five) minutes as needed for Chest pain., Disp: 30 tablet, Rfl: 3    prednisoLONE acetate (PRED FORTE) 1 % DrpS, Place 1 drop into the right eye 2 (two) times daily., Disp: 10 mL, Rfl: 11    rosuvastatin (CRESTOR) 40 MG Tab, Take 1 tablet (40 mg total) by mouth once daily., Disp: 90 tablet, Rfl: 3    vitamin E 400 UNIT capsule, Take 400 Units by mouth once daily., Disp: , Rfl:     losartan (COZAAR) 25 MG tablet, Take 1 tablet (25 mg total) by mouth once daily., Disp: 90 tablet, Rfl: 3    Current Facility-Administered Medications:     sodium chloride 0.9% flush 10 mL, 10 mL, Intravenous, PRN, Loc Garcia MD    sodium chloride 0.9% flush 10 mL, 10 mL, Intravenous, PRN, Feng Bond MD

## 2025-03-12 NOTE — ASSESSMENT & PLAN NOTE
Dosing lowering carried out today related to complaints of symptomatic hypotension at home.  He reports somnolence and weakness.    He has not measured how low his blood pressure goes but based on his complaint significant reduction in blood pressure therapy carried out today.    He monitors his blood pressures on a daily basis.

## 2025-03-12 NOTE — ASSESSMENT & PLAN NOTE
No angina.  Two-vessel stenting completed in 2022 to right coronary artery and circumflex vessels.

## 2025-03-12 NOTE — ASSESSMENT & PLAN NOTE
Last blood work was elevated related to noncompliance.  He is trying to do better.  Previously his levels were good on current therapy.  Rosuvastatin 40 mg to continue.    He states Zetia cause generalized weakness and he does not want to take it.

## 2025-03-12 NOTE — ASSESSMENT & PLAN NOTE
No claudication symptoms.  He is walking 1-2 miles a day.  Last angiogram 2022.  Patent extra iliac stents.  He does not require Pletal.  He has not had claudication for more than a year.    Walking program to continue.

## 2025-03-12 NOTE — ASSESSMENT & PLAN NOTE
Condition stable.  No shortness of breath reported with ambulation.  He has furosemide to use but has not needed it.    Current therapy to continue.

## 2025-03-27 ENCOUNTER — OFFICE VISIT (OUTPATIENT)
Dept: INTERNAL MEDICINE | Facility: CLINIC | Age: 60
End: 2025-03-27
Payer: COMMERCIAL

## 2025-03-27 VITALS
RESPIRATION RATE: 20 BRPM | BODY MASS INDEX: 33.05 KG/M2 | HEIGHT: 64 IN | HEART RATE: 89 BPM | DIASTOLIC BLOOD PRESSURE: 80 MMHG | OXYGEN SATURATION: 96 % | SYSTOLIC BLOOD PRESSURE: 132 MMHG | WEIGHT: 193.56 LBS

## 2025-03-27 DIAGNOSIS — E78.5 HYPERLIPIDEMIA LDL GOAL <70: ICD-10-CM

## 2025-03-27 DIAGNOSIS — N18.31 CHRONIC KIDNEY DISEASE, STAGE 3A: ICD-10-CM

## 2025-03-27 DIAGNOSIS — I10 PRIMARY HYPERTENSION: ICD-10-CM

## 2025-03-27 DIAGNOSIS — E66.811 OBESITY (BMI 30.0-34.9): ICD-10-CM

## 2025-03-27 DIAGNOSIS — J44.1 COPD WITH ACUTE EXACERBATION: Primary | ICD-10-CM

## 2025-03-27 PROBLEM — H43.11 VITREOUS HEMORRHAGE, RIGHT: Status: RESOLVED | Noted: 2024-03-12 | Resolved: 2025-03-27

## 2025-03-27 PROCEDURE — 99214 OFFICE O/P EST MOD 30 MIN: CPT | Mod: S$GLB,,, | Performed by: NURSE PRACTITIONER

## 2025-03-27 PROCEDURE — 3079F DIAST BP 80-89 MM HG: CPT | Mod: CPTII,S$GLB,, | Performed by: NURSE PRACTITIONER

## 2025-03-27 PROCEDURE — 3008F BODY MASS INDEX DOCD: CPT | Mod: CPTII,S$GLB,, | Performed by: NURSE PRACTITIONER

## 2025-03-27 PROCEDURE — 1160F RVW MEDS BY RX/DR IN RCRD: CPT | Mod: CPTII,S$GLB,, | Performed by: NURSE PRACTITIONER

## 2025-03-27 PROCEDURE — 3075F SYST BP GE 130 - 139MM HG: CPT | Mod: CPTII,S$GLB,, | Performed by: NURSE PRACTITIONER

## 2025-03-27 PROCEDURE — 1159F MED LIST DOCD IN RCRD: CPT | Mod: CPTII,S$GLB,, | Performed by: NURSE PRACTITIONER

## 2025-03-27 PROCEDURE — 99999 PR PBB SHADOW E&M-EST. PATIENT-LVL V: CPT | Mod: PBBFAC,,, | Performed by: NURSE PRACTITIONER

## 2025-03-27 PROCEDURE — 4010F ACE/ARB THERAPY RXD/TAKEN: CPT | Mod: CPTII,S$GLB,, | Performed by: NURSE PRACTITIONER

## 2025-03-27 RX ORDER — TRIAMCINOLONE ACETONIDE 40 MG/ML
40 INJECTION, SUSPENSION INTRA-ARTICULAR; INTRAMUSCULAR
Status: COMPLETED | OUTPATIENT
Start: 2025-03-27 | End: 2025-03-27

## 2025-03-27 RX ORDER — AMOXICILLIN 500 MG/1
500 CAPSULE ORAL EVERY 12 HOURS
Qty: 14 CAPSULE | Refills: 0 | Status: SHIPPED | OUTPATIENT
Start: 2025-03-27 | End: 2025-04-03

## 2025-03-27 RX ADMIN — TRIAMCINOLONE ACETONIDE 40 MG: 40 INJECTION, SUSPENSION INTRA-ARTICULAR; INTRAMUSCULAR at 08:03

## 2025-03-27 NOTE — PROGRESS NOTES
"Subjective     Patient ID: Michael Guillaume is a 60 y.o. male.    Chief Complaint: Follow-up (6 mo)    Pt to clinic for 6 month f/u visit. Denies change to PMH since last visit including hospitalizations or surgical procedures. Pt also reports "lump" to right posterior shoulder onset x 8 months. Denies known injury or pain. Denies further associated symptoms. Full ROM present to right arm/shoulder. Fasted states today for labs today. Declines vaccinations today. Seen per Dr. Champion for cardiac workup-seen this month. Denies tobacco use. + Mariajuana use daily. Denies daily ETOH. Non compliance with inhaler home use. Education provided for proper use for symptoms control. V/u.     Follow-up    Review of Systems   All other systems reviewed and are negative.       Objective     Physical Exam  Vitals reviewed.   Constitutional:       Appearance: Normal appearance. He is obese.   HENT:      Head: Normocephalic and atraumatic.      Right Ear: Tympanic membrane, ear canal and external ear normal.      Left Ear: Tympanic membrane, ear canal and external ear normal.      Nose: Nose normal.      Mouth/Throat:      Mouth: Mucous membranes are moist.      Pharynx: Oropharynx is clear. No oropharyngeal exudate or posterior oropharyngeal erythema.   Eyes:      Extraocular Movements:      Left eye: Normal extraocular motion and no nystagmus.      Conjunctiva/sclera:      Right eye: No exudate.     Left eye: Left conjunctiva is not injected. No exudate.     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 Without baseline change today.    Cardiovascular:      Rate and Rhythm: Normal rate and regular rhythm.      Pulses: Normal pulses.      Heart sounds: Normal heart sounds.   Pulmonary:      Effort: Pulmonary effort is normal.      Breath sounds: Examination of the right-upper field reveals wheezing and rhonchi. Examination of the left-upper field reveals wheezing and rhonchi. Examination of the right-middle field reveals " wheezing and rhonchi. Examination of the left-middle field reveals wheezing. Examination of the right-lower field reveals wheezing and rhonchi. Examination of the left-lower field reveals wheezing. Wheezing and rhonchi present. No decreased breath sounds or rales.      Comments: Expiratory wheezing   Abdominal:      General: Bowel sounds are normal. There is no distension.      Palpations: Abdomen is soft.      Tenderness: There is no abdominal tenderness.   Musculoskeletal:         General: Normal range of motion.      Cervical back: Normal range of motion and neck supple.   Skin:     General: Skin is warm.      Capillary Refill: Capillary refill takes less than 2 seconds.      Comments: Aprox 3cm x6cm hard palpable nodule under skin to right posterior shoulder. Non-tender. Skin intact without abrasion, redness, or concern.    Neurological:      General: No focal deficit present.      Mental Status: He is alert and oriented to person, place, and time. Mental status is at baseline.   Psychiatric:         Mood and Affect: Mood normal.         Behavior: Behavior normal.         Thought Content: Thought content normal.         Judgment: Judgment normal.        Assessment and Plan     1. COPD with acute exacerbation  -     CBC W/ AUTO DIFFERENTIAL; Future; Expected date: 03/27/2025  -     Comprehensive Metabolic Panel; Future; Expected date: 03/27/2025  -     triamcinolone acetonide injection 40 mg  -     amoxicillin (AMOXIL) 500 MG capsule; Take 1 capsule (500 mg total) by mouth every 12 (twelve) hours. for 7 days  Dispense: 14 capsule; Refill: 0    2. Primary hypertension  -     CBC W/ AUTO DIFFERENTIAL; Future; Expected date: 03/27/2025  -     Comprehensive Metabolic Panel; Future; Expected date: 03/27/2025  -     Microalbumin/creatinine urine ratio; Future; Expected date: 03/27/2025  -     HEMOGLOBIN A1C; Future; Expected date: 03/27/2025    3. Chronic kidney disease, stage 3a  -     CBC W/ AUTO DIFFERENTIAL;  Future; Expected date: 03/27/2025  -     Comprehensive Metabolic Panel; Future; Expected date: 03/27/2025  -     Microalbumin/creatinine urine ratio; Future; Expected date: 03/27/2025    4. Hyperlipidemia LDL goal <70  -     CBC W/ AUTO DIFFERENTIAL; Future; Expected date: 03/27/2025  -     Comprehensive Metabolic Panel; Future; Expected date: 03/27/2025  -     TSH; Future; Expected date: 03/27/2025  -     LIPID PANEL; Future; Expected date: 03/27/2025    5. Obesity (BMI 30.0-34.9)  -     CBC W/ AUTO DIFFERENTIAL; Future; Expected date: 03/27/2025  -     Comprehensive Metabolic Panel; Future; Expected date: 03/27/2025  -     Microalbumin/creatinine urine ratio; Future; Expected date: 03/27/2025  -     HEMOGLOBIN A1C; Future; Expected date: 03/27/2025    States that he did fall on his right shoulder and thinks the lump he is feeling is due to the fall. Will continue to monitor at this time.  No need for imaging. I believe it is a result from injury and hematoma          Follow up in about 6 months (around 9/27/2025).

## 2025-04-03 ENCOUNTER — CLINICAL SUPPORT (OUTPATIENT)
Dept: INTERNAL MEDICINE | Facility: CLINIC | Age: 60
End: 2025-04-03
Payer: COMMERCIAL

## 2025-04-03 DIAGNOSIS — N18.31 CHRONIC KIDNEY DISEASE, STAGE 3A: ICD-10-CM

## 2025-04-03 DIAGNOSIS — E66.811 OBESITY (BMI 30.0-34.9): ICD-10-CM

## 2025-04-03 DIAGNOSIS — J44.1 COPD WITH ACUTE EXACERBATION: ICD-10-CM

## 2025-04-03 DIAGNOSIS — E78.5 HYPERLIPIDEMIA LDL GOAL <70: ICD-10-CM

## 2025-04-03 DIAGNOSIS — I10 PRIMARY HYPERTENSION: ICD-10-CM

## 2025-04-03 LAB
ABSOLUTE EOSINOPHIL (OHS): 0.07 K/UL
ABSOLUTE MONOCYTE (OHS): 0.6 K/UL (ref 0.3–1)
ABSOLUTE NEUTROPHIL COUNT (OHS): 7.2 K/UL (ref 1.8–7.7)
ALBUMIN SERPL BCP-MCNC: 3.7 G/DL (ref 3.5–5.2)
ALBUMIN/CREAT UR: 9.2 UG/MG
ALP SERPL-CCNC: 76 UNIT/L (ref 40–150)
ALT SERPL W/O P-5'-P-CCNC: 14 UNIT/L (ref 10–44)
ANION GAP (OHS): 8 MMOL/L (ref 8–16)
AST SERPL-CCNC: 17 UNIT/L (ref 11–45)
BASOPHILS # BLD AUTO: 0.06 K/UL
BASOPHILS NFR BLD AUTO: 0.6 %
BILIRUB SERPL-MCNC: 0.3 MG/DL (ref 0.1–1)
BUN SERPL-MCNC: 30 MG/DL (ref 6–20)
CALCIUM SERPL-MCNC: 8.9 MG/DL (ref 8.7–10.5)
CHLORIDE SERPL-SCNC: 105 MMOL/L (ref 95–110)
CHOLEST SERPL-MCNC: 129 MG/DL (ref 120–199)
CHOLEST/HDLC SERPL: 3.7 {RATIO} (ref 2–5)
CO2 SERPL-SCNC: 29 MMOL/L (ref 23–29)
CREAT SERPL-MCNC: 1.3 MG/DL (ref 0.5–1.4)
CREAT UR-MCNC: 120 MG/DL (ref 23–375)
EAG (OHS): 111 MG/DL (ref 68–131)
ERYTHROCYTE [DISTWIDTH] IN BLOOD BY AUTOMATED COUNT: 13.2 % (ref 11.5–14.5)
GFR SERPLBLD CREATININE-BSD FMLA CKD-EPI: >60 ML/MIN/1.73/M2
GLUCOSE SERPL-MCNC: 103 MG/DL (ref 70–110)
HBA1C MFR BLD: 5.5 % (ref 4–5.6)
HCT VFR BLD AUTO: 51.9 % (ref 40–54)
HDLC SERPL-MCNC: 35 MG/DL (ref 40–75)
HDLC SERPL: 27.1 % (ref 20–50)
HGB BLD-MCNC: 16.2 GM/DL (ref 14–18)
IMM GRANULOCYTES # BLD AUTO: 0.03 K/UL (ref 0–0.04)
IMM GRANULOCYTES NFR BLD AUTO: 0.3 % (ref 0–0.5)
LDLC SERPL CALC-MCNC: 74.4 MG/DL (ref 63–159)
LYMPHOCYTES # BLD AUTO: 1.32 K/UL (ref 1–4.8)
MCH RBC QN AUTO: 29.7 PG (ref 27–31)
MCHC RBC AUTO-ENTMCNC: 31.2 G/DL (ref 32–36)
MCV RBC AUTO: 95 FL (ref 82–98)
MICROALBUMIN UR-MCNC: 11 UG/ML (ref ?–5000)
NONHDLC SERPL-MCNC: 94 MG/DL
NUCLEATED RBC (/100WBC) (OHS): 0 /100 WBC
PLATELET # BLD AUTO: 290 K/UL (ref 150–450)
PMV BLD AUTO: 9.9 FL (ref 9.2–12.9)
POTASSIUM SERPL-SCNC: 4.8 MMOL/L (ref 3.5–5.1)
PROT SERPL-MCNC: 6.9 GM/DL (ref 6–8.4)
RBC # BLD AUTO: 5.46 M/UL (ref 4.6–6.2)
RELATIVE EOSINOPHIL (OHS): 0.8 %
RELATIVE LYMPHOCYTE (OHS): 14.2 % (ref 18–48)
RELATIVE MONOCYTE (OHS): 6.5 % (ref 4–15)
RELATIVE NEUTROPHIL (OHS): 77.6 % (ref 38–73)
SODIUM SERPL-SCNC: 142 MMOL/L (ref 136–145)
TRIGL SERPL-MCNC: 98 MG/DL (ref 30–150)
TSH SERPL-ACNC: 0.86 UIU/ML (ref 0.4–4)
WBC # BLD AUTO: 9.28 K/UL (ref 3.9–12.7)

## 2025-04-03 PROCEDURE — 85025 COMPLETE CBC W/AUTO DIFF WBC: CPT

## 2025-04-03 PROCEDURE — 83036 HEMOGLOBIN GLYCOSYLATED A1C: CPT

## 2025-04-03 PROCEDURE — 84443 ASSAY THYROID STIM HORMONE: CPT

## 2025-04-03 PROCEDURE — 80053 COMPREHEN METABOLIC PANEL: CPT

## 2025-04-03 PROCEDURE — 80061 LIPID PANEL: CPT

## 2025-04-03 PROCEDURE — 82043 UR ALBUMIN QUANTITATIVE: CPT

## 2025-04-03 PROCEDURE — 36415 COLL VENOUS BLD VENIPUNCTURE: CPT

## 2025-04-04 ENCOUNTER — PATIENT MESSAGE (OUTPATIENT)
Dept: INTERNAL MEDICINE | Facility: CLINIC | Age: 60
End: 2025-04-04
Payer: COMMERCIAL

## 2025-04-05 DIAGNOSIS — F41.9 ANXIETY: ICD-10-CM

## 2025-04-07 RX ORDER — ALPRAZOLAM 1 MG/1
1 TABLET ORAL
Qty: 90 TABLET | Refills: 0 | Status: SHIPPED | OUTPATIENT
Start: 2025-04-07

## 2025-04-19 DIAGNOSIS — I73.9 PAD (PERIPHERAL ARTERY DISEASE): ICD-10-CM

## 2025-04-19 DIAGNOSIS — E78.5 HYPERLIPIDEMIA LDL GOAL <70: ICD-10-CM

## 2025-04-21 RX ORDER — CLOPIDOGREL BISULFATE 75 MG/1
75 TABLET ORAL
Qty: 90 TABLET | Refills: 3 | Status: SHIPPED | OUTPATIENT
Start: 2025-04-21

## 2025-04-21 RX ORDER — ROSUVASTATIN CALCIUM 40 MG/1
40 TABLET, COATED ORAL
Qty: 90 TABLET | Refills: 3 | Status: SHIPPED | OUTPATIENT
Start: 2025-04-21

## 2025-04-22 DIAGNOSIS — F41.9 ANXIETY: ICD-10-CM

## 2025-04-24 RX ORDER — ALPRAZOLAM 1 MG/1
1 TABLET ORAL
Qty: 90 TABLET | Refills: 5 | Status: SHIPPED | OUTPATIENT
Start: 2025-04-24

## 2025-05-06 ENCOUNTER — OFFICE VISIT (OUTPATIENT)
Dept: INTERNAL MEDICINE | Facility: CLINIC | Age: 60
End: 2025-05-06
Payer: COMMERCIAL

## 2025-05-06 VITALS
TEMPERATURE: 97 F | RESPIRATION RATE: 18 BRPM | BODY MASS INDEX: 33.69 KG/M2 | OXYGEN SATURATION: 92 % | DIASTOLIC BLOOD PRESSURE: 82 MMHG | SYSTOLIC BLOOD PRESSURE: 120 MMHG | HEART RATE: 87 BPM | WEIGHT: 197.31 LBS | HEIGHT: 64 IN

## 2025-05-06 DIAGNOSIS — J44.1 COPD WITH ACUTE EXACERBATION: ICD-10-CM

## 2025-05-06 DIAGNOSIS — R05.9 COUGH, UNSPECIFIED TYPE: Primary | ICD-10-CM

## 2025-05-06 PROCEDURE — 4010F ACE/ARB THERAPY RXD/TAKEN: CPT | Mod: CPTII,S$GLB,, | Performed by: NURSE PRACTITIONER

## 2025-05-06 PROCEDURE — 3066F NEPHROPATHY DOC TX: CPT | Mod: CPTII,S$GLB,, | Performed by: NURSE PRACTITIONER

## 2025-05-06 PROCEDURE — 3044F HG A1C LEVEL LT 7.0%: CPT | Mod: CPTII,S$GLB,, | Performed by: NURSE PRACTITIONER

## 2025-05-06 PROCEDURE — 3061F NEG MICROALBUMINURIA REV: CPT | Mod: CPTII,S$GLB,, | Performed by: NURSE PRACTITIONER

## 2025-05-06 PROCEDURE — 3079F DIAST BP 80-89 MM HG: CPT | Mod: CPTII,S$GLB,, | Performed by: NURSE PRACTITIONER

## 2025-05-06 PROCEDURE — 99999 PR PBB SHADOW E&M-EST. PATIENT-LVL V: CPT | Mod: PBBFAC,,, | Performed by: NURSE PRACTITIONER

## 2025-05-06 PROCEDURE — 3008F BODY MASS INDEX DOCD: CPT | Mod: CPTII,S$GLB,, | Performed by: NURSE PRACTITIONER

## 2025-05-06 PROCEDURE — 99213 OFFICE O/P EST LOW 20 MIN: CPT | Mod: S$GLB,,, | Performed by: NURSE PRACTITIONER

## 2025-05-06 PROCEDURE — 3074F SYST BP LT 130 MM HG: CPT | Mod: CPTII,S$GLB,, | Performed by: NURSE PRACTITIONER

## 2025-05-06 PROCEDURE — 1159F MED LIST DOCD IN RCRD: CPT | Mod: CPTII,S$GLB,, | Performed by: NURSE PRACTITIONER

## 2025-05-06 RX ORDER — TRIAMCINOLONE ACETONIDE 40 MG/ML
40 INJECTION, SUSPENSION INTRA-ARTICULAR; INTRAMUSCULAR
Status: COMPLETED | OUTPATIENT
Start: 2025-05-06 | End: 2025-05-06

## 2025-05-06 RX ORDER — ALBUTEROL SULFATE 1.25 MG/3ML
1.25 SOLUTION RESPIRATORY (INHALATION) EVERY 6 HOURS PRN
Qty: 75 ML | Refills: 5 | Status: SHIPPED | OUTPATIENT
Start: 2025-05-06 | End: 2026-05-06

## 2025-05-06 RX ORDER — METHYLPREDNISOLONE 4 MG/1
TABLET ORAL
Qty: 21 TABLET | Refills: 0 | Status: SHIPPED | OUTPATIENT
Start: 2025-05-06 | End: 2025-05-27

## 2025-05-06 RX ADMIN — TRIAMCINOLONE ACETONIDE 40 MG: 40 INJECTION, SUSPENSION INTRA-ARTICULAR; INTRAMUSCULAR at 02:05

## 2025-05-06 NOTE — PROGRESS NOTES
History of Present Illness    CHIEF COMPLAINT:  Patient presents today for worsening respiratory symptoms.    RESPIRATORY SYMPTOMS:  He reports significant wheezing that initially began after sleeping in a cold room and continues to be severe. He recently used a vape during a cruise ship trip.    MEDICATIONS:  He requests Albuterol refill.      ROS:  General: -fever, -chills, -fatigue, -weight gain, -weight loss  Eyes: -vision changes, -redness, -discharge  ENT: -ear pain, -nasal congestion, -sore throat  Cardiovascular: -chest pain, -palpitations, -lower extremity edema  Respiratory: -cough, -shortness of breath, +wheezing  Skin: -rash, -lesion         Physical Exam    General: No acute distress. Well-developed. Well-nourished.  Eyes: EOMI. Sclerae anicteric.  HENT: Normocephalic. Atraumatic. Nares patent. Moist oral mucosa.  Ears: Bilateral TMs clear. Bilateral EACs clear.  Cardiovascular: Regular rate. Regular rhythm. No murmurs. No rubs. No gallops. Normal S1, S2.  Respiratory: Normal respiratory effort.  No rales. No rhonchi. Wheezing throughout posterior lung fields  Skin: Warm. Dry. No rash.          Assessment & Plan    1. Cough, unspecified type  POCT COVID-19 Rapid Screening    POCT Influenza A/B Molecular      2. COPD with acute exacerbation  albuterol (ACCUNEB) 1.25 mg/3 mL Nebu    triamcinolone acetonide injection 40 mg    methylPREDNISolone (MEDROL DOSEPACK) 4 mg tablet         IMPRESSION:  - Presenting with exacerbated lung symptoms, likely triggered by recent travel and vaping.    ASTHMA:  - Observed significant wheezing during exam, which patient reports occurs after waking in a cold room and after vaping.  - Administered steroid injection in office.  - Prescribed oral steroid pack for 5 days, to begin the day after the office visit.  - Continued albuterol inhaler as needed for symptom management.    TOBACCO USE:  - Patient reports using a vape during a trip.  - Educated patient on the negative  "impact of vaping on pulmonary health, particularly how it exacerbates asthma symptoms.  - Advised complete avoidance of vaping.       "This note will not be shared with the patient."  Declined covid and flu tests  This note was generated with the assistance of ambient listening technology. Verbal consent was obtained by the patient and accompanying visitor(s) for the recording of patient appointment to facilitate this note. I attest to having reviewed and edited the generated note for accuracy, though some syntax or spelling errors may persist. Please contact the author of this note for any clarification.        "

## 2025-05-27 ENCOUNTER — OFFICE VISIT (OUTPATIENT)
Dept: CARDIOLOGY | Facility: CLINIC | Age: 60
End: 2025-05-27
Payer: COMMERCIAL

## 2025-05-27 VITALS
HEART RATE: 84 BPM | DIASTOLIC BLOOD PRESSURE: 82 MMHG | WEIGHT: 197.44 LBS | HEIGHT: 64 IN | BODY MASS INDEX: 33.71 KG/M2 | RESPIRATION RATE: 16 BRPM | SYSTOLIC BLOOD PRESSURE: 123 MMHG

## 2025-05-27 DIAGNOSIS — I25.118 CORONARY ARTERY DISEASE OF NATIVE ARTERY OF NATIVE HEART WITH STABLE ANGINA PECTORIS: Primary | ICD-10-CM

## 2025-05-27 DIAGNOSIS — E78.5 HYPERLIPIDEMIA LDL GOAL <70: ICD-10-CM

## 2025-05-27 DIAGNOSIS — I10 PRIMARY HYPERTENSION: ICD-10-CM

## 2025-05-27 DIAGNOSIS — I73.9 PAD (PERIPHERAL ARTERY DISEASE): ICD-10-CM

## 2025-05-27 DIAGNOSIS — I50.32 CHRONIC DIASTOLIC CONGESTIVE HEART FAILURE: ICD-10-CM

## 2025-05-27 DIAGNOSIS — I10 BENIGN ESSENTIAL HTN: ICD-10-CM

## 2025-05-27 DIAGNOSIS — I25.110 ATHEROSCLEROSIS OF NATIVE CORONARY ARTERY OF NATIVE HEART WITH UNSTABLE ANGINA PECTORIS: ICD-10-CM

## 2025-05-27 DIAGNOSIS — R06.09 DYSPNEA ON EXERTION: ICD-10-CM

## 2025-05-27 PROCEDURE — 3074F SYST BP LT 130 MM HG: CPT | Mod: CPTII,S$GLB,, | Performed by: INTERNAL MEDICINE

## 2025-05-27 PROCEDURE — 1160F RVW MEDS BY RX/DR IN RCRD: CPT | Mod: CPTII,S$GLB,, | Performed by: INTERNAL MEDICINE

## 2025-05-27 PROCEDURE — 3066F NEPHROPATHY DOC TX: CPT | Mod: CPTII,S$GLB,, | Performed by: INTERNAL MEDICINE

## 2025-05-27 PROCEDURE — 4010F ACE/ARB THERAPY RXD/TAKEN: CPT | Mod: CPTII,S$GLB,, | Performed by: INTERNAL MEDICINE

## 2025-05-27 PROCEDURE — 99203 OFFICE O/P NEW LOW 30 MIN: CPT | Mod: 25,S$GLB,, | Performed by: INTERNAL MEDICINE

## 2025-05-27 PROCEDURE — 93000 ELECTROCARDIOGRAM COMPLETE: CPT | Mod: S$GLB,,, | Performed by: INTERNAL MEDICINE

## 2025-05-27 PROCEDURE — 3044F HG A1C LEVEL LT 7.0%: CPT | Mod: CPTII,S$GLB,, | Performed by: INTERNAL MEDICINE

## 2025-05-27 PROCEDURE — 99999 PR PBB SHADOW E&M-EST. PATIENT-LVL IV: CPT | Mod: PBBFAC,,, | Performed by: INTERNAL MEDICINE

## 2025-05-27 PROCEDURE — 3061F NEG MICROALBUMINURIA REV: CPT | Mod: CPTII,S$GLB,, | Performed by: INTERNAL MEDICINE

## 2025-05-27 PROCEDURE — 1159F MED LIST DOCD IN RCRD: CPT | Mod: CPTII,S$GLB,, | Performed by: INTERNAL MEDICINE

## 2025-05-27 PROCEDURE — 3079F DIAST BP 80-89 MM HG: CPT | Mod: CPTII,S$GLB,, | Performed by: INTERNAL MEDICINE

## 2025-05-27 PROCEDURE — 3008F BODY MASS INDEX DOCD: CPT | Mod: CPTII,S$GLB,, | Performed by: INTERNAL MEDICINE

## 2025-05-27 RX ORDER — LOSARTAN POTASSIUM 25 MG/1
25 TABLET ORAL DAILY
Qty: 90 TABLET | Refills: 3 | Status: SHIPPED | OUTPATIENT
Start: 2025-05-27

## 2025-05-27 RX ORDER — NITROGLYCERIN 0.4 MG/1
0.4 TABLET SUBLINGUAL EVERY 5 MIN PRN
Qty: 30 TABLET | Refills: 2 | Status: SHIPPED | OUTPATIENT
Start: 2025-05-27 | End: 2026-05-27

## 2025-05-27 RX ORDER — AMLODIPINE BESYLATE 5 MG/1
5 TABLET ORAL DAILY
Qty: 90 TABLET | Refills: 4 | Status: SHIPPED | OUTPATIENT
Start: 2025-05-27

## 2025-05-27 NOTE — ASSESSMENT & PLAN NOTE
Carvedilol losartan and amlodipine to continue.  He monitors his blood pressures.  They have been stable.  I asked him to keep his losartan at 25 mg given that amlodipine was added today.

## 2025-05-27 NOTE — ASSESSMENT & PLAN NOTE
Rosuvastatin ordered.  Readings were elevated on last visit, he reported that he was not taking it on a regular basis.

## 2025-05-27 NOTE — PROGRESS NOTES
[facility] Cardiology     Subjective:    Patient ID:  Michael Guillaume is a 60 y.o. male who presents for follow-up of Shortness of Breath, Chest Pain, Coronary Artery Disease, Peripheral Arterial Disease, Hyperlipidemia, and Hypertension    Review of patient's allergies indicates:   Allergen Reactions    Lipitor [atorvastatin] Other (See Comments)     Other reaction(s): Severe Myalgias     He is reporting recurrent anginal symptoms.  He is still able to walk as 2 miles.  He states that he has excessive fatigue after the walks.  He has had some retrosternal chest discomfort with activity at other times.  He also has shortness of breath.  He notices reduced exercise capacity.  His blood pressures have been stable.  He states he did increase losartan from 25-50 because of elevated readings.  Since then his blood pressures have been stable.    He had two-vessel stenting in 2022.  He had some mild LAD disease no intervention carried out at that time.  He has not had follow-up since his stents.  He has been angina free on follow-up visits.  He has no claudication symptoms.  He did recently go on a cruise.  He developed bronchitis like symptoms.  He got a steroid Dosepak.  He does not report persistent bronchitis symptoms at this time.    Today's electrocardiogram reviewed and independently interpreted by myself confirms heart rate 81 sinus rhythm, there is minimal ST abnormality, somewhat nonspecific severity.         Review of Systems   Constitutional: Positive for malaise/fatigue. Negative for chills, decreased appetite, diaphoresis, fever, night sweats, weight gain and weight loss.   HENT:  Negative for congestion, ear discharge, ear pain, hearing loss, hoarse voice, nosebleeds, odynophagia, sore throat, stridor and tinnitus.    Eyes:  Negative for blurred vision, discharge, double vision, pain, photophobia, redness, vision loss in left eye, vision  loss in right eye, visual disturbance and visual halos.   Cardiovascular:  Positive for chest pain and dyspnea on exertion. Negative for claudication, cyanosis, irregular heartbeat, leg swelling, near-syncope, orthopnea, palpitations, paroxysmal nocturnal dyspnea and syncope.   Respiratory:  Positive for cough and shortness of breath. Negative for hemoptysis, sleep disturbances due to breathing, snoring, sputum production and wheezing.    Endocrine: Negative for cold intolerance, heat intolerance, polydipsia, polyphagia and polyuria.   Hematologic/Lymphatic: Negative for adenopathy and bleeding problem. Does not bruise/bleed easily.   Skin:  Negative for color change, dry skin, flushing, itching, nail changes, poor wound healing, rash, skin cancer, suspicious lesions and unusual hair distribution.   Musculoskeletal:  Negative for arthritis, back pain, falls, gout, joint pain, joint swelling, muscle cramps, muscle weakness, myalgias, neck pain and stiffness.   Gastrointestinal:  Negative for bloating, abdominal pain, anorexia, change in bowel habit, bowel incontinence, constipation, diarrhea, dysphagia, excessive appetite, flatus, heartburn, hematemesis, hematochezia, hemorrhoids, jaundice, melena, nausea and vomiting.   Genitourinary:  Negative for bladder incontinence, decreased libido, dysuria, flank pain, frequency, genital sores, hematuria, hesitancy, incomplete emptying, nocturia and urgency.   Neurological:  Negative for aphonia, brief paralysis, difficulty with concentration, disturbances in coordination, excessive daytime sleepiness, dizziness, focal weakness, headaches, light-headedness, loss of balance, numbness, paresthesias, seizures, sensory change, tremors, vertigo and weakness.   Psychiatric/Behavioral:  Negative for altered mental status, depression, hallucinations, memory loss, substance abuse, suicidal ideas and thoughts of violence. The patient does not have insomnia and is not nervous/anxious.   "  Allergic/Immunologic: Negative for hives and persistent infections.        Objective:       Vitals:    05/27/25 1047   BP: 123/82   BP Location: Left arm   Patient Position: Sitting   Pulse: 84   Resp: 16   Weight: 89.5 kg (197 lb 6.8 oz)   Height: 5' 4" (1.626 m)    Physical Exam  Constitutional:       General: He is not in acute distress.     Appearance: He is well-developed. He is not diaphoretic.   HENT:      Head: Normocephalic and atraumatic.      Nose: Nose normal.   Eyes:      General: No scleral icterus.        Right eye: No discharge.      Conjunctiva/sclera: Conjunctivae normal.      Pupils: Pupils are equal, round, and reactive to light.   Neck:      Thyroid: No thyromegaly.      Vascular: No JVD.      Trachea: No tracheal deviation.   Cardiovascular:      Rate and Rhythm: Normal rate and regular rhythm.      Pulses:           Carotid pulses are 2+ on the right side and 2+ on the left side.       Radial pulses are 0 on the right side and 0 on the left side.        Dorsalis pedis pulses are 0 on the right side and 0 on the left side.        Posterior tibial pulses are 0 on the right side and 0 on the left side.      Heart sounds: Normal heart sounds. No murmur heard.     No friction rub. No gallop.      Comments: Faint monophasic Doppler signals noted at pedal position bilaterally  Pulmonary:      Effort: Pulmonary effort is normal. No respiratory distress.      Breath sounds: Normal breath sounds. No stridor. No wheezing or rales.   Chest:      Chest wall: No tenderness.   Abdominal:      General: Bowel sounds are normal. There is no distension.      Palpations: Abdomen is soft. There is no mass.      Tenderness: There is no abdominal tenderness. There is no guarding or rebound.   Musculoskeletal:         General: No tenderness. Normal range of motion.      Cervical back: Normal range of motion and neck supple.   Lymphadenopathy:      Cervical: No cervical adenopathy.   Skin:     General: Skin is warm " and dry.      Coloration: Skin is not pale.      Findings: No erythema or rash.   Neurological:      Mental Status: He is alert and oriented to person, place, and time.      Cranial Nerves: No cranial nerve deficit.      Coordination: Coordination normal.   Psychiatric:         Behavior: Behavior normal.         Thought Content: Thought content normal.         Judgment: Judgment normal.           Assessment:       1. Coronary artery disease of native artery of native heart with stable angina pectoris    2. Atherosclerosis of native coronary artery of native heart with unstable angina pectoris    3. Benign essential HTN    4. Dyspnea on exertion    5. Hyperlipidemia LDL goal <70    6. Chronic diastolic congestive heart failure    7. PAD    8. Primary hypertension      Results for orders placed or performed in visit on 04/03/25   Comprehensive Metabolic Panel    Collection Time: 04/03/25  8:47 AM   Result Value Ref Range    Sodium 142 136 - 145 mmol/L    Potassium 4.8 3.5 - 5.1 mmol/L    Chloride 105 95 - 110 mmol/L    CO2 29 23 - 29 mmol/L    Glucose 103 70 - 110 mg/dL    BUN 30 (H) 6 - 20 mg/dL    Creatinine 1.3 0.5 - 1.4 mg/dL    Calcium 8.9 8.7 - 10.5 mg/dL    Protein Total 6.9 6.0 - 8.4 gm/dL    Albumin 3.7 3.5 - 5.2 g/dL    Bilirubin Total 0.3 0.1 - 1.0 mg/dL    ALP 76 40 - 150 unit/L    AST 17 11 - 45 unit/L    ALT 14 10 - 44 unit/L    Anion Gap 8 8 - 16 mmol/L    eGFR >60 >60 mL/min/1.73/m2   TSH    Collection Time: 04/03/25  8:47 AM   Result Value Ref Range    TSH 0.858 0.400 - 4.000 uIU/mL   Lipid Panel    Collection Time: 04/03/25  8:47 AM   Result Value Ref Range    Cholesterol Total 129 120 - 199 mg/dL    Triglyceride 98 30 - 150 mg/dL    HDL Cholesterol 35 (L) 40 - 75 mg/dL    LDL Cholesterol 74.4 63.0 - 159.0 mg/dL    HDL/Cholesterol Ratio 27.1 20.0 - 50.0 %    Cholesterol/HDL Ratio 3.7 2.0 - 5.0    Non HDL Cholesterol 94 mg/dL   Hemoglobin A1C    Collection Time: 04/03/25  8:47 AM   Result Value Ref  Range    Hemoglobin A1c 5.5 4.0 - 5.6 %    Estimated Average Glucose 111 68 - 131 mg/dL   CBC with Differential    Collection Time: 04/03/25  8:47 AM   Result Value Ref Range    WBC 9.28 3.90 - 12.70 K/uL    RBC 5.46 4.60 - 6.20 M/uL    HGB 16.2 14.0 - 18.0 gm/dL    HCT 51.9 40.0 - 54.0 %    MCV 95 82 - 98 fL    MCH 29.7 27.0 - 31.0 pg    MCHC 31.2 (L) 32.0 - 36.0 g/dL    RDW 13.2 11.5 - 14.5 %    Platelet Count 290 150 - 450 K/uL    MPV 9.9 9.2 - 12.9 fL    Nucleated RBC 0 <=0 /100 WBC    Neut % 77.6 (H) 38 - 73 %    Lymph % 14.2 (L) 18 - 48 %    Mono % 6.5 4 - 15 %    Eos % 0.8 <=8 %    Basophil % 0.6 <=1.9 %    Imm Grans % 0.3 0.0 - 0.5 %    Neut # 7.20 1.8 - 7.7 K/uL    Lymph # 1.32 1 - 4.8 K/uL    Mono # 0.60 0.3 - 1 K/uL    Eos # 0.07 <=0.5 K/uL    Baso # 0.06 <=0.2 K/uL    Imm Grans # 0.03 0.00 - 0.04 K/uL   Microalbumin/creatinine urine ratio    Collection Time: 04/03/25  8:50 AM   Result Value Ref Range    Urine Microalbumin 11.0   ug/mL    Urine Creatinine 120.0 23.0 - 375.0 mg/dL    Microalbumin/Creatinine Ratio Urine 9.2 <=30.0 ug/mg       Current Medications[1]     Lab Results   Component Value Date    WBC 9.28 04/03/2025    RBC 5.46 04/03/2025    HGB 16.2 04/03/2025    HCT 51.9 04/03/2025    MCV 95 04/03/2025    MCH 29.7 04/03/2025    MCHC 31.2 (L) 04/03/2025    RDW 13.2 04/03/2025     04/03/2025    MPV 9.9 04/03/2025    GRAN 6.3 09/27/2024    GRAN 76.7 (H) 09/27/2024    LYMPH 14.2 (L) 04/03/2025    LYMPH 1.32 04/03/2025    MONO 6.5 04/03/2025    MONO 0.60 04/03/2025    EOS 0.8 04/03/2025    EOS 0.07 04/03/2025    BASO 0.05 09/27/2024    EOSINOPHIL 0.6 09/27/2024    BASOPHIL 0.6 04/03/2025    BASOPHIL 0.06 04/03/2025    MG 2.1 08/19/2020        CMP  Lab Results   Component Value Date     04/03/2025    K 4.8 04/03/2025     04/03/2025    CO2 29 04/03/2025     04/03/2025    BUN 30 (H) 04/03/2025    CREATININE 1.3 04/03/2025    CALCIUM 8.9 04/03/2025    PROT 6.9 04/03/2025     "ALBUMIN 3.7 04/03/2025    BILITOT 0.3 04/03/2025    ALKPHOS 76 04/03/2025    AST 17 04/03/2025    ALT 14 04/03/2025    ANIONGAP 8 04/03/2025    ESTGFRAFRICA >60.0 03/09/2022    EGFRNONAA >60.0 03/09/2022        No results found for: "LABBLOO", "LABURIN", "RESPIRATORYC", "GSRESP"         Results for orders placed or performed during the hospital encounter of 10/06/22   EKG 12-LEAD on arrival to floor    Collection Time: 10/06/22 10:41 AM    Narrative    Test Reason : I25.110,    Vent. Rate : 084 BPM     Atrial Rate : 084 BPM     P-R Int : 144 ms          QRS Dur : 090 ms      QT Int : 384 ms       P-R-T Axes : 053 057 068 degrees     QTc Int : 453 ms    Sinus rhythm with occasional Premature ventricular complexes  Otherwise normal ECG  When compared with ECG of 27-AUG-2022 09:06,  Premature ventricular complexes are now Present  Nonspecific T wave abnormality, improved in Inferior leads  Nonspecific T wave abnormality, improved in Anterior-lateral leads  Confirmed by Etelvnia Sargent MD ([id number]) on 10/7/2022 3:19:21 PM    Referred By: KIM THOMPSON           Confirmed By:Etelvina Sargent MD                   Plan:       Problem List Items Addressed This Visit          Cardiac/Vascular    PAD    History of stenting to external iliac arteries in the past.  Last angiogram 2022.         Dyspnea on exertion    Worsening symptomatology.  He was recently treated for upper respiratory infection.         Hyperlipidemia LDL goal <70    Rosuvastatin ordered.  Readings were elevated on last visit, he reported that he was not taking it on a regular basis.         Chronic diastolic congestive heart failure    He still walks his 2 miles a day.  Current therapy to continue.  He is on furosemide.           Coronary artery disease of native artery of native heart with stable angina pectoris - Primary    Recurrent anginal symptoms reported for the past month.  Ambulatory stress test ordered.  Amlodipine added for better control of chest pain " reported today.         Primary hypertension    Carvedilol losartan and amlodipine to continue.  He monitors his blood pressures.  They have been stable.  I asked him to keep his losartan at 25 mg given that amlodipine was added today.          Other Visit Diagnoses         Atherosclerosis of native coronary artery of native heart with unstable angina pectoris        Relevant Medications    amLODIPine (NORVASC) 5 MG tablet    nitroGLYCERIN (NITROSTAT) 0.4 MG SL tablet    Other Relevant Orders    Exercise Stress - EKG    EKG 12-lead      Benign essential HTN        Relevant Medications    losartan (COZAAR) 25 MG tablet               A walk treadmill is scheduled.  I added amlodipine to his carvedilol for better anginal control.  P.r.n. nitroglycerin also prescribed.    I think angiography will be needed.  No resting ECG changes today of concern.    The plan was outlined to the patient.  Close follow-up arranged.             Woo Byrne MD  05/27/2025   11:01 AM               [1]   Current Outpatient Medications:     albuterol (ACCUNEB) 1.25 mg/3 mL Nebu, Take 3 mLs (1.25 mg total) by nebulization every 6 (six) hours as needed (shortness of breath). Rescue, Disp: 75 mL, Rfl: 5    ALPRAZolam (XANAX) 1 MG tablet, TAKE 1 TABLET BY MOUTH THREE TIMES A DAY AS NEEDED, Disp: 90 tablet, Rfl: 5    atropine 1% (ISOPTO ATROPINE) 1 % Drop, Place 1 drop into the right eye as needed (discomfort)., Disp: 5 mL, Rfl: 6    budesonide-formoterol 80-4.5 mcg (BREYNA) 80-4.5 mcg/actuation HFAA, INHALE 2 PUFFS INTO THE LUNGS TWICE A DAY, Disp: 10.2 g, Rfl: 5    carvediloL (COREG) 12.5 MG tablet, TAKE 1 TABLET BY MOUTH TWICE A DAY WITH MEALS, Disp: 180 tablet, Rfl: 3    clopidogreL (PLAVIX) 75 mg tablet, TAKE 1 TABLET BY MOUTH EVERY DAY, Disp: 90 tablet, Rfl: 3    colchicine (COLCRYS) 0.6 mg tablet, Take 0.6 mg by mouth as needed., Disp: , Rfl:     cyclobenzaprine (FLEXERIL) 10 MG tablet, TAKE 1 TABLET BY MOUTH EVERY DAY IN THE EVENING,  Disp: 30 tablet, Rfl: 0    esomeprazole (NEXIUM) 20 MG capsule, Take 20 mg by mouth before breakfast., Disp: , Rfl:     furosemide (LASIX) 20 MG tablet, TAKE 1 TABLET BY MOUTH EVERY DAY AS NEEDED, Disp: 90 tablet, Rfl: 1    methylPREDNISolone (MEDROL DOSEPACK) 4 mg tablet, use as directed, Disp: 21 tablet, Rfl: 0    nitroGLYCERIN (NITROSTAT) 0.4 MG SL tablet, Place 1 tablet (0.4 mg total) under the tongue every 5 (five) minutes as needed for Chest pain., Disp: 30 tablet, Rfl: 3    prednisoLONE acetate (PRED FORTE) 1 % DrpS, Place 1 drop into the right eye 2 (two) times daily., Disp: 10 mL, Rfl: 11    rosuvastatin (CRESTOR) 40 MG Tab, TAKE 1 TABLET BY MOUTH EVERY DAY, Disp: 90 tablet, Rfl: 3    vitamin E 400 UNIT capsule, Take 400 Units by mouth once daily., Disp: , Rfl:     amLODIPine (NORVASC) 5 MG tablet, Take 1 tablet (5 mg total) by mouth once daily., Disp: 90 tablet, Rfl: 4    losartan (COZAAR) 25 MG tablet, Take 1 tablet (25 mg total) by mouth once daily., Disp: 90 tablet, Rfl: 3    nitroGLYCERIN (NITROSTAT) 0.4 MG SL tablet, Place 1 tablet (0.4 mg total) under the tongue every 5 (five) minutes as needed for Chest pain., Disp: 30 tablet, Rfl: 2    Current Facility-Administered Medications:     sodium chloride 0.9% flush 10 mL, 10 mL, Intravenous, PRN, FriendLoc MD    sodium chloride 0.9% flush 10 mL, 10 mL, Intravenous, PRN, Feng Bond MD

## 2025-05-27 NOTE — ASSESSMENT & PLAN NOTE
Recurrent anginal symptoms reported for the past month.  Ambulatory stress test ordered.  Amlodipine added for better control of chest pain reported today.

## 2025-05-28 LAB
OHS QRS DURATION: 86 MS
OHS QTC CALCULATION: 432 MS

## 2025-05-29 DIAGNOSIS — R60.0 LOCALIZED EDEMA: ICD-10-CM

## 2025-05-29 RX ORDER — FUROSEMIDE 20 MG/1
TABLET ORAL
Qty: 180 TABLET | Refills: 3 | Status: SHIPPED | OUTPATIENT
Start: 2025-05-29

## 2025-06-04 ENCOUNTER — HOSPITAL ENCOUNTER (OUTPATIENT)
Dept: PULMONOLOGY | Facility: HOSPITAL | Age: 60
Discharge: HOME | End: 2025-06-04
Attending: INTERNAL MEDICINE
Payer: COMMERCIAL

## 2025-06-04 DIAGNOSIS — I25.110 ATHEROSCLEROSIS OF NATIVE CORONARY ARTERY OF NATIVE HEART WITH UNSTABLE ANGINA PECTORIS: ICD-10-CM

## 2025-06-04 LAB
CV STRESS BASE HR: 80 BPM
DIASTOLIC BLOOD PRESSURE: 91 MMHG
OHS CV CPX 1 MINUTE RECOVERY HEART RATE: 94 BPM
OHS CV CPX 85 PERCENT MAX PREDICTED HEART RATE MALE: 136
OHS CV CPX ESTIMATED METS: 7
OHS CV CPX MAX PREDICTED HEART RATE: 160
OHS CV CPX PATIENT IS FEMALE: 0
OHS CV CPX PATIENT IS MALE: 1
OHS CV CPX PEAK DIASTOLIC BLOOD PRESSURE: 108 MMHG
OHS CV CPX PEAK HEAR RATE: 123 BPM
OHS CV CPX PEAK RATE PRESSURE PRODUCT: NORMAL
OHS CV CPX PEAK SYSTOLIC BLOOD PRESSURE: 199 MMHG
OHS CV CPX PERCENT MAX PREDICTED HEART RATE ACHIEVED: 77
OHS CV CPX RATE PRESSURE PRODUCT PRESENTING: NORMAL
STRESS ECHO POST EXERCISE DUR MIN: 5 MINUTES
STRESS ECHO POST EXERCISE DUR SEC: 46 SECONDS
SYSTOLIC BLOOD PRESSURE: 145 MMHG

## 2025-06-04 PROCEDURE — 93017 CV STRESS TEST TRACING ONLY: CPT

## 2025-06-04 PROCEDURE — 93016 CV STRESS TEST SUPVJ ONLY: CPT | Mod: ,,, | Performed by: INTERNAL MEDICINE

## 2025-06-04 PROCEDURE — 93018 CV STRESS TEST I&R ONLY: CPT | Mod: ,,, | Performed by: INTERNAL MEDICINE

## 2025-06-05 ENCOUNTER — PATIENT MESSAGE (OUTPATIENT)
Dept: CARDIOLOGY | Facility: HOSPITAL | Age: 60
End: 2025-06-05
Payer: COMMERCIAL

## 2025-06-10 ENCOUNTER — OFFICE VISIT (OUTPATIENT)
Dept: CARDIOLOGY | Facility: CLINIC | Age: 60
End: 2025-06-10
Payer: COMMERCIAL

## 2025-06-10 VITALS
HEART RATE: 86 BPM | OXYGEN SATURATION: 94 % | DIASTOLIC BLOOD PRESSURE: 84 MMHG | WEIGHT: 197.06 LBS | SYSTOLIC BLOOD PRESSURE: 128 MMHG | BODY MASS INDEX: 33.64 KG/M2 | HEIGHT: 64 IN

## 2025-06-10 DIAGNOSIS — I73.9 PAD (PERIPHERAL ARTERY DISEASE): ICD-10-CM

## 2025-06-10 DIAGNOSIS — I10 ESSENTIAL HYPERTENSION: Chronic | ICD-10-CM

## 2025-06-10 DIAGNOSIS — I25.118 CORONARY ARTERY DISEASE OF NATIVE ARTERY OF NATIVE HEART WITH STABLE ANGINA PECTORIS: ICD-10-CM

## 2025-06-10 DIAGNOSIS — I50.32 CHRONIC DIASTOLIC CONGESTIVE HEART FAILURE: ICD-10-CM

## 2025-06-10 DIAGNOSIS — I10 PRIMARY HYPERTENSION: ICD-10-CM

## 2025-06-10 DIAGNOSIS — E78.5 HYPERLIPIDEMIA LDL GOAL <70: ICD-10-CM

## 2025-06-10 DIAGNOSIS — I20.9 ANGINA, CLASS III: Primary | ICD-10-CM

## 2025-06-10 DIAGNOSIS — J44.9 CHRONIC OBSTRUCTIVE PULMONARY DISEASE, UNSPECIFIED COPD TYPE: ICD-10-CM

## 2025-06-10 DIAGNOSIS — E66.811 OBESITY (BMI 30.0-34.9): ICD-10-CM

## 2025-06-10 PROCEDURE — 3079F DIAST BP 80-89 MM HG: CPT | Mod: CPTII,S$GLB,, | Performed by: INTERNAL MEDICINE

## 2025-06-10 PROCEDURE — 1159F MED LIST DOCD IN RCRD: CPT | Mod: CPTII,S$GLB,, | Performed by: INTERNAL MEDICINE

## 2025-06-10 PROCEDURE — 3074F SYST BP LT 130 MM HG: CPT | Mod: CPTII,S$GLB,, | Performed by: INTERNAL MEDICINE

## 2025-06-10 PROCEDURE — 99999 PR PBB SHADOW E&M-EST. PATIENT-LVL IV: CPT | Mod: PBBFAC,,, | Performed by: INTERNAL MEDICINE

## 2025-06-10 PROCEDURE — 3061F NEG MICROALBUMINURIA REV: CPT | Mod: CPTII,S$GLB,, | Performed by: INTERNAL MEDICINE

## 2025-06-10 PROCEDURE — 1160F RVW MEDS BY RX/DR IN RCRD: CPT | Mod: CPTII,S$GLB,, | Performed by: INTERNAL MEDICINE

## 2025-06-10 PROCEDURE — 3008F BODY MASS INDEX DOCD: CPT | Mod: CPTII,S$GLB,, | Performed by: INTERNAL MEDICINE

## 2025-06-10 PROCEDURE — 3044F HG A1C LEVEL LT 7.0%: CPT | Mod: CPTII,S$GLB,, | Performed by: INTERNAL MEDICINE

## 2025-06-10 PROCEDURE — 4010F ACE/ARB THERAPY RXD/TAKEN: CPT | Mod: CPTII,S$GLB,, | Performed by: INTERNAL MEDICINE

## 2025-06-10 PROCEDURE — 99214 OFFICE O/P EST MOD 30 MIN: CPT | Mod: S$GLB,,, | Performed by: INTERNAL MEDICINE

## 2025-06-10 PROCEDURE — 3066F NEPHROPATHY DOC TX: CPT | Mod: CPTII,S$GLB,, | Performed by: INTERNAL MEDICINE

## 2025-06-10 RX ORDER — CARVEDILOL 25 MG/1
25 TABLET ORAL 2 TIMES DAILY WITH MEALS
Qty: 180 TABLET | Refills: 3 | Status: SHIPPED | OUTPATIENT
Start: 2025-06-10

## 2025-06-10 RX ORDER — SODIUM CHLORIDE 9 MG/ML
INJECTION, SOLUTION INTRAVENOUS CONTINUOUS
OUTPATIENT
Start: 2025-06-10 | End: 2025-06-10

## 2025-06-10 RX ORDER — SODIUM CHLORIDE 0.9 % (FLUSH) 0.9 %
10 SYRINGE (ML) INJECTION
Status: SHIPPED | OUTPATIENT
Start: 2025-06-10

## 2025-06-10 RX ORDER — ASPIRIN 81 MG/1
81 TABLET ORAL DAILY
Start: 2025-06-10

## 2025-06-10 NOTE — H&P (VIEW-ONLY)
[facility] Cardiology     Subjective:    Patient ID:  Michael Guillaume is a 60 y.o. male who presents for follow-up of Coronary Artery Disease (Follow up), Peripheral Arterial Disease, Hyperlipidemia, Hypertension, and Chest Pain    Review of patient's allergies indicates:   Allergen Reactions    Lipitor [atorvastatin] Other (See Comments)     Other reaction(s): Severe Myalgias     The patient is still reporting chest pain and reduced exercise tolerance with walking.  His stress test was nondiagnostic.  He had reduced exercise capacity.  There was a hypertensive blood pressure response to exercise.    He had two-vessel stenting in 2022.  He states he feels the exact same way now that he did before the stents.  He has not tried nitroglycerin.  He no longer smokes.  He is on Plavix.  Most recent LDL was 74 mg% with Crestor 40 mg.  He is a former smoker.         Review of Systems   Constitutional: Positive for malaise/fatigue. Negative for chills, decreased appetite, diaphoresis, fever, night sweats, weight gain and weight loss.   HENT:  Negative for congestion, ear discharge, ear pain, hearing loss, hoarse voice, nosebleeds, odynophagia, sore throat, stridor and tinnitus.    Eyes:  Negative for blurred vision, discharge, double vision, pain, photophobia, redness, vision loss in left eye, vision loss in right eye, visual disturbance and visual halos.   Cardiovascular:  Positive for chest pain and dyspnea on exertion. Negative for claudication, cyanosis, irregular heartbeat, leg swelling, near-syncope, orthopnea, palpitations, paroxysmal nocturnal dyspnea and syncope.   Respiratory:  Positive for cough and shortness of breath. Negative for hemoptysis, sleep disturbances due to breathing, snoring, sputum production and wheezing.    Endocrine: Negative for cold intolerance, heat intolerance, polydipsia, polyphagia and polyuria.   Hematologic/Lymphatic:  "Negative for adenopathy and bleeding problem. Does not bruise/bleed easily.   Skin:  Negative for color change, dry skin, flushing, itching, nail changes, poor wound healing, rash, skin cancer, suspicious lesions and unusual hair distribution.   Musculoskeletal:  Negative for arthritis, back pain, falls, gout, joint pain, joint swelling, muscle cramps, muscle weakness, myalgias, neck pain and stiffness.   Gastrointestinal:  Negative for bloating, abdominal pain, anorexia, change in bowel habit, bowel incontinence, constipation, diarrhea, dysphagia, excessive appetite, flatus, heartburn, hematemesis, hematochezia, hemorrhoids, jaundice, melena, nausea and vomiting.   Genitourinary:  Negative for bladder incontinence, decreased libido, dysuria, flank pain, frequency, genital sores, hematuria, hesitancy, incomplete emptying, nocturia and urgency.   Neurological:  Negative for aphonia, brief paralysis, difficulty with concentration, disturbances in coordination, excessive daytime sleepiness, dizziness, focal weakness, headaches, light-headedness, loss of balance, numbness, paresthesias, seizures, sensory change, tremors, vertigo and weakness.   Psychiatric/Behavioral:  Negative for altered mental status, depression, hallucinations, memory loss, substance abuse, suicidal ideas and thoughts of violence. The patient does not have insomnia and is not nervous/anxious.    Allergic/Immunologic: Negative for hives and persistent infections.        Objective:       Vitals:    06/10/25 1253   BP: 128/84   BP Location: Left arm   Patient Position: Sitting   Pulse: 86   SpO2: (!) 94%   Weight: 89.4 kg (197 lb 1.5 oz)   Height: 5' 4" (1.626 m)    Physical Exam  Constitutional:       General: He is not in acute distress.     Appearance: He is well-developed. He is not diaphoretic.   HENT:      Head: Normocephalic and atraumatic.      Nose: Nose normal.   Eyes:      General: No scleral icterus.        Right eye: No discharge.      " Conjunctiva/sclera: Conjunctivae normal.      Pupils: Pupils are equal, round, and reactive to light.   Neck:      Thyroid: No thyromegaly.      Vascular: No JVD.      Trachea: No tracheal deviation.   Cardiovascular:      Rate and Rhythm: Normal rate and regular rhythm.      Pulses:           Carotid pulses are 2+ on the right side and 2+ on the left side.       Radial pulses are 0 on the right side and 0 on the left side.        Dorsalis pedis pulses are 0 on the right side and 0 on the left side.        Posterior tibial pulses are 0 on the right side and 0 on the left side.      Heart sounds: Normal heart sounds. No murmur heard.     No friction rub. No gallop.      Comments: Faint monophasic Doppler signals noted at pedal position bilaterally  Pulmonary:      Effort: Pulmonary effort is normal. No respiratory distress.      Breath sounds: Normal breath sounds. No stridor. No wheezing or rales.   Chest:      Chest wall: No tenderness.   Abdominal:      General: Bowel sounds are normal. There is no distension.      Palpations: Abdomen is soft. There is no mass.      Tenderness: There is no abdominal tenderness. There is no guarding or rebound.   Musculoskeletal:         General: No tenderness. Normal range of motion.      Cervical back: Normal range of motion and neck supple.   Lymphadenopathy:      Cervical: No cervical adenopathy.   Skin:     General: Skin is warm and dry.      Coloration: Skin is not pale.      Findings: No erythema or rash.   Neurological:      Mental Status: He is alert and oriented to person, place, and time.      Cranial Nerves: No cranial nerve deficit.      Coordination: Coordination normal.   Psychiatric:         Behavior: Behavior normal.         Thought Content: Thought content normal.         Judgment: Judgment normal.           Assessment:       1. Angina, class III    2. Essential hypertension    3. Chronic obstructive pulmonary disease, unspecified COPD type    4. Primary  hypertension    5. PAD    6. Hyperlipidemia LDL goal <70    7. Coronary artery disease of native artery of native heart with stable angina pectoris    8. Obesity (BMI 30.0-34.9)    9. Chronic diastolic congestive heart failure      Results for orders placed or performed during the hospital encounter of 06/04/25   Exercise Stress - EKG    Collection Time: 06/04/25 11:42 AM   Result Value Ref Range    85% Max Predicted      Max Predicted      [facility] CV CPX PATIENT IS MALE 1.0     [facility] CV CPX PATIENT IS FEMALE 0.0     HR at rest 80 bpm    Systolic blood pressure 145 mmHg    Diastolic blood pressure 91 mmHg    RPP 11,600     Exercise duration (min) 5 minutes    Exercise duration (sec) 46 seconds    Peak  bpm    Peak Systolic  mmHg    Peak Diatolic  mmHg    Peak RPP 24,477     Estimated METs 7     % Max HR Achieved 77     1 Minute Recovery HR 94 bpm       Current Medications[1]     Lab Results   Component Value Date    WBC 9.28 04/03/2025    RBC 5.46 04/03/2025    HGB 16.2 04/03/2025    HCT 51.9 04/03/2025    MCV 95 04/03/2025    MCH 29.7 04/03/2025    MCHC 31.2 (L) 04/03/2025    RDW 13.2 04/03/2025     04/03/2025    MPV 9.9 04/03/2025    GRAN 6.3 09/27/2024    GRAN 76.7 (H) 09/27/2024    LYMPH 14.2 (L) 04/03/2025    LYMPH 1.32 04/03/2025    MONO 6.5 04/03/2025    MONO 0.60 04/03/2025    EOS 0.8 04/03/2025    EOS 0.07 04/03/2025    BASO 0.05 09/27/2024    EOSINOPHIL 0.6 09/27/2024    BASOPHIL 0.6 04/03/2025    BASOPHIL 0.06 04/03/2025    MG 2.1 08/19/2020        CMP  Lab Results   Component Value Date     04/03/2025    K 4.8 04/03/2025     04/03/2025    CO2 29 04/03/2025     04/03/2025    BUN 30 (H) 04/03/2025    CREATININE 1.3 04/03/2025    CALCIUM 8.9 04/03/2025    PROT 6.9 04/03/2025    ALBUMIN 3.7 04/03/2025    BILITOT 0.3 04/03/2025    ALKPHOS 76 04/03/2025    AST 17 04/03/2025    ALT 14 04/03/2025    ANIONGAP 8 04/03/2025    ESTGFRAFRICA >60.0 03/09/2022     "EGFRNONAA >60.0 03/09/2022        No results found for: "LABBLOO", "LABURIN", "RESPIRATORYC", "GSRESP"         Results for orders placed or performed in visit on 05/27/25   EKG 12-lead    Collection Time: 05/27/25 11:03 AM   Result Value Ref Range    QRS Duration 86 ms    [facility] QTC Calculation 432 ms    Narrative    Test Reason : I25.110,    Vent. Rate :  81 BPM     Atrial Rate :  81 BPM     P-R Int : 126 ms          QRS Dur :  86 ms      QT Int : 372 ms       P-R-T Axes :  46  55  61 degrees    QTcB Int : 432 ms    Normal sinus rhythm  Normal ECG  When compared with ECG of 06-Oct-2022 10:41,  Premature ventricular complexes are no longer Present  Confirmed by Woo Byrne ([id number]) on 5/28/2025 9:10:47 AM    Referred By:            Confirmed By: Woo Byrne                   Plan:       Problem List Items Addressed This Visit          Pulmonary    COPD (chronic obstructive pulmonary disease)    He no longer smokes.  He uses inhalers.  Condition unchanged.            Cardiac/Vascular    PAD    He walks regularly.  He has claudication symptoms intermittently.  Currently asymptomatic.         Hyperlipidemia LDL goal <70    Current LDL 74 mg%.  He is on rosuvastatin 40 mg.  Zetia to be added in the future.         Chronic diastolic congestive heart failure    Currently his symptoms of heart failure limited by angina.  He just had a suboptimal stress test.    Condition unchanged.           Coronary artery disease of native artery of native heart with stable angina pectoris    Despite medical therapy anginal symptoms reported.    Repeat heart catheterization scheduled.    He had a recent nondiagnostic treadmill.    He is having classic anginal symptoms similar to what he had before two-vessel stenting in 2022.         Primary hypertension    Today I did increase carvedilol dosing to 25 mg b.i.d..  His other agents will be continued including amlodipine and losartan.            Endocrine    Obesity (BMI 30.0-34.9)    Weight " loss encouraged.          Other Visit Diagnoses         Angina, class III    -  Primary    Relevant Orders    Case Request-Cath Lab: Left heart cath (Completed)    Basic metabolic panel    CBC Without Differential      Essential hypertension  (Chronic)       Decrease carvedilol to 12.5mg BID, increase HCTZ to 25mg (the dose he's been taking for 3 weeks), and discussed low salt diet.     Relevant Medications    carvediloL (COREG) 25 MG tablet               I increase carvedilol.  I scheduled a heart catheterization at his most convenient date.  It will be an early July.  Continued medical therapy for angina to be carried out until then.  Aspirin resumed, he will continue Plavix.       A left heart catheterization has been advised.   The procedure has been explained to the patient and risks explained to the patient.  Risks include but not limited to death, myocardial infarction, bleeding and blood vessel injury, stroke, kidney injury, limb loss, allergic reaction to medications explained to the patient.  He requests to proceed.             Woo Byrne MD  06/10/2025   1:25 PM               [1]   Current Outpatient Medications:     albuterol (ACCUNEB) 1.25 mg/3 mL Nebu, Take 3 mLs (1.25 mg total) by nebulization every 6 (six) hours as needed (shortness of breath). Rescue, Disp: 75 mL, Rfl: 5    ALPRAZolam (XANAX) 1 MG tablet, TAKE 1 TABLET BY MOUTH THREE TIMES A DAY AS NEEDED, Disp: 90 tablet, Rfl: 5    amLODIPine (NORVASC) 5 MG tablet, Take 1 tablet (5 mg total) by mouth once daily., Disp: 90 tablet, Rfl: 4    atropine 1% (ISOPTO ATROPINE) 1 % Drop, Place 1 drop into the right eye as needed (discomfort)., Disp: 5 mL, Rfl: 6    budesonide-formoterol 80-4.5 mcg (BREYNA) 80-4.5 mcg/actuation HFAA, INHALE 2 PUFFS INTO THE LUNGS TWICE A DAY, Disp: 10.2 g, Rfl: 5    clopidogreL (PLAVIX) 75 mg tablet, TAKE 1 TABLET BY MOUTH EVERY DAY, Disp: 90 tablet, Rfl: 3    colchicine (COLCRYS) 0.6 mg tablet, Take 0.6 mg by mouth as  needed., Disp: , Rfl:     cyclobenzaprine (FLEXERIL) 10 MG tablet, TAKE 1 TABLET BY MOUTH EVERY DAY IN THE EVENING, Disp: 30 tablet, Rfl: 0    esomeprazole (NEXIUM) 20 MG capsule, Take 20 mg by mouth before breakfast., Disp: , Rfl:     furosemide (LASIX) 20 MG tablet, TAKE 1 TABLET (20 MG TOTAL) BY MOUTH 2 (TWO) TIMES DAILY AS NEEDED (SWELING)., Disp: 180 tablet, Rfl: 3    losartan (COZAAR) 25 MG tablet, Take 1 tablet (25 mg total) by mouth once daily., Disp: 90 tablet, Rfl: 3    nitroGLYCERIN (NITROSTAT) 0.4 MG SL tablet, Place 1 tablet (0.4 mg total) under the tongue every 5 (five) minutes as needed for Chest pain., Disp: 30 tablet, Rfl: 2    prednisoLONE acetate (PRED FORTE) 1 % DrpS, Place 1 drop into the right eye 2 (two) times daily., Disp: 10 mL, Rfl: 11    rosuvastatin (CRESTOR) 40 MG Tab, TAKE 1 TABLET BY MOUTH EVERY DAY, Disp: 90 tablet, Rfl: 3    vitamin E 400 UNIT capsule, Take 400 Units by mouth once daily., Disp: , Rfl:     aspirin (ECOTRIN) 81 MG EC tablet, Take 1 tablet (81 mg total) by mouth once daily., Disp: , Rfl:     carvediloL (COREG) 25 MG tablet, Take 1 tablet (25 mg total) by mouth 2 (two) times daily with meals., Disp: 180 tablet, Rfl: 3    Current Facility-Administered Medications:     sodium chloride 0.9% flush 10 mL, 10 mL, Intravenous, PRN, Loc Garcia MD    sodium chloride 0.9% flush 10 mL, 10 mL, Intravenous, PRN, Feng Bond MD    sodium chloride 0.9% flush 10 mL, 10 mL, Intravenous, PRN, Woo Byrne MD

## 2025-06-10 NOTE — ASSESSMENT & PLAN NOTE
Despite medical therapy anginal symptoms reported.    Repeat heart catheterization scheduled.    He had a recent nondiagnostic treadmill.    He is having classic anginal symptoms similar to what he had before two-vessel stenting in 2022.

## 2025-06-10 NOTE — PROGRESS NOTES
[facility] Cardiology     Subjective:    Patient ID:  Michael Guillaume is a 60 y.o. male who presents for follow-up of Coronary Artery Disease (Follow up), Peripheral Arterial Disease, Hyperlipidemia, Hypertension, and Chest Pain    Review of patient's allergies indicates:   Allergen Reactions    Lipitor [atorvastatin] Other (See Comments)     Other reaction(s): Severe Myalgias     The patient is still reporting chest pain and reduced exercise tolerance with walking.  His stress test was nondiagnostic.  He had reduced exercise capacity.  There was a hypertensive blood pressure response to exercise.    He had two-vessel stenting in 2022.  He states he feels the exact same way now that he did before the stents.  He has not tried nitroglycerin.  He no longer smokes.  He is on Plavix.  Most recent LDL was 74 mg% with Crestor 40 mg.  He is a former smoker.         Review of Systems   Constitutional: Positive for malaise/fatigue. Negative for chills, decreased appetite, diaphoresis, fever, night sweats, weight gain and weight loss.   HENT:  Negative for congestion, ear discharge, ear pain, hearing loss, hoarse voice, nosebleeds, odynophagia, sore throat, stridor and tinnitus.    Eyes:  Negative for blurred vision, discharge, double vision, pain, photophobia, redness, vision loss in left eye, vision loss in right eye, visual disturbance and visual halos.   Cardiovascular:  Positive for chest pain and dyspnea on exertion. Negative for claudication, cyanosis, irregular heartbeat, leg swelling, near-syncope, orthopnea, palpitations, paroxysmal nocturnal dyspnea and syncope.   Respiratory:  Positive for cough and shortness of breath. Negative for hemoptysis, sleep disturbances due to breathing, snoring, sputum production and wheezing.    Endocrine: Negative for cold intolerance, heat intolerance, polydipsia, polyphagia and polyuria.   Hematologic/Lymphatic:  "Negative for adenopathy and bleeding problem. Does not bruise/bleed easily.   Skin:  Negative for color change, dry skin, flushing, itching, nail changes, poor wound healing, rash, skin cancer, suspicious lesions and unusual hair distribution.   Musculoskeletal:  Negative for arthritis, back pain, falls, gout, joint pain, joint swelling, muscle cramps, muscle weakness, myalgias, neck pain and stiffness.   Gastrointestinal:  Negative for bloating, abdominal pain, anorexia, change in bowel habit, bowel incontinence, constipation, diarrhea, dysphagia, excessive appetite, flatus, heartburn, hematemesis, hematochezia, hemorrhoids, jaundice, melena, nausea and vomiting.   Genitourinary:  Negative for bladder incontinence, decreased libido, dysuria, flank pain, frequency, genital sores, hematuria, hesitancy, incomplete emptying, nocturia and urgency.   Neurological:  Negative for aphonia, brief paralysis, difficulty with concentration, disturbances in coordination, excessive daytime sleepiness, dizziness, focal weakness, headaches, light-headedness, loss of balance, numbness, paresthesias, seizures, sensory change, tremors, vertigo and weakness.   Psychiatric/Behavioral:  Negative for altered mental status, depression, hallucinations, memory loss, substance abuse, suicidal ideas and thoughts of violence. The patient does not have insomnia and is not nervous/anxious.    Allergic/Immunologic: Negative for hives and persistent infections.        Objective:       Vitals:    06/10/25 1253   BP: 128/84   BP Location: Left arm   Patient Position: Sitting   Pulse: 86   SpO2: (!) 94%   Weight: 89.4 kg (197 lb 1.5 oz)   Height: 5' 4" (1.626 m)    Physical Exam  Constitutional:       General: He is not in acute distress.     Appearance: He is well-developed. He is not diaphoretic.   HENT:      Head: Normocephalic and atraumatic.      Nose: Nose normal.   Eyes:      General: No scleral icterus.        Right eye: No discharge.      " Conjunctiva/sclera: Conjunctivae normal.      Pupils: Pupils are equal, round, and reactive to light.   Neck:      Thyroid: No thyromegaly.      Vascular: No JVD.      Trachea: No tracheal deviation.   Cardiovascular:      Rate and Rhythm: Normal rate and regular rhythm.      Pulses:           Carotid pulses are 2+ on the right side and 2+ on the left side.       Radial pulses are 0 on the right side and 0 on the left side.        Dorsalis pedis pulses are 0 on the right side and 0 on the left side.        Posterior tibial pulses are 0 on the right side and 0 on the left side.      Heart sounds: Normal heart sounds. No murmur heard.     No friction rub. No gallop.      Comments: Faint monophasic Doppler signals noted at pedal position bilaterally  Pulmonary:      Effort: Pulmonary effort is normal. No respiratory distress.      Breath sounds: Normal breath sounds. No stridor. No wheezing or rales.   Chest:      Chest wall: No tenderness.   Abdominal:      General: Bowel sounds are normal. There is no distension.      Palpations: Abdomen is soft. There is no mass.      Tenderness: There is no abdominal tenderness. There is no guarding or rebound.   Musculoskeletal:         General: No tenderness. Normal range of motion.      Cervical back: Normal range of motion and neck supple.   Lymphadenopathy:      Cervical: No cervical adenopathy.   Skin:     General: Skin is warm and dry.      Coloration: Skin is not pale.      Findings: No erythema or rash.   Neurological:      Mental Status: He is alert and oriented to person, place, and time.      Cranial Nerves: No cranial nerve deficit.      Coordination: Coordination normal.   Psychiatric:         Behavior: Behavior normal.         Thought Content: Thought content normal.         Judgment: Judgment normal.           Assessment:       1. Angina, class III    2. Essential hypertension    3. Chronic obstructive pulmonary disease, unspecified COPD type    4. Primary  hypertension    5. PAD    6. Hyperlipidemia LDL goal <70    7. Coronary artery disease of native artery of native heart with stable angina pectoris    8. Obesity (BMI 30.0-34.9)    9. Chronic diastolic congestive heart failure      Results for orders placed or performed during the hospital encounter of 06/04/25   Exercise Stress - EKG    Collection Time: 06/04/25 11:42 AM   Result Value Ref Range    85% Max Predicted      Max Predicted      OHS CV CPX PATIENT IS MALE 1.0     OHS CV CPX PATIENT IS FEMALE 0.0     HR at rest 80 bpm    Systolic blood pressure 145 mmHg    Diastolic blood pressure 91 mmHg    RPP 11,600     Exercise duration (min) 5 minutes    Exercise duration (sec) 46 seconds    Peak  bpm    Peak Systolic  mmHg    Peak Diatolic  mmHg    Peak RPP 24,477     Estimated METs 7     % Max HR Achieved 77     1 Minute Recovery HR 94 bpm       Current Medications[1]     Lab Results   Component Value Date    WBC 9.28 04/03/2025    RBC 5.46 04/03/2025    HGB 16.2 04/03/2025    HCT 51.9 04/03/2025    MCV 95 04/03/2025    MCH 29.7 04/03/2025    MCHC 31.2 (L) 04/03/2025    RDW 13.2 04/03/2025     04/03/2025    MPV 9.9 04/03/2025    GRAN 6.3 09/27/2024    GRAN 76.7 (H) 09/27/2024    LYMPH 14.2 (L) 04/03/2025    LYMPH 1.32 04/03/2025    MONO 6.5 04/03/2025    MONO 0.60 04/03/2025    EOS 0.8 04/03/2025    EOS 0.07 04/03/2025    BASO 0.05 09/27/2024    EOSINOPHIL 0.6 09/27/2024    BASOPHIL 0.6 04/03/2025    BASOPHIL 0.06 04/03/2025    MG 2.1 08/19/2020        CMP  Lab Results   Component Value Date     04/03/2025    K 4.8 04/03/2025     04/03/2025    CO2 29 04/03/2025     04/03/2025    BUN 30 (H) 04/03/2025    CREATININE 1.3 04/03/2025    CALCIUM 8.9 04/03/2025    PROT 6.9 04/03/2025    ALBUMIN 3.7 04/03/2025    BILITOT 0.3 04/03/2025    ALKPHOS 76 04/03/2025    AST 17 04/03/2025    ALT 14 04/03/2025    ANIONGAP 8 04/03/2025    ESTGFRAFRICA >60.0 03/09/2022     "EGFRNONAA >60.0 03/09/2022        No results found for: "LABBLOO", "LABURIN", "RESPIRATORYC", "GSRESP"         Results for orders placed or performed in visit on 05/27/25   EKG 12-lead    Collection Time: 05/27/25 11:03 AM   Result Value Ref Range    QRS Duration 86 ms    OHS QTC Calculation 432 ms    Narrative    Test Reason : I25.110,    Vent. Rate :  81 BPM     Atrial Rate :  81 BPM     P-R Int : 126 ms          QRS Dur :  86 ms      QT Int : 372 ms       P-R-T Axes :  46  55  61 degrees    QTcB Int : 432 ms    Normal sinus rhythm  Normal ECG  When compared with ECG of 06-Oct-2022 10:41,  Premature ventricular complexes are no longer Present  Confirmed by Woo Byrne ([id number]) on 5/28/2025 9:10:47 AM    Referred By:            Confirmed By: Woo Byrne                   Plan:       Problem List Items Addressed This Visit          Pulmonary    COPD (chronic obstructive pulmonary disease)    He no longer smokes.  He uses inhalers.  Condition unchanged.            Cardiac/Vascular    PAD    He walks regularly.  He has claudication symptoms intermittently.  Currently asymptomatic.         Hyperlipidemia LDL goal <70    Current LDL 74 mg%.  He is on rosuvastatin 40 mg.  Zetia to be added in the future.         Chronic diastolic congestive heart failure    Currently his symptoms of heart failure limited by angina.  He just had a suboptimal stress test.    Condition unchanged.           Coronary artery disease of native artery of native heart with stable angina pectoris    Despite medical therapy anginal symptoms reported.    Repeat heart catheterization scheduled.    He had a recent nondiagnostic treadmill.    He is having classic anginal symptoms similar to what he had before two-vessel stenting in 2022.         Primary hypertension    Today I did increase carvedilol dosing to 25 mg b.i.d..  His other agents will be continued including amlodipine and losartan.            Endocrine    Obesity (BMI 30.0-34.9)    Weight " loss encouraged.          Other Visit Diagnoses         Angina, class III    -  Primary    Relevant Orders    Case Request-Cath Lab: Left heart cath (Completed)    Basic metabolic panel    CBC Without Differential      Essential hypertension  (Chronic)       Decrease carvedilol to 12.5mg BID, increase HCTZ to 25mg (the dose he's been taking for 3 weeks), and discussed low salt diet.     Relevant Medications    carvediloL (COREG) 25 MG tablet               I increase carvedilol.  I scheduled a heart catheterization at his most convenient date.  It will be an early July.  Continued medical therapy for angina to be carried out until then.  Aspirin resumed, he will continue Plavix.       A left heart catheterization has been advised.   The procedure has been explained to the patient and risks explained to the patient.  Risks include but not limited to death, myocardial infarction, bleeding and blood vessel injury, stroke, kidney injury, limb loss, allergic reaction to medications explained to the patient.  He requests to proceed.             Woo Byrne MD  06/10/2025   1:25 PM               [1]   Current Outpatient Medications:     albuterol (ACCUNEB) 1.25 mg/3 mL Nebu, Take 3 mLs (1.25 mg total) by nebulization every 6 (six) hours as needed (shortness of breath). Rescue, Disp: 75 mL, Rfl: 5    ALPRAZolam (XANAX) 1 MG tablet, TAKE 1 TABLET BY MOUTH THREE TIMES A DAY AS NEEDED, Disp: 90 tablet, Rfl: 5    amLODIPine (NORVASC) 5 MG tablet, Take 1 tablet (5 mg total) by mouth once daily., Disp: 90 tablet, Rfl: 4    atropine 1% (ISOPTO ATROPINE) 1 % Drop, Place 1 drop into the right eye as needed (discomfort)., Disp: 5 mL, Rfl: 6    budesonide-formoterol 80-4.5 mcg (BREYNA) 80-4.5 mcg/actuation HFAA, INHALE 2 PUFFS INTO THE LUNGS TWICE A DAY, Disp: 10.2 g, Rfl: 5    clopidogreL (PLAVIX) 75 mg tablet, TAKE 1 TABLET BY MOUTH EVERY DAY, Disp: 90 tablet, Rfl: 3    colchicine (COLCRYS) 0.6 mg tablet, Take 0.6 mg by mouth as  needed., Disp: , Rfl:     cyclobenzaprine (FLEXERIL) 10 MG tablet, TAKE 1 TABLET BY MOUTH EVERY DAY IN THE EVENING, Disp: 30 tablet, Rfl: 0    esomeprazole (NEXIUM) 20 MG capsule, Take 20 mg by mouth before breakfast., Disp: , Rfl:     furosemide (LASIX) 20 MG tablet, TAKE 1 TABLET (20 MG TOTAL) BY MOUTH 2 (TWO) TIMES DAILY AS NEEDED (SWELING)., Disp: 180 tablet, Rfl: 3    losartan (COZAAR) 25 MG tablet, Take 1 tablet (25 mg total) by mouth once daily., Disp: 90 tablet, Rfl: 3    nitroGLYCERIN (NITROSTAT) 0.4 MG SL tablet, Place 1 tablet (0.4 mg total) under the tongue every 5 (five) minutes as needed for Chest pain., Disp: 30 tablet, Rfl: 2    prednisoLONE acetate (PRED FORTE) 1 % DrpS, Place 1 drop into the right eye 2 (two) times daily., Disp: 10 mL, Rfl: 11    rosuvastatin (CRESTOR) 40 MG Tab, TAKE 1 TABLET BY MOUTH EVERY DAY, Disp: 90 tablet, Rfl: 3    vitamin E 400 UNIT capsule, Take 400 Units by mouth once daily., Disp: , Rfl:     aspirin (ECOTRIN) 81 MG EC tablet, Take 1 tablet (81 mg total) by mouth once daily., Disp: , Rfl:     carvediloL (COREG) 25 MG tablet, Take 1 tablet (25 mg total) by mouth 2 (two) times daily with meals., Disp: 180 tablet, Rfl: 3    Current Facility-Administered Medications:     sodium chloride 0.9% flush 10 mL, 10 mL, Intravenous, PRN, Loc Garcia MD    sodium chloride 0.9% flush 10 mL, 10 mL, Intravenous, PRN, Feng Bond MD    sodium chloride 0.9% flush 10 mL, 10 mL, Intravenous, PRN, Woo Byrne MD

## 2025-06-10 NOTE — ASSESSMENT & PLAN NOTE
Today I did increase carvedilol dosing to 25 mg b.i.d..  His other agents will be continued including amlodipine and losartan.

## 2025-06-10 NOTE — ASSESSMENT & PLAN NOTE
Currently his symptoms of heart failure limited by angina.  He just had a suboptimal stress test.    Condition unchanged.

## 2025-06-11 ENCOUNTER — PATIENT MESSAGE (OUTPATIENT)
Dept: CARDIOLOGY | Facility: CLINIC | Age: 60
End: 2025-06-11
Payer: COMMERCIAL

## 2025-06-18 DIAGNOSIS — I25.10 CORONARY ARTERY DISEASE, UNSPECIFIED VESSEL OR LESION TYPE, UNSPECIFIED WHETHER ANGINA PRESENT, UNSPECIFIED WHETHER NATIVE OR TRANSPLANTED HEART: Primary | ICD-10-CM

## 2025-06-25 ENCOUNTER — LAB VISIT (OUTPATIENT)
Dept: LAB | Facility: HOSPITAL | Age: 60
End: 2025-06-25
Attending: INTERNAL MEDICINE
Payer: COMMERCIAL

## 2025-06-25 DIAGNOSIS — Z01.810 PREPROCEDURAL CARDIOVASCULAR EXAMINATION: ICD-10-CM

## 2025-06-25 LAB
ABSOLUTE EOSINOPHIL (OHS): 0.05 K/UL
ABSOLUTE MONOCYTE (OHS): 0.81 K/UL (ref 0.3–1)
ABSOLUTE NEUTROPHIL COUNT (OHS): 7.55 K/UL (ref 1.8–7.7)
ANION GAP (OHS): 11 MMOL/L (ref 8–16)
BASOPHILS # BLD AUTO: 0.06 K/UL
BASOPHILS NFR BLD AUTO: 0.6 %
BUN SERPL-MCNC: 27 MG/DL (ref 6–20)
CALCIUM SERPL-MCNC: 9.3 MG/DL (ref 8.7–10.5)
CHLORIDE SERPL-SCNC: 103 MMOL/L (ref 95–110)
CO2 SERPL-SCNC: 29 MMOL/L (ref 23–29)
CREAT SERPL-MCNC: 1.3 MG/DL (ref 0.5–1.4)
ERYTHROCYTE [DISTWIDTH] IN BLOOD BY AUTOMATED COUNT: 13.8 % (ref 11.5–14.5)
GFR SERPLBLD CREATININE-BSD FMLA CKD-EPI: >60 ML/MIN/1.73/M2
GLUCOSE SERPL-MCNC: 107 MG/DL (ref 70–110)
HCT VFR BLD AUTO: 51.6 % (ref 40–54)
HGB BLD-MCNC: 16.4 GM/DL (ref 14–18)
IMM GRANULOCYTES # BLD AUTO: 0.05 K/UL (ref 0–0.04)
IMM GRANULOCYTES NFR BLD AUTO: 0.5 % (ref 0–0.5)
LYMPHOCYTES # BLD AUTO: 1.24 K/UL (ref 1–4.8)
MCH RBC QN AUTO: 29.8 PG (ref 27–31)
MCHC RBC AUTO-ENTMCNC: 31.8 G/DL (ref 32–36)
MCV RBC AUTO: 94 FL (ref 82–98)
NUCLEATED RBC (/100WBC) (OHS): 0 /100 WBC
PLATELET # BLD AUTO: 238 K/UL (ref 150–450)
PMV BLD AUTO: 9.2 FL (ref 9.2–12.9)
POTASSIUM SERPL-SCNC: 4.3 MMOL/L (ref 3.5–5.1)
RBC # BLD AUTO: 5.51 M/UL (ref 4.6–6.2)
RELATIVE EOSINOPHIL (OHS): 0.5 %
RELATIVE LYMPHOCYTE (OHS): 12.7 % (ref 18–48)
RELATIVE MONOCYTE (OHS): 8.3 % (ref 4–15)
RELATIVE NEUTROPHIL (OHS): 77.4 % (ref 38–73)
SODIUM SERPL-SCNC: 143 MMOL/L (ref 136–145)
WBC # BLD AUTO: 9.76 K/UL (ref 3.9–12.7)

## 2025-06-25 PROCEDURE — 82565 ASSAY OF CREATININE: CPT

## 2025-06-25 PROCEDURE — 85025 COMPLETE CBC W/AUTO DIFF WBC: CPT

## 2025-06-25 PROCEDURE — 36415 COLL VENOUS BLD VENIPUNCTURE: CPT

## 2025-06-27 ENCOUNTER — HOSPITAL ENCOUNTER (OUTPATIENT)
Facility: HOSPITAL | Age: 60
Discharge: HOME | End: 2025-06-27
Attending: INTERNAL MEDICINE | Admitting: INTERNAL MEDICINE
Payer: MEDICARE

## 2025-06-27 VITALS
DIASTOLIC BLOOD PRESSURE: 79 MMHG | HEIGHT: 64 IN | OXYGEN SATURATION: 99 % | BODY MASS INDEX: 33.29 KG/M2 | RESPIRATION RATE: 21 BRPM | SYSTOLIC BLOOD PRESSURE: 138 MMHG | TEMPERATURE: 98 F | WEIGHT: 195 LBS | HEART RATE: 74 BPM

## 2025-06-27 DIAGNOSIS — Z01.810 PREPROCEDURAL CARDIOVASCULAR EXAMINATION: Primary | ICD-10-CM

## 2025-06-27 DIAGNOSIS — I25.10 CORONARY ARTERY DISEASE, UNSPECIFIED VESSEL OR LESION TYPE, UNSPECIFIED WHETHER ANGINA PRESENT, UNSPECIFIED WHETHER NATIVE OR TRANSPLANTED HEART: ICD-10-CM

## 2025-06-27 LAB
OHS QRS DURATION: 90 MS
OHS QTC CALCULATION: 433 MS
POC ACTIVATED CLOTTING TIME K: 199 SEC (ref 74–137)
POC ACTIVATED CLOTTING TIME K: 233 SEC (ref 74–137)
POC ACTIVATED CLOTTING TIME K: 256 SEC (ref 74–137)
POC ACTIVATED CLOTTING TIME K: 302 SEC (ref 74–137)
SAMPLE: ABNORMAL

## 2025-06-27 PROCEDURE — 63600175 PHARM REV CODE 636 W HCPCS: Performed by: INTERNAL MEDICINE

## 2025-06-27 PROCEDURE — 25500020 PHARM REV CODE 255: Performed by: INTERNAL MEDICINE

## 2025-06-27 PROCEDURE — 93458 L HRT ARTERY/VENTRICLE ANGIO: CPT | Mod: 26,XU,, | Performed by: INTERNAL MEDICINE

## 2025-06-27 PROCEDURE — 92928 PRQ TCAT PLMT NTRAC ST 1 LES: CPT | Mod: LD,,, | Performed by: INTERNAL MEDICINE

## 2025-06-27 PROCEDURE — 92972 PERQ TRLUML CORONRY LITHOTRP: CPT | Mod: LD,,, | Performed by: INTERNAL MEDICINE

## 2025-06-27 PROCEDURE — C1760 CLOSURE DEV, VASC: HCPCS | Performed by: INTERNAL MEDICINE

## 2025-06-27 PROCEDURE — 93571 IV DOP VEL&/PRESS C FLO 1ST: CPT | Mod: LD | Performed by: INTERNAL MEDICINE

## 2025-06-27 PROCEDURE — C1887 CATHETER, GUIDING: HCPCS | Performed by: INTERNAL MEDICINE

## 2025-06-27 PROCEDURE — 99152 MOD SED SAME PHYS/QHP 5/>YRS: CPT | Mod: ,,, | Performed by: INTERNAL MEDICINE

## 2025-06-27 PROCEDURE — 93010 ELECTROCARDIOGRAM REPORT: CPT | Mod: ,,, | Performed by: INTERNAL MEDICINE

## 2025-06-27 PROCEDURE — 99153 MOD SED SAME PHYS/QHP EA: CPT | Performed by: INTERNAL MEDICINE

## 2025-06-27 PROCEDURE — C1874 STENT, COATED/COV W/DEL SYS: HCPCS | Performed by: INTERNAL MEDICINE

## 2025-06-27 PROCEDURE — C1769 GUIDE WIRE: HCPCS | Performed by: INTERNAL MEDICINE

## 2025-06-27 PROCEDURE — C9600 PERC DRUG-EL COR STENT SING: HCPCS | Performed by: INTERNAL MEDICINE

## 2025-06-27 PROCEDURE — 92978 ENDOLUMINL IVUS OCT C 1ST: CPT | Performed by: INTERNAL MEDICINE

## 2025-06-27 PROCEDURE — 92978 ENDOLUMINL IVUS OCT C 1ST: CPT | Mod: 26,LD,, | Performed by: INTERNAL MEDICINE

## 2025-06-27 PROCEDURE — 93458 L HRT ARTERY/VENTRICLE ANGIO: CPT | Performed by: INTERNAL MEDICINE

## 2025-06-27 PROCEDURE — 25000003 PHARM REV CODE 250: Performed by: INTERNAL MEDICINE

## 2025-06-27 PROCEDURE — C1725 CATH, TRANSLUMIN NON-LASER: HCPCS | Performed by: INTERNAL MEDICINE

## 2025-06-27 PROCEDURE — 93571 IV DOP VEL&/PRESS C FLO 1ST: CPT | Mod: 26,52,LD, | Performed by: INTERNAL MEDICINE

## 2025-06-27 PROCEDURE — 85347 COAGULATION TIME ACTIVATED: CPT | Performed by: INTERNAL MEDICINE

## 2025-06-27 PROCEDURE — C1761 OPTIME CATH, TRANSLUMINAL INTRAVASC LITHO, CORONARY: HCPCS | Performed by: INTERNAL MEDICINE

## 2025-06-27 PROCEDURE — C1753 CATH, INTRAVAS ULTRASOUND: HCPCS | Performed by: INTERNAL MEDICINE

## 2025-06-27 PROCEDURE — 93005 ELECTROCARDIOGRAM TRACING: CPT

## 2025-06-27 PROCEDURE — C1894 INTRO/SHEATH, NON-LASER: HCPCS | Performed by: INTERNAL MEDICINE

## 2025-06-27 PROCEDURE — 27201423 OPTIME MED/SURG SUP & DEVICES STERILE SUPPLY: Performed by: INTERNAL MEDICINE

## 2025-06-27 PROCEDURE — 92972 PERQ TRLUML CORONRY LITHOTRP: CPT | Performed by: INTERNAL MEDICINE

## 2025-06-27 PROCEDURE — 99152 MOD SED SAME PHYS/QHP 5/>YRS: CPT | Performed by: INTERNAL MEDICINE

## 2025-06-27 DEVICE — EVEROLIMUS-ELUTING PLATINUM CHROMIUM CORONARY STENT SYSTEM
Type: IMPLANTABLE DEVICE | Site: CORONARY | Status: FUNCTIONAL
Brand: SYNERGY™ XD

## 2025-06-27 RX ORDER — IODIXANOL 320 MG/ML
INJECTION, SOLUTION INTRAVASCULAR
Status: DISCONTINUED | OUTPATIENT
Start: 2025-06-27 | End: 2025-06-27 | Stop reason: HOSPADM

## 2025-06-27 RX ORDER — VERAPAMIL HYDROCHLORIDE 2.5 MG/ML
INJECTION INTRAVENOUS
Status: DISCONTINUED | OUTPATIENT
Start: 2025-06-27 | End: 2025-06-27 | Stop reason: HOSPADM

## 2025-06-27 RX ORDER — HEPARIN SODIUM 1000 [USP'U]/ML
INJECTION, SOLUTION INTRAVENOUS; SUBCUTANEOUS
Status: DISCONTINUED | OUTPATIENT
Start: 2025-06-27 | End: 2025-06-27 | Stop reason: HOSPADM

## 2025-06-27 RX ORDER — HEPARIN SODIUM 200 [USP'U]/100ML
INJECTION, SOLUTION INTRAVENOUS
Status: DISCONTINUED | OUTPATIENT
Start: 2025-06-27 | End: 2025-06-27 | Stop reason: HOSPADM

## 2025-06-27 RX ORDER — LIDOCAINE HYDROCHLORIDE 10 MG/ML
INJECTION, SOLUTION EPIDURAL; INFILTRATION; INTRACAUDAL; PERINEURAL
Status: DISCONTINUED | OUTPATIENT
Start: 2025-06-27 | End: 2025-06-27 | Stop reason: HOSPADM

## 2025-06-27 RX ORDER — FENTANYL CITRATE 50 UG/ML
INJECTION, SOLUTION INTRAMUSCULAR; INTRAVENOUS
Status: DISCONTINUED | OUTPATIENT
Start: 2025-06-27 | End: 2025-06-27 | Stop reason: HOSPADM

## 2025-06-27 RX ORDER — CEFAZOLIN SODIUM 1 G/3ML
INJECTION, POWDER, FOR SOLUTION INTRAMUSCULAR; INTRAVENOUS
Status: DISCONTINUED | OUTPATIENT
Start: 2025-06-27 | End: 2025-06-27 | Stop reason: HOSPADM

## 2025-06-27 RX ORDER — MIDAZOLAM HYDROCHLORIDE 1 MG/ML
INJECTION, SOLUTION INTRAMUSCULAR; INTRAVENOUS
Status: DISCONTINUED | OUTPATIENT
Start: 2025-06-27 | End: 2025-06-27 | Stop reason: HOSPADM

## 2025-06-27 NOTE — PLAN OF CARE
Notified MD patient sheath site is bleeding around sheath and ACT is 199. Orders to removed sheath by Trini and pressure will be held accordingly. Sheath removed at 1316.

## 2025-06-27 NOTE — PLAN OF CARE
Patient discharged to home as ordered after 4 hour recovery post sheath removal per Dr. Melo. All discharge instructions, printed materials given. Patient instructed on follow-up appointment as ordered. Patient verbalized understanding and agreement with all discharge instructions given. Pt is AAOx3, VSS, denies any pain. Left groin gauze and tegaderm dressing c.d.i. No bleeding or hematoma noted.  Skin normal in color and warm to touch. Palpated bilateral radial pulses and dopplered bilateral pedal pulses. Pt voided without difficulty 400ml clear yellow urine per urinal. Pt got dressed and moving around without difficulty and no distress noted.  Pt in w/c. Left forearm 20 gauge iv dc'd as ordered with tip intact. ellie and koban dressing applied c.d.i. Pt discharged home via wheelchair to private vehicle by pt's spouse.

## 2025-06-27 NOTE — Clinical Note
The catheter was inserted into the proximal   left anterior descending. Pullback was recorded.  Images obtained and recorded

## 2025-06-27 NOTE — Clinical Note
The catheter was removed from the proximal   left anterior descending. An angiography was performed of the left coronary arteries. The angiography was performed via hand injection with 6 mL of contrast.

## 2025-06-27 NOTE — Clinical Note
The catheter was removed from the and was inserted over the wire into the proximal   left anterior descending. Pullback was recorded.  An angiography was performed of the left coronary arteries. The angiography was performed via hand injection with 6 mL of contrast.  IVUS images performed and reviewed

## 2025-06-27 NOTE — Clinical Note
An angiography was performed of the left coronary arteries. The angiography was performed via hand injection with 6 mL of contrast.

## 2025-06-27 NOTE — PLAN OF CARE
Patient and pts spouse updated--- spouse in lobby. Patient resting quietly in bed. Will continue to monitor.

## 2025-06-27 NOTE — Clinical Note
The left groin and left radial was prepped. The site was prepped with ChloraPrep. The site was clipped. The patient was draped.

## 2025-06-27 NOTE — Clinical Note
The catheter was repositioned into the ostium   left anterior descending. An angiography was performed of the left coronary arteries. The angiography was performed via hand injection with 10 mL of contrast.

## 2025-06-27 NOTE — Clinical Note
The catheter was inserted over the wire into the ostium   right coronary artery. An angiography was performed of the right coronary arteries. Multiple views were taken. The angiography was performed via hand injection with 10 mL of contrast.

## 2025-06-27 NOTE — Clinical Note
The catheter was repositioned into the proximal   left anterior descending. An angiography was performed of the left coronary arteries. The angiography was performed via hand injection with 10 mL of contrast.

## 2025-06-27 NOTE — PLAN OF CARE
Patient transferred to recovery cath lab slot 5 via stretcher with side rails up x2 .  Pt AAO X 4 and able to follow commands. Pt is stable when connecting to cardiac monitors.  VSS. Left groin with sheath in place c.d.i. no bleeding or hematoma noted. +2 mindi radial pulses palpated. Doppler pedal pulses.Skin normal in color and warm to touch, <3 sec cap refill.  Fall risk precautions given and patient acknowledges.  AIDET completed to pt.  Will continue to monitor patient.  Updated pt's spouse in sx waiting room.

## 2025-06-27 NOTE — Clinical Note
The wire was repositioned to the proximal left anterior descending arteryMeasurement performed and ratio has been measured: Results: PROX LAD: 0.82.

## 2025-06-30 NOTE — DISCHARGE SUMMARY
[facility] - Cath Lab (Hospital)  Discharge Note  Short Stay    Procedure(s) (LRB):  Angiogram, Coronary, with Left Heart Cath  Instantaneous Wave-Free Ratio (IFR) (N/A)  Percutaneous coronary intervention (N/A)  IVUS, Coronary  Stent, Coronary, Multi Vessel      OUTCOME: Patient tolerated treatment/procedure well without complication and is now ready for discharge.    DISPOSITION: Home or Self Care    FINAL DIAGNOSIS:  <principal problem not specified>    FOLLOWUP: In clinic    DISCHARGE INSTRUCTIONS:    Discharge Procedure Orders   CBC Auto Differential   Standing Status: Future Number of Occurrences: 1 Standing Exp. Date: 09/16/26     Order Specific Question Answer Comments   Send normal result to authorizing provider's In Basket if patient is active on MyChart: Yes      Basic Metabolic Panel   Standing Status: Future Number of Occurrences: 1 Standing Exp. Date: 09/16/26         Clinical Reference Documents Added to Patient Instructions         Document    CARDIAC CATHETERIZATION - DISCHARGE INSTRUCTIONS (ENGLISH)    CORONARY ANGIOPLASTY DISCHARGE INSTRUCTIONS (ENGLISH)    CORONARY STENTING DISCHARGE INSTRUCTIONS (ENGLISH)            TIME SPENT ON DISCHARGE:  25 minutes

## 2025-07-02 ENCOUNTER — TELEPHONE (OUTPATIENT)
Dept: CARDIOLOGY | Facility: CLINIC | Age: 60
End: 2025-07-02
Payer: COMMERCIAL

## 2025-07-02 NOTE — TELEPHONE ENCOUNTER
Copied from CRM #[id number]. Topic: Appointments - Appointment Access  >> Jul 2, 2025  3:37 PM Nneka wrote:  Type:  Sooner Appointment Request    Patient is requesting a sooner appointment.  Patient declined first available appointment listed as well as another facility and provider .  Patient will not accept being placed on the waitlist and is requesting a message be sent to doctor.    Name of Caller: wife    When is the first available appointment? Sept    Symptoms: 2 week procedure follow up, pt wife is asking for Thursday July 10 or Friday July 11 morning if possible [place] or [place] location     Would the patient rather a call back or a response via My [facility]? call    Best Call Back Number: [phone]      Additional Information:

## 2025-07-02 NOTE — TELEPHONE ENCOUNTER
Hosp. D/c post [facility] . Appointment made with Patient 7- with Jhonny WILEY and also put on the waiting list to been seen sooner.  Patient appreciates the call back.    Nw

## 2025-07-07 DIAGNOSIS — J44.1 COPD WITH ACUTE EXACERBATION: ICD-10-CM

## 2025-07-07 RX ORDER — BUDESONIDE AND FORMOTEROL FUMARATE 80; 4.5 UG/1; UG/1
AEROSOL, METERED RESPIRATORY (INHALATION)
Qty: 10.2 G | Refills: 2 | Status: SHIPPED | OUTPATIENT
Start: 2025-07-07

## 2025-07-21 NOTE — PROGRESS NOTES
Cardiology Clinic note    Subjective:   Patient ID:  Michael Guillaume is a 60 y.o. male who presents for follow-up of CAD s/p PCI pLAD 6/2025    HPI    7/2025: 61 yo M with hx of CAD, PAD, COPD, GERD, JUSTIN here for F/U s/p LHC and PCI pLAD. Seen in clinic with spouse, reports overall doing well. Staying active, walking ~ 2m most AMs, no recurrent CPs and exercise tolerance improved. Long conversation about CAD, GDMT, importance of medication compliance in risk reduction of ISR. Discussed lifestyle modification and importance of staying active; discussed importance of tight lipid and blood sugar control. Stable from PAD perspective, no significant claudication, no CLI. Patient denies CP, SOB/SCHWARTZ, orthopnea, PND, syncope, palpitations, LE edema. Reports compliance and tolerance with all medications.    (Dr. Champion) 6/10/2025  Michael Guillaume is a 60 y.o. male who presents for follow-up of Coronary Artery Disease (Follow up), Peripheral Arterial Disease, Hyperlipidemia, Hypertension, and Chest Pain     The patient is still reporting chest pain and reduced exercise tolerance with walking.  His stress test was nondiagnostic.  He had reduced exercise capacity.  There was a hypertensive blood pressure response to exercise.     He had two-vessel stenting in 2022.  He states he feels the exact same way now that he did before the stents.  He has not tried nitroglycerin.  He no longer smokes.  He is on Plavix.  Most recent LDL was 74 mg% with Crestor 40 mg.  He is a former smoker.        [facility] 6/27/2025    The Prox LAD lesion was 80% stenosed with 0% stenosis post-intervention.  IFR 0.81    The pre-procedure left ventricular end diastolic pressure was 24.    Prox LAD lesion: A STENT SYNERGY XD [id number] stent was successfully placed at 16 JUAN PABLO for 15 sec. lesion very calcified.  Pre-dilated with 3.5 scoring balloon and shockwave balloon.  IVUS guided with excellent results.  Post dilated with 3.5 NC balloon    The estimated  blood loss was none.    Access left common femoral artery.  Very small radial arteries.  Closure manual pull.    Widely patent left circumflex and RCA stents    Echo 4/2023  The left ventricle is normal in size with normal systolic function.  The estimated ejection fraction is 55%.  Grade I left ventricular diastolic dysfunction.  Mild mitral regurgitation.  Mild tricuspid regurgitation.  Normal right ventricular size with normal right ventricular systolic function.  There is no pulmonary hypertension.    Past Medical History:   Diagnosis Date    Anticoagulant long-term use     Arthritis     Blood transfusion     mid 40's age    COPD (chronic obstructive pulmonary disease)     Encounter for blood transfusion     GERD (gastroesophageal reflux disease)     PAD (peripheral artery disease)     Sleep apnea     Thoracic or lumbosacral neuritis or radiculitis, unspecified 5/13/2013    Thyroid disorder 9/8/2015          Patient Active Problem List    Diagnosis Date Noted    Obesity (BMI 30.0-34.9) 09/27/2024    Coronary artery disease of native artery of native heart with stable angina pectoris 10/10/2023    Primary hypertension 10/10/2023    Sleep apnea     Chronic kidney disease, stage 3a 09/27/2022    Traumatic hyphema of right eye 09/02/2022    Laceration of globe of eye, left, initial encounter 09/02/2022    Chronic diastolic congestive heart failure 09/27/2021    Marijuana use 08/01/2017    COPD (chronic obstructive pulmonary disease) 08/01/2017    Tattoos 08/01/2017    History of sleep apnea 08/01/2017    Gastroesophageal reflux disease 03/23/2017    Subclinical hyperthyroidism 05/23/2016    Hyperlipidemia LDL goal <70 03/15/2016    Anxiety 03/15/2016    Dyspnea on exertion 07/30/2015    Erectile dysfunction 03/04/2015    BPH with urinary obstruction 03/04/2015    Spondylosis without myelopathy 05/13/2013    Degeneration of lumbar or lumbosacral intervertebral disc 05/13/2013    Spinal stenosis, lumbar region,  without neurogenic claudication 05/13/2013    PAD 07/03/2012       Patient's Medications   New Prescriptions    EZETIMIBE (ZETIA) 10 MG TABLET    Take 1 tablet (10 mg total) by mouth once daily.   Previous Medications    ALBUTEROL (ACCUNEB) 1.25 MG/3 ML NEBU    Take 3 mLs (1.25 mg total) by nebulization every 6 (six) hours as needed (shortness of breath). Rescue    ALPRAZOLAM (XANAX) 1 MG TABLET    TAKE 1 TABLET BY MOUTH THREE TIMES A DAY AS NEEDED    ATROPINE 1% (ISOPTO ATROPINE) 1 % DROP    Place 1 drop into the right eye as needed (discomfort).    BUDESONIDE-FORMOTEROL 80-4.5 MCG (BREYNA) 80-4.5 MCG/ACTUATION HFAA    INHALE 2 PUFFS INTO THE LUNGS TWICE A DAY    COLCHICINE (COLCRYS) 0.6 MG TABLET    Take 0.6 mg by mouth as needed.    ESOMEPRAZOLE (NEXIUM) 20 MG CAPSULE    Take 20 mg by mouth before breakfast.    FUROSEMIDE (LASIX) 20 MG TABLET    TAKE 1 TABLET (20 MG TOTAL) BY MOUTH 2 (TWO) TIMES DAILY AS NEEDED (SWELING).    NITROGLYCERIN (NITROSTAT) 0.4 MG SL TABLET    Place 1 tablet (0.4 mg total) under the tongue every 5 (five) minutes as needed for Chest pain.    PREDNISOLONE ACETATE (PRED FORTE) 1 % DRPS    Place 1 drop into the right eye 2 (two) times daily.    VITAMIN E 400 UNIT CAPSULE    Take 400 Units by mouth once daily.   Modified Medications    Modified Medication Previous Medication    AMLODIPINE (NORVASC) 5 MG TABLET amLODIPine (NORVASC) 5 MG tablet       Take 1 tablet (5 mg total) by mouth once daily.    Take 1 tablet (5 mg total) by mouth once daily.    ASPIRIN (ECOTRIN) 81 MG EC TABLET aspirin (ECOTRIN) 81 MG EC tablet       Take 1 tablet (81 mg total) by mouth once daily.    Take 1 tablet (81 mg total) by mouth once daily.    CARVEDILOL (COREG) 25 MG TABLET carvediloL (COREG) 25 MG tablet       Take 1 tablet (25 mg total) by mouth 2 (two) times daily with meals.    Take 1 tablet (25 mg total) by mouth 2 (two) times daily with meals.    CLOPIDOGREL (PLAVIX) 75 MG TABLET clopidogreL (PLAVIX) 75  mg tablet       Take 1 tablet (75 mg total) by mouth once daily.    TAKE 1 TABLET BY MOUTH EVERY DAY    LOSARTAN (COZAAR) 25 MG TABLET losartan (COZAAR) 25 MG tablet       Take 1 tablet (25 mg total) by mouth once daily.    Take 1 tablet (25 mg total) by mouth once daily.    ROSUVASTATIN (CRESTOR) 40 MG TAB rosuvastatin (CRESTOR) 40 MG Tab       Take 1 tablet (40 mg total) by mouth once daily.    TAKE 1 TABLET BY MOUTH EVERY DAY   Discontinued Medications    CYCLOBENZAPRINE (FLEXERIL) 10 MG TABLET    TAKE 1 TABLET BY MOUTH EVERY DAY IN THE EVENING        Review of Systems   Constitutional: Negative for chills, decreased appetite, diaphoresis, malaise/fatigue, weight gain and weight loss.   Cardiovascular:  Negative for chest pain, claudication, dyspnea on exertion, irregular heartbeat, leg swelling, near-syncope, orthopnea, palpitations, paroxysmal nocturnal dyspnea and syncope.   Respiratory:  Negative for cough, hemoptysis, shortness of breath and snoring.    Gastrointestinal:  Negative for bloating, abdominal pain, nausea and vomiting.   Neurological:  Negative for light-headedness and weakness.       Family History   Problem Relation Name Age of Onset    Clotting disorder Mother          ?    Heart failure Mother      Hypertension Mother      Heart disease Mother      Clotting disorder Father          ?    Heart attack Father      Hypertension Father      Heart disease Father      Heart disease Brother 1     Anesthesia problems Neg Hx         Social History     Socioeconomic History    Marital status:    Tobacco Use    Smoking status: Former     Current packs/day: 0.00     Average packs/day: 1 pack/day for 20.0 years (20.0 ttl pk-yrs)     Types: Cigarettes     Start date: 1991     Quit date: 2011     Years since quittin.6    Smokeless tobacco: Never    Tobacco comments:     5-7 cigarettes in 3 months   Substance and Sexual Activity    Alcohol use: Not Currently     Alcohol/week: 6.0  "standard drinks of alcohol     Types: 2 - 3 Cans of beer, 6 Shots of liquor per week     Comment: wine- occasions    Drug use: Yes     Types: Marijuana     Comment: daily    Sexual activity: Yes     Partners: Female     Birth control/protection: None            Objective:   Vitals  Vitals:    07/23/25 0802 07/23/25 0806   BP: 117/82 106/64   Pulse: 85    SpO2: (!) 94%    Weight: 91.8 kg (202 lb 7.9 oz)    Height: 5' 4" (1.626 m)           Physical Exam  Vitals and nursing note reviewed.   Constitutional:       Appearance: Normal appearance.   Cardiovascular:      Rate and Rhythm: Normal rate and regular rhythm.      Heart sounds: No murmur heard.     No gallop.   Pulmonary:      Effort: Pulmonary effort is normal.      Breath sounds: Normal breath sounds. No wheezing or rales.   Abdominal:      General: Bowel sounds are normal. There is no distension.      Palpations: Abdomen is soft.      Tenderness: There is no abdominal tenderness.   Musculoskeletal:      Right lower leg: No edema.      Left lower leg: No edema.   Skin:     General: Skin is warm and dry.   Neurological:      Mental Status: He is alert and oriented to person, place, and time.           Lab Results    Lab Results   Component Value Date    WBC 9.76 06/25/2025    HGB 16.4 06/25/2025    HGB 17.7 09/27/2024    HCT 51.6 06/25/2025    HCT 54.3 (H) 09/27/2024    MCV 94 06/25/2025    MCV 94 09/27/2024       Lab Results   Component Value Date     06/25/2025     09/27/2024    INR 0.9 08/30/2016       Lab Results   Component Value Date    K 4.3 06/25/2025    K 4.3 09/27/2024    MG 2.1 08/19/2020    BUN 27 (H) 06/25/2025    CREATININE 1.3 06/25/2025       Lab Results   Component Value Date     06/25/2025     09/27/2024    HGBA1C 5.5 04/03/2025    HGBA1C 5.7 (H) 03/27/2023       Lab Results   Component Value Date    AST 17 04/03/2025    AST 21 09/27/2024    ALT 14 04/03/2025    ALT 12 09/27/2024    ALBUMIN 3.7 04/03/2025    ALBUMIN " 4.1 09/27/2024    PROT 6.9 04/03/2025    PROT 7.1 09/27/2024       Lab Results   Component Value Date    CHOL 129 04/03/2025    CHOL 180 09/27/2024    HDL 35 (L) 04/03/2025    LDLCALC 74.4 04/03/2025    TRIG 98 04/03/2025    TRIG 187 (H) 09/27/2024       Lab Results   Component Value Date     (H) 08/27/2022         Assessment:     1. Coronary artery disease of native artery of native heart with stable angina pectoris    2. Hyperlipidemia LDL goal <70    3. Atherosclerosis of native coronary artery of native heart with unstable angina pectoris    4. Essential hypertension    5. PAD    6. Benign essential HTN    7. Primary hypertension    8. Chronic diastolic congestive heart failure    9. Chronic kidney disease, stage 3a    10. Sleep apnea, unspecified type    11. Marijuana use    12. Obesity (BMI 30.0-34.9)        Plan:     CAD  -multiple PCIs; most recent s/p  PCI pLAD 6/2025 with good result  -stable, no recurrent CV s/s  -continue DAPT, BB, amlodipine, ARB, statin as above; add Zetia  -continue exercising  -Mediterranean diet education; weight loss     2. HTN  -well controlled, continue meds as above    3. HLD  -target LDLc < 70, above goal  -continue statin  -add Zetia  -lipid panel, LFTs ~ 3m    4. PAD  -stable, no significant claudication, no CLI  -med management as above  -monitor  -continue walking program    5. Dastolic heart failure  -stable  -on good GDMT, continue as above  -euvolemic on exam, continue lasix    6. CKD  -stable  -avoid NSAIDs, nephrotoxic agents  -monitor    7. Obesity  -low sodium, heart healthy diet; Mediterranean diet education  -mod intensity exercise 30m/day 3-4x/wk  -weight loss    8. JUSTIN  -CPAP    9. Marijuana use  -smoking cessation education     The ASCVD Risk score (Isidro WANG, et al., 2019) failed to calculate for the following reasons:    The valid total cholesterol range is 130 to 320 mg/dL    Orders Placed This Encounter   Procedures    Comprehensive Metabolic Panel      Standing Status:   Future     Expected Date:   10/23/2025     Expiration Date:   10/21/2026     Send normal result to authorizing provider's In Basket if patient is active on MyChart::   Yes    Lipid Panel     Standing Status:   Future     Expected Date:   10/23/2025     Expiration Date:   10/21/2026     Send normal result to authorizing provider's In Basket if patient is active on MyChart::   Yes       He expressed verbal understanding and agreed with the plan    Follow up in 3m with Dr. Champion or sooner PRN    Pertinent cardiac images and EKG reviewed independently.    Continue with current medical plan and lifestyle changes.  Return sooner for concerns or questions. If symptoms persist go to the ED.  I have reviewed all pertinent data including patient's medical history in detail and updated the computerized patient record.     Counseling included discussion regarding imaging findings, diagnosis, possibilities, treatment options, risks and benefits.    Thank you for the opportunity to care for this patient. Will be available for questions if needed.        Signed:  Jhonny Henry DNP  07/23/2025

## 2025-07-23 ENCOUNTER — OFFICE VISIT (OUTPATIENT)
Dept: CARDIOLOGY | Facility: CLINIC | Age: 60
End: 2025-07-23
Payer: COMMERCIAL

## 2025-07-23 VITALS
HEIGHT: 64 IN | BODY MASS INDEX: 34.57 KG/M2 | SYSTOLIC BLOOD PRESSURE: 106 MMHG | OXYGEN SATURATION: 94 % | WEIGHT: 202.5 LBS | DIASTOLIC BLOOD PRESSURE: 64 MMHG | HEART RATE: 85 BPM

## 2025-07-23 DIAGNOSIS — E78.5 HYPERLIPIDEMIA LDL GOAL <70: ICD-10-CM

## 2025-07-23 DIAGNOSIS — I50.32 CHRONIC DIASTOLIC CONGESTIVE HEART FAILURE: ICD-10-CM

## 2025-07-23 DIAGNOSIS — F12.90 MARIJUANA USE: ICD-10-CM

## 2025-07-23 DIAGNOSIS — I10 ESSENTIAL HYPERTENSION: Chronic | ICD-10-CM

## 2025-07-23 DIAGNOSIS — I73.9 PAD (PERIPHERAL ARTERY DISEASE): ICD-10-CM

## 2025-07-23 DIAGNOSIS — E66.811 OBESITY (BMI 30.0-34.9): ICD-10-CM

## 2025-07-23 DIAGNOSIS — N18.31 CHRONIC KIDNEY DISEASE, STAGE 3A: ICD-10-CM

## 2025-07-23 DIAGNOSIS — I25.110 ATHEROSCLEROSIS OF NATIVE CORONARY ARTERY OF NATIVE HEART WITH UNSTABLE ANGINA PECTORIS: ICD-10-CM

## 2025-07-23 DIAGNOSIS — I10 PRIMARY HYPERTENSION: ICD-10-CM

## 2025-07-23 DIAGNOSIS — I10 BENIGN ESSENTIAL HTN: ICD-10-CM

## 2025-07-23 DIAGNOSIS — I25.118 CORONARY ARTERY DISEASE OF NATIVE ARTERY OF NATIVE HEART WITH STABLE ANGINA PECTORIS: Primary | ICD-10-CM

## 2025-07-23 DIAGNOSIS — G47.30 SLEEP APNEA, UNSPECIFIED TYPE: ICD-10-CM

## 2025-07-23 PROCEDURE — 99999 PR PBB SHADOW E&M-EST. PATIENT-LVL IV: CPT | Mod: PBBFAC,,,

## 2025-07-23 RX ORDER — ROSUVASTATIN CALCIUM 40 MG/1
40 TABLET, COATED ORAL DAILY
Qty: 90 TABLET | Refills: 3 | Status: SHIPPED | OUTPATIENT
Start: 2025-07-23

## 2025-07-23 RX ORDER — CLOPIDOGREL BISULFATE 75 MG/1
75 TABLET ORAL DAILY
Qty: 90 TABLET | Refills: 3 | Status: SHIPPED | OUTPATIENT
Start: 2025-07-23

## 2025-07-23 RX ORDER — LOSARTAN POTASSIUM 25 MG/1
25 TABLET ORAL DAILY
Qty: 90 TABLET | Refills: 3 | Status: SHIPPED | OUTPATIENT
Start: 2025-07-23

## 2025-07-23 RX ORDER — AMLODIPINE BESYLATE 5 MG/1
5 TABLET ORAL DAILY
Qty: 90 TABLET | Refills: 4 | Status: SHIPPED | OUTPATIENT
Start: 2025-07-23

## 2025-07-23 RX ORDER — ASPIRIN 81 MG/1
81 TABLET ORAL DAILY
Start: 2025-07-23

## 2025-07-23 RX ORDER — CARVEDILOL 25 MG/1
25 TABLET ORAL 2 TIMES DAILY WITH MEALS
Qty: 180 TABLET | Refills: 3 | Status: SHIPPED | OUTPATIENT
Start: 2025-07-23

## 2025-07-23 RX ORDER — EZETIMIBE 10 MG/1
10 TABLET ORAL DAILY
Qty: 90 TABLET | Refills: 3 | Status: SHIPPED | OUTPATIENT
Start: 2025-07-23 | End: 2026-07-23

## 2025-08-26 ENCOUNTER — OFFICE VISIT (OUTPATIENT)
Dept: INTERNAL MEDICINE | Facility: CLINIC | Age: 60
End: 2025-08-26
Payer: COMMERCIAL

## 2025-08-26 VITALS
RESPIRATION RATE: 20 BRPM | WEIGHT: 206.13 LBS | OXYGEN SATURATION: 90 % | BODY MASS INDEX: 35.19 KG/M2 | HEIGHT: 64 IN | DIASTOLIC BLOOD PRESSURE: 70 MMHG | SYSTOLIC BLOOD PRESSURE: 104 MMHG | HEART RATE: 80 BPM

## 2025-08-26 DIAGNOSIS — G31.9 POSTERIOR CORTICAL ATROPHY: Primary | ICD-10-CM

## 2025-08-26 DIAGNOSIS — R51.9 GENERALIZED HEADACHE: ICD-10-CM

## 2025-08-26 DIAGNOSIS — R47.81 SLURRED SPEECH: ICD-10-CM

## 2025-08-26 DIAGNOSIS — R53.83 FATIGUE, UNSPECIFIED TYPE: ICD-10-CM

## 2025-08-26 DIAGNOSIS — Z95.5 H/O HEART ARTERY STENT: ICD-10-CM

## 2025-08-26 DIAGNOSIS — E66.01 SEVERE OBESITY (BMI 35.0-39.9) WITH COMORBIDITY: ICD-10-CM

## 2025-08-26 DIAGNOSIS — R29.6 FALLS FREQUENTLY: ICD-10-CM

## 2025-08-26 DIAGNOSIS — J44.1 COPD WITH ACUTE EXACERBATION: ICD-10-CM

## 2025-08-26 PROCEDURE — 99214 OFFICE O/P EST MOD 30 MIN: CPT | Mod: 25,S$GLB,, | Performed by: NURSE PRACTITIONER

## 2025-08-26 PROCEDURE — 3044F HG A1C LEVEL LT 7.0%: CPT | Mod: CPTII,S$GLB,, | Performed by: NURSE PRACTITIONER

## 2025-08-26 PROCEDURE — 3061F NEG MICROALBUMINURIA REV: CPT | Mod: CPTII,S$GLB,, | Performed by: NURSE PRACTITIONER

## 2025-08-26 PROCEDURE — 4010F ACE/ARB THERAPY RXD/TAKEN: CPT | Mod: CPTII,S$GLB,, | Performed by: NURSE PRACTITIONER

## 2025-08-26 PROCEDURE — 3066F NEPHROPATHY DOC TX: CPT | Mod: CPTII,S$GLB,, | Performed by: NURSE PRACTITIONER

## 2025-08-26 PROCEDURE — 3008F BODY MASS INDEX DOCD: CPT | Mod: CPTII,S$GLB,, | Performed by: NURSE PRACTITIONER

## 2025-08-26 PROCEDURE — 99999 PR PBB SHADOW E&M-EST. PATIENT-LVL IV: CPT | Mod: PBBFAC,,, | Performed by: NURSE PRACTITIONER

## 2025-08-26 PROCEDURE — 3078F DIAST BP <80 MM HG: CPT | Mod: CPTII,S$GLB,, | Performed by: NURSE PRACTITIONER

## 2025-08-26 PROCEDURE — 3074F SYST BP LT 130 MM HG: CPT | Mod: CPTII,S$GLB,, | Performed by: NURSE PRACTITIONER

## 2025-08-26 PROCEDURE — 96372 THER/PROPH/DIAG INJ SC/IM: CPT | Mod: S$GLB,,, | Performed by: NURSE PRACTITIONER

## 2025-08-26 PROCEDURE — 1159F MED LIST DOCD IN RCRD: CPT | Mod: CPTII,S$GLB,, | Performed by: NURSE PRACTITIONER

## 2025-08-26 RX ORDER — BUDESONIDE AND FORMOTEROL FUMARATE 80; 4.5 UG/1; UG/1
AEROSOL, METERED RESPIRATORY (INHALATION)
Qty: 10.2 G | Refills: 1 | Status: SHIPPED | OUTPATIENT
Start: 2025-08-26

## 2025-08-26 RX ORDER — CYANOCOBALAMIN 1000 UG/ML
1000 INJECTION, SOLUTION INTRAMUSCULAR; SUBCUTANEOUS
Status: COMPLETED | OUTPATIENT
Start: 2025-08-26 | End: 2025-08-26

## 2025-08-26 RX ADMIN — CYANOCOBALAMIN 1000 MCG: 1000 INJECTION, SOLUTION INTRAMUSCULAR; SUBCUTANEOUS at 01:08

## 2025-08-28 ENCOUNTER — HOSPITAL ENCOUNTER (OUTPATIENT)
Dept: RADIOLOGY | Facility: HOSPITAL | Age: 60
Discharge: HOME | End: 2025-08-28
Attending: NURSE PRACTITIONER
Payer: COMMERCIAL

## 2025-08-28 DIAGNOSIS — G31.9 POSTERIOR CORTICAL ATROPHY: ICD-10-CM

## 2025-08-28 DIAGNOSIS — R29.6 FALLS FREQUENTLY: ICD-10-CM

## 2025-08-28 DIAGNOSIS — R47.81 SLURRED SPEECH: ICD-10-CM

## 2025-08-28 PROCEDURE — 70551 MRI BRAIN STEM W/O DYE: CPT | Mod: TC

## 2025-08-28 PROCEDURE — 70551 MRI BRAIN STEM W/O DYE: CPT | Mod: 26,,, | Performed by: RADIOLOGY

## 2025-09-04 ENCOUNTER — TELEPHONE (OUTPATIENT)
Dept: INTERNAL MEDICINE | Facility: CLINIC | Age: 60
End: 2025-09-04
Payer: COMMERCIAL

## 2025-09-04 DIAGNOSIS — R26.89 BALANCE DISORDER: ICD-10-CM

## 2025-09-04 DIAGNOSIS — R41.82 ALTERED MENTAL STATUS, UNSPECIFIED ALTERED MENTAL STATUS TYPE: Primary | ICD-10-CM

## (undated) DEVICE — NDL HYPO A BEVEL 22X1 1/2

## (undated) DEVICE — SEE MEDLINE ITEM 146417

## (undated) DEVICE — GAUZE SPONGE 4X4 12PLY

## (undated) DEVICE — GUIDEWIRE STD .035X180CM ANG

## (undated) DEVICE — SEE MEDLINE ITEM 157148

## (undated) DEVICE — KIT MICROINTRO 4F .018X40X7CM

## (undated) DEVICE — BLADE SURG CARBON STEEL SZ11

## (undated) DEVICE — VISE RADIFOCUS MULTI TORQUE

## (undated) DEVICE — CATH LUTONIX DCB 035X130X5X40

## (undated) DEVICE — CONNECTOR ACCU-FLO 3WAY

## (undated) DEVICE — PANTIES FEMININE NAPKIN LG/XLG

## (undated) DEVICE — VALVE CONTROL COPILOT

## (undated) DEVICE — CATH IMPULSE 5FR PIGTAIL 125CM

## (undated) DEVICE — Device

## (undated) DEVICE — VISIPAQUE 320 200ML +PK

## (undated) DEVICE — SPIKE CONTRAST CONTROLLER

## (undated) DEVICE — KIT LEFT HEART MANIFOLD CUSTOM

## (undated) DEVICE — TRAY MINOR GEN SURG

## (undated) DEVICE — KIT INTRODUCER MICROPUNCTR 4F

## (undated) DEVICE — GUIDE LAUNCHER 6FR JL 4.0

## (undated) DEVICE — DEVICE MYNX GRIP 6/7F

## (undated) DEVICE — CATH MP 4FR 125CM

## (undated) DEVICE — PAD DEFIB CADENCE ADULT R2

## (undated) DEVICE — GUIDEWIRE RUNTHROUGH EF 180CM

## (undated) DEVICE — CATH IMPULSE FL4 5FR 100CM

## (undated) DEVICE — CATH 6FR IMA

## (undated) DEVICE — INFLATOR ENCORE 26 BLLN INFL

## (undated) DEVICE — CATH NC EMERGE MR 3.5X15MM

## (undated) DEVICE — GUIDE WIRE WHOLEY EXCHANGE 300

## (undated) DEVICE — CATH NC EMERGE MR 3.5X12MM

## (undated) DEVICE — KIT PROBE COVER WITH GEL

## (undated) DEVICE — SYR MED RAD 150ML

## (undated) DEVICE — CATH EMERGE MR 12 X 3.00

## (undated) DEVICE — DRAPE ANGIO BRACH 38X44IN

## (undated) DEVICE — COVER INSTR ELASTIC BAND 40X20

## (undated) DEVICE — SHEATH INTRODUCER 6FR 11CM

## (undated) DEVICE — KIT GLIDESHEATH SLEND 6FR 10CM

## (undated) DEVICE — CATH IMPULSE 5F 100CM FR4

## (undated) DEVICE — SET MICRO PUNCT 4FR/MPIS-401

## (undated) DEVICE — GLIDESHEATH SLENDER SS 5FR10CM

## (undated) DEVICE — SHEATH 6FR 40CM CROSSOVER

## (undated) DEVICE — COMPRESS FEMSTOP

## (undated) DEVICE — GUIDEWIRE OMNI J TIP 185CM

## (undated) DEVICE — SYR ONLY LUER LOCK 20CC

## (undated) DEVICE — KIT CUSTOM MANIFOLD

## (undated) DEVICE — CATH EMERGE MR 30 X 3.00

## (undated) DEVICE — CONTRAST FLEXCIL INJECTION

## (undated) DEVICE — GUIDEWIRE WHOLEY STD STR 260CM

## (undated) DEVICE — KIT INTRODUCER W/GUIDEWIRE

## (undated) DEVICE — SEE MEDLINE ITEM 157187

## (undated) DEVICE — CATH EAGLE EYE PLATINUM

## (undated) DEVICE — CATH ANGIOSCULPT EVO 3.5X10MM

## (undated) DEVICE — CATH SHOCKWAVE C2+ IVL 3.5X12M

## (undated) DEVICE — CLIPPER BLADE MOD 4406 (CAREF)

## (undated) DEVICE — GUIDE WIRE BMW 014 X190

## (undated) DEVICE — COVER PROBE US 5.5X58L NON LTX

## (undated) DEVICE — GUIDEWIRE INQWIRE SE 3MM JTIP

## (undated) DEVICE — CATH ANGLED GLIDE CATH 4FR

## (undated) DEVICE — PADS RADI PERIPHERAL SHIELD

## (undated) DEVICE — HEMOSTAT VASC BAND LONG 27CM

## (undated) DEVICE — SEE MEDLINE ITEM 156894

## (undated) DEVICE — LUBRICANT SURGILUBE 2 OZ

## (undated) DEVICE — SUT PERCLOSE PROSTYLE MEDIATE

## (undated) DEVICE — PROTECTION STATION PLUS

## (undated) DEVICE — GUIDEWIRE EMERALD 150CM PTFE

## (undated) DEVICE — GUIDEWIRE STD .035X180CM STR

## (undated) DEVICE — SEE MEDLINE ITEM 152622

## (undated) DEVICE — GUIDE WIRE WHOLLY FLOPPY

## (undated) DEVICE — GUIDE LAUNCHER 6FR JR 4.0

## (undated) DEVICE — PAD ABD 8X10 STERILE

## (undated) DEVICE — ELECTRODE REM PLYHSV RETURN 9

## (undated) DEVICE — CATH ANG PIGTAIL 4FR INFINITY

## (undated) DEVICE — CATH EAGLE EYE ST .014X20X150

## (undated) DEVICE — COVER BAND BAG 40 X 40

## (undated) DEVICE — BRIEF MESH LARGE

## (undated) DEVICE — GUIDEWIRE STF .035X180CM ANG